# Patient Record
Sex: FEMALE | Race: WHITE | NOT HISPANIC OR LATINO | Employment: OTHER | URBAN - METROPOLITAN AREA
[De-identification: names, ages, dates, MRNs, and addresses within clinical notes are randomized per-mention and may not be internally consistent; named-entity substitution may affect disease eponyms.]

---

## 2017-01-27 DIAGNOSIS — Q23.1 BICUSPID AORTIC VALVE: ICD-10-CM

## 2017-01-27 DIAGNOSIS — R94.31 ABNORMAL EKG: Primary | ICD-10-CM

## 2017-05-02 ENCOUNTER — TRANSCRIBE ORDERS (OUTPATIENT)
Dept: ADMINISTRATIVE | Facility: HOSPITAL | Age: 67
End: 2017-05-02

## 2017-05-02 DIAGNOSIS — Z12.31 VISIT FOR SCREENING MAMMOGRAM: Primary | ICD-10-CM

## 2017-05-05 ENCOUNTER — APPOINTMENT (OUTPATIENT)
Dept: OTHER | Facility: HOSPITAL | Age: 67
End: 2017-05-05
Attending: FAMILY MEDICINE

## 2017-05-05 ENCOUNTER — HOSPITAL ENCOUNTER (OUTPATIENT)
Dept: MAMMOGRAPHY | Facility: HOSPITAL | Age: 67
Discharge: HOME | End: 2017-05-05
Attending: FAMILY MEDICINE | Admitting: FAMILY MEDICINE

## 2017-05-05 DIAGNOSIS — Z92.89 H/O MAMMOGRAM: ICD-10-CM

## 2017-05-05 DIAGNOSIS — Z12.31 VISIT FOR SCREENING MAMMOGRAM: ICD-10-CM

## 2017-05-05 PROCEDURE — 77063 BREAST TOMOSYNTHESIS BI: CPT

## 2017-05-05 PROCEDURE — 77067 SCR MAMMO BI INCL CAD: CPT | Performed by: RADIOLOGY

## 2017-05-05 PROCEDURE — 77063 BREAST TOMOSYNTHESIS BI: CPT | Performed by: RADIOLOGY

## 2017-05-05 PROCEDURE — G0202 SCR MAMMO BI INCL CAD: HCPCS

## 2017-05-10 ENCOUNTER — TRANSCRIBE ORDERS (OUTPATIENT)
Dept: ADMINISTRATIVE | Facility: HOSPITAL | Age: 67
End: 2017-05-10

## 2017-05-10 DIAGNOSIS — M25.512 LEFT SHOULDER PAIN, UNSPECIFIED CHRONICITY: Primary | ICD-10-CM

## 2017-05-15 ENCOUNTER — APPOINTMENT (OUTPATIENT)
Dept: PREADMISSION TESTING | Facility: HOSPITAL | Age: 67
End: 2017-05-15

## 2017-05-15 ENCOUNTER — HOSPITAL ENCOUNTER (OUTPATIENT)
Dept: CT IMAGING | Facility: HOSPITAL | Age: 67
Discharge: HOME | End: 2017-05-15
Attending: ORTHOPAEDIC SURGERY | Admitting: ORTHOPAEDIC SURGERY

## 2017-05-15 ENCOUNTER — APPOINTMENT (OUTPATIENT)
Dept: CT IMAGING | Facility: HOSPITAL | Age: 67
End: 2017-05-15
Attending: ORTHOPAEDIC SURGERY

## 2017-05-15 ENCOUNTER — HOSPITAL ENCOUNTER (OUTPATIENT)
Dept: GENERAL RADIOLOGY | Facility: HOSPITAL | Age: 67
Discharge: HOME | End: 2017-05-15

## 2017-05-15 VITALS — HEIGHT: 60 IN | BODY MASS INDEX: 27.05 KG/M2 | WEIGHT: 137.79 LBS

## 2017-05-15 DIAGNOSIS — M25.512 LEFT SHOULDER PAIN, UNSPECIFIED CHRONICITY: ICD-10-CM

## 2017-05-15 LAB
ALBUMIN SERPL-MCNC: 4.1 G/DL (ref 3.2–4.8)
ALBUMIN/GLOB SERPL: 1.6 G/DL (ref 1.5–2.5)
ALP SERPL-CCNC: 82 U/L (ref 25–100)
ALT SERPL W P-5'-P-CCNC: 18 U/L (ref 7–40)
ANION GAP SERPL CALCULATED.3IONS-SCNC: 2 MMOL/L (ref 3–11)
APTT PPP: 25 SECONDS (ref 24–31)
AST SERPL-CCNC: 30 U/L (ref 0–33)
BILIRUB SERPL-MCNC: 0.1 MG/DL (ref 0.3–1.2)
BUN BLD-MCNC: 14 MG/DL (ref 9–23)
BUN/CREAT SERPL: 20 (ref 7–25)
CALCIUM SPEC-SCNC: 9.6 MG/DL (ref 8.7–10.4)
CHLORIDE SERPL-SCNC: 107 MMOL/L (ref 99–109)
CO2 SERPL-SCNC: 37 MMOL/L (ref 20–31)
CREAT BLD-MCNC: 0.7 MG/DL (ref 0.6–1.3)
DEPRECATED RDW RBC AUTO: 57.4 FL (ref 37–54)
ERYTHROCYTE [DISTWIDTH] IN BLOOD BY AUTOMATED COUNT: 16.2 % (ref 11.3–14.5)
GFR SERPL CREATININE-BSD FRML MDRD: 84 ML/MIN/1.73
GLOBULIN UR ELPH-MCNC: 2.5 GM/DL
GLUCOSE BLD-MCNC: 124 MG/DL (ref 70–100)
HBA1C MFR BLD: 4.9 % (ref 4.8–5.6)
HCT VFR BLD AUTO: 43.9 % (ref 34.5–44)
HGB BLD-MCNC: 13.7 G/DL (ref 11.5–15.5)
INR PPP: 1.04
MCH RBC QN AUTO: 30.6 PG (ref 27–31)
MCHC RBC AUTO-ENTMCNC: 31.2 G/DL (ref 32–36)
MCV RBC AUTO: 98 FL (ref 80–99)
PLATELET # BLD AUTO: 172 10*3/MM3 (ref 150–450)
PMV BLD AUTO: 9.2 FL (ref 6–12)
POTASSIUM BLD-SCNC: 4.2 MMOL/L (ref 3.5–5.5)
PROT SERPL-MCNC: 6.6 G/DL (ref 5.7–8.2)
PROTHROMBIN TIME: 11.4 SECONDS (ref 9.6–11.5)
RBC # BLD AUTO: 4.48 10*6/MM3 (ref 3.89–5.14)
SODIUM BLD-SCNC: 146 MMOL/L (ref 132–146)
WBC NRBC COR # BLD: 5.74 10*3/MM3 (ref 3.5–10.8)

## 2017-05-15 PROCEDURE — 85610 PROTHROMBIN TIME: CPT | Performed by: ORTHOPAEDIC SURGERY

## 2017-05-15 PROCEDURE — 80053 COMPREHEN METABOLIC PANEL: CPT | Performed by: ORTHOPAEDIC SURGERY

## 2017-05-15 PROCEDURE — 85027 COMPLETE CBC AUTOMATED: CPT | Performed by: ORTHOPAEDIC SURGERY

## 2017-05-15 PROCEDURE — 71020 HC CHEST PA AND LATERAL: CPT

## 2017-05-15 PROCEDURE — 73200 CT UPPER EXTREMITY W/O DYE: CPT

## 2017-05-15 PROCEDURE — 36415 COLL VENOUS BLD VENIPUNCTURE: CPT

## 2017-05-15 PROCEDURE — 83036 HEMOGLOBIN GLYCOSYLATED A1C: CPT | Performed by: ORTHOPAEDIC SURGERY

## 2017-05-15 PROCEDURE — 85730 THROMBOPLASTIN TIME PARTIAL: CPT | Performed by: ORTHOPAEDIC SURGERY

## 2017-05-15 RX ORDER — VITAMIN E 268 MG
800 CAPSULE ORAL DAILY
COMMUNITY

## 2017-05-15 RX ORDER — LEVOTHYROXINE SODIUM 88 UG/1
88 TABLET ORAL DAILY
COMMUNITY
End: 2021-04-05

## 2017-05-15 RX ORDER — TRIAMCINOLONE ACETONIDE 55 UG/1
2 SPRAY, METERED NASAL DAILY
COMMUNITY
End: 2017-08-30

## 2017-05-15 RX ORDER — CETIRIZINE HYDROCHLORIDE 10 MG/1
10 TABLET ORAL DAILY PRN
COMMUNITY
End: 2018-03-20

## 2017-05-17 ENCOUNTER — HOSPITAL ENCOUNTER (OUTPATIENT)
Dept: MAMMOGRAPHY | Facility: HOSPITAL | Age: 67
Discharge: HOME | End: 2017-05-17
Admitting: FAMILY MEDICINE

## 2017-05-17 DIAGNOSIS — R92.8 ABNORMAL MAMMOGRAM: ICD-10-CM

## 2017-05-17 PROCEDURE — G0279 TOMOSYNTHESIS, MAMMO: HCPCS

## 2017-05-17 PROCEDURE — G0204 DX MAMMO INCL CAD BI: HCPCS

## 2017-05-17 PROCEDURE — 77062 BREAST TOMOSYNTHESIS BI: CPT | Performed by: RADIOLOGY

## 2017-05-17 PROCEDURE — 77066 DX MAMMO INCL CAD BI: CPT | Performed by: RADIOLOGY

## 2017-05-18 DIAGNOSIS — I35.9 AORTIC VALVE DEFECT: Primary | ICD-10-CM

## 2017-05-19 ENCOUNTER — ANESTHESIA EVENT (OUTPATIENT)
Dept: PERIOP | Facility: HOSPITAL | Age: 67
End: 2017-05-19

## 2017-05-19 RX ORDER — SODIUM CHLORIDE 0.9 % (FLUSH) 0.9 %
1-10 SYRINGE (ML) INJECTION AS NEEDED
Status: CANCELLED | OUTPATIENT
Start: 2017-05-19

## 2017-05-19 RX ORDER — FAMOTIDINE 10 MG/ML
20 INJECTION, SOLUTION INTRAVENOUS ONCE
Status: CANCELLED | OUTPATIENT
Start: 2017-05-19 | End: 2017-05-19

## 2017-05-22 ENCOUNTER — HOSPITAL ENCOUNTER (INPATIENT)
Facility: HOSPITAL | Age: 67
LOS: 1 days | Discharge: HOME | End: 2017-05-23
Attending: ORTHOPAEDIC SURGERY | Admitting: ORTHOPAEDIC SURGERY

## 2017-05-22 ENCOUNTER — ANESTHESIA (OUTPATIENT)
Dept: PERIOP | Facility: HOSPITAL | Age: 67
End: 2017-05-22

## 2017-05-22 DIAGNOSIS — Z74.09 IMPAIRED MOBILITY AND ADLS: ICD-10-CM

## 2017-05-22 DIAGNOSIS — Z78.9 IMPAIRED MOBILITY AND ADLS: ICD-10-CM

## 2017-05-22 DIAGNOSIS — Z74.09 IMPAIRED FUNCTIONAL MOBILITY, BALANCE, GAIT, AND ENDURANCE: Primary | ICD-10-CM

## 2017-05-22 PROBLEM — E03.9 HYPOTHYROID: Status: ACTIVE | Noted: 2017-05-22

## 2017-05-22 PROBLEM — M19.019 ARTHROPATHY OF SHOULDER REGION: Status: ACTIVE | Noted: 2017-05-22

## 2017-05-22 PROBLEM — Z96.612 STATUS POST REVERSE TOTAL REPLACEMENT OF LEFT SHOULDER: Status: ACTIVE | Noted: 2017-05-22

## 2017-05-22 LAB
ABO GROUP BLD: NORMAL
BLD GP AB SCN SERPL QL: NEGATIVE
GLUCOSE BLDC GLUCOMTR-MCNC: 134 MG/DL (ref 70–130)
RH BLD: POSITIVE

## 2017-05-22 PROCEDURE — 0PRD0JZ REPLACEMENT OF LEFT HUMERAL HEAD WITH SYNTHETIC SUBSTITUTE, OPEN APPROACH: ICD-10-PCS | Performed by: ORTHOPAEDIC SURGERY

## 2017-05-22 PROCEDURE — 25010000002 PROPOFOL 10 MG/ML EMULSION: Performed by: NURSE ANESTHETIST, CERTIFIED REGISTERED

## 2017-05-22 PROCEDURE — 82962 GLUCOSE BLOOD TEST: CPT

## 2017-05-22 PROCEDURE — 25010000003 CEFAZOLIN IN DEXTROSE 2-4 GM/100ML-% SOLUTION: Performed by: ORTHOPAEDIC SURGERY

## 2017-05-22 PROCEDURE — 25010000002 HYDROCORTISONE SODIUM SUCCINATE 100 MG RECONSTITUTED SOLUTION: Performed by: NURSE PRACTITIONER

## 2017-05-22 PROCEDURE — 25010000002 MIDAZOLAM PER 1 MG: Performed by: NURSE ANESTHETIST, CERTIFIED REGISTERED

## 2017-05-22 PROCEDURE — 86920 COMPATIBILITY TEST SPIN: CPT

## 2017-05-22 PROCEDURE — 25010000002 ROPIVACAINE PER 1 MG: Performed by: NURSE ANESTHETIST, CERTIFIED REGISTERED

## 2017-05-22 PROCEDURE — 25010000002 NEOSTIGMINE PER 0.5 MG: Performed by: NURSE ANESTHETIST, CERTIFIED REGISTERED

## 2017-05-22 PROCEDURE — 25010000002 HYDROCORTISONE SODIUM SUCCINATE 100 MG RECONSTITUTED SOLUTION: Performed by: NURSE ANESTHETIST, CERTIFIED REGISTERED

## 2017-05-22 PROCEDURE — C1713 ANCHOR/SCREW BN/BN,TIS/BN: HCPCS | Performed by: ORTHOPAEDIC SURGERY

## 2017-05-22 PROCEDURE — C1776 JOINT DEVICE (IMPLANTABLE): HCPCS | Performed by: ORTHOPAEDIC SURGERY

## 2017-05-22 PROCEDURE — 25010000002 ONDANSETRON PER 1 MG: Performed by: NURSE ANESTHETIST, CERTIFIED REGISTERED

## 2017-05-22 PROCEDURE — 0RRK00Z REPLACEMENT OF LEFT SHOULDER JOINT WITH REVERSE BALL AND SOCKET SYNTHETIC SUBSTITUTE, OPEN APPROACH: ICD-10-PCS | Performed by: ORTHOPAEDIC SURGERY

## 2017-05-22 PROCEDURE — 86901 BLOOD TYPING SEROLOGIC RH(D): CPT | Performed by: ORTHOPAEDIC SURGERY

## 2017-05-22 PROCEDURE — 86900 BLOOD TYPING SEROLOGIC ABO: CPT | Performed by: ORTHOPAEDIC SURGERY

## 2017-05-22 PROCEDURE — 25010000002 FENTANYL CITRATE (PF) 100 MCG/2ML SOLUTION: Performed by: NURSE ANESTHETIST, CERTIFIED REGISTERED

## 2017-05-22 PROCEDURE — 86850 RBC ANTIBODY SCREEN: CPT | Performed by: ORTHOPAEDIC SURGERY

## 2017-05-22 PROCEDURE — 0PHD04Z INSERTION OF INTERNAL FIXATION DEVICE INTO LEFT HUMERAL HEAD, OPEN APPROACH: ICD-10-PCS | Performed by: ORTHOPAEDIC SURGERY

## 2017-05-22 DEVICE — IMPLANTABLE DEVICE: Type: IMPLANTABLE DEVICE | Site: SHOULDER | Status: FUNCTIONAL

## 2017-05-22 DEVICE — SCRW GLEN UNIVERS REVERS CENTRL NL 6.5X20MM: Type: IMPLANTABLE DEVICE | Site: SHOULDER | Status: FUNCTIONAL

## 2017-05-22 DEVICE — CUP SUT UNIVERS REVERS 36 NTRL: Type: IMPLANTABLE DEVICE | Site: SHOULDER | Status: FUNCTIONAL

## 2017-05-22 DEVICE — SCRW GLEN UNIVERS REVERS PERIPH 4.5X36MM: Type: IMPLANTABLE DEVICE | Site: SHOULDER | Status: FUNCTIONAL

## 2017-05-22 DEVICE — SCRW GLEN UNIVERS REVERS PERIPH 4.5X30MM: Type: IMPLANTABLE DEVICE | Site: SHOULDER | Status: FUNCTIONAL

## 2017-05-22 DEVICE — GLENOSPHERE UNIVERS REVERS S/36 PLS4 LAT: Type: IMPLANTABLE DEVICE | Site: SHOULDER | Status: FUNCTIONAL

## 2017-05-22 DEVICE — STEM HUM/SHLDR UNIVERS REVERS CAP/COAT SZ7: Type: IMPLANTABLE DEVICE | Site: SHOULDER | Status: FUNCTIONAL

## 2017-05-22 RX ORDER — LEFLUNOMIDE 10 MG/1
20 TABLET ORAL DAILY
Status: DISCONTINUED | OUTPATIENT
Start: 2017-05-22 | End: 2017-05-23 | Stop reason: HOSPADM

## 2017-05-22 RX ORDER — LEVOTHYROXINE SODIUM 88 UG/1
88 TABLET ORAL DAILY
Status: DISCONTINUED | OUTPATIENT
Start: 2017-05-22 | End: 2017-05-23 | Stop reason: HOSPADM

## 2017-05-22 RX ORDER — HYDRALAZINE HYDROCHLORIDE 20 MG/ML
10 INJECTION INTRAMUSCULAR; INTRAVENOUS EVERY 6 HOURS PRN
Status: DISCONTINUED | OUTPATIENT
Start: 2017-05-22 | End: 2017-05-23 | Stop reason: HOSPADM

## 2017-05-22 RX ORDER — HYDROCODONE BITARTRATE AND ACETAMINOPHEN 5; 325 MG/1; MG/1
1 TABLET ORAL ONCE AS NEEDED
Status: DISCONTINUED | OUTPATIENT
Start: 2017-05-22 | End: 2017-05-22 | Stop reason: HOSPADM

## 2017-05-22 RX ORDER — ACETAMINOPHEN 325 MG/1
650 TABLET ORAL ONCE AS NEEDED
Status: DISCONTINUED | OUTPATIENT
Start: 2017-05-22 | End: 2017-05-22 | Stop reason: HOSPADM

## 2017-05-22 RX ORDER — GLYCOPYRROLATE 0.2 MG/ML
INJECTION INTRAMUSCULAR; INTRAVENOUS AS NEEDED
Status: DISCONTINUED | OUTPATIENT
Start: 2017-05-22 | End: 2017-05-22 | Stop reason: SURG

## 2017-05-22 RX ORDER — FENTANYL CITRATE 50 UG/ML
50 INJECTION, SOLUTION INTRAMUSCULAR; INTRAVENOUS
Status: DISCONTINUED | OUTPATIENT
Start: 2017-05-22 | End: 2017-05-22 | Stop reason: HOSPADM

## 2017-05-22 RX ORDER — DOCUSATE SODIUM 100 MG/1
100 CAPSULE, LIQUID FILLED ORAL 2 TIMES DAILY PRN
Status: DISCONTINUED | OUTPATIENT
Start: 2017-05-22 | End: 2017-05-23 | Stop reason: HOSPADM

## 2017-05-22 RX ORDER — CETIRIZINE HYDROCHLORIDE 10 MG/1
10 TABLET ORAL DAILY PRN
Status: DISCONTINUED | OUTPATIENT
Start: 2017-05-22 | End: 2017-05-23 | Stop reason: HOSPADM

## 2017-05-22 RX ORDER — SODIUM CHLORIDE, SODIUM LACTATE, POTASSIUM CHLORIDE, CALCIUM CHLORIDE 600; 310; 30; 20 MG/100ML; MG/100ML; MG/100ML; MG/100ML
9 INJECTION, SOLUTION INTRAVENOUS CONTINUOUS
Status: DISCONTINUED | OUTPATIENT
Start: 2017-05-22 | End: 2017-05-23 | Stop reason: HOSPADM

## 2017-05-22 RX ORDER — LIDOCAINE HYDROCHLORIDE 10 MG/ML
INJECTION, SOLUTION INFILTRATION; PERINEURAL AS NEEDED
Status: DISCONTINUED | OUTPATIENT
Start: 2017-05-22 | End: 2017-05-22 | Stop reason: SURG

## 2017-05-22 RX ORDER — OXYCODONE AND ACETAMINOPHEN 7.5; 325 MG/1; MG/1
2 TABLET ORAL EVERY 4 HOURS PRN
Status: DISCONTINUED | OUTPATIENT
Start: 2017-05-22 | End: 2017-05-23 | Stop reason: HOSPADM

## 2017-05-22 RX ORDER — LIDOCAINE HYDROCHLORIDE 10 MG/ML
0.5 INJECTION, SOLUTION EPIDURAL; INFILTRATION; INTRACAUDAL; PERINEURAL ONCE AS NEEDED
Status: COMPLETED | OUTPATIENT
Start: 2017-05-22 | End: 2017-05-22

## 2017-05-22 RX ORDER — BUPIVACAINE HYDROCHLORIDE 2.5 MG/ML
INJECTION, SOLUTION EPIDURAL; INFILTRATION; INTRACAUDAL AS NEEDED
Status: DISCONTINUED | OUTPATIENT
Start: 2017-05-22 | End: 2017-05-22 | Stop reason: SURG

## 2017-05-22 RX ORDER — CEFAZOLIN SODIUM 2 G/100ML
2 INJECTION, SOLUTION INTRAVENOUS ONCE
Status: COMPLETED | OUTPATIENT
Start: 2017-05-22 | End: 2017-05-22

## 2017-05-22 RX ORDER — NALOXONE HCL 0.4 MG/ML
0.1 VIAL (ML) INJECTION
Status: DISCONTINUED | OUTPATIENT
Start: 2017-05-22 | End: 2017-05-23 | Stop reason: HOSPADM

## 2017-05-22 RX ORDER — ONDANSETRON 2 MG/ML
4 INJECTION INTRAMUSCULAR; INTRAVENOUS EVERY 6 HOURS PRN
Status: DISCONTINUED | OUTPATIENT
Start: 2017-05-22 | End: 2017-05-23 | Stop reason: HOSPADM

## 2017-05-22 RX ORDER — ONDANSETRON 2 MG/ML
4 INJECTION INTRAMUSCULAR; INTRAVENOUS EVERY 6 HOURS PRN
Status: DISCONTINUED | OUTPATIENT
Start: 2017-05-22 | End: 2017-05-22 | Stop reason: SDUPTHER

## 2017-05-22 RX ORDER — FAMOTIDINE 20 MG/1
20 TABLET, FILM COATED ORAL ONCE
Status: COMPLETED | OUTPATIENT
Start: 2017-05-22 | End: 2017-05-22

## 2017-05-22 RX ORDER — ZOLPIDEM TARTRATE 5 MG/1
10 TABLET ORAL NIGHTLY
Status: DISCONTINUED | OUTPATIENT
Start: 2017-05-22 | End: 2017-05-23 | Stop reason: HOSPADM

## 2017-05-22 RX ORDER — PROPOFOL 10 MG/ML
VIAL (ML) INTRAVENOUS AS NEEDED
Status: DISCONTINUED | OUTPATIENT
Start: 2017-05-22 | End: 2017-05-22 | Stop reason: SURG

## 2017-05-22 RX ORDER — HYDROXYCHLOROQUINE SULFATE 200 MG/1
200 TABLET, FILM COATED ORAL DAILY
Status: DISCONTINUED | OUTPATIENT
Start: 2017-05-22 | End: 2017-05-23 | Stop reason: HOSPADM

## 2017-05-22 RX ORDER — SODIUM CHLORIDE, SODIUM LACTATE, POTASSIUM CHLORIDE, CALCIUM CHLORIDE 600; 310; 30; 20 MG/100ML; MG/100ML; MG/100ML; MG/100ML
INJECTION, SOLUTION INTRAVENOUS CONTINUOUS PRN
Status: DISCONTINUED | OUTPATIENT
Start: 2017-05-22 | End: 2017-05-22 | Stop reason: SURG

## 2017-05-22 RX ORDER — ONDANSETRON 2 MG/ML
4 INJECTION INTRAMUSCULAR; INTRAVENOUS ONCE AS NEEDED
Status: DISCONTINUED | OUTPATIENT
Start: 2017-05-22 | End: 2017-05-22 | Stop reason: HOSPADM

## 2017-05-22 RX ORDER — ATRACURIUM BESYLATE 10 MG/ML
INJECTION, SOLUTION INTRAVENOUS AS NEEDED
Status: DISCONTINUED | OUTPATIENT
Start: 2017-05-22 | End: 2017-05-22 | Stop reason: SURG

## 2017-05-22 RX ORDER — MAGNESIUM HYDROXIDE 1200 MG/15ML
LIQUID ORAL AS NEEDED
Status: DISCONTINUED | OUTPATIENT
Start: 2017-05-22 | End: 2017-05-22 | Stop reason: HOSPADM

## 2017-05-22 RX ORDER — PANTOPRAZOLE SODIUM 40 MG/1
40 TABLET, DELAYED RELEASE ORAL
Status: DISCONTINUED | OUTPATIENT
Start: 2017-05-22 | End: 2017-05-23 | Stop reason: HOSPADM

## 2017-05-22 RX ORDER — MIDAZOLAM HYDROCHLORIDE 1 MG/ML
INJECTION INTRAMUSCULAR; INTRAVENOUS AS NEEDED
Status: DISCONTINUED | OUTPATIENT
Start: 2017-05-22 | End: 2017-05-22 | Stop reason: SURG

## 2017-05-22 RX ORDER — PROPOFOL 10 MG/ML
VIAL (ML) INTRAVENOUS CONTINUOUS PRN
Status: DISCONTINUED | OUTPATIENT
Start: 2017-05-22 | End: 2017-05-22 | Stop reason: SURG

## 2017-05-22 RX ORDER — ROPIVACAINE HYDROCHLORIDE 2 MG/ML
6 INJECTION, SOLUTION EPIDURAL; INFILTRATION CONTINUOUS
Status: DISCONTINUED | OUTPATIENT
Start: 2017-05-22 | End: 2017-05-23 | Stop reason: HOSPADM

## 2017-05-22 RX ORDER — OXYCODONE AND ACETAMINOPHEN 7.5; 325 MG/1; MG/1
1 TABLET ORAL EVERY 4 HOURS PRN
Status: DISCONTINUED | OUTPATIENT
Start: 2017-05-22 | End: 2017-05-23 | Stop reason: HOSPADM

## 2017-05-22 RX ORDER — CEFAZOLIN SODIUM 2 G/100ML
2 INJECTION, SOLUTION INTRAVENOUS EVERY 8 HOURS
Status: COMPLETED | OUTPATIENT
Start: 2017-05-22 | End: 2017-05-22

## 2017-05-22 RX ORDER — DULOXETIN HYDROCHLORIDE 60 MG/1
60 CAPSULE, DELAYED RELEASE ORAL DAILY
Status: DISCONTINUED | OUTPATIENT
Start: 2017-05-22 | End: 2017-05-23 | Stop reason: HOSPADM

## 2017-05-22 RX ORDER — ONDANSETRON 2 MG/ML
INJECTION INTRAMUSCULAR; INTRAVENOUS AS NEEDED
Status: DISCONTINUED | OUTPATIENT
Start: 2017-05-22 | End: 2017-05-22 | Stop reason: SURG

## 2017-05-22 RX ORDER — ACETAMINOPHEN 650 MG/1
650 SUPPOSITORY RECTAL ONCE AS NEEDED
Status: DISCONTINUED | OUTPATIENT
Start: 2017-05-22 | End: 2017-05-22 | Stop reason: HOSPADM

## 2017-05-22 RX ORDER — PREDNISONE 1 MG/1
5 TABLET ORAL DAILY
Status: DISCONTINUED | OUTPATIENT
Start: 2017-05-23 | End: 2017-05-23 | Stop reason: HOSPADM

## 2017-05-22 RX ADMIN — EPHEDRINE SULFATE 10 MG: 50 INJECTION INTRAMUSCULAR; INTRAVENOUS; SUBCUTANEOUS at 08:50

## 2017-05-22 RX ADMIN — ROBINUL 0.4 MG: 0.2 INJECTION INTRAMUSCULAR; INTRAVENOUS at 08:53

## 2017-05-22 RX ADMIN — ZOLPIDEM TARTRATE 10 MG: 5 TABLET, FILM COATED ORAL at 22:36

## 2017-05-22 RX ADMIN — SODIUM CHLORIDE, POTASSIUM CHLORIDE, SODIUM LACTATE AND CALCIUM CHLORIDE 9 ML/HR: 600; 310; 30; 20 INJECTION, SOLUTION INTRAVENOUS at 06:25

## 2017-05-22 RX ADMIN — HYDROCORTISONE SODIUM SUCCINATE 100 MG: 100 INJECTION, POWDER, FOR SOLUTION INTRAMUSCULAR; INTRAVENOUS at 07:53

## 2017-05-22 RX ADMIN — SODIUM CHLORIDE, POTASSIUM CHLORIDE, SODIUM LACTATE AND CALCIUM CHLORIDE: 600; 310; 30; 20 INJECTION, SOLUTION INTRAVENOUS at 07:39

## 2017-05-22 RX ADMIN — LIDOCAINE HYDROCHLORIDE 50 MG: 10 INJECTION, SOLUTION INFILTRATION; PERINEURAL at 07:45

## 2017-05-22 RX ADMIN — LEFLUNOMIDE 20 MG: 10 TABLET, FILM COATED ORAL at 12:34

## 2017-05-22 RX ADMIN — EPHEDRINE SULFATE 20 MG: 50 INJECTION INTRAMUSCULAR; INTRAVENOUS; SUBCUTANEOUS at 08:48

## 2017-05-22 RX ADMIN — FENTANYL CITRATE 50 MCG: 50 INJECTION INTRAMUSCULAR; INTRAVENOUS at 09:40

## 2017-05-22 RX ADMIN — LEVOTHYROXINE SODIUM 88 MCG: 88 TABLET ORAL at 12:34

## 2017-05-22 RX ADMIN — DULOXETINE 60 MG: 60 CAPSULE, DELAYED RELEASE ORAL at 12:34

## 2017-05-22 RX ADMIN — CEFAZOLIN SODIUM 2 G: 2 INJECTION, SOLUTION INTRAVENOUS at 07:42

## 2017-05-22 RX ADMIN — SODIUM CHLORIDE, POTASSIUM CHLORIDE, SODIUM LACTATE AND CALCIUM CHLORIDE: 600; 310; 30; 20 INJECTION, SOLUTION INTRAVENOUS at 08:48

## 2017-05-22 RX ADMIN — PROPOFOL 111 MCG/KG/MIN: 10 INJECTION, EMULSION INTRAVENOUS at 07:45

## 2017-05-22 RX ADMIN — PANTOPRAZOLE SODIUM 40 MG: 40 TABLET, DELAYED RELEASE ORAL at 12:34

## 2017-05-22 RX ADMIN — PROPOFOL 150 MG: 10 INJECTION, EMULSION INTRAVENOUS at 07:45

## 2017-05-22 RX ADMIN — Medication 3 MG: at 08:53

## 2017-05-22 RX ADMIN — OXYCODONE HYDROCHLORIDE AND ACETAMINOPHEN 1 TABLET: 7.5; 325 TABLET ORAL at 22:36

## 2017-05-22 RX ADMIN — ONDANSETRON 4 MG: 2 INJECTION INTRAMUSCULAR; INTRAVENOUS at 08:53

## 2017-05-22 RX ADMIN — LIDOCAINE HYDROCHLORIDE 0.2 ML: 10 INJECTION, SOLUTION EPIDURAL; INFILTRATION; INTRACAUDAL; PERINEURAL at 06:25

## 2017-05-22 RX ADMIN — HYDROCORTISONE SODIUM SUCCINATE 50 MG: 100 INJECTION, POWDER, FOR SOLUTION INTRAMUSCULAR; INTRAVENOUS at 17:54

## 2017-05-22 RX ADMIN — MIDAZOLAM HYDROCHLORIDE 2 MG: 1 INJECTION, SOLUTION INTRAMUSCULAR; INTRAVENOUS at 07:08

## 2017-05-22 RX ADMIN — BUPIVACAINE HYDROCHLORIDE 15 ML: 2.5 INJECTION, SOLUTION EPIDURAL; INFILTRATION; INTRACAUDAL; PERINEURAL at 07:18

## 2017-05-22 RX ADMIN — MUPIROCIN: 20 OINTMENT TOPICAL at 06:33

## 2017-05-22 RX ADMIN — FENTANYL CITRATE 50 MCG: 50 INJECTION INTRAMUSCULAR; INTRAVENOUS at 10:05

## 2017-05-22 RX ADMIN — HYDROXYCHLOROQUINE SULFATE 200 MG: 200 TABLET, FILM COATED ORAL at 12:34

## 2017-05-22 RX ADMIN — FAMOTIDINE 20 MG: 20 TABLET ORAL at 06:33

## 2017-05-22 RX ADMIN — OXYCODONE HYDROCHLORIDE AND ACETAMINOPHEN 1 TABLET: 7.5; 325 TABLET ORAL at 17:54

## 2017-05-22 RX ADMIN — Medication 6 ML/HR: at 09:15

## 2017-05-22 RX ADMIN — ATRACURIUM BESYLATE 50 MG: 10 INJECTION, SOLUTION INTRAVENOUS at 07:45

## 2017-05-22 RX ADMIN — CEFAZOLIN SODIUM 2 G: 2 INJECTION, SOLUTION INTRAVENOUS at 22:36

## 2017-05-22 RX ADMIN — CEFAZOLIN SODIUM 2 G: 2 INJECTION, SOLUTION INTRAVENOUS at 15:56

## 2017-05-23 VITALS
OXYGEN SATURATION: 96 % | HEIGHT: 60 IN | RESPIRATION RATE: 16 BRPM | SYSTOLIC BLOOD PRESSURE: 127 MMHG | DIASTOLIC BLOOD PRESSURE: 70 MMHG | HEART RATE: 62 BPM | TEMPERATURE: 98 F | BODY MASS INDEX: 26.9 KG/M2 | WEIGHT: 137 LBS

## 2017-05-23 PROBLEM — N99.89 POSTOPERATIVE URINARY RETENTION: Status: ACTIVE | Noted: 2017-05-23

## 2017-05-23 PROBLEM — D62 ACUTE BLOOD LOSS ANEMIA: Status: ACTIVE | Noted: 2017-05-23

## 2017-05-23 PROBLEM — R33.8 POSTOPERATIVE URINARY RETENTION: Status: ACTIVE | Noted: 2017-05-23

## 2017-05-23 LAB
ANION GAP SERPL CALCULATED.3IONS-SCNC: 8 MMOL/L (ref 3–11)
BASOPHILS # BLD AUTO: 0.02 10*3/MM3 (ref 0–0.2)
BASOPHILS NFR BLD AUTO: 0.2 % (ref 0–1)
BUN BLD-MCNC: 13 MG/DL (ref 9–23)
BUN/CREAT SERPL: 21.7 (ref 7–25)
CALCIUM SPEC-SCNC: 8.8 MG/DL (ref 8.7–10.4)
CHLORIDE SERPL-SCNC: 101 MMOL/L (ref 99–109)
CO2 SERPL-SCNC: 30 MMOL/L (ref 20–31)
CREAT BLD-MCNC: 0.6 MG/DL (ref 0.6–1.3)
DEPRECATED RDW RBC AUTO: 57.9 FL (ref 37–54)
EOSINOPHIL # BLD AUTO: 0.02 10*3/MM3 (ref 0.1–0.3)
EOSINOPHIL NFR BLD AUTO: 0.2 % (ref 0–3)
ERYTHROCYTE [DISTWIDTH] IN BLOOD BY AUTOMATED COUNT: 16.2 % (ref 11.3–14.5)
GFR SERPL CREATININE-BSD FRML MDRD: 101 ML/MIN/1.73
GLUCOSE BLD-MCNC: 101 MG/DL (ref 70–100)
HCT VFR BLD AUTO: 36.2 % (ref 34.5–44)
HGB BLD-MCNC: 11.2 G/DL (ref 11.5–15.5)
IMM GRANULOCYTES # BLD: 0.02 10*3/MM3 (ref 0–0.03)
IMM GRANULOCYTES NFR BLD: 0.2 % (ref 0–0.6)
LYMPHOCYTES # BLD AUTO: 1.13 10*3/MM3 (ref 0.6–4.8)
LYMPHOCYTES NFR BLD AUTO: 13.8 % (ref 24–44)
MCH RBC QN AUTO: 30.6 PG (ref 27–31)
MCHC RBC AUTO-ENTMCNC: 30.9 G/DL (ref 32–36)
MCV RBC AUTO: 98.9 FL (ref 80–99)
MONOCYTES # BLD AUTO: 1.03 10*3/MM3 (ref 0–1)
MONOCYTES NFR BLD AUTO: 12.5 % (ref 0–12)
NEUTROPHILS # BLD AUTO: 5.99 10*3/MM3 (ref 1.5–8.3)
NEUTROPHILS NFR BLD AUTO: 73.1 % (ref 41–71)
PLATELET # BLD AUTO: 158 10*3/MM3 (ref 150–450)
PMV BLD AUTO: 9.4 FL (ref 6–12)
POTASSIUM BLD-SCNC: 3.5 MMOL/L (ref 3.5–5.5)
RBC # BLD AUTO: 3.66 10*6/MM3 (ref 3.89–5.14)
SODIUM BLD-SCNC: 139 MMOL/L (ref 132–146)
WBC NRBC COR # BLD: 8.21 10*3/MM3 (ref 3.5–10.8)

## 2017-05-23 PROCEDURE — 80048 BASIC METABOLIC PNL TOTAL CA: CPT | Performed by: ORTHOPAEDIC SURGERY

## 2017-05-23 PROCEDURE — 97530 THERAPEUTIC ACTIVITIES: CPT | Performed by: OCCUPATIONAL THERAPIST

## 2017-05-23 PROCEDURE — 25010000002 HYDROMORPHONE PER 4 MG: Performed by: ORTHOPAEDIC SURGERY

## 2017-05-23 PROCEDURE — 25010000002 HYDROCORTISONE SODIUM SUCCINATE 100 MG RECONSTITUTED SOLUTION: Performed by: NURSE PRACTITIONER

## 2017-05-23 PROCEDURE — 25010000002 ONDANSETRON PER 1 MG: Performed by: NURSE PRACTITIONER

## 2017-05-23 PROCEDURE — 63710000001 PREDNISONE PER 5 MG: Performed by: ORTHOPAEDIC SURGERY

## 2017-05-23 PROCEDURE — 97165 OT EVAL LOW COMPLEX 30 MIN: CPT | Performed by: OCCUPATIONAL THERAPIST

## 2017-05-23 PROCEDURE — 85025 COMPLETE CBC W/AUTO DIFF WBC: CPT | Performed by: ORTHOPAEDIC SURGERY

## 2017-05-23 PROCEDURE — 97161 PT EVAL LOW COMPLEX 20 MIN: CPT

## 2017-05-23 RX ORDER — TAMSULOSIN HYDROCHLORIDE 0.4 MG/1
0.4 CAPSULE ORAL DAILY
Status: DISCONTINUED | OUTPATIENT
Start: 2017-05-23 | End: 2017-05-23 | Stop reason: HOSPADM

## 2017-05-23 RX ORDER — LEFLUNOMIDE 20 MG/1
20 TABLET ORAL DAILY
Status: ON HOLD
Start: 2017-05-23 | End: 2018-04-05

## 2017-05-23 RX ORDER — TAMSULOSIN HYDROCHLORIDE 0.4 MG/1
0.4 CAPSULE ORAL DAILY
Qty: 14 CAPSULE | Refills: 0 | Status: SHIPPED | OUTPATIENT
Start: 2017-05-23 | End: 2017-08-30

## 2017-05-23 RX ORDER — PSEUDOEPHEDRINE HCL 30 MG
100 TABLET ORAL 2 TIMES DAILY PRN
Qty: 60 CAPSULE | Refills: 0 | Status: SHIPPED | OUTPATIENT
Start: 2017-05-23 | End: 2017-08-30

## 2017-05-23 RX ORDER — ROPIVACAINE HYDROCHLORIDE 2 MG/ML
6 INJECTION, SOLUTION EPIDURAL; INFILTRATION CONTINUOUS
Start: 2017-05-23 | End: 2017-08-30

## 2017-05-23 RX ORDER — OXYCODONE AND ACETAMINOPHEN 7.5; 325 MG/1; MG/1
1 TABLET ORAL EVERY 4 HOURS PRN
Refills: 0
Start: 2017-05-23 | End: 2017-06-01

## 2017-05-23 RX ADMIN — PANTOPRAZOLE SODIUM 40 MG: 40 TABLET, DELAYED RELEASE ORAL at 04:42

## 2017-05-23 RX ADMIN — OXYCODONE HYDROCHLORIDE AND ACETAMINOPHEN 1 TABLET: 7.5; 325 TABLET ORAL at 00:34

## 2017-05-23 RX ADMIN — PREDNISONE 5 MG: 5 TABLET ORAL at 08:54

## 2017-05-23 RX ADMIN — OXYCODONE HYDROCHLORIDE AND ACETAMINOPHEN 1 TABLET: 7.5; 325 TABLET ORAL at 13:58

## 2017-05-23 RX ADMIN — ONDANSETRON 4 MG: 2 INJECTION INTRAMUSCULAR; INTRAVENOUS at 02:25

## 2017-05-23 RX ADMIN — HYDROMORPHONE HYDROCHLORIDE 0.5 MG: 1 INJECTION, SOLUTION INTRAMUSCULAR; INTRAVENOUS; SUBCUTANEOUS at 02:25

## 2017-05-23 RX ADMIN — TAMSULOSIN HYDROCHLORIDE 0.4 MG: 0.4 CAPSULE ORAL at 11:05

## 2017-05-23 RX ADMIN — OXYCODONE HYDROCHLORIDE AND ACETAMINOPHEN 1 TABLET: 7.5; 325 TABLET ORAL at 04:42

## 2017-05-23 RX ADMIN — LEVOTHYROXINE SODIUM 88 MCG: 88 TABLET ORAL at 08:54

## 2017-05-23 RX ADMIN — HYDROCORTISONE SODIUM SUCCINATE 50 MG: 100 INJECTION, POWDER, FOR SOLUTION INTRAMUSCULAR; INTRAVENOUS at 08:53

## 2017-05-23 RX ADMIN — HYDROXYCHLOROQUINE SULFATE 200 MG: 200 TABLET, FILM COATED ORAL at 08:54

## 2017-05-23 RX ADMIN — DULOXETINE 60 MG: 60 CAPSULE, DELAYED RELEASE ORAL at 08:54

## 2017-05-26 LAB
ABO + RH BLD: NORMAL
ABO + RH BLD: NORMAL
BH BB BLOOD EXPIRATION DATE: NORMAL
BH BB BLOOD EXPIRATION DATE: NORMAL
BH BB BLOOD TYPE BARCODE: 5100
BH BB BLOOD TYPE BARCODE: 5100
BH BB DISPENSE STATUS: NORMAL
BH BB DISPENSE STATUS: NORMAL
BH BB PRODUCT CODE: NORMAL
BH BB PRODUCT CODE: NORMAL
BH BB UNIT NUMBER: NORMAL
BH BB UNIT NUMBER: NORMAL
CROSSMATCH INTERPRETATION: NORMAL
CROSSMATCH INTERPRETATION: NORMAL
UNIT  ABO: NORMAL
UNIT  ABO: NORMAL
UNIT  RH: NORMAL
UNIT  RH: NORMAL

## 2017-08-30 ENCOUNTER — CONSULT (OUTPATIENT)
Dept: CARDIOLOGY | Facility: CLINIC | Age: 67
End: 2017-08-30

## 2017-08-30 VITALS
WEIGHT: 136.4 LBS | DIASTOLIC BLOOD PRESSURE: 78 MMHG | SYSTOLIC BLOOD PRESSURE: 124 MMHG | HEART RATE: 82 BPM | BODY MASS INDEX: 26.78 KG/M2 | HEIGHT: 60 IN

## 2017-08-30 DIAGNOSIS — Q23.1 BICUSPID AORTIC VALVE: ICD-10-CM

## 2017-08-30 DIAGNOSIS — R42 DIZZINESS: Primary | ICD-10-CM

## 2017-08-30 PROBLEM — Q23.81 BICUSPID AORTIC VALVE: Status: ACTIVE | Noted: 2017-08-30

## 2017-08-30 PROCEDURE — 93000 ELECTROCARDIOGRAM COMPLETE: CPT | Performed by: INTERNAL MEDICINE

## 2017-08-30 PROCEDURE — 99204 OFFICE O/P NEW MOD 45 MIN: CPT | Performed by: INTERNAL MEDICINE

## 2017-08-30 RX ORDER — OXYCODONE AND ACETAMINOPHEN 7.5; 325 MG/1; MG/1
1 TABLET ORAL EVERY 6 HOURS PRN
Status: ON HOLD | COMMUNITY
End: 2018-04-05

## 2017-08-30 RX ORDER — ASCORBIC ACID 500 MG
500 TABLET ORAL DAILY
COMMUNITY

## 2017-08-30 RX ORDER — HYDROCODONE BITARTRATE AND ACETAMINOPHEN 5; 325 MG/1; MG/1
1 TABLET ORAL AS NEEDED
COMMUNITY
End: 2018-04-05 | Stop reason: HOSPADM

## 2018-01-04 ENCOUNTER — TRANSCRIBE ORDERS (OUTPATIENT)
Dept: ADMINISTRATIVE | Facility: HOSPITAL | Age: 68
End: 2018-01-04

## 2018-01-04 DIAGNOSIS — R92.8 ABNORMAL MAMMOGRAM: Primary | ICD-10-CM

## 2018-02-05 ENCOUNTER — OFFICE VISIT (OUTPATIENT)
Dept: ORTHOPEDIC SURGERY | Facility: CLINIC | Age: 68
End: 2018-02-05

## 2018-02-05 VITALS
SYSTOLIC BLOOD PRESSURE: 160 MMHG | BODY MASS INDEX: 26.09 KG/M2 | DIASTOLIC BLOOD PRESSURE: 76 MMHG | WEIGHT: 129.41 LBS | HEART RATE: 93 BPM | HEIGHT: 59 IN

## 2018-02-05 DIAGNOSIS — M05.762 RHEUMATOID ARTHRITIS INVOLVING LEFT KNEE WITH POSITIVE RHEUMATOID FACTOR (HCC): Primary | ICD-10-CM

## 2018-02-05 PROCEDURE — 99203 OFFICE O/P NEW LOW 30 MIN: CPT | Performed by: ORTHOPAEDIC SURGERY

## 2018-02-05 RX ORDER — MELOXICAM 7.5 MG/1
15 TABLET ORAL ONCE
Status: CANCELLED | OUTPATIENT
Start: 2018-02-05 | End: 2018-02-05

## 2018-02-05 RX ORDER — PREGABALIN 150 MG/1
150 CAPSULE ORAL ONCE
Status: CANCELLED | OUTPATIENT
Start: 2018-02-05 | End: 2018-02-05

## 2018-02-05 RX ORDER — ACETAMINOPHEN 325 MG/1
1000 TABLET ORAL ONCE
Status: CANCELLED | OUTPATIENT
Start: 2018-02-05 | End: 2018-02-05

## 2018-02-05 NOTE — PROGRESS NOTES
[facility] [place] [facility] Note    Subjective     Chief Complaint   Patient presents with   • Left Knee - Pain        HPI    Shanel Wall is a 67 y.o. female. She presents today for evaluation of left knee pain.  She has a known history of rheumatoid arthritis, and his had conservative treatment for her knee, which has worsened to the point where she would like knee replacement surgery.  She had a right total knee arthroplasty about 4 years ago, which worked quite well for her.  Pain is sharp, moderate, but severe at times, and worse with climbing stairs.    The patient has been considering left knee total joint replacement surgery.  The pain has been severe and has been worsening in spite of medications.The pain interferes with walking, sleeping, work and leisure activities, and the patient uses a cane as an ambulatory aid.  No previous history of blood clots, nor family history of clotting disorders.      Patient Active Problem List   Diagnosis   • Abnormal EKG   • Dizziness   • Lightheadedness   • Gastroesophageal reflux disease without esophagitis   • Rheumatoid arthritis involving multiple sites   • Hypoglycemia   • Insomnia   • Osteoporosis   • Arthropathy of shoulder region   • Status post reverse total replacement of left shoulder   • Hypothyroid   • Acute blood loss anemia, mild, asymptomatic   • Postoperative urinary retention   • Bicuspid aortic valve     Past Medical History:   Diagnosis Date   • Anesthesia     PT HAS RHEUMATOID ARTHRITIS, PT HAS SPECIFIC INSTRUCTIONS PER RA MD, INSTRUCTIONS AND RECS ATTACHED TO CHART AND PT TO DISCUSS WITH ANESTHESIA ON DATE OF PROCEDURE    • Arthritis    • Cancer     CERVICAL CANCER   • Depression    • Disease of thyroid gland    • GERD (gastroesophageal reflux disease)    • IBS (irritable bowel syndrome)    • Joint pain    • Measles    • Menopause    • Rheumatoid arthritis    • Wears glasses       Past Surgical History:   Procedure Laterality Date   •  ADENOIDECTOMY     • BREAST EXCISIONAL BIOPSY Left     NO VISIBLE SCAR   • CERVICAL CONE BIOPSY     • COLONOSCOPY     • COSMETIC SURGERY      SHORT SCAR FACE LIFT PER DR RODRIGUEZ    • DILATATION AND CURETTAGE     • KNEE ARTHROSCOPY Bilateral    • REDUCTION MAMMAPLASTY     • TENDON REPAIR Right     HAND   • TONSILLECTOMY AND ADENOIDECTOMY     • TOTAL SHOULDER ARTHROPLASTY W/ DISTAL CLAVICLE EXCISION Left 5/22/2017    Procedure: LEFT REVERSE TOTAL SHOULDER ARTHROPLASTY FOR FRACTURE;  Surgeon: Damaso Harris MD;  Location: [facility];  Service:    • TUBAL ABDOMINAL LIGATION        Family History   Problem Relation Age of Onset   • Parkinsonism Mother    • Heart failure Father    • Kidney failure Father    • Hyperlipidemia Father    • No Known Problems Sister    • No Known Problems Brother    • Breast cancer Neg Hx    • Ovarian cancer Neg Hx      Social History     Social History   • Marital status:      Spouse name: N/A   • Number of children: N/A   • Years of education: N/A     Occupational History   • unemployed      Social History Main Topics   • Smoking status: Never Smoker   • Smokeless tobacco: Never Used   • Alcohol use 1.2 oz/week     1 Glasses of wine, 1 Shots of liquor per week      Comment: drinks on weekend    • Drug use: No   • Sexual activity: Defer     Other Topics Concern   • Not on file     Social History Narrative    Pt consumes 2 servings of caffeine per day.       Current Outpatient Prescriptions on File Prior to Visit   Medication Sig Dispense Refill   • alendronate (FOSAMAX) 70 MG tablet Take 70 mg by mouth Every 7 (Seven) Days. Sunday     • ALLERGY SERUM INJECTION Inject  under the skin Every 21 (Twenty-One) Days.     • aluminum hydroxide-mag carbonate (GAVISCON EXTRA RELIEF) 160-105 MG chewable tablet chewable tablet Chew 1 tablet Daily As Needed.     • Calcium Carb-Cholecalciferol (CALCIUM-VITAMIN D) 600-400 MG-UNIT tablet Take 1 tablet by mouth 2 (Two) Times a Day.     • cetirizine (zyrTEC)  10 MG tablet Take 10 mg by mouth Daily As Needed for Allergies.     • DULoxetine (CYMBALTA) 60 MG capsule Take 60 mg by mouth Daily.     • Etanercept 50 MG/ML solution auto-injector Inject 50 mg under the skin 1 (One) Time Per Week. MONDAYS  Resume when ok with Dr. Harris     • folic acid (FOLVITE) 1 MG tablet Take 1 mg by mouth Daily.     • folic acid-vit B6-vit B12 (FOLGARD) 2.2-25-1 MG tablet tablet Take 1 tablet by mouth 2 (Two) Times a Day.     • HYDROcodone-acetaminophen (NORCO) 5-325 MG per tablet Take 1 tablet by mouth As Needed.     • lansoprazole (PREVACID) 15 MG capsule Take 15 mg by mouth Daily.     • leflunomide (ARAVA) 20 MG tablet Take 1 tablet by mouth Daily. Resume when ok with Dr. Harris     • levothyroxine (SYNTHROID, LEVOTHROID) 88 MCG tablet Take 88 mcg by mouth Daily.     • methotrexate 2.5 MG tablet Take 9 tablets by mouth 1 (One) Time Per Week. MONDAYS  Resume when ok with Dr. Harris  0   • Multiple Vitamins-Minerals (SENIOR MULTIVITAMIN PLUS) tablet Take 1 tablet by mouth Daily.     • NON FORMULARY Take 2 tablets by mouth Daily. Hydro Eye gelcaps     • oxyCODONE-acetaminophen (PERCOCET) 7.5-325 MG per tablet Take 1 tablet by mouth As Needed.     • polyethyl glycol-propyl glycol (SYSTANE) 0.4-0.3 % solution ophthalmic solution Administer 2 drops to both eyes 2 (Two) Times a Day.     • predniSONE (DELTASONE) 5 MG tablet Take 5 mg by mouth Daily.     • prochlorperazine (COMPAZINE) 10 MG tablet Take 10 mg by mouth Every 6 (Six) Hours As Needed.     • pseudoephedrine-guaifenesin (MUCINEX D)  MG per 12 hr tablet Take 1 tablet by mouth Every 12 (Twelve) Hours.     • salsalate (DISALCID) 750 MG tablet Take 1,500 mg by mouth 2 (Two) Times a Day.     • sodium chloride (OCEAN) 0.65 % nasal spray 1 spray into each nostril As Needed.     • vitamin A 8000 UNIT capsule Take 8,000 Units by mouth Daily.     • vitamin C (ASCORBIC ACID) 500 MG tablet Take 500 mg by mouth Daily.     • vitamin E 400 UNIT  capsule Take 800 Units by mouth Daily.     • zolpidem (AMBIEN) 10 MG tablet Take 10 mg by mouth Every Night.       No current facility-administered medications on file prior to visit.       No Known Allergies     Review of Systems   Constitutional: Positive for activity change and fatigue. Negative for appetite change, chills, diaphoresis, fever and unexpected weight change.   HENT: Positive for congestion, postnasal drip and tinnitus. Negative for dental problem, drooling, ear discharge, ear pain, facial swelling, hearing loss, mouth sores, nosebleeds, rhinorrhea, sinus pressure, sneezing, sore throat, trouble swallowing and voice change.    Eyes: Positive for visual disturbance. Negative for photophobia, pain, discharge, redness and itching.   Respiratory: Negative for apnea, cough, choking, chest tightness, shortness of breath, wheezing and stridor.    Cardiovascular: Negative for chest pain, palpitations and leg swelling.   Gastrointestinal: Positive for diarrhea and nausea. Negative for abdominal distention, abdominal pain, anal bleeding, blood in stool, constipation, rectal pain and vomiting.   Endocrine: Negative for cold intolerance, heat intolerance, polydipsia, polyphagia and polyuria.   Genitourinary: Negative for decreased urine volume, difficulty urinating, dysuria, enuresis, flank pain, frequency, hematuria and urgency.   Musculoskeletal: Positive for back pain, gait problem and neck pain. Negative for arthralgias, joint swelling, myalgias and neck stiffness.        Joint Pain    Skin: Negative for color change, pallor, rash and wound.   Allergic/Immunologic: Positive for environmental allergies, food allergies and immunocompromised state.   Neurological: Positive for dizziness, light-headedness and headaches. Negative for tremors, seizures, syncope, facial asymmetry, speech difficulty, weakness and numbness.   Hematological: Negative for adenopathy. Does not bruise/bleed easily.  "  Psychiatric/Behavioral: Negative for agitation, behavioral problems, confusion, decreased concentration, dysphoric mood, hallucinations, self-injury, sleep disturbance and suicidal ideas. The patient is not nervous/anxious and is not hyperactive.         Objective      Physical Exam  /76  Pulse 93  Ht 150 cm (59.06\")  Wt 58.7 kg (129 lb 6.6 oz)  BMI 26.09 kg/m2    Body mass index is 26.09 kg/(m^2).    General:   Mental Status:  Alert   Appearance: Cooperative, in no acute distress   Build and Nutrition: Well-nourished and well developed female   Orientation: Alert and oriented to person, place and time   Posture: Normal   Gait: Normal    Integument:   Left knee: No skin lesions, no rash, no ecchymosis    Neurologic:   Sensation:    Left foot: Intact to light touch on the dorsal and plantar aspect   Motor:  Left lower extremity: 5/5 quadriceps, hamstrings, ankle dorsiflexors, and ankle plantar flexors  Vascular:   Left lower extremity: 2+ dorsalis pedis pulse, prompt capillary refill    Lower Extremities:   Left Knee:    Tenderness:  Medial joint line tenderness    Effusion:  None    Swelling:  None    Crepitus:  Positive    Atrophy:  None    Range of motion:  Extension: 0°       Flexion: 120°  Instability:  No varus laxity, no valgus laxity, negative anterior drawer  Deformities:  None      Imaging/Studies  Imaging Results (last 24 hours)     Procedure Component Value Units Date/Time    XR Knee 4+ View Left [[id number]] Resulted:  02/05/18 1417     Updated:  02/05/18 1417    Narrative:       Left Knee Radiographs  Indication: left knee pain  Views: Standing AP's and skiers of both knees, with lateral and sunrise   views of the left knee    Comparison: no prior studies available    Findings:   Medial joint space narrowing, and bone-on-bone contact in the   patellofemoral joint, with medial osteophytes in the medial compartment.            Assessment and Plan     Shanel was seen today for pain.    Diagnoses " and all orders for this visit:    Rheumatoid arthritis involving left knee with positive rheumatoid factor  -     XR Knee 4+ View Left  -     Case Request; Standing  -     CBC and Differential; Future  -     Comprehensive metabolic panel; Future  -     Protime-INR; Future  -     APTT; Future  -     Hemoglobin A1c; Future  -     Sedimentation rate; Future  -     C-reactive protein; Future  -     Urinalysis With / Culture If Indicated - Urine, Clean Catch; Future  -     ECG 12 Lead; Future  -     ceFAZolin (ANCEF) 2 g in sodium chloride 0.9 % 100 mL IVPB; Infuse 2 g into a venous catheter 1 (One) Time.  -     acetaminophen (TYLENOL) tablet 975 mg; Take 3 tablets by mouth 1 (One) Time.  -     meloxicam (MOBIC) tablet 15 mg; Take 2 tablets by mouth 1 (One) Time.  -     pregabalin (LYRICA) capsule 150 mg; Take 1 capsule by mouth 1 (One) Time.  -     mupirocin (BACTROBAN) 2 % nasal ointment 1 application; 1 application by Each Nare route 1 (One) Time.  -     Tranexamic Acid 1,000 mg in sodium chloride 0.9 % 100 mL; Infuse 1,000 mg into a venous catheter 1 (One) Time.  -     Tranexamic Acid 1,000 mg in sodium chloride 0.9 % 100 mL; Infuse 1,000 mg into a venous catheter 1 (One) Time.  -     Case Request    Other orders  -     mupirocin (BACTROBAN NASAL) 2 % nasal ointment; into each nostril 2 (Two) Times a Day. Apply pea-sized amount tot each nostril twice daily for 5 days prior to surgery  -     chlorhexidine (HIBICLENS) 4 % external liquid; Apply  topically Daily. Shower with hibiclens solution as directed for 5 days prior to surgery  -     Inpatient Admission; Standing  -     Follow Anesthesia Guidelines / Standing Orders; Future  -     Orthopedic Discharge Planning for PT & Case Management - Inpatient With Post-Op Day 2 or 3 Discharge  -     Obtain informed consent  -     Provide instructions to patient regarding NPO status  -     Clorhexidine skin prep  -     Follow Anesthesia Guidelines / Standing Orders; Standing  -      Nerve Block; Standing  -     Verify NPO Status; Standing  -     RAJAT hose- To be placed on patient in pre-op; Standing  -     SCD (sequential compression device)- to be placed on patient in Pre-op; Standing  -     Clip operative site; Standing  -     Obtain informed consent (if not collected inpatient or PAT); Standing  -     Notify Physician - Standard; Standing        I reviewed my findings with patient today.  Her left knee pain is worsened to the point where she would like to proceed with knee replacement surgery.  Please see my counseling note for details.  Increased risks associated with knee replacement surgery in the setting of rheumatoid arthritis was discussed.    Surgical Counseling     I have informed the patient of the diagnosis and the prognosis.  Exhaustive conservative treatment modalities have not resulted in long term pain relief.  The symptoms have progressed to the point of daily pain and inability to perform activities of daily living without significant pain. The patient has reached the point of desiring to proceed with total knee arthroplasty after discussing the risks, benefits and alternatives to the procedure.  The surgical procedure itself was discussed in detail. Risks of the procedure were discussed, which included but are not limited to, bleeding, infection, damage to blood vessels and nerves, incomplete pain relief, loosening of the prosthesis, deep infection, need for further surgery, loss of limb, deep venous thrombosis, pulmonary embolus, death, heart attack, stroke, kidney failure, liver failure, and anesthetic complications.  In addition, the potential for deep infection developing in the future was discussed, which could require further surgery.  The knee would have to be re-opened, debrided, and potentially remove the prosthesis, which may or may not be replaced in the future.  Also, the possibility for loosening of the prosthesis has been mentioned.  If the prosthesis  loosened, a revision arthroplasty could be performed, with results that are not as predictable compared to the original procedure.  The typical rehabilitative course has also been discussed, and full recovery may take up to a year to see the maximum benefit.  The importance of patient cooperation in the rehabilitative efforts has also been discussed.  No guarantees whatsoever were given.  The patient understands the potential risks versus the benefits and desires to proceed with total knee arthroplasty at a mutually convenient time.    Return for For surgery as planned.      Medical Decision Making  Management Options : major surgery with risk factors  Data/Risk: radiology tests and independent visualization of imaging, lab tests, or EMG/NCV      Mahesh Wesley MD  02/05/18  2:49 PM

## 2018-02-08 ENCOUNTER — HOSPITAL ENCOUNTER (OUTPATIENT)
Dept: GENERAL RADIOLOGY | Facility: HOSPITAL | Age: 68
Discharge: HOME | End: 2018-02-08
Attending: PSYCHIATRY & NEUROLOGY | Admitting: PSYCHIATRY & NEUROLOGY

## 2018-02-08 ENCOUNTER — OFFICE VISIT (OUTPATIENT)
Dept: NEUROLOGY | Facility: CLINIC | Age: 68
End: 2018-02-08

## 2018-02-08 VITALS
DIASTOLIC BLOOD PRESSURE: 76 MMHG | SYSTOLIC BLOOD PRESSURE: 125 MMHG | WEIGHT: 135 LBS | BODY MASS INDEX: 27.21 KG/M2 | HEIGHT: 59 IN

## 2018-02-08 DIAGNOSIS — R42 DIZZINESS: Primary | ICD-10-CM

## 2018-02-08 DIAGNOSIS — H81.10 BENIGN PAROXYSMAL POSITIONAL VERTIGO, UNSPECIFIED LATERALITY: ICD-10-CM

## 2018-02-08 DIAGNOSIS — G47.10 HYPERSOMNOLENCE: ICD-10-CM

## 2018-02-08 DIAGNOSIS — M54.2 NECK PAIN: ICD-10-CM

## 2018-02-08 DIAGNOSIS — R27.0 ATAXIA: ICD-10-CM

## 2018-02-08 DIAGNOSIS — R51.9 NOCTURNAL HEADACHES: ICD-10-CM

## 2018-02-08 PROCEDURE — 99204 OFFICE O/P NEW MOD 45 MIN: CPT | Performed by: PSYCHIATRY & NEUROLOGY

## 2018-02-08 PROCEDURE — 72052 X-RAY EXAM NECK SPINE 6/>VWS: CPT

## 2018-02-08 NOTE — PROGRESS NOTES
Subjective:     Patient ID: Shanel Wall is a 67 y.o. female.    CC:   Chief Complaint   Patient presents with   • Dizziness   • Headache       HPI:   History of Present Illness  The following portions of the patient's history were reviewed and updated as appropriate: allergies, current medications, past family history, past medical history, past social history, past surgical history and problem list.     66 y/o female complains of intermittent positional vertigo for over one year, daily and disabling, keeping her from being able to lie down. She had a single PT session for BPPV that made her dizzy leading to a fall and a broken collar bone. She did not go back. She has poor balance. She denies history of head trauma or a stroke. She has tried meclizine and diazepam that did not appear to help much, currently not taking. Looking up appears to bring on vertigo. She has not had a head scan. She also complains of frequent headaches often on awakening, mostly pressure, frontal and occipital, some wake her up at night. Reoports unrefreshing sleep, daytime drowsiness, snoring. She has not had a sleep study. She reports some neck pain, denies radicular symptoms.    Past Medical History:   Diagnosis Date   • Anesthesia     PT HAS RHEUMATOID ARTHRITIS, PT HAS SPECIFIC INSTRUCTIONS PER RA MD, INSTRUCTIONS AND RECS ATTACHED TO CHART AND PT TO DISCUSS WITH ANESTHESIA ON DATE OF PROCEDURE    • Arthritis    • Cancer     CERVICAL CANCER   • Depression    • Disease of thyroid gland    • GERD (gastroesophageal reflux disease)    • IBS (irritable bowel syndrome)    • Joint pain    • Measles    • Menopause    • Rheumatoid arthritis    • Shingles    • Wears glasses        Past Surgical History:   Procedure Laterality Date   • ADENOIDECTOMY     • BREAST EXCISIONAL BIOPSY Left     NO VISIBLE SCAR   • CERVICAL CONE BIOPSY     • COLONOSCOPY     • COSMETIC SURGERY      SHORT SCAR FACE LIFT PER DR RODRIGUEZ    • DILATATION AND CURETTAGE      • KNEE ARTHROSCOPY Bilateral    • REDUCTION MAMMAPLASTY     • TENDON REPAIR Right     HAND   • TONSILLECTOMY AND ADENOIDECTOMY     • TOTAL SHOULDER ARTHROPLASTY W/ DISTAL CLAVICLE EXCISION Left 5/22/2017    Procedure: LEFT REVERSE TOTAL SHOULDER ARTHROPLASTY FOR FRACTURE;  Surgeon: Damaso Harris MD;  Location: [facility];  Service:    • TUBAL ABDOMINAL LIGATION         Social History     Social History   • Marital status:      Spouse name: N/A   • Number of children: N/A   • Years of education: N/A     Occupational History   • unemployed      Social History Main Topics   • Smoking status: Never Smoker   • Smokeless tobacco: Never Used   • Alcohol use 1.2 oz/week     1 Glasses of wine, 1 Shots of liquor per week      Comment: DAILY   • Drug use: No   • Sexual activity: Defer     Other Topics Concern   • Not on file     Social History Narrative    Pt consumes 2 servings of caffeine per day.        Family History   Problem Relation Age of Onset   • Parkinsonism Mother    • Heart failure Father    • Kidney failure Father    • Hyperlipidemia Father    • No Known Problems Sister    • No Known Problems Brother    • Breast cancer Neg Hx    • Ovarian cancer Neg Hx         Review of Systems   Constitutional: Positive for fatigue. Negative for chills, fever and unexpected weight change.   HENT: Negative for ear pain, hearing loss, nosebleeds, rhinorrhea and sore throat.    Eyes: Positive for photophobia and visual disturbance. Negative for pain, discharge and itching.   Respiratory: Negative for cough, chest tightness, shortness of breath and wheezing.    Cardiovascular: Negative for chest pain, palpitations and leg swelling.   Gastrointestinal: Positive for diarrhea and nausea. Negative for abdominal pain, blood in stool, constipation and vomiting.   Endocrine: Positive for heat intolerance.   Genitourinary: Negative for dysuria, frequency, hematuria and urgency.   Musculoskeletal: Positive for arthralgias, back  pain, gait problem, joint swelling, neck pain and neck stiffness. Negative for myalgias.   Skin: Negative for rash and wound.   Allergic/Immunologic: Negative for environmental allergies and food allergies.   Neurological: Positive for dizziness, light-headedness and headaches. Negative for tremors, seizures, syncope, speech difficulty, weakness and numbness.   Hematological: Negative for adenopathy. Does not bruise/bleed easily.   Psychiatric/Behavioral: Positive for decreased concentration and dysphoric mood. Negative for agitation, confusion, hallucinations, sleep disturbance and suicidal ideas. The patient is not nervous/anxious.         Objective:    Neurologic Exam     Mental Status   Oriented to person, place, and time.     Cranial Nerves     CN III, IV, VI   Pupils are equal, round, and reactive to light.  Extraocular motions are normal.     Motor Exam     Strength   Strength 5/5 throughout.       Physical Exam   Constitutional: She is oriented to person, place, and time. She appears well-developed and well-nourished.   HENT:   Head: Normocephalic and atraumatic.   Eyes: EOM are normal. Pupils are equal, round, and reactive to light.   Neck: Neck supple. Carotid bruit is not present.   Cardiovascular: Normal rate, regular rhythm, normal heart sounds and intact distal pulses.    Pulmonary/Chest: Effort normal and breath sounds normal.   Abdominal: Soft. Bowel sounds are normal.   Neurological: She is alert and oriented to person, place, and time. She has normal strength and normal reflexes. No cranial nerve deficit or sensory deficit. Coordination and gait normal. She displays no Babinski's sign on the right side. She displays no Babinski's sign on the left side.   Gait wide based, Romberg positive.   Skin: Skin is warm.   Psychiatric: She has a normal mood and affect. Her behavior is normal. Thought content normal. Cognition and memory are normal.       Assessment/Plan:       Shanel was seen today for  dizziness and headache.    Diagnoses and all orders for this visit:    Dizziness  -     MRI Brain Without Contrast  -     MRI Angiogram Neck Without Contrast  -     MRI Angiogram Head Without Contrast    Benign paroxysmal positional vertigo, unspecified laterality  -     MRI Brain Without Contrast  -     MRI Angiogram Neck Without Contrast  -     MRI Angiogram Head Without Contrast  -     Ambulatory Referral to Physical Therapy    Neck pain  -     XR Spine Cervical Complete With Flex Ext    Ataxia  -     MRI Brain Without Contrast  -     MRI Angiogram Neck Without Contrast  -     MRI Angiogram Head Without Contrast    Nocturnal headaches  -     MRI Brain Without Contrast  -     Ambulatory Referral to Sleep Medicine    Hypersomnolence  -     Ambulatory Referral to Sleep Medicine         She is willing to try vestibular rehab again, may resume meclizine until feeling better, follow fall precautions. MRI brain and MRAs are requested unop consideration of brain base mass and vascular disease.     Ramana Patricia MD  2/20/2018

## 2018-02-12 ENCOUNTER — TELEPHONE (OUTPATIENT)
Dept: NEUROLOGY | Facility: CLINIC | Age: 68
End: 2018-02-12

## 2018-02-14 ENCOUNTER — APPOINTMENT (OUTPATIENT)
Dept: MAMMOGRAPHY | Facility: HOSPITAL | Age: 68
End: 2018-02-14
Attending: FAMILY MEDICINE

## 2018-02-28 ENCOUNTER — HOSPITAL ENCOUNTER (OUTPATIENT)
Dept: MRI IMAGING | Facility: HOSPITAL | Age: 68
Discharge: HOME | End: 2018-02-28
Attending: PSYCHIATRY & NEUROLOGY | Admitting: PSYCHIATRY & NEUROLOGY

## 2018-02-28 ENCOUNTER — HOSPITAL ENCOUNTER (OUTPATIENT)
Dept: MRI IMAGING | Facility: HOSPITAL | Age: 68
Discharge: HOME | End: 2018-02-28
Attending: PSYCHIATRY & NEUROLOGY

## 2018-02-28 PROCEDURE — 70544 MR ANGIOGRAPHY HEAD W/O DYE: CPT

## 2018-02-28 PROCEDURE — 70547 MR ANGIOGRAPHY NECK W/O DYE: CPT

## 2018-02-28 PROCEDURE — 70551 MRI BRAIN STEM W/O DYE: CPT

## 2018-03-01 ENCOUNTER — TELEPHONE (OUTPATIENT)
Dept: NEUROLOGY | Facility: CLINIC | Age: 68
End: 2018-03-01

## 2018-03-01 NOTE — TELEPHONE ENCOUNTER
----- Message from Ramana Patricia MD sent at 2/28/2018 10:49 PM EST -----  Regarding: MRI/MRAs  Normal, thanks.  ----- Message -----     From: Interface, Rad Results [place] In     Sent: 2/28/2018   3:05 PM       To: Ramana Patricia MD

## 2018-03-19 ENCOUNTER — DOCUMENTATION (OUTPATIENT)
Dept: SOCIAL WORK | Facility: HOSPITAL | Age: 68
End: 2018-03-19

## 2018-03-19 NOTE — PROGRESS NOTES
3/19/18- I spoke with Mrs. Wall by phone to discuss d/c plan for upcoming surgery. Her plan is to return home.She states  will be available to assist with care. We discussed the new Medicare guidelines that classify TKR's as overnight outpt. She verbalized understanding (states she had a right TKR done 4yrs ago and stayed 3 days). She already has equipment from previous surgeries. She plans to use [facility], she has used them before and requests [facility] as the PT. She also plans to hire private duty help in the home,states she plans to use [employer] [facility] duty ( she has used them before).She does have problems with vertigo,but states she is ambulatory.  No other questions verbalized   Sonja Kellerman RN, case mgt.

## 2018-03-20 ENCOUNTER — APPOINTMENT (OUTPATIENT)
Dept: PREADMISSION TESTING | Facility: HOSPITAL | Age: 68
End: 2018-03-20

## 2018-03-20 VITALS — HEIGHT: 60 IN | BODY MASS INDEX: 26.92 KG/M2 | WEIGHT: 137.13 LBS

## 2018-03-20 DIAGNOSIS — M05.762 RHEUMATOID ARTHRITIS INVOLVING LEFT KNEE WITH POSITIVE RHEUMATOID FACTOR (HCC): ICD-10-CM

## 2018-03-20 LAB
ALBUMIN SERPL-MCNC: 4.4 G/DL (ref 3.2–4.8)
ALBUMIN/GLOB SERPL: 1.6 G/DL (ref 1.5–2.5)
ALP SERPL-CCNC: 82 U/L (ref 25–100)
ALT SERPL W P-5'-P-CCNC: 25 U/L (ref 7–40)
ANION GAP SERPL CALCULATED.3IONS-SCNC: 8 MMOL/L (ref 3–11)
APTT PPP: <24 SECONDS (ref 24–31)
AST SERPL-CCNC: 37 U/L (ref 0–33)
BACTERIA UR QL AUTO: NORMAL /HPF
BASOPHILS # BLD AUTO: 0.05 10*3/MM3 (ref 0–0.2)
BASOPHILS NFR BLD AUTO: 0.9 % (ref 0–1)
BILIRUB SERPL-MCNC: 0.2 MG/DL (ref 0.3–1.2)
BILIRUB UR QL STRIP: NEGATIVE
BILIRUB UR QL STRIP: NEGATIVE
BUN BLD-MCNC: 16 MG/DL (ref 9–23)
BUN/CREAT SERPL: 20 (ref 7–25)
CALCIUM SPEC-SCNC: 9.5 MG/DL (ref 8.7–10.4)
CHLORIDE SERPL-SCNC: 103 MMOL/L (ref 99–109)
CLARITY UR: CLEAR
CLARITY UR: CLEAR
CO2 SERPL-SCNC: 32 MMOL/L (ref 20–31)
COLOR UR: YELLOW
COLOR UR: YELLOW
CREAT BLD-MCNC: 0.8 MG/DL (ref 0.6–1.3)
CRP SERPL-MCNC: 0.04 MG/DL (ref 0–1)
DEPRECATED RDW RBC AUTO: 57 FL (ref 37–54)
EOSINOPHIL # BLD AUTO: 0.09 10*3/MM3 (ref 0–0.3)
EOSINOPHIL NFR BLD AUTO: 1.6 % (ref 0–3)
ERYTHROCYTE [DISTWIDTH] IN BLOOD BY AUTOMATED COUNT: 16.1 % (ref 11.3–14.5)
ERYTHROCYTE [SEDIMENTATION RATE] IN BLOOD: 11 MM/HR (ref 0–30)
GFR SERPL CREATININE-BSD FRML MDRD: 72 ML/MIN/1.73
GLOBULIN UR ELPH-MCNC: 2.8 GM/DL
GLUCOSE BLD-MCNC: 98 MG/DL (ref 70–100)
GLUCOSE UR STRIP-MCNC: NEGATIVE MG/DL
GLUCOSE UR STRIP-MCNC: NEGATIVE MG/DL
HBA1C MFR BLD: 5.4 % (ref 4.8–5.6)
HCT VFR BLD AUTO: 46.7 % (ref 34.5–44)
HGB BLD-MCNC: 14.6 G/DL (ref 11.5–15.5)
HGB UR QL STRIP.AUTO: NEGATIVE
HGB UR QL STRIP.AUTO: NEGATIVE
HYALINE CASTS UR QL AUTO: NORMAL /LPF
IMM GRANULOCYTES # BLD: 0.02 10*3/MM3 (ref 0–0.03)
IMM GRANULOCYTES NFR BLD: 0.4 % (ref 0–0.6)
INR PPP: 1.06 (ref 0.91–1.09)
KETONES UR QL STRIP: ABNORMAL
KETONES UR QL STRIP: NEGATIVE
LEUKOCYTE ESTERASE UR QL STRIP.AUTO: ABNORMAL
LEUKOCYTE ESTERASE UR QL STRIP.AUTO: NEGATIVE
LYMPHOCYTES # BLD AUTO: 0.93 10*3/MM3 (ref 0.6–4.8)
LYMPHOCYTES NFR BLD AUTO: 16.3 % (ref 24–44)
MCH RBC QN AUTO: 31.3 PG (ref 27–31)
MCHC RBC AUTO-ENTMCNC: 31.3 G/DL (ref 32–36)
MCV RBC AUTO: 100.2 FL (ref 80–99)
MONOCYTES # BLD AUTO: 0.54 10*3/MM3 (ref 0–1)
MONOCYTES NFR BLD AUTO: 9.5 % (ref 0–12)
NEUTROPHILS # BLD AUTO: 4.08 10*3/MM3 (ref 1.5–8.3)
NEUTROPHILS NFR BLD AUTO: 71.3 % (ref 41–71)
NITRITE UR QL STRIP: NEGATIVE
NITRITE UR QL STRIP: NEGATIVE
PH UR STRIP.AUTO: 5.5 [PH] (ref 5–8)
PH UR STRIP.AUTO: 5.5 [PH] (ref 5–8)
PLATELET # BLD AUTO: 191 10*3/MM3 (ref 150–450)
PMV BLD AUTO: 9.8 FL (ref 6–12)
POTASSIUM BLD-SCNC: 3.9 MMOL/L (ref 3.5–5.5)
PROT SERPL-MCNC: 7.2 G/DL (ref 5.7–8.2)
PROT UR QL STRIP: NEGATIVE
PROT UR QL STRIP: NEGATIVE
PROTHROMBIN TIME: 11.1 SECONDS (ref 9.6–11.5)
RBC # BLD AUTO: 4.66 10*6/MM3 (ref 3.89–5.14)
RBC # UR: NORMAL /HPF
REF LAB TEST METHOD: NORMAL
SODIUM BLD-SCNC: 143 MMOL/L (ref 132–146)
SP GR UR STRIP: 1.02 (ref 1–1.03)
SP GR UR STRIP: 1.02 (ref 1–1.03)
SQUAMOUS #/AREA URNS HPF: NORMAL /HPF
UROBILINOGEN UR QL STRIP: ABNORMAL
UROBILINOGEN UR QL STRIP: NORMAL
WBC NRBC COR # BLD: 5.71 10*3/MM3 (ref 3.5–10.8)
WBC UR QL AUTO: NORMAL /HPF

## 2018-03-20 PROCEDURE — 81003 URINALYSIS AUTO W/O SCOPE: CPT | Performed by: ORTHOPAEDIC SURGERY

## 2018-03-20 PROCEDURE — 36415 COLL VENOUS BLD VENIPUNCTURE: CPT

## 2018-03-20 PROCEDURE — 83036 HEMOGLOBIN GLYCOSYLATED A1C: CPT | Performed by: ORTHOPAEDIC SURGERY

## 2018-03-20 PROCEDURE — 85652 RBC SED RATE AUTOMATED: CPT | Performed by: ORTHOPAEDIC SURGERY

## 2018-03-20 PROCEDURE — 80053 COMPREHEN METABOLIC PANEL: CPT | Performed by: ORTHOPAEDIC SURGERY

## 2018-03-20 PROCEDURE — 85610 PROTHROMBIN TIME: CPT | Performed by: ORTHOPAEDIC SURGERY

## 2018-03-20 PROCEDURE — 93005 ELECTROCARDIOGRAM TRACING: CPT

## 2018-03-20 PROCEDURE — 81001 URINALYSIS AUTO W/SCOPE: CPT | Performed by: ORTHOPAEDIC SURGERY

## 2018-03-20 PROCEDURE — 93010 ELECTROCARDIOGRAM REPORT: CPT | Performed by: INTERNAL MEDICINE

## 2018-03-20 PROCEDURE — 85730 THROMBOPLASTIN TIME PARTIAL: CPT | Performed by: ORTHOPAEDIC SURGERY

## 2018-03-20 PROCEDURE — 85025 COMPLETE CBC W/AUTO DIFF WBC: CPT | Performed by: ORTHOPAEDIC SURGERY

## 2018-03-20 PROCEDURE — 86140 C-REACTIVE PROTEIN: CPT | Performed by: ORTHOPAEDIC SURGERY

## 2018-03-20 ASSESSMENT — KOOS JR
KOOS JR SCORE: 50.012
KOOS JR SCORE: 15

## 2018-03-20 NOTE — DISCHARGE INSTRUCTIONS
The following information and instructions were given:    NPO after MN except sips of water with routine prescribed medication (except blood thinner, diabetes, or weight reducing medication) unless otherwise instructed by your physician.  Do not eat, drink, smoke or chew gum after MN the night before surgery. This also includes no mints.    DO NOT shave for two days before your procedure.  Do not wear makeup.      DO NOT wear fingernail polish (gel/regular) and/or acrylic/artificial nails on the day of surgery.   If a patient had recent manicure and would rather not remove polish or artificial nails, then the minimum requirement is that the polish/artificial nails must be removed from the middle finger on each hand.      If patient was having surgery on an upper extremity, then the patient was instructed that fingernail polish/artificial fingernails must be removed for surgery.  NO EXCEPTIONS.      If patient was having surgery on a lower extremity, then the patient was instructed that toenail polish on both extremities must be removed for surgery.  NO EXCEPTIONS.    Remove all jewelry (advised to go to jeweler if unable to remove).  Jewelry especially rings can no longer be taped for surgery.    Leave anything you consider valuable at home.    Leave your suitcase in the car until after your surgery.    Bring the following with you (if applicable)   -picture ID and insurance cards   -Co-pay/deductible required by insurance   -Medications in the original bottles (not a list) including all over-the-counter  medications if not brought to PAT   -Copy of advance directive, living will or power of  documents if not  brought to PAT   -CPAP or BIPAP mask and tubing (do not bring machine)   -Skin prep instructions sheet   -PAT Pass    Education booklet, brochure, handout or binder given to patient.    Pain Control After Surgery handout given to patient.    Respirex use (handout given to patient) and pneumonia  prevention.    Signs and Symptoms of infection.    DVT Prevention stressing the importance of ambulation.    Patient to apply Chlorhexadine wipes to surgical area (as instructed) the night before procedure and the AM of procedure.    When applicable for ERAS patients (colon, orthropedic), patients were instructed to drink 20 ounces of Gatorade or G2 for diabetics (or until full) the morning of surgery.  The Gatorade or G2 must be consumed at least 3 hours before surgery start time.  No RED Gatorade or G2.  Appropriated ERAS handout given to patient during PAT visit.

## 2018-03-20 NOTE — PAT
Bactroban and Chlorhexidine written and verbal instructions given to patient during PAT visit. tp verbalized understanding and instructed pt to go to [facility] pharmacy    Patient to apply Chlorhexadine wipes  to surgical area (as instructed) the night before procedure and the AM of procedure. Wipes provided.    Attended joint class today    Passed memory screen 4/5      PT HAS RHEUMATOID ARTHRITIS, PT HAS SPECIFIC INSTRUCTIONS PER RA MD, pt to bring INSTRUCTIONS AND RECS ATTACHED TO CHART AND PT TO DISCUSS WITH ANESTHESIA ON DATE OF PROCEDURE

## 2018-04-02 ENCOUNTER — ANESTHESIA EVENT (OUTPATIENT)
Dept: PERIOP | Facility: HOSPITAL | Age: 68
End: 2018-04-02

## 2018-04-03 ENCOUNTER — APPOINTMENT (OUTPATIENT)
Dept: GENERAL RADIOLOGY | Facility: HOSPITAL | Age: 68
End: 2018-04-03

## 2018-04-03 ENCOUNTER — ANESTHESIA (OUTPATIENT)
Dept: PERIOP | Facility: HOSPITAL | Age: 68
End: 2018-04-03

## 2018-04-03 ENCOUNTER — HOSPITAL ENCOUNTER (OUTPATIENT)
Facility: HOSPITAL | Age: 68
LOS: 1 days | Discharge: NURSING, REHAB OR LONG-TERM CARE | End: 2018-04-05
Attending: ORTHOPAEDIC SURGERY | Admitting: ORTHOPAEDIC SURGERY

## 2018-04-03 DIAGNOSIS — Z74.09 IMPAIRED FUNCTIONAL MOBILITY, BALANCE, GAIT, AND ENDURANCE: Primary | ICD-10-CM

## 2018-04-03 DIAGNOSIS — Z78.9 IMPAIRED MOBILITY AND ADLS: ICD-10-CM

## 2018-04-03 DIAGNOSIS — Z74.09 IMPAIRED MOBILITY AND ADLS: ICD-10-CM

## 2018-04-03 DIAGNOSIS — M05.762 RHEUMATOID ARTHRITIS INVOLVING LEFT KNEE WITH POSITIVE RHEUMATOID FACTOR (HCC): ICD-10-CM

## 2018-04-03 PROBLEM — Z96.652 STATUS POST TOTAL LEFT KNEE REPLACEMENT: Status: ACTIVE | Noted: 2018-04-03

## 2018-04-03 LAB
ABO GROUP BLD: NORMAL
BLD GP AB SCN SERPL QL: NEGATIVE
RH BLD: POSITIVE
T&S EXPIRATION DATE: NORMAL

## 2018-04-03 PROCEDURE — 25010000002 HYDROCORTISONE SODIUM SUCCINATE 100 MG RECONSTITUTED SOLUTION: Performed by: NURSE PRACTITIONER

## 2018-04-03 PROCEDURE — 97161 PT EVAL LOW COMPLEX 20 MIN: CPT

## 2018-04-03 PROCEDURE — C1713 ANCHOR/SCREW BN/BN,TIS/BN: HCPCS | Performed by: ORTHOPAEDIC SURGERY

## 2018-04-03 PROCEDURE — G8979 MOBILITY GOAL STATUS: HCPCS

## 2018-04-03 PROCEDURE — 97116 GAIT TRAINING THERAPY: CPT

## 2018-04-03 PROCEDURE — 25010000002 PROPOFOL 1000 MG/ML EMULSION: Performed by: NURSE ANESTHETIST, CERTIFIED REGISTERED

## 2018-04-03 PROCEDURE — 25010000002 ROPIVACAINE PER 1 MG: Performed by: ORTHOPAEDIC SURGERY

## 2018-04-03 PROCEDURE — 25010000003 CEFAZOLIN IN DEXTROSE 2-4 GM/100ML-% SOLUTION: Performed by: ORTHOPAEDIC SURGERY

## 2018-04-03 PROCEDURE — 86850 RBC ANTIBODY SCREEN: CPT | Performed by: ORTHOPAEDIC SURGERY

## 2018-04-03 PROCEDURE — C1776 JOINT DEVICE (IMPLANTABLE): HCPCS | Performed by: ORTHOPAEDIC SURGERY

## 2018-04-03 PROCEDURE — 25010000002 DEXAMETHASONE PER 1 MG: Performed by: NURSE ANESTHETIST, CERTIFIED REGISTERED

## 2018-04-03 PROCEDURE — 73560 X-RAY EXAM OF KNEE 1 OR 2: CPT

## 2018-04-03 PROCEDURE — 86900 BLOOD TYPING SEROLOGIC ABO: CPT | Performed by: ORTHOPAEDIC SURGERY

## 2018-04-03 PROCEDURE — 27447 TOTAL KNEE ARTHROPLASTY: CPT | Performed by: ORTHOPAEDIC SURGERY

## 2018-04-03 PROCEDURE — 86901 BLOOD TYPING SEROLOGIC RH(D): CPT | Performed by: ORTHOPAEDIC SURGERY

## 2018-04-03 PROCEDURE — 25010000002 ONDANSETRON PER 1 MG: Performed by: NURSE ANESTHETIST, CERTIFIED REGISTERED

## 2018-04-03 PROCEDURE — G8978 MOBILITY CURRENT STATUS: HCPCS

## 2018-04-03 PROCEDURE — 25010000002 ROPIVACAINE PER 1 MG: Performed by: NURSE ANESTHETIST, CERTIFIED REGISTERED

## 2018-04-03 DEVICE — TOTL KN ATTUNE DEPUY 9527038: Type: IMPLANTABLE DEVICE | Status: FUNCTIONAL

## 2018-04-03 DEVICE — ATTUNE KNEE SYSTEM FEMORAL POSTERIOR STABILIZED NARROW SIZE 4N LEFT CEMENTED
Type: IMPLANTABLE DEVICE | Site: KNEE | Status: FUNCTIONAL
Brand: ATTUNE

## 2018-04-03 DEVICE — ATTUNE PATELLA MEDIALIZED ANATOMIC 32MM CEMENTED AOX
Type: IMPLANTABLE DEVICE | Site: KNEE | Status: FUNCTIONAL
Brand: ATTUNE

## 2018-04-03 DEVICE — CMT BONE SIMPLEX/P FULL DOSE 10/PK: Type: IMPLANTABLE DEVICE | Site: KNEE | Status: FUNCTIONAL

## 2018-04-03 DEVICE — ATTUNE KNEE SYSTEM TIBIAL BASE ROTATING PLATFORM SIZE 3 CEMENTED
Type: IMPLANTABLE DEVICE | Site: KNEE | Status: FUNCTIONAL
Brand: ATTUNE

## 2018-04-03 DEVICE — ATTUNE KNEE SYSTEM TIBIAL INSERT ROTATING PLATFORM POSTERIOR STABILIZED 4 7MM AOX
Type: IMPLANTABLE DEVICE | Site: KNEE | Status: FUNCTIONAL
Brand: ATTUNE

## 2018-04-03 RX ORDER — PREGABALIN 75 MG/1
150 CAPSULE ORAL ONCE
Status: COMPLETED | OUTPATIENT
Start: 2018-04-03 | End: 2018-04-03

## 2018-04-03 RX ORDER — HYDRALAZINE HYDROCHLORIDE 20 MG/ML
5 INJECTION INTRAMUSCULAR; INTRAVENOUS
Status: DISCONTINUED | OUTPATIENT
Start: 2018-04-03 | End: 2018-04-03 | Stop reason: HOSPADM

## 2018-04-03 RX ORDER — MAGNESIUM HYDROXIDE 1200 MG/15ML
LIQUID ORAL AS NEEDED
Status: DISCONTINUED | OUTPATIENT
Start: 2018-04-03 | End: 2018-04-03 | Stop reason: HOSPADM

## 2018-04-03 RX ORDER — BUPIVACAINE HYDROCHLORIDE 5 MG/ML
INJECTION, SOLUTION EPIDURAL; INTRACAUDAL AS NEEDED
Status: DISCONTINUED | OUTPATIENT
Start: 2018-04-03 | End: 2018-04-03 | Stop reason: SURG

## 2018-04-03 RX ORDER — DOCUSATE SODIUM 100 MG/1
100 CAPSULE, LIQUID FILLED ORAL 2 TIMES DAILY PRN
Status: DISCONTINUED | OUTPATIENT
Start: 2018-04-03 | End: 2018-04-05 | Stop reason: HOSPADM

## 2018-04-03 RX ORDER — MELOXICAM 7.5 MG/1
15 TABLET ORAL DAILY
Status: DISCONTINUED | OUTPATIENT
Start: 2018-04-03 | End: 2018-04-05 | Stop reason: HOSPADM

## 2018-04-03 RX ORDER — MEPERIDINE HYDROCHLORIDE 25 MG/ML
12.5 INJECTION INTRAMUSCULAR; INTRAVENOUS; SUBCUTANEOUS
Status: DISCONTINUED | OUTPATIENT
Start: 2018-04-03 | End: 2018-04-03 | Stop reason: HOSPADM

## 2018-04-03 RX ORDER — OXYCODONE HYDROCHLORIDE AND ACETAMINOPHEN 5; 325 MG/1; MG/1
1 TABLET ORAL ONCE AS NEEDED
Status: DISCONTINUED | OUTPATIENT
Start: 2018-04-03 | End: 2018-04-03 | Stop reason: HOSPADM

## 2018-04-03 RX ORDER — BISACODYL 5 MG/1
10 TABLET, DELAYED RELEASE ORAL DAILY PRN
Status: DISCONTINUED | OUTPATIENT
Start: 2018-04-03 | End: 2018-04-05 | Stop reason: HOSPADM

## 2018-04-03 RX ORDER — ONDANSETRON 2 MG/ML
4 INJECTION INTRAMUSCULAR; INTRAVENOUS EVERY 6 HOURS PRN
Status: DISCONTINUED | OUTPATIENT
Start: 2018-04-03 | End: 2018-04-05 | Stop reason: HOSPADM

## 2018-04-03 RX ORDER — LIDOCAINE HYDROCHLORIDE 10 MG/ML
0.5 INJECTION, SOLUTION EPIDURAL; INFILTRATION; INTRACAUDAL; PERINEURAL ONCE AS NEEDED
Status: COMPLETED | OUTPATIENT
Start: 2018-04-03 | End: 2018-04-03

## 2018-04-03 RX ORDER — PROCHLORPERAZINE MALEATE 10 MG
10 TABLET ORAL EVERY 6 HOURS PRN
Status: DISCONTINUED | OUTPATIENT
Start: 2018-04-03 | End: 2018-04-05 | Stop reason: HOSPADM

## 2018-04-03 RX ORDER — SODIUM CHLORIDE, SODIUM LACTATE, POTASSIUM CHLORIDE, CALCIUM CHLORIDE 600; 310; 30; 20 MG/100ML; MG/100ML; MG/100ML; MG/100ML
9 INJECTION, SOLUTION INTRAVENOUS CONTINUOUS
Status: DISCONTINUED | OUTPATIENT
Start: 2018-04-03 | End: 2018-04-03

## 2018-04-03 RX ORDER — NALOXONE HCL 0.4 MG/ML
0.1 VIAL (ML) INJECTION
Status: DISCONTINUED | OUTPATIENT
Start: 2018-04-03 | End: 2018-04-05 | Stop reason: HOSPADM

## 2018-04-03 RX ORDER — LEVOTHYROXINE SODIUM 88 UG/1
88 TABLET ORAL
Status: DISCONTINUED | OUTPATIENT
Start: 2018-04-03 | End: 2018-04-05 | Stop reason: HOSPADM

## 2018-04-03 RX ORDER — DULOXETIN HYDROCHLORIDE 60 MG/1
60 CAPSULE, DELAYED RELEASE ORAL DAILY
Status: DISCONTINUED | OUTPATIENT
Start: 2018-04-03 | End: 2018-04-05 | Stop reason: HOSPADM

## 2018-04-03 RX ORDER — SODIUM CHLORIDE 0.9 % (FLUSH) 0.9 %
1-10 SYRINGE (ML) INJECTION AS NEEDED
Status: DISCONTINUED | OUTPATIENT
Start: 2018-04-03 | End: 2018-04-05 | Stop reason: HOSPADM

## 2018-04-03 RX ORDER — BISACODYL 10 MG
10 SUPPOSITORY, RECTAL RECTAL DAILY PRN
Status: DISCONTINUED | OUTPATIENT
Start: 2018-04-03 | End: 2018-04-05 | Stop reason: HOSPADM

## 2018-04-03 RX ORDER — OXYCODONE AND ACETAMINOPHEN 7.5; 325 MG/1; MG/1
1 TABLET ORAL ONCE AS NEEDED
Status: DISCONTINUED | OUTPATIENT
Start: 2018-04-03 | End: 2018-04-03 | Stop reason: HOSPADM

## 2018-04-03 RX ORDER — PROMETHAZINE HYDROCHLORIDE 25 MG/ML
6.25 INJECTION, SOLUTION INTRAMUSCULAR; INTRAVENOUS ONCE AS NEEDED
Status: DISCONTINUED | OUTPATIENT
Start: 2018-04-03 | End: 2018-04-03 | Stop reason: HOSPADM

## 2018-04-03 RX ORDER — LABETALOL HYDROCHLORIDE 5 MG/ML
5 INJECTION, SOLUTION INTRAVENOUS
Status: DISCONTINUED | OUTPATIENT
Start: 2018-04-03 | End: 2018-04-03 | Stop reason: HOSPADM

## 2018-04-03 RX ORDER — ZOLPIDEM TARTRATE 5 MG/1
10 TABLET ORAL NIGHTLY PRN
Status: DISCONTINUED | OUTPATIENT
Start: 2018-04-03 | End: 2018-04-05 | Stop reason: HOSPADM

## 2018-04-03 RX ORDER — SODIUM CHLORIDE 0.9 % (FLUSH) 0.9 %
1-10 SYRINGE (ML) INJECTION AS NEEDED
Status: DISCONTINUED | OUTPATIENT
Start: 2018-04-03 | End: 2018-04-03 | Stop reason: HOSPADM

## 2018-04-03 RX ORDER — ACETAMINOPHEN 500 MG
1000 TABLET ORAL ONCE
Status: COMPLETED | OUTPATIENT
Start: 2018-04-03 | End: 2018-04-03

## 2018-04-03 RX ORDER — ROPIVACAINE HYDROCHLORIDE 2 MG/ML
12 INJECTION, SOLUTION EPIDURAL; INFILTRATION CONTINUOUS
Status: DISCONTINUED | OUTPATIENT
Start: 2018-04-03 | End: 2018-04-04

## 2018-04-03 RX ORDER — ROPIVACAINE HYDROCHLORIDE 5 MG/ML
INJECTION, SOLUTION EPIDURAL; INFILTRATION; PERINEURAL AS NEEDED
Status: DISCONTINUED | OUTPATIENT
Start: 2018-04-03 | End: 2018-04-03 | Stop reason: HOSPADM

## 2018-04-03 RX ORDER — ONDANSETRON 2 MG/ML
INJECTION INTRAMUSCULAR; INTRAVENOUS AS NEEDED
Status: DISCONTINUED | OUTPATIENT
Start: 2018-04-03 | End: 2018-04-03 | Stop reason: SURG

## 2018-04-03 RX ORDER — PANTOPRAZOLE SODIUM 40 MG/1
40 TABLET, DELAYED RELEASE ORAL EVERY MORNING
Status: DISCONTINUED | OUTPATIENT
Start: 2018-04-04 | End: 2018-04-05 | Stop reason: HOSPADM

## 2018-04-03 RX ORDER — ONDANSETRON 4 MG/1
4 TABLET, FILM COATED ORAL EVERY 6 HOURS PRN
Status: DISCONTINUED | OUTPATIENT
Start: 2018-04-03 | End: 2018-04-05 | Stop reason: HOSPADM

## 2018-04-03 RX ORDER — BUPIVACAINE HYDROCHLORIDE 2.5 MG/ML
INJECTION, SOLUTION EPIDURAL; INFILTRATION; INTRACAUDAL AS NEEDED
Status: DISCONTINUED | OUTPATIENT
Start: 2018-04-03 | End: 2018-04-03 | Stop reason: SURG

## 2018-04-03 RX ORDER — HYDROMORPHONE HYDROCHLORIDE 1 MG/ML
0.5 INJECTION, SOLUTION INTRAMUSCULAR; INTRAVENOUS; SUBCUTANEOUS
Status: DISCONTINUED | OUTPATIENT
Start: 2018-04-03 | End: 2018-04-03 | Stop reason: HOSPADM

## 2018-04-03 RX ORDER — OXYCODONE HYDROCHLORIDE AND ACETAMINOPHEN 5; 325 MG/1; MG/1
1 TABLET ORAL EVERY 4 HOURS PRN
Status: DISCONTINUED | OUTPATIENT
Start: 2018-04-03 | End: 2018-04-05 | Stop reason: HOSPADM

## 2018-04-03 RX ORDER — CEFAZOLIN SODIUM 2 G/100ML
2 INJECTION, SOLUTION INTRAVENOUS EVERY 8 HOURS
Status: COMPLETED | OUTPATIENT
Start: 2018-04-03 | End: 2018-04-04

## 2018-04-03 RX ORDER — HYDROCODONE BITARTRATE AND ACETAMINOPHEN 7.5; 325 MG/1; MG/1
1 TABLET ORAL EVERY 4 HOURS PRN
Status: DISCONTINUED | OUTPATIENT
Start: 2018-04-03 | End: 2018-04-03

## 2018-04-03 RX ORDER — TAMSULOSIN HYDROCHLORIDE 0.4 MG/1
0.4 CAPSULE ORAL DAILY
Status: DISCONTINUED | OUTPATIENT
Start: 2018-04-03 | End: 2018-04-05 | Stop reason: HOSPADM

## 2018-04-03 RX ORDER — HYDRALAZINE HYDROCHLORIDE 20 MG/ML
10 INJECTION INTRAMUSCULAR; INTRAVENOUS EVERY 6 HOURS PRN
Status: DISCONTINUED | OUTPATIENT
Start: 2018-04-03 | End: 2018-04-05 | Stop reason: HOSPADM

## 2018-04-03 RX ORDER — IPRATROPIUM BROMIDE AND ALBUTEROL SULFATE 2.5; .5 MG/3ML; MG/3ML
3 SOLUTION RESPIRATORY (INHALATION) ONCE AS NEEDED
Status: DISCONTINUED | OUTPATIENT
Start: 2018-04-03 | End: 2018-04-03 | Stop reason: HOSPADM

## 2018-04-03 RX ORDER — NALOXONE HCL 0.4 MG/ML
0.4 VIAL (ML) INJECTION
Status: DISCONTINUED | OUTPATIENT
Start: 2018-04-03 | End: 2018-04-05 | Stop reason: HOSPADM

## 2018-04-03 RX ORDER — SODIUM CHLORIDE 9 MG/ML
120 INJECTION, SOLUTION INTRAVENOUS CONTINUOUS
Status: DISCONTINUED | OUTPATIENT
Start: 2018-04-03 | End: 2018-04-05 | Stop reason: HOSPADM

## 2018-04-03 RX ORDER — FENTANYL CITRATE 50 UG/ML
50 INJECTION, SOLUTION INTRAMUSCULAR; INTRAVENOUS
Status: DISCONTINUED | OUTPATIENT
Start: 2018-04-03 | End: 2018-04-03 | Stop reason: HOSPADM

## 2018-04-03 RX ORDER — MORPHINE SULFATE 2 MG/ML
4 INJECTION, SOLUTION INTRAMUSCULAR; INTRAVENOUS
Status: DISCONTINUED | OUTPATIENT
Start: 2018-04-03 | End: 2018-04-05 | Stop reason: HOSPADM

## 2018-04-03 RX ORDER — ONDANSETRON 2 MG/ML
4 INJECTION INTRAMUSCULAR; INTRAVENOUS ONCE AS NEEDED
Status: DISCONTINUED | OUTPATIENT
Start: 2018-04-03 | End: 2018-04-03 | Stop reason: HOSPADM

## 2018-04-03 RX ORDER — MELOXICAM 7.5 MG/1
15 TABLET ORAL ONCE
Status: COMPLETED | OUTPATIENT
Start: 2018-04-03 | End: 2018-04-03

## 2018-04-03 RX ORDER — PROMETHAZINE HYDROCHLORIDE 25 MG/1
25 TABLET ORAL ONCE AS NEEDED
Status: DISCONTINUED | OUTPATIENT
Start: 2018-04-03 | End: 2018-04-03 | Stop reason: HOSPADM

## 2018-04-03 RX ORDER — PROMETHAZINE HYDROCHLORIDE 25 MG/1
25 SUPPOSITORY RECTAL ONCE AS NEEDED
Status: DISCONTINUED | OUTPATIENT
Start: 2018-04-03 | End: 2018-04-03 | Stop reason: HOSPADM

## 2018-04-03 RX ORDER — CEFAZOLIN SODIUM 2 G/100ML
2 INJECTION, SOLUTION INTRAVENOUS ONCE
Status: COMPLETED | OUTPATIENT
Start: 2018-04-03 | End: 2018-04-03

## 2018-04-03 RX ORDER — DEXAMETHASONE SODIUM PHOSPHATE 4 MG/ML
INJECTION, SOLUTION INTRA-ARTICULAR; INTRALESIONAL; INTRAMUSCULAR; INTRAVENOUS; SOFT TISSUE AS NEEDED
Status: DISCONTINUED | OUTPATIENT
Start: 2018-04-03 | End: 2018-04-03 | Stop reason: SURG

## 2018-04-03 RX ORDER — NALOXONE HCL 0.4 MG/ML
0.4 VIAL (ML) INJECTION AS NEEDED
Status: DISCONTINUED | OUTPATIENT
Start: 2018-04-03 | End: 2018-04-03 | Stop reason: HOSPADM

## 2018-04-03 RX ORDER — FAMOTIDINE 20 MG/1
20 TABLET, FILM COATED ORAL ONCE
Status: COMPLETED | OUTPATIENT
Start: 2018-04-03 | End: 2018-04-03

## 2018-04-03 RX ORDER — ASPIRIN 325 MG
325 TABLET, DELAYED RELEASE (ENTERIC COATED) ORAL DAILY
Status: DISCONTINUED | OUTPATIENT
Start: 2018-04-04 | End: 2018-04-04

## 2018-04-03 RX ADMIN — CEFAZOLIN SODIUM 2 G: 2 INJECTION, SOLUTION INTRAVENOUS at 17:04

## 2018-04-03 RX ADMIN — BUPIVACAINE HYDROCHLORIDE 20 ML: 2.5 INJECTION, SOLUTION EPIDURAL; INFILTRATION; INTRACAUDAL; PERINEURAL at 12:35

## 2018-04-03 RX ADMIN — LEVOTHYROXINE SODIUM 88 MCG: 88 TABLET ORAL at 17:10

## 2018-04-03 RX ADMIN — CEFAZOLIN SODIUM 2 G: 2 INJECTION, SOLUTION INTRAVENOUS at 10:33

## 2018-04-03 RX ADMIN — SODIUM CHLORIDE, POTASSIUM CHLORIDE, SODIUM LACTATE AND CALCIUM CHLORIDE: 600; 310; 30; 20 INJECTION, SOLUTION INTRAVENOUS at 10:33

## 2018-04-03 RX ADMIN — HYDROCORTISONE SODIUM SUCCINATE 50 MG: 100 INJECTION, POWDER, FOR SOLUTION INTRAMUSCULAR; INTRAVENOUS at 17:04

## 2018-04-03 RX ADMIN — DULOXETINE HYDROCHLORIDE 60 MG: 60 CAPSULE, DELAYED RELEASE ORAL at 17:04

## 2018-04-03 RX ADMIN — ROPIVACAINE HYDROCHLORIDE 12 ML/HR: 2 INJECTION, SOLUTION EPIDURAL; INFILTRATION at 13:06

## 2018-04-03 RX ADMIN — TRANEXAMIC ACID 1000 MG: 100 INJECTION, SOLUTION INTRAVENOUS at 10:45

## 2018-04-03 RX ADMIN — SODIUM CHLORIDE 120 ML/HR: 9 INJECTION, SOLUTION INTRAVENOUS at 23:34

## 2018-04-03 RX ADMIN — MELOXICAM 15 MG: 7.5 TABLET ORAL at 07:55

## 2018-04-03 RX ADMIN — BUPIVACAINE HYDROCHLORIDE 2 ML: 5 INJECTION, SOLUTION EPIDURAL; INTRACAUDAL at 10:39

## 2018-04-03 RX ADMIN — PREGABALIN 150 MG: 75 CAPSULE ORAL at 07:55

## 2018-04-03 RX ADMIN — SODIUM CHLORIDE 120 ML/HR: 9 INJECTION, SOLUTION INTRAVENOUS at 13:22

## 2018-04-03 RX ADMIN — OXYCODONE HYDROCHLORIDE AND ACETAMINOPHEN 1 TABLET: 5; 325 TABLET ORAL at 23:12

## 2018-04-03 RX ADMIN — ACETAMINOPHEN 1000 MG: 500 TABLET, FILM COATED ORAL at 07:55

## 2018-04-03 RX ADMIN — MELOXICAM 15 MG: 7.5 TABLET ORAL at 17:03

## 2018-04-03 RX ADMIN — DEXAMETHASONE SODIUM PHOSPHATE 8 MG: 4 INJECTION, SOLUTION INTRAMUSCULAR; INTRAVENOUS at 10:52

## 2018-04-03 RX ADMIN — TRANEXAMIC ACID 1000 MG: 100 INJECTION, SOLUTION INTRAVENOUS at 11:30

## 2018-04-03 RX ADMIN — MUPIROCIN 1 APPLICATION: 20 OINTMENT TOPICAL at 07:55

## 2018-04-03 RX ADMIN — LIDOCAINE HYDROCHLORIDE 0.5 ML: 10 INJECTION, SOLUTION EPIDURAL; INFILTRATION; INTRACAUDAL; PERINEURAL at 07:55

## 2018-04-03 RX ADMIN — TAMSULOSIN HYDROCHLORIDE 0.4 MG: 0.4 CAPSULE ORAL at 17:04

## 2018-04-03 RX ADMIN — OXYCODONE HYDROCHLORIDE AND ACETAMINOPHEN 1 TABLET: 5; 325 TABLET ORAL at 18:32

## 2018-04-03 RX ADMIN — PROPOFOL 75 MCG/KG/MIN: 10 INJECTION, EMULSION INTRAVENOUS at 10:39

## 2018-04-03 RX ADMIN — FAMOTIDINE 20 MG: 20 TABLET, FILM COATED ORAL at 07:55

## 2018-04-03 RX ADMIN — ONDANSETRON 8 MG: 2 INJECTION INTRAMUSCULAR; INTRAVENOUS at 10:43

## 2018-04-03 RX ADMIN — ZOLPIDEM TARTRATE 10 MG: 5 TABLET, FILM COATED ORAL at 23:12

## 2018-04-03 NOTE — H&P
Patient Name: Shanel Wall  MRN: [id number]  : 1950  DOS: 4/3/2018    Attending: No att. providers found    Primary Care Provider: Gena Olvera MD      Chief complaint:  Left knee pain    Subjective   Patient is a 67 y.o. female presented for left total knee arthroplasty by Dr. Wesley under spinal anesthesia. She tolerated surgery well and is admitted for further medical management. Her knee has been painful for many years. Conservative treatments failed to provide long term pain relief. She denies use of assistive device for ambulation or recent falls.      She is known to us from previous shoulder replacement surgery in May 2017; which she recovered well.  She does have rheumatoid arthritis and is followed by Dr. Delgado.    When seen postop she is doing well. She denies pain, but is still under effects of spinal anesthesia. She denies nausea, shortness of breath or chest pain. No hx of DVT or PE.    Allergies:  No Known Allergies    Meds:  Prescriptions Prior to Admission   Medication Sig Dispense Refill Last Dose   • Calcium Carb-Cholecalciferol (CALCIUM-VITAMIN D) 600-400 MG-UNIT tablet Take 1 tablet by mouth 2 (Two) Times a Day.   2018 at Unknown time   • DULoxetine (CYMBALTA) 60 MG capsule Take 60 mg by mouth Daily.   2018 at Unknown time   • folic acid (FOLVITE) 1 MG tablet Take 1 mg by mouth Daily.   2018 at Unknown time   • folic acid-vit B6-vit B12 (FOLGARD) 2.2-25-1 MG tablet tablet Take 1 tablet by mouth 2 (Two) Times a Day.   2018 at Unknown time   • HYDROcodone-acetaminophen (NORCO) 5-325 MG per tablet Take 1 tablet by mouth As Needed.   2018 at Unknown time   • lansoprazole (PREVACID) 15 MG capsule Take 15 mg by mouth Daily.   2018 at Unknown time   • leflunomide (ARAVA) 20 MG tablet Take 1 tablet by mouth Daily. Resume when ok with Dr. Harris   Past Month at Unknown time   • levothyroxine (SYNTHROID, LEVOTHROID) 88 MCG tablet Take 88 mcg by mouth Daily.    4/2/2018 at Unknown time   • methotrexate 2.5 MG tablet Take 9 tablets by mouth 1 (One) Time Per Week. MONDAYS  Resume when ok with Dr. Harris (Patient taking differently: Take 22.5 mg by mouth 1 (One) Time Per Week. wednesedays)  0 Past Month at Unknown time   • Multiple Vitamins-Minerals (SENIOR MULTIVITAMIN PLUS) tablet Take 1 tablet by mouth Daily.   4/2/2018 at Unknown time   • NON FORMULARY Take 2 tablets by mouth Daily. Hydro Eye gelcaps   4/2/2018 at Unknown time   • oxyCODONE-acetaminophen (PERCOCET) 7.5-325 MG per tablet Take 1 tablet by mouth Every 6 (Six) Hours As Needed for Moderate Pain .   4/2/2018 at Unknown time   • polyethyl glycol-propyl glycol (SYSTANE) 0.4-0.3 % solution ophthalmic solution Administer 2 drops to both eyes 2 (Two) Times a Day.   4/2/2018 at Unknown time   • predniSONE (DELTASONE) 5 MG tablet Take 5 mg by mouth Daily.   4/3/2018 at Unknown time   • prochlorperazine (COMPAZINE) 10 MG tablet Take 10 mg by mouth Every 6 (Six) Hours As Needed for Nausea or Vomiting.   4/2/2018 at Unknown time   • pseudoephedrine-guaifenesin (MUCINEX D)  MG per 12 hr tablet Take 1 tablet by mouth Every 12 (Twelve) Hours.   4/2/2018 at Unknown time   • salsalate (DISALCID) 750 MG tablet Take 1,500 mg by mouth 2 (Two) Times a Day.   4/2/2018 at Unknown time   • sodium chloride (OCEAN) 0.65 % nasal spray 1 spray into each nostril As Needed.   4/2/2018 at Unknown time   • vitamin A 8000 UNIT capsule Take 8,000 Units by mouth Daily.   4/2/2018 at Unknown time   • vitamin C (ASCORBIC ACID) 500 MG tablet Take 500 mg by mouth Daily.   4/2/2018 at Unknown time   • vitamin E 400 UNIT capsule Take 800 Units by mouth Daily.   4/2/2018 at Unknown time   • zolpidem (AMBIEN) 10 MG tablet Take 10 mg by mouth Every Night.   4/2/2018 at Unknown time   • alendronate (FOSAMAX) 70 MG tablet Take 70 mg by mouth Every 7 (Seven) Days. Eleazar   3/28/2018   • ALLERGY SERUM INJECTION Inject  under the skin Every 21  "(Twenty-One) Days.   3/13/2018   • aluminum hydroxide-mag carbonate (GAVISCON EXTRA RELIEF) 160-105 MG chewable tablet chewable tablet Chew 1 tablet Daily As Needed.   Taking   • Etanercept 50 MG/ML solution auto-injector Inject 50 mg under the skin 1 (One) Time Per Week. MONDAYS  Resume when ok with Dr. Harris (Patient taking differently: Inject 50 mg under the skin 1 (One) Time Per Week. MONDAYS)   Taking       History:   Past Medical History:   Diagnosis Date   • Anesthesia     PT HAS RHEUMATOID ARTHRITIS, PT HAS SPECIFIC INSTRUCTIONS PER RA MD, pt to bring INSTRUCTIONS AND RECS ATTACHED TO CHART AND PT TO DISCUSS WITH ANESTHESIA ON DATE OF PROCEDURE    • Arthritis    • Cervical cancer     cone biopsy   • Depression    • Disease of thyroid gland    • GERD (gastroesophageal reflux disease)    • Hard to intubate     \"anterior trachea\"    • IBS (irritable bowel syndrome)    • Joint pain    • Measles    • Menopause    • Rheumatoid arthritis    • Shingles    • Vertigo    • Wears glasses      Past Surgical History:   Procedure Laterality Date   • ADENOIDECTOMY     • BREAST EXCISIONAL BIOPSY Left     NO VISIBLE SCAR   • CERVICAL CONE BIOPSY     • COLONOSCOPY     • COSMETIC SURGERY      SHORT SCAR FACE LIFT PER DR RODRIGUEZ    • DILATATION AND CURETTAGE     • FOOT SURGERY Left     calcaneous    • KNEE ARTHROPLASTY Right     blue    • KNEE ARTHROSCOPY Bilateral    • REDUCTION MAMMAPLASTY Bilateral    • TENDON REPAIR Right     HAND   • TONSILLECTOMY AND ADENOIDECTOMY     • TOTAL SHOULDER ARTHROPLASTY W/ DISTAL CLAVICLE EXCISION Left 5/22/2017    Procedure: LEFT REVERSE TOTAL SHOULDER ARTHROPLASTY FOR FRACTURE;  Surgeon: Damaso Harris MD;  Location: [facility];  Service:    • TUBAL ABDOMINAL LIGATION       Family History   Problem Relation Age of Onset   • Parkinsonism Mother    • Heart failure Father    • Kidney failure Father    • Hyperlipidemia Father    • No Known Problems Sister    • No Known Problems Brother    • " "Breast cancer Neg Hx    • Ovarian cancer Neg Hx      Social History   Substance Use Topics   • Smoking status: Never Smoker   • Smokeless tobacco: Never Used   • Alcohol use 1.2 oz/week     1 Glasses of wine, 1 Shots of liquor per week      Comment: DAILY   . No children. Retired from working at [employer].    Review of Systems  All systems were reviewed and negative except for:  Gastrointestinal: postitive for  diarrhea    Vital Signs  /76   Pulse 57   Temp 97.6 °F (36.4 °C) (Oral)   Resp 16   Ht 149.9 cm (59\")   Wt 62.1 kg (137 lb)   SpO2 95%   BMI 27.67 kg/m²     Physical Exam:    General Appearance:    Alert, cooperative, in no acute distress   Head:    Normocephalic, without obvious abnormality, atraumatic   Eyes:            Lids and lashes normal, conjunctivae and sclerae normal, no   icterus, no pallor, corneas clear, PERRLA   Ears:    Ears appear intact with no abnormalities noted   Neck:   No adenopathy, supple, trachea midline, no thyromegaly, no     carotid bruit, no JVD   Lungs:     Clear to auscultation,respirations regular, even and                   unlabored    Heart:    Regular rhythm and normal rate, normal S1 and S2, no            murmur, no gallop, no rub, no click   Abdomen:     Normal bowel sounds, no masses, no organomegaly, soft        non-tender, non-distended, no guarding, no rebound                 tenderness   Genitalia:    Deferred   Extremities:   Left knee ACE wrap CDI. Nerve block present. Joint deformities secondary to RA    Pulses:   Pulses palpable and equal bilaterally   Skin:   No bleeding, bruising or rash   Neurologic:   Cranial nerves 2 - 12 grossly intact, lack of sensation or movement BLE due to effects of spinal block      I reviewed the patient's new clinical results.     Results for DELGADO FERNANDO A (MRN [id number]) as of 4/3/2018 14:35   Ref. Range 3/20/2018 15:08   Glucose Latest Ref Range: 70 - 100 mg/dL 98   Sodium Latest Ref Range: 132 - 146 mmol/L " 143   Potassium Latest Ref Range: 3.5 - 5.5 mmol/L 3.9   CO2 Latest Ref Range: 20.0 - 31.0 mmol/L 32.0 (H)   Chloride Latest Ref Range: 99 - 109 mmol/L 103   Anion Gap Latest Ref Range: 3.0 - 11.0 mmol/L 8.0   Creatinine Latest Ref Range: 0.60 - 1.30 mg/dL 0.80   BUN Latest Ref Range: 9 - 23 mg/dL 16   BUN/Creatinine Ratio Latest Ref Range: 7.0 - 25.0  20.0   Calcium Latest Ref Range: 8.7 - 10.4 mg/dL 9.5   eGFR Non African Amer Latest Ref Range: >60 mL/min/1.73 72   Alkaline Phosphatase Latest Ref Range: 25 - 100 U/L 82   Total Protein Latest Ref Range: 5.7 - 8.2 g/dL 7.2   ALT (SGPT) Latest Ref Range: 7 - 40 U/L 25   AST (SGOT) Latest Ref Range: 0 - 33 U/L 37 (H)   Total Bilirubin Latest Ref Range: 0.3 - 1.2 mg/dL 0.2 (L)   Albumin Latest Ref Range: 3.20 - 4.80 g/dL 4.40   Globulin Latest Units: gm/dL 2.8   A/G Ratio Latest Ref Range: 1.5 - 2.5 g/dL 1.6   Hemoglobin A1C Latest Ref Range: 4.80 - 5.60 % 5.40   C-Reactive Protein Latest Ref Range: 0.00 - 1.00 mg/dL 0.04   Protime Latest Ref Range: 9.6 - 11.5 Seconds 11.1   INR Latest Ref Range: 0.91 - 1.09  1.06   aPTT Latest Ref Range: 24.0 - 31.0 seconds <24.0 (L)   WBC Latest Ref Range: 3.50 - 10.80 10*3/mm3 5.71   RBC Latest Ref Range: 3.89 - 5.14 10*6/mm3 4.66   Hemoglobin Latest Ref Range: 11.5 - 15.5 g/dL 14.6   Hematocrit Latest Ref Range: 34.5 - 44.0 % 46.7 (H)   RDW Latest Ref Range: 11.3 - 14.5 % 16.1 (H)   MCV Latest Ref Range: 80.0 - 99.0 fL 100.2 (H)   MCH Latest Ref Range: 27.0 - 31.0 pg 31.3 (H)   MCHC Latest Ref Range: 32.0 - 36.0 g/dL 31.3 (L)   MPV Latest Ref Range: 6.0 - 12.0 fL 9.8   Platelets Latest Ref Range: 150 - 450 10*3/mm3 191     Assessment and Plan:   Principal Problem:    Status post total left knee replacement  Active Problems:    Rheumatoid arthritis involving left knee with positive rheumatoid factor    Gastroesophageal reflux disease without esophagitis    Hypothyroid      Plan  1. PT/OT- early ambulation post op  2. Pain  control-prns, AC nerve block  3. IS-encourage  4. DVT proph- mechs/ASA  5. Bowel regimen  6. Resume home medications as appropriate  7. Monitor post-op labs  8. DC planning for home    Stress dose steroids ordered per Rheumatologist recs.    Hypothyroid  - Continue home synthroid     Seen and examined by me.   Discussed with patient.wy.  I have personally performed a diagnostic evaluation on this patient. My history is consistant  with HPI obtained. My exam finding are listed above. I have personally reviewed and discussed the above formulated treatment plan with ARNSHERIF.julianne Stallings MD  04/12/18  4:29 PM

## 2018-04-03 NOTE — OP NOTE
DATE OF PROCEDURE: 04/03/18    PREOPERATIVE DIAGNOSIS: left knee rheumatoid arthritis      POSTOPERATIVE DIAGNOSIS:  left knee rheumatoid arthritis    PROCEDURES PERFORMED:   left total knee arthroplasty with DePuy Attune components (# 4 narrow posterior stabilized femur, # 3 rotating platform tibia, 7 mm rotating platform polyethylene, with 32 three peg anatomic patella).     SURGEON: Mahesh Wesley MD    ASSISTANT: Jayshree Jean Baptiste PA-C     SPECIMENS: None    IMPLANTS:   Implants     Implant    Baseplt Jed Univers Revers Sm - [id number] - Implanted   (Left) Shoulder    Inventory item: Baseplt Jed Univers Revers Sm Model/Cat number: [id number]    : ARTHREX Lot number: [id number]    As of 5/22/2017     Status: Implanted                  Scrw Jed Univers Revers Centrl Nl 6.5x20mm - [id number] - Implanted   (Left) Shoulder    Inventory item: Scrw Jed Univers Revers Centrl Nl 6.5x20mm Model/Cat number: [id number]    : ARTHREX Lot number: [id number]    As of 5/22/2017     Status: Implanted                  Scrw Jed Univers Revers Periph 4.5x30mm - [id number] - Implanted   (Left) Shoulder    Inventory item: Scrw Jed Univers Revers Periph 4.5x30mm Model/Cat number: [id number]    : ARTHREX Lot number: [id number]    As of 5/22/2017     Status: Implanted                  Scrw Jed Univers Revers Periph 4.5x36mm - [id number] - Implanted   (Left) Shoulder    Inventory item: Scrw Jed Univers Revers Periph 4.5x36mm Model/Cat number: [id number]    : ARTHREX Lot number: [id number]    As of 5/22/2017     Status: Implanted                  Glenosphere Univers Revers S/36 Pls4 Lat - [id number] - Implanted   (Left) Shoulder    Inventory item: Glenosphere Univers Revers S/36 Pls4 Lat Model/Cat number: [id number]    : ARTHREX Lot number: [id number]    As of 5/22/2017     Status: Implanted                  Stem Hum Univers Revers Cap/Coat Sz7 - [id number] - Implanted   (Left) Shoulder     Inventory item: Stem Hum Univers Revers Cap/Coat Sz7 Model/Cat number: [id number]    : ARTHREX Lot number: [id number]    As of 5/22/2017     Status: Implanted                  Cup Sut Univers Revers 36 Ntrl - [id number] - Implanted   (Left) Shoulder    Inventory item: Cup Sut Univers Revers 36 Ntrl Model/Cat number: [id number]    : ARTHREX Lot number: [id number]    As of 5/22/2017     Status: Implanted                  Liner Hum Univers Revers Sm 36 Pls3mm - [id number] - Implanted   (Left) Shoulder    Inventory item: Liner Hum Univers Revers Sm 36 Pls3mm Model/Cat number: [id number]    : ARTHREX Lot number: [id number]    As of 5/22/2017     Status: Implanted                  Totl Shldr Rev S4 - [id number] - Implanted   (Left) Shoulder    Inventory item: Totl Shldr Rev S4 Model/Cat number: [id number]    : ARTHREX      As of 5/22/2017     Status: Implanted                  Cmt Bone Simplex/P Full Dose 10/Pk - [id number] - Implanted   (Left) Knee    Inventory item: Cmt Bone Simplex/P Full Dose 10/Pk Model/Cat number: [id number]    : BISON Lot number: [id number]    As of 4/3/2018     Status: Implanted                  Base Tib Attune Cmt Rp Sz3 - [id number] - Implanted   (Left) Knee    Inventory item: Base Tib Attune Cmt Rp Sz3 Model/Cat number: [id number]    : DEPUY Lot number: [id number]    As of 4/3/2018     Status: Implanted                  Comp Fem Attune Cmt Ps Nrw Sz4 Lt - [id number] - Implanted   (Left) Knee    Inventory item: Comp Fem Attune Cmt Ps Nrw Sz4 Lt Model/Cat number: [id number]    : DEPUY Lot number: [id number]    As of 4/3/2018     Status: Implanted                  Comp Pat Attune Cmt Medl Luciana 32mm - [id number] - Implanted   (Left) Knee    Inventory item: Comp Pat Attune Cmt Medl Luciana 32mm Model/Cat number: [id number]    : DEPUY Lot number: [id number]    As of 4/3/2018     Status: Implanted                   Ins Tib Chandrika Ps Rp Sz4 7mm - [id number] - Implanted   (Left) Knee    Inventory item: Insrt Tib Attricki Ps Rp Sz4 7mm Model/Cat number: [id number]    : DEPUY Lot number: [id number]    As of 4/3/2018     Status: Implanted                         ANESTHESIA:  Spinal    STAFF:  Circulator: Tracy Carroll RN; Isabela Chavez RN  Scrub Person: Kathleen Valerio; Charlotte Martin  Vendor Representative: Justus Woody  Nursing Assistant: Luis Staples    TOURNIQUET TIME: 20 minutes     ESTIMATED BLOOD LOSS: 100ml     COMPLICATIONS: None    PREOPERATIVE ANTIBIOTICS: Ancef 2 g    INDICATIONS: The patient is a 67 y.o. female with debilitating left knee pain secondary to advanced rheumatoid arthritis that failed to improve in spite of conservative treatment .  Options have been discussed at length with the patient and the patient has had an extended course of conservative treatment without long-term benefit. The patient has reached the point where the patient desires total knee arthroplasty surgery and understands the risks, benefits, and alternatives. Consent was obtained. Please see my office notes for details with regard to preoperative counseling and operative rationale.     DESCRIPTION OF PROCEDURE: The patient was positively identified in the preoperative holding area and brought to the operating suite and placed in a supine position. After adequate spinal anesthetic had been achieved, the left lower extremity was prepped and draped in the usual sterile fashion.  After application of a tourniquet to the left upper thigh, which was used during the procedure for a total 20 minutes, used during the cementation process only. Landmarks of the knee were identified and timeout procedure was performed to confirm the operative site, as well as other parameters. Following the sterile prep and drape, a skin incision was made just off the medial aspect of midline for a medial parapatellar approach. Following a sharp skin  incision, dissection was carried down to the level of the extensor mechanism and a medial parapatellar arthrotomy was made and the patella was tucked into the lateral gutter. Description of arthritis:  Advanced arthritic changes in the patellofemoral joint, with bone-on-bone contact, and medial and lateral osteophytes with significant medial and lateral wear.  The knee was adequately exposed and a distal femoral cut was made with an intramedullary guide. This was subsequently sized for a # 4 implant and anterior, posterior chamfer cuts made. The menisci removed both medially and laterally.  The ACL was transected and the tibia was subluxed anteriorly. Proximal tibia cut was made with the external alignment guide. The cut was noted to be perpendicular to the tibial axis, with symmetric flexion and extension gaps. Therefore, final femoral preparations were made with the box cutting guide followed by final tibial preparations to accommodate a # 3 tibia. With a trial 6  insert in place, full flexion and extension was noted with no instability. The patella was then prepared for a 32 three peg anatomic patella which had excellent tracking. All trial components were removed and final components were cemented in place with namely a #3 rotating platform tibia, # 4 narrow posterior stabilized femur and a 32 three peg anatomic patella with a trial 7 insert for cement compression. All the excess cement was removed from the bone implant interface and allowed to harden. Tourniquet was deflated. Hemostasis was obtained with electrocautery. There was no brisk bleeding noted in the popliteal fossa in particular. Therefore, the knee was copiously irrigated as it was between major steps, and the final 7 insert was placed as this was deemed appropriate for the patient's anatomy with full flexion and extension and no instability and attention was then directed towards closure. The medial parapatellar arthrotomy was closed with #1 Vicryl  in an interrupted figure-of-eight fashion in 4 strategic locations followed by oversewing this from proximal to distal with a #1 StrataFix symmetric, which nicely sealed the joint, followed by closure of the deep fascial layer with #1 Vicryl in a buried interrupted fashion, followed by closure of the subcutaneous layer with 2-0 Vicryl and the skin with 3-0 Stratafix in a running subcuticular fashion. Prineo wound closure dressing was applied followed by a sterile dressing with 4 x 4's, abdominal pad, soft roll and Ace wrap. The patient tolerated the procedure well and was brought to the recovery room in good condition.     PLAN:  1.  The patient will begin early range of motion and weight-bearing per the post total knee arthroplasty protocol.   2.  I anticipate brief hospitalization for initial rehabilitation and pain control followed by continued rehabilitation in either home health or supervised setting.   3.  Postoperative medical management with Dr. Stallings.  4.  Aspirin will be utilized for DVT prophylaxis unless there is a contraindication.       Mahesh Wesley MD  04/03/18  12:07 PM

## 2018-04-03 NOTE — H&P
Pre-Op H&P  Shanel Wall  [id number]  1950    Chief complaint: Left knee pain    HPI:    2/5/18 office visit:  Shanel Wall is a 67 y.o. female. She presents today for evaluation of left knee pain.  She has a known history of rheumatoid arthritis, and his had conservative treatment for her knee, which has worsened to the point where she would like knee replacement surgery.  She had a right total knee arthroplasty about 4 years ago, which worked quite well for her.  Pain is sharp, moderate, but severe at times, and worse with climbing stairs.     The patient has been considering left knee total joint replacement surgery.  The pain has been severe and has been worsening in spite of medications.The pain interferes with walking, sleeping, work and leisure activities, and the patient uses a cane as an ambulatory aid.  No previous history of blood clots, nor family history of clotting disorders.    4/3/18:  Here today for left total knee arthroplasty    Review of Systems:  General ROS: negative for chills, fever or skin lesions;  No changes since last office visit  Cardiovascular ROS: no chest pain or dyspnea on exertion  Respiratory ROS: no cough, shortness of breath, or wheezing    Allergies: No Known Allergies    Home Meds:    No current facility-administered medications on file prior to encounter.      Current Outpatient Prescriptions on File Prior to Encounter   Medication Sig Dispense Refill   • Calcium Carb-Cholecalciferol (CALCIUM-VITAMIN D) 600-400 MG-UNIT tablet Take 1 tablet by mouth 2 (Two) Times a Day.     • DULoxetine (CYMBALTA) 60 MG capsule Take 60 mg by mouth Daily.     • folic acid (FOLVITE) 1 MG tablet Take 1 mg by mouth Daily.     • folic acid-vit B6-vit B12 (FOLGARD) 2.2-25-1 MG tablet tablet Take 1 tablet by mouth 2 (Two) Times a Day.     • HYDROcodone-acetaminophen (NORCO) 5-325 MG per tablet Take 1 tablet by mouth As Needed.     • lansoprazole (PREVACID) 15 MG capsule Take 15 mg  by mouth Daily.     • leflunomide (ARAVA) 20 MG tablet Take 1 tablet by mouth Daily. Resume when ok with Dr. Harris     • levothyroxine (SYNTHROID, LEVOTHROID) 88 MCG tablet Take 88 mcg by mouth Daily.     • methotrexate 2.5 MG tablet Take 9 tablets by mouth 1 (One) Time Per Week. MONDAYS  Resume when ok with Dr. Harris (Patient taking differently: Take 22.5 mg by mouth 1 (One) Time Per Week. wednesedays)  0   • Multiple Vitamins-Minerals (SENIOR MULTIVITAMIN PLUS) tablet Take 1 tablet by mouth Daily.     • NON FORMULARY Take 2 tablets by mouth Daily. Hydro Eye gelcaps     • oxyCODONE-acetaminophen (PERCOCET) 7.5-325 MG per tablet Take 1 tablet by mouth Every 6 (Six) Hours As Needed for Moderate Pain .     • polyethyl glycol-propyl glycol (SYSTANE) 0.4-0.3 % solution ophthalmic solution Administer 2 drops to both eyes 2 (Two) Times a Day.     • predniSONE (DELTASONE) 5 MG tablet Take 5 mg by mouth Daily.     • prochlorperazine (COMPAZINE) 10 MG tablet Take 10 mg by mouth Every 6 (Six) Hours As Needed for Nausea or Vomiting.     • pseudoephedrine-guaifenesin (MUCINEX D)  MG per 12 hr tablet Take 1 tablet by mouth Every 12 (Twelve) Hours.     • salsalate (DISALCID) 750 MG tablet Take 1,500 mg by mouth 2 (Two) Times a Day.     • sodium chloride (OCEAN) 0.65 % nasal spray 1 spray into each nostril As Needed.     • vitamin A 8000 UNIT capsule Take 8,000 Units by mouth Daily.     • vitamin C (ASCORBIC ACID) 500 MG tablet Take 500 mg by mouth Daily.     • vitamin E 400 UNIT capsule Take 800 Units by mouth Daily.     • zolpidem (AMBIEN) 10 MG tablet Take 10 mg by mouth Every Night.     • alendronate (FOSAMAX) 70 MG tablet Take 70 mg by mouth Every 7 (Seven) Days. Sunday     • ALLERGY SERUM INJECTION Inject  under the skin Every 21 (Twenty-One) Days.     • aluminum hydroxide-mag carbonate (GAVISCON EXTRA RELIEF) 160-105 MG chewable tablet chewable tablet Chew 1 tablet Daily As Needed.     • Chlorhexidine Gluconate 4 %  "solution  Shower with hibiclens solution as directed for 5 days prior to surgery 237 mL 0   • Etanercept 50 MG/ML solution auto-injector Inject 50 mg under the skin 1 (One) Time Per Week. MONDAYS  Resume when ok with Dr. Harris (Patient taking differently: Inject 50 mg under the skin 1 (One) Time Per Week. MONDAYS)     • mupirocin (BACTROBAN) 2 % ointment  Apply pea-sized amount tot each nostril twice daily for 5 days prior to surgery 22 g 0       PMH:   Past Medical History:   Diagnosis Date   • Anesthesia     PT HAS RHEUMATOID ARTHRITIS, PT HAS SPECIFIC INSTRUCTIONS PER RA MD, pt to bring INSTRUCTIONS AND RECS ATTACHED TO CHART AND PT TO DISCUSS WITH ANESTHESIA ON DATE OF PROCEDURE    • Arthritis    • Cervical cancer     cone biopsy   • Depression    • Disease of thyroid gland    • GERD (gastroesophageal reflux disease)    • Hard to intubate     \"anterior trachea\"    • IBS (irritable bowel syndrome)    • Joint pain    • Measles    • Menopause    • Rheumatoid arthritis    • Shingles    • Vertigo    • Wears glasses      PSH:    Past Surgical History:   Procedure Laterality Date   • ADENOIDECTOMY     • BREAST EXCISIONAL BIOPSY Left     NO VISIBLE SCAR   • CERVICAL CONE BIOPSY     • COLONOSCOPY     • COSMETIC SURGERY      SHORT SCAR FACE LIFT PER DR RODRIGUEZ    • DILATATION AND CURETTAGE     • FOOT SURGERY Left     calcaneous    • KNEE ARTHROPLASTY Right     blue    • KNEE ARTHROSCOPY Bilateral    • REDUCTION MAMMAPLASTY Bilateral    • TENDON REPAIR Right     HAND   • TONSILLECTOMY AND ADENOIDECTOMY     • TOTAL SHOULDER ARTHROPLASTY W/ DISTAL CLAVICLE EXCISION Left 5/22/2017    Procedure: LEFT REVERSE TOTAL SHOULDER ARTHROPLASTY FOR FRACTURE;  Surgeon: Damaso Harris MD;  Location: [facility];  Service:    • TUBAL ABDOMINAL LIGATION         Immunization History:  Influenza: yes 2017  Pneumococcal: yes 2014  Tetanus: no    Social History:   Tobacco:   History   Smoking Status   • Never Smoker   Smokeless Tobacco   • " "Never Used      Alcohol:     History   Alcohol Use   • 1.2 oz/week   • 1 Glasses of wine, 1 Shots of liquor per week     Comment: DAILY       Vitals:           /92 (BP Location: Right arm, Patient Position: Lying)   Pulse 73   Temp 98.2 °F (36.8 °C) (Tympanic)   Resp 18   Ht 149.9 cm (59\")   Wt 62.1 kg (137 lb)   SpO2 95%   BMI 27.67 kg/m²     Physical Exam:  General Appearance:    Alert, cooperative, no distress, appears stated age   Head:    Normocephalic, without obvious abnormality, atraumatic   Lungs:     Clear to auscultation bilaterally, respirations unlabored    Heart:   Regular rate and rhythm, S1 and S2 normal, no murmur, rub    or gallop    Abdomen:    Soft, non-tender.  +bowel sounds   Breast Exam:    deferred   Genitalia:    deferred   Extremities:   Extremities normal, atraumatic, no cyanosis or edema   Skin:   Skin color, texture, turgor normal, no rashes or lesions   Neurologic:   Grossly intact   Results Review  I reviewed the patient's new clinical results.    Cancer Staging (if applicable)  Cancer Patient: __ yes _x_no __unknown; If yes, clinical stage T:__ N:__M:__, stage group or __N/A    Impression: RA involving left knee    Discussion: I reviewed my findings with patient today.  Her left knee pain is worsened to the point where she would like to proceed with knee replacement surgery.  Please see my counseling note for details.  Increased risks associated with knee replacement surgery in the setting of rheumatoid arthritis was discussed.     Surgical Counseling      I have informed the patient of the diagnosis and the prognosis.  Exhaustive conservative treatment modalities have not resulted in long term pain relief.  The symptoms have progressed to the point of daily pain and inability to perform activities of daily living without significant pain. The patient has reached the point of desiring to proceed with total knee arthroplasty after discussing the risks, benefits and " alternatives to the procedure.  The surgical procedure itself was discussed in detail. Risks of the procedure were discussed, which included but are not limited to, bleeding, infection, damage to blood vessels and nerves, incomplete pain relief, loosening of the prosthesis, deep infection, need for further surgery, loss of limb, deep venous thrombosis, pulmonary embolus, death, heart attack, stroke, kidney failure, liver failure, and anesthetic complications.  In addition, the potential for deep infection developing in the future was discussed, which could require further surgery.  The knee would have to be re-opened, debrided, and potentially remove the prosthesis, which may or may not be replaced in the future.  Also, the possibility for loosening of the prosthesis has been mentioned.  If the prosthesis loosened, a revision arthroplasty could be performed, with results that are not as predictable compared to the original procedure.  The typical rehabilitative course has also been discussed, and full recovery may take up to a year to see the maximum benefit.  The importance of patient cooperation in the rehabilitative efforts has also been discussed.  No guarantees whatsoever were given.  The patient understands the potential risks versus the benefits and desires to proceed with total knee arthroplasty at a mutually convenient time.      Abbi Rick, ENDY   4/3/2018   8:03 AM       Agree with above.  Plan for left total knee arthroplasty.

## 2018-04-03 NOTE — PLAN OF CARE
Problem: Pain, Acute (Adult)  Goal: Identify Related Risk Factors and Signs and Symptoms  Outcome: Outcome(s) achieved Date Met: 04/03/18 04/03/18 1715   Pain, Acute (Adult)   Related Risk Factors (Acute Pain) knowledge deficit;surgery   Signs and Symptoms (Acute Pain) BADLs/IADLs reluctance/inability to perform;verbalization of pain descriptors     Goal: Acceptable Pain Control/Comfort Level  Outcome: Ongoing (interventions implemented as appropriate)   04/03/18 5092   Pain, Acute (Adult)   Acceptable Pain Control/Comfort Level making progress toward outcome

## 2018-04-03 NOTE — PROGRESS NOTES
Discharge Planning Assessment  [facility]     Patient Name: Shanel Pacheco  MRN: [id number]  Today's Date: 4/3/2018    Admit Date: 4/3/2018          Discharge Needs Assessment     Row Name 04/03/18 7078       Living Environment    Lives With spouse    Name(s) of Who Lives With Patient jose pacheco (spouse) [phone]    Current Living Arrangements home/apartment/condo    Primary Care Provided by spouse/significant other    Provides Primary Care For no one    Family Caregiver if Needed spouse    Quality of Family Relationships involved;supportive    Able to Return to Prior Arrangements yes       Resource/Environmental Concerns    Resource/Environmental Concerns none    Transportation Concerns car, none       Transition Planning    Patient/Family Anticipates Transition to home    Patient/Family Anticipated Services at Transition home health care;rehabilitation services       Discharge Needs Assessment    Readmission Within the Last 30 Days no previous admission in last 30 days    Concerns to be Addressed basic needs;discharge planning    Equipment Currently Used at Home walker, rolling;commode    Anticipated Changes Related to Illness inability to care for self    Equipment Needed After Discharge none    Outpatient/Agency/Support Group Needs homecare agency    Discharge Facility/Level of Care Needs home with home health    Current Discharge Risk dependent with mobility/activities of daily living            Discharge Plan     Row Name 04/03/18 1520       Plan    Plan Home with      Patient/Family in Agreement with Plan yes    Plan Comments I met with Mrs pacheco to discuss d/c plan. Her plan is to return home. She lives with  who is home full time to assist. She has requested Home PT from [facility]. I have spoken with Jozef who accepted referral. She already has equipment from previous surgeries. 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 We also  discussed option of respite program at [facility], she will think about it an let  know.. Case mgt will f/u in am    Final Discharge Disposition Code 06 - home with home health care        Destination     No service coordination in this encounter.      Durable Medical Equipment     No service coordination in this encounter.      Dialysis/Infusion     No service coordination in this encounter.      Home Medical Care     No service coordination in this encounter.      Social Care     No service coordination in this encounter.                Demographic Summary     Row Name 04/03/18 1631       General Information    Admission Type inpatient    Arrived From home    Referral Source physician    Reason for Consult discharge planning    Preferred Language English    General Information Comments PCP- Gena Olvera       Contact Information    Permission Granted to Share Info With             Functional Status     Row Name 04/03/18 1632       Functional Status    Usual Activity Tolerance good       Functional Status, IADL    Medications independent    Meal Preparation assistive person    Housekeeping assistive person    Laundry independent    Shopping independent       Mental Status    General Appearance WDL WDL       Mental Status Summary    Recent Changes in Mental Status/Cognitive Functioning no changes       Employment/    Employment Status retired    Employment/ Comments Medicare & Misc            Psychosocial    No documentation.           Abuse/Neglect    No documentation.           Legal    No documentation.           Substance Abuse    No documentation.           Patient Forms    No documentation.         Sonja C Kellerman, RN

## 2018-04-03 NOTE — ANESTHESIA PROCEDURE NOTES
Spinal Block    Patient location during procedure: OR  Indication:at surgeon's request  Performed By  CRNA: VIRGINIE DARBY  Preanesthetic Checklist  Completed: patient identified, site marked, surgical consent, pre-op evaluation, timeout performed, IV checked, risks and benefits discussed and monitors and equipment checked  Spinal Block Prep:  Patient Position:sitting  Sterile Tech:cap, gloves, sterile barriers and mask  Prep:Chloraprep  Patient Monitoring:blood pressure monitoring, continuous pulse oximetry and EKG  Spinal Block Procedure  Approach:midline  Guidance:landmark technique and palpation technique  Location:L4-L5  Needle Type:Pencan (Introducer:   no)  Needle Gauge:25 G  Placement of Spinal needle event:cerebrospinal fluid aspirated    Fluid Appearance:clear  Post Assessment  Patient Tolerance:patient tolerated the procedure well with no apparent complications  Complications no  Additional Notes  Procedure:  Pt assisted to sitting position, with legs in position of comfort over side of bed.  Pt. instructed in optimal spine presentation, the spine was prepped/ Draped and the skin at insertion site was anesthetized with 1% Lidocaine 2 ml.  The spinal needle was then advanced until CSF flow was obtained and LA was injected:      Marcaine 0.5% PSF injected 10 Mg.

## 2018-04-03 NOTE — PLAN OF CARE
Problem: Patient Care Overview  Goal: Plan of Care Review  Outcome: Ongoing (interventions implemented as appropriate)   04/03/18 1287   Coping/Psychosocial   Plan of Care Reviewed With patient   OTHER   Outcome Summary Pt ambulated 340 feet with CGAx2 and RW, limited by fatigue. Will obtain ROM measure POD #1. PT will follow to increase ROM, improve strength and progress functional mobility. Plan is home with HHPT at discharge, pt does have multiple steps at home so may need longer inpatient stay pending progress. PADD score=9

## 2018-04-03 NOTE — ANESTHESIA PREPROCEDURE EVALUATION
Anesthesia Evaluation     Patient summary reviewed and Nursing notes reviewed   NPO Solid Status: > 8 hours  NPO Liquid Status: > 8 hours           Airway   Mallampati: IV  TM distance: >3 FB  Neck ROM: limited  Difficult intubation highly probable  Dental      Pulmonary     breath sounds clear to auscultation  Cardiovascular     Rhythm: regular  Rate: normal        Neuro/Psych  (+) dizziness/light headedness,     GI/Hepatic/Renal/Endo    (+)  GERD,  hypothyroidism,     Musculoskeletal     (+) neck pain,   Abdominal    Substance History      OB/GYN          Other   (+) arthritis     ROS/Med Hx Other: Longstanding steroid dependent RA; bicuspid AV without stenosis or regurg, normal EF                  Anesthesia Plan    ASA 3     spinal   (+ add canal cath  Cover with stress dose steroids)  intravenous induction   Anesthetic plan and risks discussed with patient.    Plan discussed with CRNA.

## 2018-04-03 NOTE — ANESTHESIA PROCEDURE NOTES
Peripheral Block    Patient location during procedure: post-op  Start time: 4/3/2018 12:35 PM  Reason for block: at surgeon's request and post-op pain management  Performed by  CRNA: VIRGIINE DARBY  Assisted by: SHAHZAD BARRIOS  Preanesthetic Checklist  Completed: patient identified, site marked, surgical consent, pre-op evaluation, timeout performed, IV checked, risks and benefits discussed and monitors and equipment checked  Prep:  Sterile barriers:cap, gloves, mask and sterile barriers  Prep: ChloraPrep  Patient monitoring: blood pressure monitoring, continuous pulse oximetry and EKG  Procedure  Sedation:no  Performed under: spinal  Guidance:ultrasound guided  Images:still images obtained    Laterality:left  Block Type:adductor canal block  Injection Technique:catheter  Needle Type:Tuohy and echogenic  Needle Gauge:18 G    Catheter Size:20 G (20g)  Cath Depth at skin: 11 cm  Medications  Local Injected:bupivacaine 0.25% Local Amount Injected:20 (ml)mL  Post Assessment  Injection Assessment: negative aspiration for heme, incremental injection and no paresthesia on injection  Patient Tolerance:comfortable throughout block  Complications:no  Additional Notes  Procedure:             The pt was placed in the Supine position.  The Insertion site was  prepped and Draped in sterile fashion.  The pt was anesthetized with  IV Sedation( see meds).  Skin and cutaneous tissue was infiltrated and anesthetized with 1% Lidocaine 3 mls via a 25g needle.  A BBraun 4 inch 18g echogenic needle was then  inserted approximately midline, mid-thigh and advanced In-plane with Ultrasound guidance.  Normal Saline PSF was utilized for hydrodissection of tissue.  The Vastus medialis and Sartorius muscle where visualized and the needle tip was placed in the adductor canal,  lateral to the femoral artery.  LA injection spread was visualized, injection was incremental 1-5ml, injection pressure was normal or little, no intraneural injection,  no vascular injection.  LA dose was injected thru the needle(see dose above).  A BBraun 20g wire stylet catheter was placed via the needle with ultrasound visualization and confirmation with NS fluid bolus. The labeled Catheter was then secured to skin at insertion site with skin afix and steristrips to curled catheter and CHG transparent dressing.  Thank you.

## 2018-04-03 NOTE — ANESTHESIA POSTPROCEDURE EVALUATION
Patient: Shanel Wall    Procedure Summary     Date:  04/03/18 Room / Location:   [facility]    Anesthesia Start:  1033 Anesthesia Stop:  1246    Procedure:  TOTAL KNEE ARTHROPLASTY LEFT (Left Knee) Diagnosis:       Rheumatoid arthritis involving left knee with positive rheumatoid factor      (Rheumatoid arthritis involving left knee with positive rheumatoid factor [M05.762])    Surgeon:  Mahesh Wesley MD Provider:  Hawk Key MD    Anesthesia Type:  spinal ASA Status:  3          Anesthesia Type: spinal  Last vitals  BP   104/56 (04/03/18 1226)   Temp   98 °F (36.7 °C) (04/03/18 1226)   Pulse   61 (04/03/18 1226)   Resp   16 (04/03/18 1226)     SpO2   94 % (04/03/18 1226)     Post Anesthesia Care and Evaluation    Patient location during evaluation: PACU  Patient participation: complete - patient participated  Level of consciousness: awake and alert  Pain score: 0  Pain management: adequate  Airway patency: patent  Anesthetic complications: No anesthetic complications  PONV Status: none  Cardiovascular status: hemodynamically stable and acceptable  Respiratory status: nonlabored ventilation, acceptable and nasal cannula  Hydration status: acceptable  Post Neuraxial Block status: No signs or symptoms of PDPH

## 2018-04-03 NOTE — THERAPY EVALUATION
"Acute Care - Physical Therapy Initial Evaluation  [facility]     Patient Name: Shanel Wall  : 1950  MRN: [id number]  Today's Date: 4/3/2018   Onset of Illness/Injury or Date of Surgery: 18  Date of Referral to PT: 18  Referring Physician: MD Lupillo      Admit Date: 4/3/2018    Visit Dx:     ICD-10-CM ICD-9-CM   1. Impaired functional mobility, balance, gait, and endurance Z74.09 V49.89   2. Rheumatoid arthritis involving left knee with positive rheumatoid factor M05.762 714.0     Patient Active Problem List   Diagnosis   • Abnormal EKG   • Dizziness   • Lightheadedness   • Gastroesophageal reflux disease without esophagitis   • Rheumatoid arthritis involving multiple sites   • Hypoglycemia   • Insomnia   • Osteoporosis   • Arthropathy of shoulder region   • Status post reverse total replacement of left shoulder   • Hypothyroid   • Acute blood loss anemia, mild, asymptomatic   • Postoperative urinary retention   • Bicuspid aortic valve   • Rheumatoid arthritis involving left knee with positive rheumatoid factor   • Benign paroxysmal positional vertigo   • Neck pain   • Ataxia   • Nocturnal headaches   • Status post total left knee replacement     Past Medical History:   Diagnosis Date   • Anesthesia     PT HAS RHEUMATOID ARTHRITIS, PT HAS SPECIFIC INSTRUCTIONS PER RA MD, pt to bring INSTRUCTIONS AND RECS ATTACHED TO CHART AND PT TO DISCUSS WITH ANESTHESIA ON DATE OF PROCEDURE    • Arthritis    • Cervical cancer     cone biopsy   • Depression    • Disease of thyroid gland    • GERD (gastroesophageal reflux disease)    • Hard to intubate     \"anterior trachea\"    • IBS (irritable bowel syndrome)    • Joint pain    • Measles    • Menopause    • Rheumatoid arthritis    • Shingles    • Vertigo    • Wears glasses      Past Surgical History:   Procedure Laterality Date   • ADENOIDECTOMY     • BREAST EXCISIONAL BIOPSY Left     NO VISIBLE SCAR   • CERVICAL CONE BIOPSY     • COLONOSCOPY     • " COSMETIC SURGERY      SHORT SCAR FACE LIFT PER DR RODRIGUEZ    • DILATATION AND CURETTAGE     • FOOT SURGERY Left     calcaneous    • KNEE ARTHROPLASTY Right     blue    • KNEE ARTHROSCOPY Bilateral    • REDUCTION MAMMAPLASTY Bilateral    • TENDON REPAIR Right     HAND   • TONSILLECTOMY AND ADENOIDECTOMY     • TOTAL SHOULDER ARTHROPLASTY W/ DISTAL CLAVICLE EXCISION Left 5/22/2017    Procedure: LEFT REVERSE TOTAL SHOULDER ARTHROPLASTY FOR FRACTURE;  Surgeon: Damaso Harris MD;  Location: [facility] OR;  Service:    • TUBAL ABDOMINAL LIGATION          PT ASSESSMENT (last 72 hours)      Physical Therapy Evaluation     Row Name 04/03/18 1542          PT Evaluation Time/Intention    Subjective Information no complaints  -     Document Type evaluation  -     Mode of Treatment physical therapy  -     Patient Effort excellent  -     Symptoms Noted During/After Treatment increased pain  -     Comment RN notified  -     Row Name 04/03/18 1548          General Information    Patient Profile Reviewed? yes  -MJ     Onset of Illness/Injury or Date of Surgery 04/03/18  -     Referring Physician MD Lupillo  -     Patient Observations alert;cooperative;agree to therapy  -     General Observations of Patient Pt supine in bed, L adductor nerve catheter, IV, SCDs, ace bandage and ice to L knee.   -MJ     Prior Level of Function min assist:;all household mobility;community mobility;gait;transfer;bed mobility   Pain increased with mobility  -MJ     Equipment Currently Used at Home walker, rolling;walker, standard;cane, straight;bath bench;raised toilet  -MJ     Pertinent History of Current Functional Problem Pt presents for surgical management of L knee pain and dysfunction that failed to improve with conservative management  -MJ     Existing Precautions/Restrictions fall;other (see comments)   L adductor nerve catheter  -MJ     Risks Reviewed patient:;LOB;increased discomfort  -     Benefits Reviewed patient:;improve  function;increase independence;increase strength;increase balance;decrease pain  -MJ     Barriers to Rehab none identified  -MJ     Row Name 04/03/18 1542          Relationship/Environment    Primary Source of Support/Comfort spouse  -MJ     Lives With spouse  -MJ     Family Caregiver if Needed spouse  -MJ     Row Name 04/03/18 1542          Resource/Environmental Concerns    Current Living Arrangements home/apartment/condo  -MJ     Row Name 04/03/18 1542          Home Main Entrance    Number of Stairs, Main Entrance six  -MJ     Stair Railings, Main Entrance railing on right side (ascending)  -MJ     Row Name 04/03/18 1542          Stairs Within Home, Primary    Stairs, Within Home, Primary 16  -MJ     Stair Railings, Within Home, Primary railing on right side (ascending)  -MJ     Row Name 04/03/18 1542          Cognitive Assessment/Intervention- PT/OT    Orientation Status (Cognition) oriented x 4  -MJ     Follows Commands (Cognition) WFL  -MJ     Row Name 04/03/18 1542          Safety Issues, Functional Mobility    Impairments Affecting Function (Mobility) pain;range of motion (ROM);strength  -MJ     Row Name 04/03/18 1542          Mobility Assessment/Treatment    Extremity Weight-bearing Status left lower extremity  -MJ     Left Lower Extremity (Weight-bearing Status) weight-bearing as tolerated (WBAT)  -MJ     Row Name 04/03/18 1542          Bed Mobility Assessment/Treatment    Bed Mobility Assessment/Treatment supine-sit;sit-supine  -MJ     Supine-Sit [place] (Bed Mobility) conditional independence;verbal cues  -MJ     Sit-Supine [place] (Bed Mobility) not tested   pt UIC  -MJ     Bed Mobility, Safety Issues decreased use of legs for bridging/pushing  -MJ     Assistive Device (Bed Mobility) bed rails;head of bed elevated  -MJ     Comment (Bed Mobility) Verbal cues to move LEs off EOB and to use UEs to push trunk to sitting.   -MJ     Row Name 04/03/18 1542          Transfer Assessment/Treatment     Transfer Assessment/Treatment sit-stand transfer;stand-sit transfer;toilet transfer  -MJ     Comment (Transfers) Verbal cues to push up from bed with UEs to stand and to reach back for chair to lower into sitting. Cues to step L LE out prior to t/f. Assisted pt to bathroom, cues to reach back for grab bar prior to t/f.   -MJ     Sit-Stand [place] (Transfers) contact guard;2 person assist;verbal cues  -MJ     Stand-Sit [place] (Transfers) contact guard;2 person assist;verbal cues  -MJ     [place] Level (Toilet Transfer) contact guard;verbal cues  -MJ     Assistive Device (Toilet Transfer) grab bars/safety frame;raised toilet seat;walker, front-wheeled  -MJ     Row Name 04/03/18 1542          Sit-Stand Transfer    Assistive Device (Sit-Stand Transfers) walker, front-wheeled  -MJ     Row Name 04/03/18 1542          Stand-Sit Transfer    Assistive Device (Stand-Sit Transfers) walker, front-wheeled  -MJ     Row Name 04/03/18 1542          Toilet Transfer    Type (Toilet Transfer) stand pivot/stand step  -MJ     Row Name 04/03/18 1542          Gait/Stairs Assessment/Training    [place] Level (Gait) contact guard;2 person assist;verbal cues  -MJ     Assistive Device (Gait) walker, front-wheeled  -MJ     Distance in Feet (Gait) 340  -MJ     Pattern (Gait) step-through  -MJ     Deviations/Abnormal Patterns (Gait) left sided deviations;antalgic;stride length decreased  -MJ     Left Sided Gait Deviations weight shift ability decreased  -MJ     Comment (Gait/Stairs) Pt demo step through gait pattern. Cues to stay in middle of RW and to increase LE weight bearing. Gait limited by fatigue  -MJ     Row Name 04/03/18 1542          General ROM    LT Lower Ext Lt Knee Extension/Flexion  -MJ     Row Name 04/03/18 1542          Left Lower Ext    Lt Knee Extension/Flexion AROM L knee AROM impaired 25%  -MJ     Row Name 04/03/18 1542          General Assessment (Manual Muscle Testing)    General Manual Muscle Testing  (MMT) Assessment lower extremity strength deficits identified  -[facility] Name 04/03/18 1542          Lower Extremity (Manual Muscle Testing)    Comment, MMT: Lower Extremity L LE 4-/5; R LE 4+/5  -[facility] Name 04/03/18 1542          Motor Assessment/Intervention    Additional Documentation Therapeutic Exercise (Group)  -[facility] Name 04/03/18 1542          Therapeutic Exercise    Therapeutic Exercise supine, lower extremities  -     Additional Documentation Therapeutic Exercise (Row)  -MJ     Row Name 04/03/18 1542          Lower Extremity Supine Therapeutic Exercise    Performed, Supine Lower Extremity (Therapeutic Exercise) ankle pumps;gluteal sets;quadriceps sets  -     Sets/Reps Detail, Supine Lower Extremity (Therapeutic Exercise) 10  -MJ     Comment, Supine Lower Extremity (Therapeutic Exercise) Cues for technique  -[facility] Name 04/03/18 1542          Vision Assessment/Intervention    Visual Impairment/Limitations WFL;corrective lenses full time  -[facility] Name 04/03/18 1542          Pain Assessment    Additional Documentation Pain Scale: Numbers Pre/Post-Treatment (Group)  -MJ     Row Name 04/03/18 1542          Pain Scale: Numbers Pre/Post-Treatment    Pain Scale: Numbers, Pretreatment 0/10 - no pain  -     Pain Scale: Numbers, Post-Treatment 3/10  -MJ     Pain Location - Side Left  -MJ     Pain Location - Orientation generalized  -MJ     Pain Location knee  -MJ     Pain Intervention(s) Repositioned;Ambulation/increased activity;Cold pack  -[facility] Name             Wound 04/03/18 1112 Left knee incision    Wound - Properties Group Date first assessed: 04/03/18  -JS Time first assessed: 1112  -JS Side: Left  -JS Location: knee  -JS Type: incision  -JS    [facility] Name 04/03/18 1542          Plan of Care Review    Plan of Care Reviewed With patient  -MJ     Row Name 04/03/18 1542          Physical Therapy Clinical Impression    Date of Referral to PT 04/03/18  -     PT Diagnosis (PT Clinical  Impression) s/p elective L TKA  -MJ     Patient/Family Goals Statement (PT Clinical Impression) return home  -MJ     Criteria for Skilled Interventions Met (PT Clinical Impression) yes;treatment indicated  -MJ     Care Plan Review (PT) evaluation/treatment results reviewed;care plan/treatment goals reviewed;risks/benefits reviewed;patient/other agree to care plan  -MJ     Row Name 04/03/18 1542          Physical Therapy Goals    Bed Mobility Goal Selection (PT) bed mobility, PT goal 1  -MJ     Transfer Goal Selection (PT) transfer, PT goal 1  -MJ     Gait Training Goal Selection (PT) gait training, PT goal 1  -MJ     ROM Goal Selection (PT) ROM, PT goal 1  -MJ     Stairs Goal Selection (PT) stairs, PT goal 1;stairs, PT goal 2  -MJ     Additional Documentation Stairs Goal Selection (PT) (Row);ROM Goal Selection (PT) (Row)  -     Row Name 04/03/18 1542          Bed Mobility Goal 1 (PT)    Activity/Assistive Device (Bed Mobility Goal 1, PT) sit to supine/supine to sit  -MJ     [place] Level/Cues Needed (Bed Mobility Goal 1, PT) conditional independence  -MJ     Time Frame (Bed Mobility Goal 1, PT) 3 days;long term goal (LTG)  -MJ     Progress/Outcomes (Bed Mobility Goal 1, PT) goal partially met   achieved supine to sit  -MJ     Row Name 04/03/18 1542          Transfer Goal 1 (PT)    Activity/Assistive Device (Transfer Goal 1, PT) sit-to-stand/stand-to-sit;walker, rolling  -MJ     [place] Level/Cues Needed (Transfer Goal 1, PT) conditional independence  -MJ     Time Frame (Transfer Goal 1, PT) 3 days;long term goal (LTG)  -MJ     Progress/Outcome (Transfer Goal 1, PT) goal ongoing  -MJ     Row Name 04/03/18 1542          Gait Training Goal 1 (PT)    Activity/Assistive Device (Gait Training Goal 1, PT) gait (walking locomotion);walker, rolling  -MJ     [place] Level (Gait Training Goal 1, PT) conditional independence  -MJ     Distance (Gait Goal 1, PT) 500 feet  -MJ     Time Frame (Gait Training Goal 1,  PT) 3 days;long term goal (LTG)  -MJ     Progress/Outcome (Gait Training Goal 1, PT) goal ongoing  -     Row Name 04/03/18 1542          ROM Goal 1 (PT)    ROM Goal 1 (PT) L knee 0-90 degrees  -MJ     Time Frame (ROM Goal 1, PT) 3 days;long term goal (LTG)  -MJ     Progress/Outcome (ROM Goal 1, PT) goal ongoing  -     Row Name 04/03/18 1542          Stairs Goal 1 (PT)    Activity/Assistive Device (Stairs Goal 1, PT) ascending stairs;descending stairs;using handrail;step-to-step;cane, straight  -MJ     [place] Level/Cues Needed (Stairs Goal 1, PT) contact guard assist  -MJ     Number of Stairs (Stairs Goal 1, PT) 6  -MJ     Time Frame (Stairs Goal 1, PT) 3 days;long term goal (LTG)  -MJ     Progress/Outcome (Stairs Goal 1, PT) goal ongoing  -     Row Name 04/03/18 1542          Stairs Goal 2 (PT)    Activity/Assistive Device (Stairs Goal 2, PT) ascending stairs;descending stairs;using handrail;step-to-step;cane, straight  -MJ     [place] Level/Cues Needed (Stairs Goal 2, PT) contact guard assist  -MJ     Number of Stairs (Stairs Goal 2, PT) 16  -MJ     Time Frame (Stairs Goal 2, PT) 3 days;long term goal (LTG)  -MJ     Progress/Outcome (Stairs Goal 2, PT) goal ongoing  -     Row Name 04/03/18 1542          Positioning and Restraints    Pre-Treatment Position in bed  -     Post Treatment Position chair  -MJ     In Chair notified nsg;reclined;call light within reach;encouraged to call for assist;compression device  -     Row Name 04/03/18 1542          Living Environment    Home Accessibility stairs to enter home;stairs within home;tub/shower is not walk in  -       User Key  (r) = Recorded By, (t) = Taken By, (c) = Cosigned By    Initials Name Provider Type    COOPER Greenberg PT Physical Therapist    GELA Carroll RN Registered Nurse          Physical Therapy Education     Title: PT OT SLP Therapies (Active)     Topic: Physical Therapy (Active)     Point: Mobility training (Done)     Learning Progress Summary     Learner Status Readiness Method Response Comment Documented by    Patient Done Acceptance VALE BYERS Reviewed benefits of mobility  04/03/18 1627          Point: Body mechanics (Done)    Learning Progress Summary     Learner Status Readiness Method Response Comment Documented by    Patient Done Acceptance VALE BYERS Reviewed benefits of mobility  04/03/18 1627          Point: Precautions (Done)    Learning Progress Summary     Learner Status Readiness Method Response Comment Documented by    Patient Done Acceptance VALE BYERS Reviewed benefits of mobility  04/03/18 1627                      User Key     Initials Effective Dates Name Provider Type Discipline     04/03/18 -  Luke Greenberg, PT Physical Therapist PT                PT Recommendation and Plan  Anticipated Discharge Disposition (PT): home or self care, home with home health care  Planned Therapy Interventions (PT Eval): balance training, bed mobility training, gait training, home exercise program, patient/family education, ROM (range of motion), stair training, strengthening, transfer training  Therapy Frequency (PT Clinical Impression): 2 times/day  Outcome Summary/Treatment Plan (PT)  Anticipated Discharge Disposition (PT): home or self care, home with home health care  Plan of Care Reviewed With: patient  Outcome Summary: Pt ambulated 340 feet with CGAx2 and RW, limited by fatigue. Will obtain ROM measure POD #1. PT will follow to increase ROM, improve strength and progress functional mobility. Plan is home with HHPT at discharge, pt does have multiple steps at home so may need longer inpatient stay pending progress. PADD score=9          Outcome Measures     Row Name 04/03/18 3337             How much help from another person do you currently need...    Turning from your back to your side while in flat bed without using bedrails? 4  -MJ      Moving from lying on back to sitting on the side of a flat bed without bedrails? 4   -MJ      Moving to and from a bed to a chair (including a wheelchair)? 3  -MJ      Standing up from a chair using your arms (e.g., wheelchair, bedside chair)? 3  -MJ      Climbing 3-5 steps with a railing? 3  -MJ      To walk in hospital room? 3  -MJ      AM-PAC 6 Clicks Score 20  -MJ         Functional Assessment    Outcome Measure Options AM-PAC 6 Clicks Basic Mobility (PT)  -MJ        User Key  (r) = Recorded By, (t) = Taken By, (c) = Cosigned By    Initials Name Provider Type    COOPER Greenberg PT Physical Therapist           Time Calculation:         PT Charges     Row Name 04/03/18 1629             Time Calculation    Start Time 1542  -MJ      PT Received On 04/03/18  -MJ      PT Goal Re-Cert Due Date 04/13/18  -         Time Calculation- PT    Total Timed Code Minutes- PT 10 minute(s)  -        User Key  (r) = Recorded By, (t) = Taken By, (c) = Cosigned By    Initials Name Provider Type    COOPER Greenberg PT Physical Therapist          Therapy Charges for Today     Code Description Service Date Service Provider Modifiers Qty    71213860045 HC PT MOBILITY CURRENT 4/3/2018 Luke Greenberg, PT GP, CJ 1    21634494583 HC PT MOBILITY PROJECTED 4/3/2018 Luke Greenberg, PT GP, CI 1    47536687341 HC PT EVAL LOW COMPLEXITY 3 4/3/2018 Luke Greenberg PT GP 1    89317000293 HC GAIT TRAINING EA 15 MIN 4/3/2018 Luke Greenberg PT GP 1    77710016857 HC PT THER SUPP EA 15 MIN 4/3/2018 Luke Greenberg PT GP 2          PT G-Codes  PT Professional Judgement Used?: Yes  Outcome Measure Options: AM-PAC 6 Clicks Basic Mobility (PT)  Score: 20  Functional Limitation: Mobility: Walking and moving around  Mobility: Walking and Moving Around Current Status (): At least 20 percent but less than 40 percent impaired, limited or restricted  Mobility: Walking and Moving Around Goal Status (): At least 1 percent but less than 20 percent impaired, limited or restricted      Luke Greenberg PT  4/3/2018

## 2018-04-04 PROBLEM — E87.6 HYPOKALEMIA: Status: ACTIVE | Noted: 2018-04-04

## 2018-04-04 PROBLEM — G89.18 ACUTE POSTOPERATIVE PAIN: Status: ACTIVE | Noted: 2018-04-04

## 2018-04-04 LAB
ANION GAP SERPL CALCULATED.3IONS-SCNC: 10 MMOL/L (ref 3–11)
BUN BLD-MCNC: 18 MG/DL (ref 9–23)
BUN/CREAT SERPL: 30 (ref 7–25)
CALCIUM SPEC-SCNC: 7.9 MG/DL (ref 8.7–10.4)
CHLORIDE SERPL-SCNC: 103 MMOL/L (ref 99–109)
CO2 SERPL-SCNC: 27 MMOL/L (ref 20–31)
CREAT BLD-MCNC: 0.6 MG/DL (ref 0.6–1.3)
DEPRECATED RDW RBC AUTO: 57.3 FL (ref 37–54)
ERYTHROCYTE [DISTWIDTH] IN BLOOD BY AUTOMATED COUNT: 16.1 % (ref 11.3–14.5)
GFR SERPL CREATININE-BSD FRML MDRD: 100 ML/MIN/1.73
GLUCOSE BLD-MCNC: 111 MG/DL (ref 70–100)
HCT VFR BLD AUTO: 35.9 % (ref 34.5–44)
HGB BLD-MCNC: 11.3 G/DL (ref 11.5–15.5)
MCH RBC QN AUTO: 30.8 PG (ref 27–31)
MCHC RBC AUTO-ENTMCNC: 31.5 G/DL (ref 32–36)
MCV RBC AUTO: 97.8 FL (ref 80–99)
PLATELET # BLD AUTO: 159 10*3/MM3 (ref 150–450)
PMV BLD AUTO: 10.2 FL (ref 6–12)
POTASSIUM BLD-SCNC: 3.4 MMOL/L (ref 3.5–5.5)
RBC # BLD AUTO: 3.67 10*6/MM3 (ref 3.89–5.14)
SODIUM BLD-SCNC: 140 MMOL/L (ref 132–146)
WBC NRBC COR # BLD: 9.54 10*3/MM3 (ref 3.5–10.8)

## 2018-04-04 PROCEDURE — 25010000002 HYDROMORPHONE PER 4 MG: Performed by: ORTHOPAEDIC SURGERY

## 2018-04-04 PROCEDURE — 80048 BASIC METABOLIC PNL TOTAL CA: CPT | Performed by: NURSE PRACTITIONER

## 2018-04-04 PROCEDURE — 25010000002 HYDROCORTISONE SODIUM SUCCINATE 100 MG RECONSTITUTED SOLUTION: Performed by: NURSE PRACTITIONER

## 2018-04-04 PROCEDURE — 97165 OT EVAL LOW COMPLEX 30 MIN: CPT

## 2018-04-04 PROCEDURE — 25010000002 ROPIVACAINE PER 1 MG: Performed by: NURSE ANESTHETIST, CERTIFIED REGISTERED

## 2018-04-04 PROCEDURE — 99024 POSTOP FOLLOW-UP VISIT: CPT | Performed by: ORTHOPAEDIC SURGERY

## 2018-04-04 PROCEDURE — 85027 COMPLETE CBC AUTOMATED: CPT | Performed by: ORTHOPAEDIC SURGERY

## 2018-04-04 PROCEDURE — G8988 SELF CARE GOAL STATUS: HCPCS

## 2018-04-04 PROCEDURE — 97530 THERAPEUTIC ACTIVITIES: CPT

## 2018-04-04 PROCEDURE — G8987 SELF CARE CURRENT STATUS: HCPCS

## 2018-04-04 PROCEDURE — 97110 THERAPEUTIC EXERCISES: CPT

## 2018-04-04 PROCEDURE — 97116 GAIT TRAINING THERAPY: CPT

## 2018-04-04 PROCEDURE — 25010000003 CEFAZOLIN IN DEXTROSE 2-4 GM/100ML-% SOLUTION: Performed by: ORTHOPAEDIC SURGERY

## 2018-04-04 RX ORDER — ROPIVACAINE HYDROCHLORIDE 2 MG/ML
12 INJECTION, SOLUTION EPIDURAL; INFILTRATION CONTINUOUS
Status: DISCONTINUED | OUTPATIENT
Start: 2018-04-04 | End: 2018-04-05

## 2018-04-04 RX ORDER — POTASSIUM CHLORIDE 750 MG/1
40 CAPSULE, EXTENDED RELEASE ORAL ONCE
Status: COMPLETED | OUTPATIENT
Start: 2018-04-04 | End: 2018-04-04

## 2018-04-04 RX ADMIN — HYDROMORPHONE HYDROCHLORIDE 0.5 MG: 10 INJECTION INTRAMUSCULAR; INTRAVENOUS; SUBCUTANEOUS at 08:56

## 2018-04-04 RX ADMIN — BISACODYL 10 MG: 5 TABLET, COATED ORAL at 17:14

## 2018-04-04 RX ADMIN — OXYCODONE HYDROCHLORIDE AND ACETAMINOPHEN 1 TABLET: 5; 325 TABLET ORAL at 06:12

## 2018-04-04 RX ADMIN — OXYCODONE HYDROCHLORIDE AND ACETAMINOPHEN 1 TABLET: 5; 325 TABLET ORAL at 11:05

## 2018-04-04 RX ADMIN — CEFAZOLIN SODIUM 2 G: 2 INJECTION, SOLUTION INTRAVENOUS at 01:18

## 2018-04-04 RX ADMIN — ASPIRIN 325 MG: 325 TABLET, DELAYED RELEASE ORAL at 08:56

## 2018-04-04 RX ADMIN — HYDROCORTISONE SODIUM SUCCINATE 50 MG: 100 INJECTION, POWDER, FOR SOLUTION INTRAMUSCULAR; INTRAVENOUS at 21:28

## 2018-04-04 RX ADMIN — PANTOPRAZOLE SODIUM 40 MG: 40 TABLET, DELAYED RELEASE ORAL at 08:56

## 2018-04-04 RX ADMIN — OXYCODONE HYDROCHLORIDE AND ACETAMINOPHEN 1 TABLET: 5; 325 TABLET ORAL at 21:28

## 2018-04-04 RX ADMIN — ROPIVACAINE HYDROCHLORIDE 12 ML/HR: 2 INJECTION, SOLUTION EPIDURAL; INFILTRATION at 22:27

## 2018-04-04 RX ADMIN — SODIUM CHLORIDE 120 ML/HR: 9 INJECTION, SOLUTION INTRAVENOUS at 08:57

## 2018-04-04 RX ADMIN — ROPIVACAINE HYDROCHLORIDE 12 ML/HR: 2 INJECTION, SOLUTION EPIDURAL; INFILTRATION at 04:25

## 2018-04-04 RX ADMIN — MELOXICAM 15 MG: 7.5 TABLET ORAL at 08:56

## 2018-04-04 RX ADMIN — MAGNESIUM HYDROXIDE 10 ML: 2400 SUSPENSION ORAL at 17:14

## 2018-04-04 RX ADMIN — TAMSULOSIN HYDROCHLORIDE 0.4 MG: 0.4 CAPSULE ORAL at 08:56

## 2018-04-04 RX ADMIN — HYDROMORPHONE HYDROCHLORIDE 0.5 MG: 10 INJECTION INTRAMUSCULAR; INTRAVENOUS; SUBCUTANEOUS at 15:33

## 2018-04-04 RX ADMIN — DULOXETINE HYDROCHLORIDE 60 MG: 60 CAPSULE, DELAYED RELEASE ORAL at 08:57

## 2018-04-04 RX ADMIN — ZOLPIDEM TARTRATE 10 MG: 5 TABLET, FILM COATED ORAL at 21:43

## 2018-04-04 RX ADMIN — OXYCODONE HYDROCHLORIDE AND ACETAMINOPHEN 1 TABLET: 5; 325 TABLET ORAL at 17:14

## 2018-04-04 RX ADMIN — POTASSIUM CHLORIDE 40 MEQ: 750 CAPSULE, EXTENDED RELEASE ORAL at 11:05

## 2018-04-04 RX ADMIN — HYDROCORTISONE SODIUM SUCCINATE 50 MG: 100 INJECTION, POWDER, FOR SOLUTION INTRAMUSCULAR; INTRAVENOUS at 08:56

## 2018-04-04 NOTE — PROGRESS NOTES
"      [facility] Progress Note    Subjective      LOS: 1 day   Patient Care Team:  Gena Olvera MD as PCP - General (Family Medicine)    Interval History:   Patient resting comfortably in bed.  Her pain is controlled, but she does have more pain than she did yesterday.    Objective      Vital Signs  Temp (24hrs), Av.9 °F (36.6 °C), Min:97.5 °F (36.4 °C), Max:98.2 °F (36.8 °C)      /71 (BP Location: Right arm, Patient Position: Lying)   Pulse 70   Temp 98.2 °F (36.8 °C) (Oral)   Resp 16   Ht 149.9 cm (59\")   Wt 62.1 kg (137 lb)   SpO2 95%   BMI 27.67 kg/m²     Examination:   Examination of the left knee: The wound is clean, dry, and intact.  Ankle dorsiflexion, ankle plantar flexion, and EHL are intact.  Sensation intact in the foot to light touch.  2+ dorsalis pedis pulse.  Straight leg raise is intact.      Labs:    Results from last 7 days  Lab Units 18  0421   WBC 10*3/mm3 9.54   RBC 10*6/mm3 3.67*   HEMOGLOBIN g/dL 11.3*   HEMATOCRIT % 35.9   PLATELETS 10*3/mm3 159       Radiology:  Imaging Results (last 24 hours)     Procedure Component Value Units Date/Time    XR Knee 1 or 2 View Left [[id number]] Collected:  18 1601     Updated:  18 1711    Narrative:       EXAMINATION: XR KNEE 1 OR 2 VW, RIGHT-2018:      INDICATION: Post-op.      COMPARISON: NONE.     FINDINGS: Two views of the left knee reveal no fracture or dislocation.  The cortex is intact. Joint spaces are preserved. No soft tissue  abnormality identified. Hardware is intact and unremarkable. The patient  is status post total left knee arthroplasty.           Impression:       Status post total left knee arthroplasty with no hardware  complication or malalignment identified of the prosthesis. Postsurgical  changes seen within the soft tissues.     D:  2018  E:  2018     This report was finalized on 4/3/2018 5:09 PM by Dr. Tiffany Mercado MD.             PT:  Pt ambulated 340 feet " with CGAx2 and RW, limited by fatigue. Will obtain ROM measure POD #1. PT will follow to increase ROM, improve strength and progress functional mobility. Plan is home with HHPT at discharge, pt does have multiple steps at home so may need longer inpatient stay pending progress. PADD score=9     Results Review:     I reviewed the patient's new clinical results.    Assessment and Plan     Assessment:   Status post left total knee arthroplasty-doing well    Principal Problem:    Status post total left knee replacement  Active Problems:    Gastroesophageal reflux disease without esophagitis    Hypothyroid    Rheumatoid arthritis involving left knee with positive rheumatoid factor      Plan for disposition: Probable discharge to home tomorrow.  Follow-up with me on 4/23/2018 as planned.      Mahesh Wesley MD  04/04/18  8:01 AM

## 2018-04-04 NOTE — PROGRESS NOTES
Continued Stay Note   [place]     Patient Name: Shanel Wall  MRN: [id number]  Today's Date: 4/4/2018    Admit Date: 4/3/2018          Discharge Plan     Row Name 04/04/18 1152       Plan    Plan [facility]    Patient/Family in Agreement with Plan yes    Plan Comments Followed up [facility] Ms. Wall at the bedside for discharge planning.  Ms. Wall has been accepted into [facility], self pay of $100/day.  [facility] Wall and [facility]  Patient will be transferring to [facility] tomorrow.  Patient's  will be transporting her to facility by personal vehicle.  CM will continue to follow.    Final Discharge Disposition Code --   Respite              Discharge Codes    No documentation.       Expected Discharge Date and Time     Expected Discharge Date Expected Discharge Time    Apr 4, 2018             Kimber Bocanegra

## 2018-04-04 NOTE — THERAPY TREATMENT NOTE
Acute Care - Physical Therapy Treatment Note  [facility]     Patient Name: Shanel Wall  : 1950  MRN: [id number]  Today's Date: 2018  Onset of Illness/Injury or Date of Surgery: 18  Date of Referral to PT: 18  Referring Physician: MD Lupillo    Admit Date: 4/3/2018    Visit Dx:    ICD-10-CM ICD-9-CM   1. Impaired functional mobility, balance, gait, and endurance Z74.09 V49.89   2. Rheumatoid arthritis involving left knee with positive rheumatoid factor M05.762 714.0   3. Impaired mobility and ADLs Z74.09 799.89     Patient Active Problem List   Diagnosis   • Abnormal EKG   • Dizziness   • Lightheadedness   • Gastroesophageal reflux disease without esophagitis   • Rheumatoid arthritis involving multiple sites   • Hypoglycemia   • Insomnia   • Osteoporosis   • Arthropathy of shoulder region   • Status post reverse total replacement of left shoulder   • Hypothyroid   • Acute blood loss anemia, mild, asymptomatic   • Postoperative urinary retention   • Bicuspid aortic valve   • Rheumatoid arthritis involving left knee with positive rheumatoid factor   • Benign paroxysmal positional vertigo   • Neck pain   • Ataxia   • Nocturnal headaches   • Status post total left knee replacement       Therapy Treatment    Therapy Treatment / Health Promotion    Treatment Time/Intention  Discipline: physical therapist (18 0935 : Luke Greenberg, CHA)  Document Type: therapy note (daily note) (18 0935 : Luke Greenberg, PT)  Subjective Information: complains of, pain (18 0935 : Luke Greenberg, PT)  Mode of Treatment: physical therapy (18 0935 : Luke Greenberg, PT)  Patient Effort: good (18 0935 : Luke Greenberg, CHA)  Existing Precautions/Restrictions: fall, other (see comments) (L adductor nerve catehter) (18 0935 : Luke Greenberg, CHA)  Plan of Care Review  Plan of Care Reviewed With: patient (18 0955 : Luke Greenberg PT)    Vitals/Pain/Safety  Pain Scale: Numbers  Pre/Post-Treatment  Pain Scale: Numbers, Pretreatment: 2/10 (04/04/18 0935 : Luke Greenberg PT)  Pain Scale: Numbers, Post-Treatment: 6/10 (04/04/18 0935 : Luke Greenberg PT)  Pain Location - Side: Left (04/04/18 0935 : Luke Greenberg PT)  Pain Location - Orientation: anterior (04/04/18 0935 : Luke Greenberg PT)  Pain Location: knee (04/04/18 0935 : Luke Greenberg PT)  Pain Intervention(s): Repositioned, Ambulation/increased activity, Cold pack (04/04/18 0935 : Luke Greenberg PT)  Pain Scale: Word Pre/Post-Treatment  Pain Location - Side: Left (04/04/18 0935 : Luke Greenberg PT)  Pain Location - Orientation: anterior (04/04/18 0935 : Luke Greenberg PT)  Pain Location: knee (04/04/18 0935 : Luke Greenberg PT)  Pain Intervention(s): Repositioned, Ambulation/increased activity, Cold pack (04/04/18 0935 : Luke Greenberg PT)  Pain Scale: FACES Pre/Post-Treatment  Pain Location - Side: Left (04/04/18 0935 : Luke Greenberg PT)  Pain Location - Orientation: anterior (04/04/18 0935 : Luke Greenberg PT)  Pain Location: knee (04/04/18 0935 : Luke Greenberg PT)  Pain Intervention(s): Repositioned, Ambulation/increased activity, Cold pack (04/04/18 0935 : Luke Greenberg PT)  Positioning and Restraints  Pre-Treatment Position: sitting in chair/recliner (04/04/18 0935 : Luke Greenberg PT)  Post Treatment Position: chair (04/04/18 0935 : Luke Greenberg PT)  In Chair: notified nsg, reclined, call light within reach, encouraged to call for assist, exit alarm on (04/04/18 0935 : Luke Greenberg PT)    Mobility,ADL,Motor, Modality  Mobility Assessment/Intervention  Extremity Weight-bearing Status: left lower extremity (04/04/18 0935 : Luke Greenberg PT)  Left Lower Extremity (Weight-bearing Status): weight-bearing as tolerated (WBAT) (04/04/18 0935 : Luke Greenberg PT)  Bed Mobility Assessment/Treatment  Comment (Bed Mobility): NT- Pt UIC (04/04/18 0935 : Luke Greenberg PT)  Transfer Assessment/Treatment  Transfer Assessment/Treatment: sit-stand transfer, stand-sit transfer  (04/04/18 0935 : Luke Greenberg, PT)  Comment (Transfers): Verbal cues for hand placement and to step L LE out prior to t/f (04/04/18 0935 : Luke Greenberg, PT)  Sit-Stand Transfer  Sit-Stand Leming (Transfers): supervision, verbal cues (04/04/18 0935 : Luke Greenberg PT)  Assistive Device (Sit-Stand Transfers): walker, front-wheeled (04/04/18 0935 : Luke Greenberg PT)  Stand-Sit Transfer  Stand-Sit Leming (Transfers): supervision, verbal cues (04/04/18 0935 : Luke Greenberg PT)  Assistive Device (Stand-Sit Transfers): walker, front-wheeled (04/04/18 0935 : Luke Greenberg PT)  Gait/Stairs Assessment/Training  Leming Level (Gait): contact guard, verbal cues (04/04/18 0935 : Luke Greenberg, PT)  Assistive Device (Gait): walker, front-wheeled (04/04/18 0935 : Luke Greenberg, PT)  Distance in Feet (Gait): 220 (04/04/18 0935 : Luke Greenberg, PT)  Pattern (Gait): step-through (04/04/18 0935 : Luke Greenberg, PT)  Deviations/Abnormal Patterns (Gait): left sided deviations, antalgic, gait speed decreased, stride length decreased (04/04/18 0935 : Luke Greenberg, PT)  Left Sided Gait Deviations: weight shift ability decreased (04/04/18 0935 : Luke Greenberg PT)  Comment (Gait/Stairs): Pt demo step through gait pattern. Cues to allow heel to strike at initial contact and to increase LE weight bearing, mild improvement. Cues to  feet during turn. Gait limited by pain and fatigue (04/04/18 0935 : Luke Greenberg, PT)     Lower Extremity Supine Therapeutic Exercise  Performed, Supine Lower Extremity (Therapeutic Exercise): ankle pumps, gluteal sets, quadriceps sets, SLR (straight leg raise), SAQ (short arc quad) over bolster, heel slides (LAQ) (04/04/18 0935 : Luke Greenberg, PT)  Sets/Reps Detail, Supine Lower Extremity (Therapeutic Exercise): 15 (04/04/18 0935 : Luke Greenberg, PT)  Comment, Supine Lower Extremity (Therapeutic Exercise): TKA protocol: cues for technique. No physical assist required (04/04/18 0935 : Luke Greenberg, PT)            ROM/MMT             Sensory, Edema, Orthotics          Cognition, Communication, Swallow  Cognitive Assessment/Intervention- PT/OT  Orientation Status (Cognition): oriented x 4 (04/04/18 0935 : Luke Greenberg, PT)  Follows Commands (Cognition): WFL (04/04/18 0935 : Luke Greenberg, PT)    Outcome Summary  Outcome Summary/Treatment Plan (PT)  Daily Summary of Progress (PT): progress toward functional goals as expected (04/04/18 0935 : Luke Greenberg, PT)            PT Rehab Goals     Row Name 04/03/18 1542             Bed Mobility Goal 1 (PT)    Activity/Assistive Device (Bed Mobility Goal 1, PT) sit to supine/supine to sit  -MJ      Boykins Level/Cues Needed (Bed Mobility Goal 1, PT) conditional independence  -MJ      Time Frame (Bed Mobility Goal 1, PT) 3 days;long term goal (LTG)  -MJ      Progress/Outcomes (Bed Mobility Goal 1, PT) goal partially met   achieved supine to sit  -MJ         Transfer Goal 1 (PT)    Activity/Assistive Device (Transfer Goal 1, PT) sit-to-stand/stand-to-sit;walker, rolling  -MJ      Boykins Level/Cues Needed (Transfer Goal 1, PT) conditional independence  -MJ      Time Frame (Transfer Goal 1, PT) 3 days;long term goal (LTG)  -MJ      Progress/Outcome (Transfer Goal 1, PT) goal ongoing  -MJ         Gait Training Goal 1 (PT)    Activity/Assistive Device (Gait Training Goal 1, PT) gait (walking locomotion);walker, rolling  -MJ      Boykins Level (Gait Training Goal 1, PT) conditional independence  -MJ      Distance (Gait Goal 1, PT) 500 feet  -MJ      Time Frame (Gait Training Goal 1, PT) 3 days;long term goal (LTG)  -MJ      Progress/Outcome (Gait Training Goal 1, PT) goal ongoing  -MJ         ROM Goal 1 (PT)    ROM Goal 1 (PT) L knee 0-90 degrees  -MJ      Time Frame (ROM Goal 1, PT) 3 days;long term goal (LTG)  -MJ      Progress/Outcome (ROM Goal 1, PT) goal ongoing  -MJ         Stairs Goal 1 (PT)    Activity/Assistive Device (Stairs Goal 1, PT) ascending stairs;descending  stairs;using handrail;step-to-step;cane, straight  -MJ      Ritchie Level/Cues Needed (Stairs Goal 1, PT) contact guard assist  -MJ      Number of Stairs (Stairs Goal 1, PT) 6  -MJ      Time Frame (Stairs Goal 1, PT) 3 days;long term goal (LTG)  -MJ      Progress/Outcome (Stairs Goal 1, PT) goal ongoing  -MJ         Stairs Goal 2 (PT)    Activity/Assistive Device (Stairs Goal 2, PT) ascending stairs;descending stairs;using handrail;step-to-step;cane, straight  -MJ      Ritchie Level/Cues Needed (Stairs Goal 2, PT) contact guard assist  -MJ      Number of Stairs (Stairs Goal 2, PT) 16  -MJ      Time Frame (Stairs Goal 2, PT) 3 days;long term goal (LTG)  -MJ      Progress/Outcome (Stairs Goal 2, PT) goal ongoing  -MJ        User Key  (r) = Recorded By, (t) = Taken By, (c) = Cosigned By    Initials Name Provider Type    COOPER Greenberg, PT Physical Therapist          Physical Therapy Education     Title: PT OT SLP Therapies (Active)     Topic: Physical Therapy (Done)     Point: Mobility training (Done)    Learning Progress Summary     Learner Status Readiness Method Response Comment Documented by    Patient Done Acceptance VALE BYERS NR Reviewed HEP, gait mechanics, benefits of mobility  04/04/18 0955     Done Acceptance VALE BYERS Reviewed benefits of mobility  04/03/18 1627          Point: Home exercise program (Done)    Learning Progress Summary     Learner Status Readiness Method Response Comment Documented by    Patient Done Acceptance VALE BYERS NR Reviewed HEP, gait mechanics, benefits of mobility  04/04/18 0955          Point: Body mechanics (Done)    Learning Progress Summary     Learner Status Readiness Method Response Comment Documented by    Patient Done Acceptance VALE BYERS NR Reviewed HEP, gait mechanics, benefits of mobility  04/04/18 0955     Done Acceptance VALE BYERS Reviewed benefits of mobility  04/03/18 1627          Point: Precautions (Done)    Learning Progress Summary     Learner Status  Readiness Method Response Comment Documented by    Patient Done Acceptance VALE BYERS,NR Reviewed HEP, gait mechanics, benefits of mobility  04/04/18 0955     Done Acceptance VALE BYERS Reviewed benefits of mobility  04/03/18 1627                      User Key     Initials Effective Dates Name Provider Type Discipline     04/03/18 -  Luke Greenberg, PT Physical Therapist PT                    PT Recommendation and Plan  Anticipated Discharge Disposition (PT): home or self care, home with home health care  Planned Therapy Interventions (PT Eval): balance training, bed mobility training, gait training, home exercise program, patient/family education, ROM (range of motion), stair training, strengthening, transfer training  Therapy Frequency (PT Clinical Impression): 2 times/day  Outcome Summary/Treatment Plan (PT)  Daily Summary of Progress (PT): progress toward functional goals as expected  Anticipated Discharge Disposition (PT): home or self care, home with home health care  Plan of Care Reviewed With: patient  Progress: improving  Outcome Summary: Pt ambulated 220 feet with CGA and RW, limited by pain. Will obtain ROM measure in PM and progress to stair training as able.           Outcome Measures     Row Name 04/04/18 0935 04/04/18 0801 04/03/18 1542       How much help from another person do you currently need...    Turning from your back to your side while in flat bed without using bedrails? 4  -MJ  -- 4  -MJ    Moving from lying on back to sitting on the side of a flat bed without bedrails? 4  -MJ  -- 4  -MJ    Moving to and from a bed to a chair (including a wheelchair)? 3  -MJ  -- 3  -MJ    Standing up from a chair using your arms (e.g., wheelchair, bedside chair)? 3  -MJ  -- 3  -MJ    Climbing 3-5 steps with a railing? 3  -MJ  -- 3  -MJ    To walk in hospital room? 3  -MJ  -- 3  -MJ    AM-PAC 6 Clicks Score 20  -MJ  -- 20  -MJ       How much help from another is currently needed...    Putting on and taking off  regular lower body clothing?  -- 3  -AC  --    Bathing (including washing, rinsing, and drying)  -- 3  -AC  --    Toileting (which includes using toilet bed pan or urinal)  -- 3  -AC  --    Putting on and taking off regular upper body clothing  -- 4  -AC  --    Taking care of personal grooming (such as brushing teeth)  -- 4  -AC  --    Eating meals  -- 4  -AC  --    Score  -- 21  -AC  --       Functional Assessment    Outcome Measure Options AM-PAC 6 Clicks Basic Mobility (PT)  - AM-PAC 6 Clicks Daily Activity (OT)  - AM-PAC 6 Clicks Basic Mobility (PT)  -      User Key  (r) = Recorded By, (t) = Taken By, (c) = Cosigned By    Initials Name Provider Type    AC Cassidy Pierre, OT Occupational Therapist    COOPER Greenberg PT Physical Therapist           Time Calculation:         PT Charges     Row Name 04/04/18 0956             Time Calculation    Start Time 0935  -      PT Received On 04/04/18  -      PT Goal Re-Cert Due Date 04/13/18  -         Time Calculation- PT    Total Timed Code Minutes- PT 16 minute(s)  -        User Key  (r) = Recorded By, (t) = Taken By, (c) = Cosigned By    Initials Name Provider Type    COOPER Greenberg PT Physical Therapist          Therapy Charges for Today     Code Description Service Date Service Provider Modifiers Qty    62661768432 HC PT MOBILITY CURRENT 4/3/2018 Luke Greenberg, PT GP, CJ 1    77143283821 HC PT MOBILITY PROJECTED 4/3/2018 Luke Greenberg, PT GP, CI 1    53958223537 HC PT EVAL LOW COMPLEXITY 3 4/3/2018 Luke Greenberg PT GP 1    20808255115 HC GAIT TRAINING EA 15 MIN 4/3/2018 Luke Greenberg PT GP 1    99614540230 HC PT THER SUPP EA 15 MIN 4/3/2018 Luke Greenberg PT GP 2    77478918270 HC GAIT TRAINING EA 15 MIN 4/4/2018 Luke Greenberg, PT GP 1          PT Carlos  PT Professional Judgement Used?: Yes  Outcome Measure Options: AM-PAC 6 Clicks Basic Mobility (PT)  Score: 20  Functional Limitation: Mobility: Walking and moving around  Mobility: Walking and Moving Around  Current Status (): At least 20 percent but less than 40 percent impaired, limited or restricted  Mobility: Walking and Moving Around Goal Status (): At least 1 percent but less than 20 percent impaired, limited or restricted    Luke Greenberg, PT  4/4/2018

## 2018-04-04 NOTE — PROGRESS NOTES
"IM progress note      Shanel Wall  [id number]  1950     LOS: 1 day     Attending: No att. providers found    Primary Care Provider: Gena Olvera MD      Chief Complaint/Reason for visit:  Left knee pain    Subjective   Doing well. Adequate pain control. Denies f/c/n/v/sob/cp.    Objective     Vital Signs  Blood pressure 131/62, pulse 72, temperature 98.5 °F (36.9 °C), temperature source Oral, resp. rate 17, height 149.9 cm (59\"), weight 62.1 kg (137 lb), SpO2 96 %, not currently breastfeeding.  No data recorded.      Nutrition: PO    Respiratory: RA    Physical Therapy: Pt ambulated 220 feet with CGA and RW, limited by pain. Will obtain ROM measure in PM and progress to stair training as able.     Physical Exam:     General Appearance:    Alert, cooperative, in no acute distress   Head:    Normocephalic, without obvious abnormality, atraumatic    Lungs:     Normal effort, symmetric chest rise, no crepitus, clear to      auscultation bilaterally             Heart:    Regular rhythm and normal rate, normal S1 and S2   Abdomen:     Normal bowel sounds, no masses, no organomegaly, soft        non-tender, non-distended, no guarding, no rebound                tenderness   Extremities:   No clubbing, cyanosis or edema.  No deformities. Left knee stockinet CDI. Nerve block present   Pulses:   Pulses palpable and equal bilaterally   Skin:   No bleeding, bruising or rash   Neurologic:   Moves all extremities with no obvious focal motor deficit.  Cranial nerves 2 - 12 grossly intact. Flexion and dorsiflexion intact bilateral feet.       Results Review:     I reviewed the patient's new clinical results.     Results from last 7 days  Lab Units 04/04/18  0421   WBC 10*3/mm3 9.54   HEMOGLOBIN g/dL 11.3*   HEMATOCRIT % 35.9   PLATELETS 10*3/mm3 159       Results from last 7 days  Lab Units 04/04/18  0421   SODIUM mmol/L 140   POTASSIUM mmol/L 3.4*   CHLORIDE mmol/L 103   CO2 mmol/L 27.0   BUN mg/dL 18 "   CREATININE mg/dL 0.60   CALCIUM mg/dL 7.9*   GLUCOSE mg/dL 111*     I reviewed the patient's new imaging including images and reports.    All medications reviewed.     aspirin 325 mg Oral Daily   DULoxetine 60 mg Oral Daily   hydrocortisone sodium succinate 50 mg Intravenous Q12H   [START ON 4/5/2018] hydrocortisone sodium succinate 50 mg Intravenous Once   levothyroxine 88 mcg Oral Q AM   meloxicam 15 mg Oral Daily   pantoprazole 40 mg Oral QAM   tamsulosin 0.4 mg Oral Daily       Assessment/Plan   Principal Problem:    Status post total left knee replacement  Active Problems:    Rheumatoid arthritis involving left knee with positive rheumatoid factor    Gastroesophageal reflux disease without esophagitis    Hypothyroid    Acute blood loss anemia, mild, asymptomatic    Hypokalemia, replaced    Acute postoperative pain      Plan  1. PT/OT- WBAT LLE  2. Pain control-prns, AC nerve block   3. IS-encouraged  4. DVT proph- mechs/ASA  5. Bowel regimen  6. Monitor post-op labs  7. DC planning for [place] tomorrow    Stress dose steroids ordered per Rheumatologist recs.     Hypothyroid  - Continue home synthroid     Seen and examined by me. Agree with above. Discussed with patient.     Haleigh Stallings MD  04/12/18  4:31 PM

## 2018-04-04 NOTE — THERAPY EVALUATION
"Acute Care - Occupational Therapy Initial Evaluation  [facility]     Patient Name: Shanel Wall  : 1950  MRN: [id number]  Today's Date: 2018  Onset of Illness/Injury or Date of Surgery: 18  Date of Referral to OT: 18  Referring Physician: MD Lupillo    Admit Date: 4/3/2018       ICD-10-CM ICD-9-CM   1. Impaired functional mobility, balance, gait, and endurance Z74.09 V49.89   2. Rheumatoid arthritis involving left knee with positive rheumatoid factor M05.762 714.0   3. Impaired mobility and ADLs Z74.09 799.89     Patient Active Problem List   Diagnosis   • Abnormal EKG   • Dizziness   • Lightheadedness   • Gastroesophageal reflux disease without esophagitis   • Rheumatoid arthritis involving multiple sites   • Hypoglycemia   • Insomnia   • Osteoporosis   • Arthropathy of shoulder region   • Status post reverse total replacement of left shoulder   • Hypothyroid   • Acute blood loss anemia, mild, asymptomatic   • Postoperative urinary retention   • Bicuspid aortic valve   • Rheumatoid arthritis involving left knee with positive rheumatoid factor   • Benign paroxysmal positional vertigo   • Neck pain   • Ataxia   • Nocturnal headaches   • Status post total left knee replacement     Past Medical History:   Diagnosis Date   • Anesthesia     PT HAS RHEUMATOID ARTHRITIS, PT HAS SPECIFIC INSTRUCTIONS PER RA MD, pt to bring INSTRUCTIONS AND RECS ATTACHED TO CHART AND PT TO DISCUSS WITH ANESTHESIA ON DATE OF PROCEDURE    • Arthritis    • Cervical cancer     cone biopsy   • Depression    • Disease of thyroid gland    • GERD (gastroesophageal reflux disease)    • Hard to intubate     \"anterior trachea\"    • IBS (irritable bowel syndrome)    • Joint pain    • Measles    • Menopause    • Rheumatoid arthritis    • Shingles    • Vertigo    • Wears glasses      Past Surgical History:   Procedure Laterality Date   • ADENOIDECTOMY     • BREAST EXCISIONAL BIOPSY Left     NO VISIBLE SCAR   • CERVICAL CONE " BIOPSY     • COLONOSCOPY     • COSMETIC SURGERY      SHORT SCAR FACE LIFT PER DR RODRIGUEZ    • DILATATION AND CURETTAGE     • FOOT SURGERY Left     calcaneous    • KNEE ARTHROPLASTY Right     blue    • KNEE ARTHROSCOPY Bilateral    • REDUCTION MAMMAPLASTY Bilateral    • TENDON REPAIR Right     HAND   • TONSILLECTOMY AND ADENOIDECTOMY     • TOTAL KNEE ARTHROPLASTY Left 4/3/2018    Procedure: TOTAL KNEE ARTHROPLASTY LEFT;  Surgeon: Mahesh Wesley MD;  Location: [facility] OR;  Service: Orthopedics   • TOTAL SHOULDER ARTHROPLASTY W/ DISTAL CLAVICLE EXCISION Left 5/22/2017    Procedure: LEFT REVERSE TOTAL SHOULDER ARTHROPLASTY FOR FRACTURE;  Surgeon: Damaso Harris MD;  Location:  [facility] OR;  Service:    • TUBAL ABDOMINAL LIGATION            OT ASSESSMENT FLOWSHEET (last 72 hours)      Occupational Therapy Evaluation     Row Name 04/04/18 0801                   OT Evaluation Time/Intention    Subjective Information complains of;pain  -AC        Document Type evaluation  -AC        Mode of Treatment occupational therapy  -AC        Patient Effort good  -AC           General Information    Patient Profile Reviewed? yes  -AC        Onset of Illness/Injury or Date of Surgery 04/03/18  -AC        Referring Physician MD Lupillo  -AC        Patient Observations alert;cooperative  -AC        General Observations of Patient Pt received supine in bed.    -AC        Prior Level of Function --   increased pain and effort with self care  -AC        Equipment Currently Used at Home walker, rolling;walker, standard;cane, straight;raised toilet;bath bench   self care kit  -AC        Pertinent History of Current Functional Problem S/P L TKR  -AC        Existing Precautions/Restrictions fall   nerve cath  -AC        Risks Reviewed patient:;LOB;increased discomfort  -AC        Benefits Reviewed patient:;improve function;increase independence;increase balance;increase knowledge  -AC        Barriers to Rehab none identified  -AC            Relationship/Environment    Primary Source of Support/Comfort spouse  -        Lives With spouse  -           Resource/Environmental Concerns    Current Living Arrangements home/apartment/condo  -           Home Main Entrance    Number of Stairs, Main Entrance six  -AC        Stair Railings, Main Entrance railing on right side (ascending)  -AC           Stairs Within Home, Primary    Stairs, Within Home, Primary 16  -AC        Stair Railings, Within Home, Primary railing on right side (ascending)  -           Cognitive Assessment/Intervention- PT/OT    Orientation Status (Cognition) oriented x 4  -AC        Follows Commands (Cognition) WFL  -           Mobility Assessment/Treatment    Extremity Weight-bearing Status left lower extremity  -        Left Lower Extremity (Weight-bearing Status) weight-bearing as tolerated (WBAT)  -           Bed Mobility Assessment/Treatment    Bed Mobility Assessment/Treatment supine-sit  -        Supine-Sit [place] (Bed Mobility) conditional independence  -        Bed Mobility, Safety Issues decreased use of legs for bridging/pushing  -        Assistive Device (Bed Mobility) leg   -           Functional Mobility    Functional Mobility- Ind. Level verbal cues required;supervision required  -        Functional Mobility- Device rolling walker  -        Functional Mobility-Distance (Feet) 10  -AC           Transfer Assessment/Treatment    Transfer Assessment/Treatment sit-stand transfer;stand-sit transfer;bathtub transfer  -        Comment (Transfers) VCs to step LLE out prior to transfer  -        Sit-Stand [place] (Transfers) verbal cues;supervision  -        Stand-Sit [place] (Transfers) verbal cues;supervision  -        [place] Level (Bathtub Transfer) contact guard   simulated  -        Assistive Device (Bathtub Transfer) tub bench;leg   -           Sit-Stand Transfer    Assistive Device (Sit-Stand Transfers)  walker, front-wheeled  -AC           Stand-Sit Transfer    Assistive Device (Stand-Sit Transfers) walker, front-wheeled  -AC           ADL Assessment/Intervention    BADL Assessment/Intervention lower body dressing  -AC           Lower Body Dressing Assessment/Training    Lower Body Dressing [place] Level doff;don;socks;minimum assist (75% patient effort)  -AC        Assistive Devices (Lower Body Dressing) reacher;sock-aid  -AC        Lower Body Dressing Position unsupported sitting  -AC           General ROM    GENERAL ROM COMMENTS BUE WFL  -AC           Vision Assessment/Intervention    Visual Impairment/Limitations WFL;corrective lenses full time  -AC           Positioning and Restraints    Pre-Treatment Position in bed  -AC        Post Treatment Position chair  -AC        In Chair reclined;call light within reach;encouraged to call for assist;exit alarm on  -AC           Pain Scale: Numbers Pre/Post-Treatment    Pain Scale: Numbers, Pretreatment 0/10 - no pain  -AC        Pain Scale: Numbers, Post-Treatment 4/10  -AC        Pain Location - Side Left  -AC        Pain Location - Orientation generalized  -AC        Pain Location mouth (non-dental)  -AC        Pain Intervention(s) Repositioned  -AC           Wound 04/03/18 1112 Left knee incision    Wound - Properties Group Date first assessed: 04/03/18  -JS Time first assessed: 1112  -JS Side: Left  -JS Location: knee  -JS Type: incision  -JS       Clinical Impression (OT)    Date of Referral to OT 04/03/18  -AC        OT Diagnosis ADL decline  -AC        Criteria for Skilled Therapeutic Interventions Met (OT Eval) yes;treatment indicated  -AC        Therapy Frequency (OT Eval) daily  -AC        Care Plan Review (OT) care plan/treatment goals reviewed;risks/benefits reviewed  -AC        Anticipated Discharge Disposition (OT) --   home with assist  -AC           Planned OT Interventions    Planned Therapy Interventions (OT Eval) adaptive equipment training;BADL  retraining;functional balance retraining;transfer/mobility retraining  -AC           OT Goals    Transfer Goal Selection (OT) transfer, OT goal 1  -AC        Dressing Goal Selection (OT) dressing, OT goal 1  -AC        Toileting Goal Selection (OT) toileting, OT goal 1  -AC           Transfer Goal 1 (OT)    Activity/Assistive Device (Transfer Goal 1, OT) toilet;other (see comments)   RTS  -AC        [place] Level/Cues Needed (Transfer Goal 1, OT) supervision required  -AC        Time Frame (Transfer Goal 1, OT) by discharge  -AC           Dressing Goal 1 (OT)    Activity/Assistive Device (Dressing Goal 1, OT) lower body dressing  -AC        [place]/Cues Needed (Dressing Goal 1, OT) supervision required;set-up required  -AC        Time Frame (Dressing Goal 1, OT) by discharge  -AC           Toileting Goal 1 (OT)    Activity/Device (Toileting Goal 1, OT) toileting skills, all;raised toilet seat  -AC        [place] Level/Cues Needed (Toileting Goal 1, OT) supervision required  -AC        Time Frame (Toileting Goal 1, OT) by discharge  -AC           Living Environment    Home Accessibility stairs to enter home;stairs within home;tub/shower is not walk in  -AC          User Key  (r) = Recorded By, (t) = Taken By, (c) = Cosigned By    Initials Name Effective Dates    AC Cassidy Pierre OT 06/23/15 -     GELA Carroll, RN 07/14/16 -            Occupational Therapy Education     Title: PT OT SLP Therapies (Active)     Topic: Occupational Therapy (Active)     Point: ADL training (Done)     Description: Instruct learner(s) on proper safety adaptation and remediation techniques during self care or transfers.   Instruct in proper use of assistive devices.   Learning Progress Summary     Learner Status Readiness Method Response Comment Documented by    Patient Done Acceptance E VU tub shower transfer training, use of AE for LBD AC 04/04/18 0916                      User Key     Initials Effective Dates Name  Provider Type Discipline     06/23/15 -  Cassidy Pierre, OT Occupational Therapist OT                  OT Recommendation and Plan  Outcome Summary/Treatment Plan (OT)  Anticipated Discharge Disposition (OT):  (home with assist)  Planned Therapy Interventions (OT Eval): adaptive equipment training, BADL retraining, functional balance retraining, transfer/mobility retraining  Therapy Frequency (OT Eval): daily  Plan of Care Review  Plan of Care Reviewed With: patient  Plan of Care Reviewed With: patient  Outcome Summary: OT eval complete. Pt CGA with simulated tub shower transfer using tub bench,  min A to don/doff socks using AE.  Pt will benenfit from OT to advance pt toward increased indpendence with self care.  Recommend home with assist.           Outcome Measures     Row Name 04/04/18 0801 04/03/18 1542          How much help from another person do you currently need...    Turning from your back to your side while in flat bed without using bedrails?  -- 4  -MJ     Moving from lying on back to sitting on the side of a flat bed without bedrails?  -- 4  -MJ     Moving to and from a bed to a chair (including a wheelchair)?  -- 3  -MJ     Standing up from a chair using your arms (e.g., wheelchair, bedside chair)?  -- 3  -MJ     Climbing 3-5 steps with a railing?  -- 3  -MJ     To walk in hospital room?  -- 3  -MJ     AM-PAC 6 Clicks Score  -- 20  -MJ        How much help from another is currently needed...    Putting on and taking off regular lower body clothing? 3  -AC  --     Bathing (including washing, rinsing, and drying) 3  -AC  --     Toileting (which includes using toilet bed pan or urinal) 3  -AC  --     Putting on and taking off regular upper body clothing 4  -AC  --     Taking care of personal grooming (such as brushing teeth) 4  -AC  --     Eating meals 4  -AC  --     Score 21  -AC  --        Functional Assessment    Outcome Measure Options AM-PAC 6 Clicks Daily Activity (OT)  - AM-PAC 6 Clicks Basic  Mobility (PT)  -MJ       User Key  (r) = Recorded By, (t) = Taken By, (c) = Cosigned By    Initials Name Provider Type    AC Cassidy Pierre, OT Occupational Therapist    COOPER Greenberg, PT Physical Therapist          Time Calculation:   OT Start Time: 0801    Therapy Charges for Today     Code Description Service Date Service Provider Modifiers Qty    59603035766  OT EVAL LOW COMPLEXITY 4 4/4/2018 Cassidy iPerre OT GO 1    [id number]  OT THERAPEUTIC ACT EA 15 MIN 4/4/2018 Cassidy Pierre OT GO 1    [id number]  OT SELFCARE CURRENT 4/4/2018 Cassidy Pierre OT GO, CJ 1    [id number]  OT SELFCARE PROJECTED 4/4/2018 Cassidy Pierre OT GO, CI 1          OT G-codes  Functional Assessment Tool Used: impact 6 clicks  Score: 21  Self Care Current Status (): At least 20 percent but less than 40 percent impaired, limited or restricted  Self Care Goal Status (): At least 1 percent but less than 20 percent impaired, limited or restricted    Cassidy Pierre OT  4/4/2018

## 2018-04-04 NOTE — NURSING NOTE
Acute Pain Service:  On-Q teaching completed with patient.  She has had an ONQ before.  Handout and bracelet provided with [facility] on call central phone number.  Instructed to call with any questions or concerns.  Patient verbalized understanding.  Service will continue to follow until catheter DC'd.  Please contact patient at C:[phone] or H:[phone] if needed.

## 2018-04-04 NOTE — PLAN OF CARE
Problem: Patient Care Overview  Goal: Plan of Care Review  Outcome: Ongoing (interventions implemented as appropriate)   04/04/18 9621   Coping/Psychosocial   Plan of Care Reviewed With patient   OTHER   Outcome Summary Pt ambulated 220 feet with CGA and RW, limited by pain. Will obtain ROM measure in PM and progress to stair training as able.    Plan of Care Review   Progress improving

## 2018-04-04 NOTE — PLAN OF CARE
Problem: Patient Care Overview  Goal: Plan of Care Review   04/04/18 0933   Coping/Psychosocial   Plan of Care Reviewed With patient   OTHER   Outcome Summary OT eval complete. Pt CGA with simulated tub shower transfer using tub bench, min A to don/doff socks using AE. Pt will benenfit from OT to advance pt toward increased indpendence with self care. Recommend home with assist.    Plan of Care Review   Progress (Evaluation)

## 2018-04-04 NOTE — PROGRESS NOTES
[facility]    Acute pain service Inpatient Progress Note    Patient Name: Shanel Wall  :  1950  MRN:  [id number]        Acute Pain  Service Inpatient Progress Note:    Analgesia:Fair  Pain Score:7/10  LOC: alert and awake  Resp Status: room air  Cardiac: VS stable  Side Effects:None  Catheter Site:clean, dressing intact and dry  Cath type: peripheral nerve cath(MOOG pump)  Infusion rate: 12ml/hr  Catheter Plan:Catheter to remain Insitu and Continue catheter infusion rate unchanged  Comments: Anterior = 7, Posterior 0.  Increased pump to 16 ml/hr x2 hours.

## 2018-04-04 NOTE — PLAN OF CARE
Problem: Patient Care Overview  Goal: Plan of Care Review  Outcome: Ongoing (interventions implemented as appropriate)   04/04/18 5704   Coping/Psychosocial   Plan of Care Reviewed With patient   OTHER   Outcome Summary Pt increased gait distance to 370 feet with supervision and RW. Pt progressed to stair training and ROM measured 8-91 degrees. Pt anticipates discharge to [facility] tomorrow, will continue to progress mobility as able.    Plan of Care Review   Progress improving

## 2018-04-04 NOTE — THERAPY TREATMENT NOTE
Acute Care - Physical Therapy Treatment Note  [facility]     Patient Name: Shanel Wall  : 1950  MRN: [id number]  Today's Date: 2018  Onset of Illness/Injury or Date of Surgery: 18  Date of Referral to PT: 18  Referring Physician: MD Lupillo    Admit Date: 4/3/2018    Visit Dx:    ICD-10-CM ICD-9-CM   1. Impaired functional mobility, balance, gait, and endurance Z74.09 V49.89   2. Rheumatoid arthritis involving left knee with positive rheumatoid factor M05.762 714.0   3. Impaired mobility and ADLs Z74.09 799.89     Patient Active Problem List   Diagnosis   • Abnormal EKG   • Dizziness   • Lightheadedness   • Gastroesophageal reflux disease without esophagitis   • Rheumatoid arthritis involving multiple sites   • Hypoglycemia   • Insomnia   • Osteoporosis   • Arthropathy of shoulder region   • Status post reverse total replacement of left shoulder   • Hypothyroid   • Acute blood loss anemia, mild, asymptomatic   • Postoperative urinary retention   • Bicuspid aortic valve   • Rheumatoid arthritis involving left knee with positive rheumatoid factor   • Benign paroxysmal positional vertigo   • Neck pain   • Ataxia   • Nocturnal headaches   • Status post total left knee replacement   • Hypokalemia, replaced   • Acute postoperative pain       Therapy Treatment    Therapy Treatment / Health Promotion    Treatment Time/Intention  Discipline: physical therapist (18 1430 : Luke Greenberg PT)  Document Type: therapy note (daily note) (18 1430 : Luke Greenberg PT)  Subjective Information: complains of, pain (18 1430 : Luke Greenberg PT)  Mode of Treatment: physical therapy (18 1430 : Luke Greenberg PT)  Patient/Family Observations: Pt supine in bed, L adductor nerve catheter, IV, SCDs. (18 1430 : Luke Greenberg PT)  Care Plan Review: patient/other agree to care plan (18 1430 : Luke Greenberg PT)  Patient Effort: good (18 1430 : Luke Greenberg PT)  Existing  Precautions/Restrictions: fall, other (see comments) (L adductor nerve catheter) (04/04/18 1430 : Luke Greenberg PT)  Plan of Care Review  Plan of Care Reviewed With: patient (04/04/18 1504 : Luke Greenberg PT)    Vitals/Pain/Safety  Pain Scale: Numbers Pre/Post-Treatment  Pain Scale: Numbers, Pretreatment: 4/10 (04/04/18 1430 : Luke Greenberg PT)  Pain Scale: Numbers, Post-Treatment: 6/10 (04/04/18 1430 : Luke Greenberg PT)  Pain Location - Side: Left (04/04/18 1430 : Luke Greenberg PT)  Pain Location - Orientation: anterior (04/04/18 1430 : Luke Greenberg PT)  Pain Location: knee (04/04/18 1430 : Luke Greenberg PT)  Pain Intervention(s): Repositioned, Ambulation/increased activity, Cold pack (04/04/18 1430 : Luke Greenberg PT)  Pain Scale: Word Pre/Post-Treatment  Pain Location - Side: Left (04/04/18 1430 : Luke Greenberg PT)  Pain Location - Orientation: anterior (04/04/18 1430 : Luke Greenberg PT)  Pain Location: knee (04/04/18 1430 : Luke Greenberg PT)  Pain Intervention(s): Repositioned, Ambulation/increased activity, Cold pack (04/04/18 1430 : Luke Greenberg PT)  Pain Scale: FACES Pre/Post-Treatment  Pain Location - Side: Left (04/04/18 1430 : Luke Greenberg PT)  Pain Location - Orientation: anterior (04/04/18 1430 : Luke Greenberg PT)  Pain Location: knee (04/04/18 1430 : Luke Greenberg PT)  Pain Intervention(s): Repositioned, Ambulation/increased activity, Cold pack (04/04/18 1430 : Luke Greenberg PT)  Positioning and Restraints  Pre-Treatment Position: in bed (04/04/18 1430 : Luke Greenberg PT)  Post Treatment Position: bed (04/04/18 1430 : Luke Greenberg PT)  In Bed: notified nsg, supine, call light within reach, encouraged to call for assist, SCD pump applied, L heel elevated (04/04/18 1430 : Luke Jury, PT)  In Chair: notified nsg, reclined, call light within reach, encouraged to call for assist, exit alarm on (04/04/18 0935 : Luke Greenberg, PT)    Mobility,ADL,Motor, Modality  Mobility Assessment/Intervention  Extremity Weight-bearing  Status: left lower extremity (04/04/18 1430 : Luke Greenberg PT)  Left Lower Extremity (Weight-bearing Status): weight-bearing as tolerated (WBAT) (04/04/18 1430 : Luke Greenberg PT)  Bed Mobility Assessment/Treatment  Supine-Sit [place] (Bed Mobility): conditional independence (04/04/18 1430 : Luke Greenberg PT)  Sit-Supine [place] (Bed Mobility): conditional independence (04/04/18 1430 : Luke Greenberg PT)  Bed Mobility, Safety Issues: decreased use of legs for bridging/pushing (04/04/18 1430 : Luke Greenberg PT)  Assistive Device (Bed Mobility): head of bed elevated (04/04/18 1430 : Luke Greenberg PT)  Comment (Bed Mobility): NT- Pt [facility] (04/04/18 0935 : Luke Greenberg PT)  Transfer Assessment/Treatment  Transfer Assessment/Treatment: sit-stand transfer, stand-sit transfer, toilet transfer (04/04/18 1430 : Luke Greenberg PT)  Comment (Transfers): Verbal cues for correct hand placement and to step L LE out prior to t/f. Assisted pt to bahtroom, pt independent with hygiene after toileting (04/04/18 1430 : Luke Greenberg PT)  Sit-Stand Transfer  Sit-Stand [place] (Transfers): supervision, verbal cues (04/04/18 1430 : Luke Greenberg PT)  Assistive Device (Sit-Stand Transfers): walker, front-wheeled (04/04/18 1430 : Luke Greenberg PT)  Stand-Sit Transfer  Stand-Sit [place] (Transfers): supervision, verbal cues (04/04/18 1430 : Luke Greenberg PT)  Assistive Device (Stand-Sit Transfers): walker, front-wheeled (04/04/18 1430 : Luke Greenberg PT)  Toilet Transfer  Type (Toilet Transfer): stand pivot/stand step (04/04/18 1430 : Luke Greenberg PT)  [place] Level (Toilet Transfer): supervision, verbal cues (04/04/18 1430 : Luke Greenberg PT)  Assistive Device (Toilet Transfer): raised toilet seat, walker, front-wheeled (04/04/18 1430 : Luke Greenberg PT)  Gait/Stairs Assessment/Training  [place] Level (Gait): supervision, verbal cues (04/04/18 1430 : Luke Greenberg PT)  Assistive Device (Gait): walker, front-wheeled (04/04/18  1430 : Luke Greenberg PT)  Distance in Feet (Gait): 370 (04/04/18 1430 : Luke Greenberg PT)  Pattern (Gait): step-through (04/04/18 1430 : Luke Greenberg PT)  Deviations/Abnormal Patterns (Gait): left sided deviations, antalgic, gait speed decreased, stride length decreased (04/04/18 1430 : Luke Greenberg PT)  Left Sided Gait Deviations: weight shift ability decreased (04/04/18 1430 : Luke Greenberg PT)  [place] Level (Stairs): contact guard, verbal cues (04/04/18 1430 : Luke Greneberg PT)  Handrail Location (Stairs): both sides (04/04/18 1430 : Luke Greenberg PT)  Number of Steps (Stairs): 5 (04/04/18 1430 : Luke Greenberg PT)  Ascending Technique (Stairs): step-to-step (04/04/18 1430 : Luke Greenberg PT)  Descending Technique (Stairs): step-to-step (04/04/18 1430 : Luke Greenberg PT)  Stairs, Safety Issues: sequencing ability decreased, weight-shifting ability decreased (04/04/18 1430 : Luke Greenberg PT)  Stairs, Impairments: ROM decreased, strength decreased, pain (04/04/18 1430 : Luke Greenberg PT)  Comment (Gait/Stairs): Pt demo step through gait pattern. Cues to increased LE weight bearing, improvement noted. Pt with smooth sequencing and good heel strike. Gait limited by pain and fatigue. Pt performed steps non-reciprocally. Verbal cues to ascend with R LE and to descend with L LE (04/04/18 1430 : Luke Greenberg PT)     Lower Extremity Supine Therapeutic Exercise  Performed, Supine Lower Extremity (Therapeutic Exercise): ankle pumps, gluteal sets, quadriceps sets, heel slides, SLR (straight leg raise), SAQ (short arc quad) over bolster (LAQ) (04/04/18 1430 : Luke Greenberg PT)  Sets/Reps Detail, Supine Lower Extremity (Therapeutic Exercise): 15 (04/04/18 1430 : Luke Greenberg PT)  Comment, Supine Lower Extremity (Therapeutic Exercise): TKA protocol: cues for technique. No physical assist required (04/04/18 1430 : Luke Greenberg, PT)           ROM/MMT  Left Lower Ext  Lt Knee Extension/Flexion AROM: L knee 8-91 degrees; pt lacking 8  degrees from full extension; pt seated to measure AAROM flexion (04/04/18 1430 : Luke Greenberg, PT)          Sensory, Edema, Orthotics          Cognition, Communication, Swallow  Cognitive Assessment/Intervention- PT/OT  Orientation Status (Cognition): oriented x 4 (04/04/18 1430 : Luke Greenberg, PT)  Follows Commands (Cognition): WFL (04/04/18 1430 : Luke Greenberg, PT)    Outcome Summary  Outcome Summary/Treatment Plan (PT)  Daily Summary of Progress (PT): progress toward functional goals is good (04/04/18 1430 : Luke Greenberg, PT)            PT Rehab Goals     Row Name 04/03/18 1542             Bed Mobility Goal 1 (PT)    Activity/Assistive Device (Bed Mobility Goal 1, PT) sit to supine/supine to sit  -MJ      [place] Level/Cues Needed (Bed Mobility Goal 1, PT) conditional independence  -MJ      Time Frame (Bed Mobility Goal 1, PT) 3 days;long term goal (LTG)  -MJ      Progress/Outcomes (Bed Mobility Goal 1, PT) goal partially met   achieved supine to sit  -MJ         Transfer Goal 1 (PT)    Activity/Assistive Device (Transfer Goal 1, PT) sit-to-stand/stand-to-sit;walker, rolling  -MJ      [place] Level/Cues Needed (Transfer Goal 1, PT) conditional independence  -MJ      Time Frame (Transfer Goal 1, PT) 3 days;long term goal (LTG)  -MJ      Progress/Outcome (Transfer Goal 1, PT) goal ongoing  -MJ         Gait Training Goal 1 (PT)    Activity/Assistive Device (Gait Training Goal 1, PT) gait (walking locomotion);walker, rolling  -MJ      [place] Level (Gait Training Goal 1, PT) conditional independence  -MJ      Distance (Gait Goal 1, PT) 500 feet  -MJ      Time Frame (Gait Training Goal 1, PT) 3 days;long term goal (LTG)  -MJ      Progress/Outcome (Gait Training Goal 1, PT) goal ongoing  -MJ         ROM Goal 1 (PT)    ROM Goal 1 (PT) L knee 0-90 degrees  -MJ      Time Frame (ROM Goal 1, PT) 3 days;long term goal (LTG)  -MJ      Progress/Outcome (ROM Goal 1, PT) goal ongoing  -MJ         Stairs Goal 1 (PT)     Activity/Assistive Device (Stairs Goal 1, PT) ascending stairs;descending stairs;using handrail;step-to-step;cane, straight  -MJ      [place] Level/Cues Needed (Stairs Goal 1, PT) contact guard assist  -MJ      Number of Stairs (Stairs Goal 1, PT) 6  -MJ      Time Frame (Stairs Goal 1, PT) 3 days;long term goal (LTG)  -MJ      Progress/Outcome (Stairs Goal 1, PT) goal ongoing  -MJ         Stairs Goal 2 (PT)    Activity/Assistive Device (Stairs Goal 2, PT) ascending stairs;descending stairs;using handrail;step-to-step;cane, straight  -MJ      [place] Level/Cues Needed (Stairs Goal 2, PT) contact guard assist  -MJ      Number of Stairs (Stairs Goal 2, PT) 16  -MJ      Time Frame (Stairs Goal 2, PT) 3 days;long term goal (LTG)  -MJ      Progress/Outcome (Stairs Goal 2, PT) goal ongoing  -MJ        User Key  (r) = Recorded By, (t) = Taken By, (c) = Cosigned By    Initials Name Provider Type    COOPER Greenberg, CHA Physical Therapist          Physical Therapy Education     Title: PT OT SLP Therapies (Active)     Topic: Physical Therapy (Done)     Point: Mobility training (Done)    Learning Progress Summary     Learner Status Readiness Method Response Comment Documented by    Patient Done Acceptance VALE BYERS NR Reviewed HEP, gait mechanics, benefits of mobility, stairs sequencing  04/04/18 1503     Done Acceptance VALE BYERS NR Reviewed HEP, gait mechanics, benefits of mobility  04/04/18 0955     Done Acceptance VALE BYERS Reviewed benefits of mobility  04/03/18 1627          Point: Home exercise program (Done)    Learning Progress Summary     Learner Status Readiness Method Response Comment Documented by    Patient Done Acceptance VALE BYERS NR Reviewed HEP, gait mechanics, benefits of mobility, stairs sequencing  04/04/18 1503     Done Acceptance VALE BYERS NR Reviewed HEP, gait mechanics, benefits of mobility  04/04/18 0955          Point: Body mechanics (Done)    Learning Progress Summary     Learner Status Readiness  Method Response Comment Documented by    Patient Done Acceptance VALE BYERS NR Reviewed HEP, gait mechanics, benefits of mobility, stairs sequencing  04/04/18 1503     Done Acceptance VALE BYERSNR Reviewed HEP, gait mechanics, benefits of mobility  04/04/18 0955     Done Acceptance VALE BYERS Reviewed benefits of mobility  04/03/18 1627          Point: Precautions (Done)    Learning Progress Summary     Learner Status Readiness Method Response Comment Documented by    Patient Done Acceptance VALE BYERS NR Reviewed HEP, gait mechanics, benefits of mobility, stairs sequencing  04/04/18 1503     Done Acceptance VALE BYERSNR Reviewed HEP, gait mechanics, benefits of mobility  04/04/18 0955     Done Acceptance VALE BYERS Reviewed benefits of mobility  04/03/18 1627                      User Key     Initials Effective Dates Name Provider Type Discipline     04/03/18 -  Luke Greenberg, PT Physical Therapist PT                    PT Recommendation and Plan  Anticipated Discharge Disposition (PT): home or self care, home with home health care  Planned Therapy Interventions (PT Eval): balance training, bed mobility training, gait training, home exercise program, patient/family education, ROM (range of motion), stair training, strengthening, transfer training  Therapy Frequency (PT Clinical Impression): 2 times/day  Outcome Summary/Treatment Plan (PT)  Daily Summary of Progress (PT): progress toward functional goals is good  Anticipated Discharge Disposition (PT): home or self care, home with home health care  Plan of Care Reviewed With: patient  Progress: improving  Outcome Summary: Pt increased gait distance to 370 feet with supervision and RW. Pt progressed to stair training and ROM measured 8-91 degrees. Pt anticipates discharge to [facility] tomorrow, will continue to progress mobility as able.           Outcome Measures     Row Name 04/04/18 1430 04/04/18 0935 04/04/18 0801       How much help from another person do you currently  need...    Turning from your back to your side while in flat bed without using bedrails? 4  -MJ 4  -MJ  --    Moving from lying on back to sitting on the side of a flat bed without bedrails? 4  -MJ 4  -MJ  --    Moving to and from a bed to a chair (including a wheelchair)? 3  -MJ 3  -MJ  --    Standing up from a chair using your arms (e.g., wheelchair, bedside chair)? 3  -MJ 3  -MJ  --    Climbing 3-5 steps with a railing? 3  -MJ 3  -MJ  --    To walk in hospital room? 3  -MJ 3  -MJ  --    AM-PAC 6 Clicks Score 20  -MJ 20  -MJ  --       How much help from another is currently needed...    Putting on and taking off regular lower body clothing?  --  -- 3  -AC    Bathing (including washing, rinsing, and drying)  --  -- 3  -AC    Toileting (which includes using toilet bed pan or urinal)  --  -- 3  -AC    Putting on and taking off regular upper body clothing  --  -- 4  -AC    Taking care of personal grooming (such as brushing teeth)  --  -- 4  -AC    Eating meals  --  -- 4  -AC    Score  --  -- 21  -AC       Functional Assessment    Outcome Measure Options AM-PAC 6 Clicks Basic Mobility (PT)  -MJ AM-PAC 6 Clicks Basic Mobility (PT)  -MJ AM-[facility] 6 Clicks Daily Activity (OT)  -AC    Row Name 04/03/18 1542             How much help from another person do you currently need...    Turning from your back to your side while in flat bed without using bedrails? 4  -MJ      Moving from lying on back to sitting on the side of a flat bed without bedrails? 4  -MJ      Moving to and from a bed to a chair (including a wheelchair)? 3  -MJ      Standing up from a chair using your arms (e.g., wheelchair, bedside chair)? 3  -MJ      Climbing 3-5 steps with a railing? 3  -MJ      To walk in hospital room? 3  -MJ      AM-PAC 6 Clicks Score 20  -MJ         Functional Assessment    Outcome Measure Options AM-PAC 6 Clicks Basic Mobility (PT)  -MJ        User Key  (r) = Recorded By, (t) = Taken By, (c) = Cosigned By    Initials Name Provider Type     AC Cassidy Pierre, OT Occupational Therapist    MJ Luke Greenberg, PT Physical Therapist           Time Calculation:         PT Charges     Row Name 04/04/18 1505 04/04/18 0956          Time Calculation    Start Time 1430  -MJ 0935  -MJ     PT Received On 04/04/18  -MJ 04/04/18  -MJ     PT Goal Re-Cert Due Date 04/13/18  -MJ 04/13/18  -MJ        Time Calculation- PT    Total Timed Code Minutes- PT 25 minute(s)  -MJ 16 minute(s)  -MJ       User Key  (r) = Recorded By, (t) = Taken By, (c) = Cosigned By    Initials Name Provider Type    MJ Luke Greenberg, PT Physical Therapist          Therapy Charges for Today     Code Description Service Date Service Provider Modifiers Qty    48069384325 HC PT MOBILITY CURRENT 4/3/2018 Luke Greenberg, PT GP, CJ 1    35977127237 HC PT MOBILITY PROJECTED 4/3/2018 Luke Greenberg, PT GP, CI 1    52754124575 HC PT EVAL LOW COMPLEXITY 3 4/3/2018 Luke Greenberg, PT GP 1    25070451347 HC GAIT TRAINING EA 15 MIN 4/3/2018 Luke Greenberg, PT GP 1    95118436471 HC PT THER SUPP EA 15 MIN 4/3/2018 Luke Greenberg, PT GP 2    83947879075 HC GAIT TRAINING EA 15 MIN 4/4/2018 Luke Greenberg, PT GP 1    63203140049 HC GAIT TRAINING EA 15 MIN 4/4/2018 Luke Greenberg, PT GP 1    66690147651 HC PT THER PROC EA 15 MIN 4/4/2018 Luke Greenberg, PT GP 1          PT G-Codes  PT Professional Judgement Used?: Yes  Outcome Measure Options: AM-PAC 6 Clicks Basic Mobility (PT)  Score: 20  Functional Limitation: Mobility: Walking and moving around  Mobility: Walking and Moving Around Current Status (): At least 20 percent but less than 40 percent impaired, limited or restricted  Mobility: Walking and Moving Around Goal Status (): At least 1 percent but less than 20 percent impaired, limited or restricted    Luke Greenberg, CHA  4/4/2018

## 2018-04-05 VITALS
SYSTOLIC BLOOD PRESSURE: 142 MMHG | RESPIRATION RATE: 18 BRPM | HEIGHT: 59 IN | HEART RATE: 85 BPM | OXYGEN SATURATION: 94 % | DIASTOLIC BLOOD PRESSURE: 65 MMHG | BODY MASS INDEX: 27.62 KG/M2 | TEMPERATURE: 98 F | WEIGHT: 137 LBS

## 2018-04-05 LAB
DEPRECATED RDW RBC AUTO: 58.4 FL (ref 37–54)
ERYTHROCYTE [DISTWIDTH] IN BLOOD BY AUTOMATED COUNT: 16.5 % (ref 11.3–14.5)
HCT VFR BLD AUTO: 38.1 % (ref 34.5–44)
HGB BLD-MCNC: 11.9 G/DL (ref 11.5–15.5)
MCH RBC QN AUTO: 30.7 PG (ref 27–31)
MCHC RBC AUTO-ENTMCNC: 31.2 G/DL (ref 32–36)
MCV RBC AUTO: 98.4 FL (ref 80–99)
PLATELET # BLD AUTO: 166 10*3/MM3 (ref 150–450)
PMV BLD AUTO: 10.4 FL (ref 6–12)
RBC # BLD AUTO: 3.87 10*6/MM3 (ref 3.89–5.14)
WBC NRBC COR # BLD: 8.03 10*3/MM3 (ref 3.5–10.8)

## 2018-04-05 PROCEDURE — 25010000002 ROPIVACAINE HCL-NACL 0.2-0.9 % SOLUTION: Performed by: NURSE ANESTHETIST, CERTIFIED REGISTERED

## 2018-04-05 PROCEDURE — 99024 POSTOP FOLLOW-UP VISIT: CPT | Performed by: ORTHOPAEDIC SURGERY

## 2018-04-05 PROCEDURE — 97116 GAIT TRAINING THERAPY: CPT

## 2018-04-05 PROCEDURE — G8979 MOBILITY GOAL STATUS: HCPCS

## 2018-04-05 PROCEDURE — 25010000002 HYDROCORTISONE SODIUM SUCCINATE 100 MG RECONSTITUTED SOLUTION: Performed by: NURSE PRACTITIONER

## 2018-04-05 PROCEDURE — 85027 COMPLETE CBC AUTOMATED: CPT | Performed by: ORTHOPAEDIC SURGERY

## 2018-04-05 PROCEDURE — G8980 MOBILITY D/C STATUS: HCPCS

## 2018-04-05 PROCEDURE — 97110 THERAPEUTIC EXERCISES: CPT

## 2018-04-05 PROCEDURE — G8978 MOBILITY CURRENT STATUS: HCPCS

## 2018-04-05 RX ORDER — ZOLPIDEM TARTRATE 10 MG/1
10 TABLET ORAL NIGHTLY
Qty: 5 TABLET | Refills: 0 | Status: SHIPPED | OUTPATIENT
Start: 2018-04-05 | End: 2018-04-05

## 2018-04-05 RX ORDER — OXYCODONE AND ACETAMINOPHEN 7.5; 325 MG/1; MG/1
1 TABLET ORAL EVERY 6 HOURS PRN
Qty: 40 TABLET | Refills: 0 | Status: SHIPPED | OUTPATIENT
Start: 2018-04-05 | End: 2018-04-05

## 2018-04-05 RX ORDER — LEFLUNOMIDE 20 MG/1
20 TABLET ORAL DAILY
Start: 2018-04-05 | End: 2020-08-19

## 2018-04-05 RX ORDER — ROPIVACAINE IN 0.9% SOD CHL/PF 0.2% 545ML
12 ELASTOMERIC PUMP, HI VARIABLE RATE INJECTION CONTINUOUS
Status: DISCONTINUED | OUTPATIENT
Start: 2018-04-05 | End: 2018-04-05 | Stop reason: HOSPADM

## 2018-04-05 RX ORDER — OXYCODONE AND ACETAMINOPHEN 7.5; 325 MG/1; MG/1
1 TABLET ORAL EVERY 6 HOURS PRN
Qty: 40 TABLET | Refills: 0 | Status: SHIPPED | OUTPATIENT
Start: 2018-04-05 | End: 2018-04-13 | Stop reason: SDUPTHER

## 2018-04-05 RX ORDER — ZOLPIDEM TARTRATE 10 MG/1
10 TABLET ORAL NIGHTLY
Qty: 5 TABLET | Refills: 0 | Status: SHIPPED | OUTPATIENT
Start: 2018-04-05 | End: 2022-10-10 | Stop reason: SDUPTHER

## 2018-04-05 RX ORDER — PSEUDOEPHEDRINE HCL 30 MG
100 TABLET ORAL 2 TIMES DAILY PRN
Start: 2018-04-05 | End: 2020-08-19

## 2018-04-05 RX ADMIN — HYDROCORTISONE SODIUM SUCCINATE 50 MG: 100 INJECTION, POWDER, FOR SOLUTION INTRAMUSCULAR; INTRAVENOUS at 08:23

## 2018-04-05 RX ADMIN — APIXABAN 2.5 MG: 2.5 TABLET, FILM COATED ORAL at 08:22

## 2018-04-05 RX ADMIN — BISACODYL 10 MG: 5 TABLET, COATED ORAL at 08:22

## 2018-04-05 RX ADMIN — DOCUSATE SODIUM 100 MG: 100 CAPSULE, LIQUID FILLED ORAL at 08:22

## 2018-04-05 RX ADMIN — OXYCODONE HYDROCHLORIDE AND ACETAMINOPHEN 1 TABLET: 5; 325 TABLET ORAL at 08:23

## 2018-04-05 RX ADMIN — LEVOTHYROXINE SODIUM 88 MCG: 88 TABLET ORAL at 05:27

## 2018-04-05 RX ADMIN — OXYCODONE HYDROCHLORIDE AND ACETAMINOPHEN 1 TABLET: 5; 325 TABLET ORAL at 12:01

## 2018-04-05 RX ADMIN — DULOXETINE HYDROCHLORIDE 60 MG: 60 CAPSULE, DELAYED RELEASE ORAL at 08:22

## 2018-04-05 RX ADMIN — Medication 12 ML/HR: at 11:59

## 2018-04-05 RX ADMIN — PANTOPRAZOLE SODIUM 40 MG: 40 TABLET, DELAYED RELEASE ORAL at 05:27

## 2018-04-05 RX ADMIN — TAMSULOSIN HYDROCHLORIDE 0.4 MG: 0.4 CAPSULE ORAL at 08:23

## 2018-04-05 RX ADMIN — MELOXICAM 15 MG: 7.5 TABLET ORAL at 08:22

## 2018-04-05 RX ADMIN — OXYCODONE HYDROCHLORIDE AND ACETAMINOPHEN 1 TABLET: 5; 325 TABLET ORAL at 03:28

## 2018-04-05 NOTE — THERAPY DISCHARGE NOTE
Acute Care - Physical Therapy Treatment Note/Discharge  [facility]     Patient Name: Shanel Wall  : 1950  MRN: [id number]  Today's Date: 2018  Onset of Illness/Injury or Date of Surgery: 18  Date of Referral to PT: 18  Referring Physician: MD Lupillo    Admit Date: 4/3/2018    Visit Dx:    ICD-10-CM ICD-9-CM   1. Impaired functional mobility, balance, gait, and endurance Z74.09 V49.89   2. Rheumatoid arthritis involving left knee with positive rheumatoid factor M05.762 714.0   3. Impaired mobility and ADLs Z74.09 799.89     Patient Active Problem List   Diagnosis   • Abnormal EKG   • Dizziness   • Lightheadedness   • Gastroesophageal reflux disease without esophagitis   • Rheumatoid arthritis involving multiple sites   • Hypoglycemia   • Insomnia   • Osteoporosis   • Arthropathy of shoulder region   • Status post reverse total replacement of left shoulder   • Hypothyroid   • Acute blood loss anemia, mild, asymptomatic   • Postoperative urinary retention   • Bicuspid aortic valve   • Rheumatoid arthritis involving left knee with positive rheumatoid factor   • Benign paroxysmal positional vertigo   • Neck pain   • Ataxia   • Nocturnal headaches   • Status post total left knee replacement   • Hypokalemia, replaced   • Acute postoperative pain       Physical Therapy Education     Title: PT OT SLP Therapies (Active)     Topic: Physical Therapy (Done)     Point: Mobility training (Done)    Learning Progress Summary     Learner Status Readiness Method Response Comment Documented by    Patient Done Acceptance VALE BYERS NR Reviewed benefits of activity, HEP, gait mechanics, stair sequencing.  18 1052     Done Acceptance VALE BYERS NR Reviewed HEP, gait mechanics, benefits of mobility, stairs sequencing  18 1503     Done Acceptance VALE BYERS NR Reviewed HEP, gait mechanics, benefits of mobility  18 0955     Done Acceptance VALE BYERS Reviewed benefits of mobility  18 1627           Point: Home exercise program (Done)    Learning Progress Summary     Learner Status Readiness Method Response Comment Documented by    Patient Done Acceptance VALE BYERS,NR Reviewed benefits of activity, HEP, gait mechanics, stair sequencing.  04/05/18 1052     Done Acceptance VALE YBERSNR Reviewed HEP, gait mechanics, benefits of mobility, stairs sequencing  04/04/18 1503     Done Acceptance VALE BYERS,NR Reviewed HEP, gait mechanics, benefits of mobility  04/04/18 0955          Point: Body mechanics (Done)    Learning Progress Summary     Learner Status Readiness Method Response Comment Documented by    Patient Done Acceptance VALE BYERS,NR Reviewed benefits of activity, HEP, gait mechanics, stair sequencing.  04/05/18 1052     Done Acceptance VAEL BYERSNR Reviewed HEP, gait mechanics, benefits of mobility, stairs sequencing  04/04/18 1503     Done Acceptance VALE BYERSNR Reviewed HEP, gait mechanics, benefits of mobility  04/04/18 0955     Done Acceptance VALE BYERS Reviewed benefits of mobility  04/03/18 1627          Point: Precautions (Done)    Learning Progress Summary     Learner Status Readiness Method Response Comment Documented by    Patient Done Acceptance VALE BYERS,NR Reviewed benefits of activity, HEP, gait mechanics, stair sequencing.  04/05/18 1052     Done Acceptance VALE BYERSNR Reviewed HEP, gait mechanics, benefits of mobility, stairs sequencing  04/04/18 1503     Done Acceptance VALE BYERSNR Reviewed HEP, gait mechanics, benefits of mobility  04/04/18 0955     Done Acceptance VALE BYERS Reviewed benefits of mobility  04/03/18 1627                      User Key     Initials Effective Dates Name Provider Type Discipline     04/03/18 -  Luke Greenberg, PT Physical Therapist PT     04/03/18 -  Virgen Rios PT Physical Therapist PT                    PT Rehab Goals     Row Name 04/05/18 1010 04/03/18 1542          Bed Mobility Goal 1 (PT)    Activity/Assistive Device (Bed Mobility Goal 1, PT) sit to  supine/supine to sit  -LM sit to supine/supine to sit  -MJ     [place] Level/Cues Needed (Bed Mobility Goal 1, PT) conditional independence  -LM conditional independence  -MJ     Time Frame (Bed Mobility Goal 1, PT) 3 days;long term goal (LTG)  -LM 3 days;long term goal (LTG)  -MJ     Progress/Outcomes (Bed Mobility Goal 1, PT) goal met   achieved supine to sit  -LM goal partially met   achieved supine to sit  -MJ        Transfer Goal 1 (PT)    Activity/Assistive Device (Transfer Goal 1, PT) sit-to-stand/stand-to-sit;walker, rolling  -LM sit-to-stand/stand-to-sit;walker, rolling  -MJ     [place] Level/Cues Needed (Transfer Goal 1, PT) conditional independence  -LM conditional independence  -MJ     Time Frame (Transfer Goal 1, PT) 3 days;long term goal (LTG)  -LM 3 days;long term goal (LTG)  -MJ     Progress/Outcome (Transfer Goal 1, PT) goal not met  -LM goal ongoing  -MJ        Gait Training Goal 1 (PT)    Activity/Assistive Device (Gait Training Goal 1, PT) gait (walking locomotion);walker, rolling  -LM gait (walking locomotion);walker, rolling  -MJ     [place] Level (Gait Training Goal 1, PT) conditional independence  -LM conditional independence  -MJ     Distance (Gait Goal 1, PT) 500 feet  - feet  -MJ     Time Frame (Gait Training Goal 1, PT) 3 days;long term goal (LTG)  -LM 3 days;long term goal (LTG)  -MJ     Progress/Outcome (Gait Training Goal 1, PT) goal partially met  -LM goal ongoing  -MJ        ROM Goal 1 (PT)    ROM Goal 1 (PT) L knee 0-90 degrees  -LM L knee 0-90 degrees  -MJ     Time Frame (ROM Goal 1, PT) 3 days;long term goal (LTG)  -LM 3 days;long term goal (LTG)  -MJ     Progress/Outcome (ROM Goal 1, PT) goal partially met   met flexion  -LM goal ongoing  -MJ        Stairs Goal 1 (PT)    Activity/Assistive Device (Stairs Goal 1, PT) ascending stairs;descending stairs;using handrail;step-to-step;cane, straight  -LM ascending stairs;descending stairs;using  handrail;step-to-step;cane, straight  -MJ     [place] Level/Cues Needed (Stairs Goal 1, PT) contact guard assist  -LM contact guard assist  -MJ     Number of Stairs (Stairs Goal 1, PT) 6  -LM 6  -MJ     Time Frame (Stairs Goal 1, PT) 3 days;long term goal (LTG)  -LM 3 days;long term goal (LTG)  -MJ     Progress/Outcome (Stairs Goal 1, PT) goal partially met  -LM goal ongoing  -MJ        Stairs Goal 2 (PT)    Activity/Assistive Device (Stairs Goal 2, PT) ascending stairs;descending stairs;using handrail;step-to-step;cane, straight  -LM ascending stairs;descending stairs;using handrail;step-to-step;cane, straight  -MJ     [place] Level/Cues Needed (Stairs Goal 2, PT) contact guard assist  -LM contact guard assist  -MJ     Number of Stairs (Stairs Goal 2, PT) 16  -LM 16  -MJ     Time Frame (Stairs Goal 2, PT) 3 days;long term goal (LTG)  -LM 3 days;long term goal (LTG)  -MJ     Progress/Outcome (Stairs Goal 2, PT) goal not met  -LM goal ongoing  -MJ       User Key  (r) = Recorded By, (t) = Taken By, (c) = Cosigned By    Initials Name Provider Type    COOPER Greenberg, PT Physical Therapist    SERENA Rios PT Physical Therapist        Therapy Treatment    Therapy Treatment / Health Promotion    Treatment Time/Intention  Discipline: physical therapist (04/05/18 1010 : Virgen Rios PT)  Document Type: therapy note (daily note), discharge treatment (04/05/18 1010 : Virgen Rios PT)  Subjective Information: complains of (stiffness) (04/05/18 1010 : Virgen Rios PT)  Mode of Treatment: physical therapy, individual therapy (04/05/18 1010 : Virgen Rios PT)  Patient/Family Observations: Pt received sitting UIC, left adductor canal nerve cath. (04/05/18 1010 : Virgen Rios PT)  Care Plan Review: patient/other agree to care plan (04/05/18 1010 : Virgen Rios PT)  Patient Effort: good (04/05/18 1010 : Virgen K [place], PT)  Existing Precautions/Restrictions: fall, other (see comments) (L  adductor canal nerve cath) (04/05/18 1010 : Virgen Rios PT)  Plan of Care Review  Plan of Care Reviewed With: patient (04/05/18 1053 : Virgen Rios PT)    Vitals/Pain/Safety  Pain Scale: Numbers Pre/Post-Treatment  Pain Scale: Numbers, Pretreatment: 0/10 - no pain (04/05/18 1010 : Virgen Rios PT)  Pain Scale: Numbers, Post-Treatment: 0/10 - no pain (04/05/18 1010 : Virgen Rios PT)  Positioning and Restraints  Pre-Treatment Position: sitting in chair/recliner (04/05/18 1010 : Virgen Rios PT)  Post Treatment Position: bed (04/05/18 1010 : Virgen Rios PT)  In Bed: notified nsg, supine, call light within reach, encouraged to call for assist, exit alarm on, L heel elevated (04/05/18 1010 : Virgen Rios PT)    Mobility,ADL,Motor, Modality  Mobility Assessment/Intervention  Extremity Weight-bearing Status: left lower extremity (04/05/18 1010 : Virgen Rios PT)  Left Lower Extremity (Weight-bearing Status): weight-bearing as tolerated (WBAT) (04/05/18 1010 : Virgen Rios PT)  Bed Mobility Assessment/Treatment  Supine-Sit [place] (Bed Mobility): not tested (Pt received UI) (04/05/18 1010 : Virgen Rios PT)  Sit-Supine [place] (Bed Mobility): conditional independence (04/05/18 1010 : Virgen Rios PT)  Transfer Assessment/Treatment  Transfer Assessment/Treatment: sit-stand transfer, stand-sit transfer (04/05/18 1010 : Virgen Rios PT)  Comment (Transfers): Verbal cues for correct hand placement and to step out left LE prior to t/f. (04/05/18 1010 : Virgen Rios PT)  Sit-Stand Transfer  Sit-Stand [place] (Transfers): supervision, verbal cues (04/05/18 1010 : Virgen Rios PT)  Assistive Device (Sit-Stand Transfers): walker, front-wheeled (04/05/18 1010 : Virgen Rios PT)  Stand-Sit Transfer  Stand-Sit [place] (Transfers): supervision, verbal cues (04/05/18 1010 : Virgen Rios, PT)  Assistive Device (Stand-Sit Transfers): walker,  front-wheeled (04/05/18 1010 : Virgen Rios PT)  Gait/Stairs Assessment/Training  [place] Level (Gait): supervision, verbal cues (04/05/18 1010 : Virgen Rios PT)  Assistive Device (Gait): walker, front-wheeled (04/05/18 1010 : Virgen Rios PT)  Distance in Feet (Gait): 380 (04/05/18 1010 : Virgen Rios PT)  Pattern (Gait): step-through (04/05/18 1010 : Virgen Rios PT)  Deviations/Abnormal Patterns (Gait): left sided deviations, antalgic, gait speed decreased, stride length decreased (04/05/18 1010 : Virgen Rios PT)  Left Sided Gait Deviations: weight shift ability decreased (04/05/18 1010 : Virgen Rios PT)  [place] Level (Stairs): contact guard, verbal cues (04/05/18 1010 : Virgen Rios PT)  Handrail Location (Stairs): both sides (04/05/18 1010 : Virgen Rios PT)  Number of Steps (Stairs): 5 (04/05/18 1010 : Virgen Rios PT)  Ascending Technique (Stairs): step-to-step (04/05/18 1010 : Virgen Rios PT)  Descending Technique (Stairs): step-to-step (04/05/18 1010 : Virgen Rios PT)  Stairs, Safety Issues: sequencing ability decreased, weight-shifting ability decreased (04/05/18 1010 : Virgen Rios PT)  Stairs, Impairments: ROM decreased, strength decreased (04/05/18 1010 : Virgen Rios PT)  Comment (Gait/Stairs): Pt ambulated with step-through pattern at slow pace. Verbal cues to increase left LE weight-bearing. Pt demo good upright posture and heel strike. Verbal cues given for stair sequencing. (04/05/18 1010 : Virgen Rios PT)     Therapeutic Exercise  Therapeutic Exercise: seated, lower extremities (04/05/18 1010 : Virgen Rios PT)  Additional Documentation: Therapeutic Exercise (Row) (04/05/18 1010 : Virgen Rios PT)  Lower Extremity Seated Therapeutic Exercise  Performed, Seated Lower Extremity (Therapeutic Exercise): SAQ (short arc quad), knee extension, LAQ (long arc quad), knee extension, ankle dorsiflexion/plantarflexion (quad  set, glut set, heel slide stretch, SLR) (04/05/18 1010 : Virgen Rios PT)  Sets/Reps Detail, Seated Lower Extremity (Therapeutic Exercise): x15 reps each (04/05/18 1010 : Virgen Rios PT)  Comment, Seated Lower Extremity (Therapeutic Exercise): TKA protocol, no physical assist required (04/05/18 1010 : Virgen Rios PT)           ROM/MMT  Left Lower Ext  Lt Knee Extension/Flexion AROM: L knee ROM 8-95 degrees; pt lacking 8 degrees from full extension; AAROM flexion measured in sitting (04/05/18 1010 : Virgen Rios PT)          Sensory, Edema, Orthotics          Cognition, Communication, Swallow  Cognitive Assessment/Intervention  Additional Documentation: Cognitive Assessment/Intervention (Group) (04/05/18 1010 : Virgen Rios PT)  Cognitive Assessment/Intervention- PT/OT  Orientation Status (Cognition): oriented x 4 (04/05/18 1010 : Virgen Rios PT)  Follows Commands (Cognition): WFL (04/05/18 1010 : Virgen Rios PT)    Outcome Summary  Outcome Summary/Treatment Plan (PT)  Daily Summary of Progress (PT): progress toward functional goals as expected (04/05/18 1010 : Virgen Rios PT)  Anticipated Discharge Disposition (PT): other health care institution not elsewhere defined (Rehab - respite care) (04/05/18 1055 : Virgen Rios, PT)    PT Recommendation and Plan  Anticipated Discharge Disposition (PT): other health care institution not elsewhere defined (Rehab - respite care)  Outcome Summary/Treatment Plan (PT)  Daily Summary of Progress (PT): progress toward functional goals as expected  Anticipated Discharge Disposition (PT): other health care institution not elsewhere defined (Rehab - respite care)  Plan of Care Reviewed With: patient  Outcome Summary: Pt ambulated 380 feet with supervision and RW. L knee ROM measured 8-91 degrees. Continued stair training x5 steps. Pt anticipating d/c to [facility] today.           Outcome Measures     Row Name 04/05/18 1010 04/04/18 1430 04/04/18  0935       How much help from another person do you currently need...    Turning from your back to your side while in flat bed without using bedrails? 4  -LM 4  -MJ 4  -MJ    Moving from lying on back to sitting on the side of a flat bed without bedrails? 4  -LM 4  -MJ 4  -MJ    Moving to and from a bed to a chair (including a wheelchair)? 3  -LM 3  -MJ 3  -MJ    Standing up from a chair using your arms (e.g., wheelchair, bedside chair)? 3  -LM 3  -MJ 3  -MJ    Climbing 3-5 steps with a railing? 3  -LM 3  -MJ 3  -MJ    To walk in hospital room? 3  -LM 3  -MJ 3  -MJ    AM-PAC 6 Clicks Score 20  -LM 20  -MJ 20  -MJ       Functional Assessment    Outcome Measure Options AM-PAC 6 Clicks Basic Mobility (PT)  -LM AM-PAC 6 Clicks Basic Mobility (PT)  -MJ AM-PAC 6 Clicks Basic Mobility (PT)  -MJ    Row Name 04/04/18 0801 04/03/18 1542          How much help from another person do you currently need...    Turning from your back to your side while in flat bed without using bedrails?  -- 4  -MJ     Moving from lying on back to sitting on the side of a flat bed without bedrails?  -- 4  -MJ     Moving to and from a bed to a chair (including a wheelchair)?  -- 3  -MJ     Standing up from a chair using your arms (e.g., wheelchair, bedside chair)?  -- 3  -MJ     Climbing 3-5 steps with a railing?  -- 3  -MJ     To walk in hospital room?  -- 3  -MJ     AM-PAC 6 Clicks Score  -- 20  -MJ        How much help from another is currently needed...    Putting on and taking off regular lower body clothing? 3  -AC  --     Bathing (including washing, rinsing, and drying) 3  -AC  --     Toileting (which includes using toilet bed pan or urinal) 3  -AC  --     Putting on and taking off regular upper body clothing 4  -AC  --     Taking care of personal grooming (such as brushing teeth) 4  -AC  --     Eating meals 4  -AC  --     Score 21  -AC  --        Functional Assessment    Outcome Measure Options AM-PAC 6 Clicks Daily Activity (OT)  -AC  -[facility] 6 Clicks Basic Mobility (PT)  -       User Key  (r) = Recorded By, (t) = Taken By, (c) = Cosigned By    Initials Name Provider Type    VICKI Pierre, OT Occupational Therapist    MJ Luke Greenberg, PT Physical Therapist    LM Virgen Rios, PT Physical Therapist           Time Calculation:         PT Charges     Row Name 04/05/18 1058             Time Calculation    Start Time 1010  -LM      PT Received On 04/05/18  -LM      PT Goal Re-Cert Due Date 04/13/18  -LM         Time Calculation- PT    Total Timed Code Minutes- PT 30 minute(s)  -LM        User Key  (r) = Recorded By, (t) = Taken By, (c) = Cosigned By    Initials Name Provider Type    LM Virgen Rios, PT Physical Therapist          Therapy Charges for Today     Code Description Service Date Service Provider Modifiers Qty    51872809245 HC PT MOBILITY CURRENT 4/5/2018 Virgen Rios, PT GP, CJ 1    38246644900 HC PT MOBILITY PROJECTED 4/5/2018 Virgen Rios, PT GP, CI 1    17949188607 HC PT MOBILITY DISCHARGE 4/5/2018 Virgen Rios, PT GP, CJ 1    53853519622 HC GAIT TRAINING EA 15 MIN 4/5/2018 Virgen Rios, PT GP 1    72592669035 HC PT THER PROC EA 15 MIN 4/5/2018 Virgen Rios, PT GP 1          PT G-Codes  PT Professional Judgement Used?: Yes  Outcome Measure Options: AM-PAC 6 Clicks Basic Mobility (PT)  Score: 20  Functional Limitation: Mobility: Walking and moving around  Mobility: Walking and Moving Around Current Status (): At least 20 percent but less than 40 percent impaired, limited or restricted  Mobility: Walking and Moving Around Goal Status (): At least 1 percent but less than 20 percent impaired, limited or restricted  Mobility: Walking and Moving Around Discharge Status (): At least 20 percent but less than 40 percent impaired, limited or restricted    PT Discharge Summary  Anticipated Discharge Disposition (PT): other health care institution not elsewhere defined (Rehab - respite care)  Reason for  Discharge: Discharge from facility  Outcomes Achieved: Patient able to partially acheive established goals  Discharge Destination: other (comment) (Rehab - respite care)    Virgen Rios, PT  4/5/2018

## 2018-04-05 NOTE — PROGRESS NOTES
Continued Stay Note  [facility]     Patient Name: Shanel Wall  MRN: [id number]  Today's Date: 4/5/2018    Admit Date: 4/3/2018          Discharge Plan     Row Name 04/05/18 1219       Plan    Plan d/c update    Patient/Family in Agreement with Plan other (see comments)    Plan Comments RN can call report to  [phone] and d/c summary will be evaluated per EPIC ([facility] has EPIC access). Pt has transportation w .              Discharge Codes    No documentation.       Expected Discharge Date and Time     Expected Discharge Date Expected Discharge Time    Apr 4, 2018             Shila Benitez, RN

## 2018-04-05 NOTE — PROGRESS NOTES
"      [facility] Orthopaedic Surgery Progress Note    Subjective      LOS: 1 day   Patient Care Team:  Gena Olvera MD as PCP - General (Family Medicine)    Interval History:   Eating breakfast this morning.  Pain is controlled.    Objective      Vital Signs  Temp (24hrs), Av.3 °F (36.8 °C), Min:97.8 °F (36.6 °C), Max:98.5 °F (36.9 °C)      /76 (BP Location: Left arm, Patient Position: Lying)   Pulse 76   Temp 97.8 °F (36.6 °C) (Oral)   Resp 18   Ht 149.9 cm (59\")   Wt 62.1 kg (137 lb)   SpO2 94%   BMI 27.67 kg/m²     Examination:   Examination of the left knee: The wound is clean, dry, and intact.  Ankle dorsiflexion, ankle plantar flexion, and EHL are intact.  Sensation intact in the foot to light touch.  2+ dorsalis pedis pulse.  Straight leg raise is intact.      Labs:    Results from last 7 days  Lab Units 18  0421   WBC 10*3/mm3 9.54   RBC 10*6/mm3 3.67*   HEMOGLOBIN g/dL 11.3*   HEMATOCRIT % 35.9   PLATELETS 10*3/mm3 159       PT:  Pt increased gait distance to 370 feet with supervision and RW. Pt progressed to stair training and ROM measured 8-91 degrees. Pt anticipates discharge to [facility] tomorrow, will continue to progress mobility as able.      Results Review:     I reviewed the patient's new clinical results.    Assessment and Plan     Assessment:   Status post left total knee arthroplasty-doing well    Principal Problem:    Status post total left knee replacement  Active Problems:    Gastroesophageal reflux disease without esophagitis    Hypothyroid    Acute blood loss anemia, mild, asymptomatic    Rheumatoid arthritis involving left knee with positive rheumatoid factor    Hypokalemia, replaced    Acute postoperative pain      Plan for disposition: Probable discharge to [facility] today.  Follow-up with me on 2018 as planned.      Mahesh Wesley MD  18  7:47 AM    "

## 2018-04-05 NOTE — PROGRESS NOTES
[facility]    Acute pain service Inpatient Progress Note    Patient Name: Shanel Wall  :  1950  MRN:  [id number]        Acute Pain  Service Inpatient Progress Note:    Analgesia:Good  Pain Score:2/10  LOC: alert and awake  Resp Status: room air  Cardiac: VS stable  Side Effects:None  Catheter Site:clean, dressing intact and dry  Cath type: peripheral nerve cath(MOOG pump)  Infusion rate: 12ml/hr  Catheter Plan:Catheter to remain Insitu and Continue catheter infusion rate unchanged  Comments: Anterior = 2, posterior = 0

## 2018-04-05 NOTE — DISCHARGE SUMMARY
Patient Name: Shanel Wall  MRN: [id number]  : 1950  DOS: 2018    Attending: Mahesh Wesley MD    Primary Care Provider: Gena Olvera MD    Date of Admission:.4/3/2018  7:16 AM    Date of Discharge:  2018    Discharge Diagnosis: Principal Problem:    Status post total left knee replacement  Active Problems:    Rheumatoid arthritis involving left knee with positive rheumatoid factor    Gastroesophageal reflux disease without esophagitis    Hypothyroid    Acute blood loss anemia, mild, asymptomatic    Hypokalemia, replaced    Acute postoperative pain      Hospital Course  Patient is a 67 y.o. female presented for left total knee arthroplasty by Dr. Wesley under spinal anesthesia. She tolerated surgery well and was admitted for further medical management. Her knee has been painful for many years. Conservative treatments failed to provide long term pain relief. She denies use of assistive device for ambulation or recent falls.      She is known to us from previous shoulder replacement surgery in May 2017; which she recovered well.  She does have rheumatoid arthritis and is followed by Dr. Delgado.      Patient was provided pain medications as needed for pain control, along with adductor canal nerve block infusion of Ropivacaine.    Adjustments were made to pain medications to optimize postop pain management. Risks and benefits of opiate medications discussed with patient.    She was seen by PT and OT and has progressed well over her stay.  She used an IS for atelectasis prophylaxis and Eliquis along with mechanicals for DVT prophylaxis.  Home medications were resumed as appropriate, and labs were monitored and remained fairly stable.     With the progress she has made, she is ready for DC to [facility] today.    A prineo dressing is in place and is to remain for 2 weeks.  She will have an On Q pump ( instructed on it during this admit)  Discussed with patient regarding plan and she shows  "understanding and agreement.        Procedures Performed  DATE OF PROCEDURE: 18     PREOPERATIVE DIAGNOSIS: left knee rheumatoid arthritis       POSTOPERATIVE DIAGNOSIS:  left knee rheumatoid arthritis     PROCEDURES PERFORMED:   left total knee arthroplasty with DePuy Attune components (# 4 narrow posterior stabilized femur, # 3 rotating platform tibia, 7 mm rotating platform polyethylene, with 32 three peg anatomic patella).      SURGEON: Mahesh Wesley MD       Pertinent Test Results:    I reviewed the patient's new clinical results.     Results from last 7 days  Lab Units 18  0759 18  0421   WBC 10*3/mm3 8.03 9.54   HEMOGLOBIN g/dL 11.9 11.3*   HEMATOCRIT % 38.1 35.9   PLATELETS 10*3/mm3 166 159       Results from last 7 days  Lab Units 18  0421   SODIUM mmol/L 140   POTASSIUM mmol/L 3.4*   CHLORIDE mmol/L 103   CO2 mmol/L 27.0   BUN mg/dL 18   CREATININE mg/dL 0.60   CALCIUM mg/dL 7.9*   GLUCOSE mg/dL 111*     I reviewed the patient's new imaging including images and reports.      Physical therapy: Pt increased gait distance to 370 feet with supervision and RW. Pt progressed to stair training and ROM measured 8-91 degrees. Pt anticipates discharge to [facility] tomorrow, will continue to progress mobility as able.    Discharge Assessment:    Vital Signs  /65 (BP Location: Left arm, Patient Position: Lying)   Pulse 85   Temp 98 °F (36.7 °C) (Oral)   Resp 18   Ht 149.9 cm (59\")   Wt 62.1 kg (137 lb)   SpO2 94%   BMI 27.67 kg/m²   Temp (24hrs), Av.2 °F (36.8 °C), Min:97.8 °F (36.6 °C), Max:98.5 °F (36.9 °C)      General Appearance:    Alert, cooperative, in no acute distress   Lungs:     Clear to auscultation,respirations regular, even and                   unlabored    Heart:    Regular rhythm and normal rate, normal S1 and S2   Abdomen:     Normal bowel sounds, no masses, no organomegaly, soft        non-tender, non-distended, no guarding, no rebound                 " tenderness   Extremities:   Moves all extremities well, no edema, no cyanosis, no              Redness. Left knee stockinet CDI. Nerve block present. Joint deformities secondary to RA    Pulses:   Pulses palpable and equal bilaterally   Skin:   No bleeding, bruising or rash   Neurologic:   Cranial nerves 2 - 12 grossly intact, sensation intact. Flexion and dorsiflexion intact bilateral feet.       Discharge Disposition: [facility]    Discharge Medications   Shanel Wall   Home Medication Instructions CHRISTOPHER:[id number]    Printed on:04/05/18 1023   Medication Information                      alendronate (FOSAMAX) 70 MG tablet  Take 70 mg by mouth Every 7 (Seven) Days. Sunday             ALLERGY SERUM INJECTION  Inject  under the skin Every 21 (Twenty-One) Days.             aluminum hydroxide-mag carbonate (GAVISCON EXTRA RELIEF) 160-105 MG chewable tablet chewable tablet  Chew 1 tablet Daily As Needed.             apixaban (ELIQUIS) 2.5 MG tablet tablet  Take 1 tablet by mouth Every 12 (Twelve) Hours. For 2 weeks             Calcium Carb-Cholecalciferol (CALCIUM-VITAMIN D) 600-400 MG-UNIT tablet  Take 1 tablet by mouth 2 (Two) Times a Day.             docusate sodium 100 MG capsule  Take 100 mg by mouth 2 (Two) Times a Day As Needed for Constipation.             DULoxetine (CYMBALTA) 60 MG capsule  Take 60 mg by mouth Daily.             Etanercept 50 MG/ML solution auto-injector  Inject 50 mg under the skin 1 (One) Time Per Week. MONDAYS  Resume when ok with Dr. Wesley             folic acid (FOLVITE) 1 MG tablet  Take 1 mg by mouth Daily.             folic acid-vit B6-vit B12 (FOLGARD) 2.2-25-1 MG tablet tablet  Take 1 tablet by mouth 2 (Two) Times a Day.             lansoprazole (PREVACID) 15 MG capsule  Take 15 mg by mouth Daily.             leflunomide (ARAVA) 20 MG tablet  Take 1 tablet by mouth Daily. Resume when ok with Dr. Wesley             levothyroxine (SYNTHROID, LEVOTHROID) 88 MCG tablet  Take 88 mcg by  mouth Daily.             methotrexate 2.5 MG tablet  Take 9 tablets by mouth 1 (One) Time Per Week. MONDAYS  Resume when ok with Dr. Wesley             Multiple Vitamins-Minerals (SENIOR MULTIVITAMIN PLUS) tablet  Take 1 tablet by mouth Daily.             NON FORMULARY  Take 2 tablets by mouth Daily. Hydro Eye gelcaps             oxyCODONE-acetaminophen (PERCOCET) 7.5-325 MG per tablet  Take 1 tablet by mouth Every 6 (Six) Hours As Needed for Moderate Pain .             polyethyl glycol-propyl glycol (SYSTANE) 0.4-0.3 % solution ophthalmic solution  Administer 2 drops to both eyes 2 (Two) Times a Day.             predniSONE (DELTASONE) 5 MG tablet  Take 5 mg by mouth Daily.             prochlorperazine (COMPAZINE) 10 MG tablet  Take 10 mg by mouth Every 6 (Six) Hours As Needed for Nausea or Vomiting.             pseudoephedrine-guaifenesin (MUCINEX D)  MG per 12 hr tablet  Take 1 tablet by mouth Every 12 (Twelve) Hours.             salsalate (DISALCID) 750 MG tablet  Take 1,500 mg by mouth 2 (Two) Times a Day.             sodium chloride (OCEAN) 0.65 % nasal spray  1 spray into each nostril As Needed.             vitamin A 8000 UNIT capsule  Take 8,000 Units by mouth Daily.             vitamin C (ASCORBIC ACID) 500 MG tablet  Take 500 mg by mouth Daily.             vitamin E 400 UNIT capsule  Take 800 Units by mouth Daily.             zolpidem (AMBIEN) 10 MG tablet  Take 1 tablet by mouth Every Night.                 Discharge Diet: Regular diet    Activity at Discharge: [facility]    Follow-up Appointments  Dr. Wesley per his orders      ENDY Castellanos  04/05/18  10:23 AM

## 2018-04-05 NOTE — PLAN OF CARE
Problem: Patient Care Overview  Goal: Plan of Care Review  Outcome: Ongoing (interventions implemented as appropriate)   04/05/18 1053   Coping/Psychosocial   Plan of Care Reviewed With patient   OTHER   Outcome Summary Pt ambulated 380 feet with supervision and RW. L knee ROM measured 8-91 degrees. Continued stair training x5 steps. Pt anticipating d/c to [facility] today.

## 2018-04-05 NOTE — PLAN OF CARE
Problem: Patient Care Overview  Goal: Plan of Care Review  Outcome: Ongoing (interventions implemented as appropriate)   04/04/18 2128 04/05/18 0448   Coping/Psychosocial   Plan of Care Reviewed With patient --    Plan of Care Review   Progress --  improving

## 2018-04-06 ENCOUNTER — TELEPHONE (OUTPATIENT)
Dept: ORTHOPEDIC SURGERY | Facility: CLINIC | Age: 68
End: 2018-04-06

## 2018-04-06 DIAGNOSIS — Z96.652 STATUS POST LEFT KNEE REPLACEMENT: Primary | ICD-10-CM

## 2018-04-06 NOTE — TELEPHONE ENCOUNTER
----- Message from Mhaesh Wesley MD sent at 4/6/2018  8:36 AM EDT -----  Done    ----- Message -----  From: Jennifer Hernandez  Sent: 4/5/2018   4:08 PM  To: Mahesh Wesley MD    PATIENT DISCHARGED TODAY TO [facility]. SHE DID NOT GET A SCRIPT FOR ELIQUIS. CAN YOU CALL IN SCRIPT TO [address] PHONE # [phone].     SHE ALSO SAID SHE NEEDS AN ORDER FOR HER HOME THERAPY.     TXS

## 2018-04-09 NOTE — PROGRESS NOTES
EDDIE Velazquez    Nerve Cath Post Op Call    Patient Name: Shanel Wall  :  1950  MRN:  [id number]  Date of Discharge: 2018    Nerve Cath Post Op Call:    Analgesia:Good  Pain Score:3/10  Side Effects:None  Patient Controlled ON Q pump infusion rate: 12ml/hr

## 2018-04-09 NOTE — THERAPY DISCHARGE NOTE
Acute Care - Occupational Therapy Discharge Summary  [facility]     Patient Name: Shanel Wall  : 1950  MRN: [id number]    Today's Date: 2018       Date of Referral to OT: 18         Admit Date: 4/3/2018        OT Recommendation and Plan    Visit Dx:    ICD-10-CM ICD-9-CM   1. Impaired functional mobility, balance, gait, and endurance Z74.09 V49.89   2. Rheumatoid arthritis involving left knee with positive rheumatoid factor M05.762 714.0   3. Impaired mobility and ADLs Z74.09 799.89                     OT Rehab Goals     Row Name 18 1252 18 0801          Transfer Goal 1 (OT)    Activity/Assistive Device (Transfer Goal 1, OT)  -- toilet;other (see comments)   RTS  -AC     [place] Level/Cues Needed (Transfer Goal 1, OT)  -- supervision required  -AC     Time Frame (Transfer Goal 1, OT)  -- by discharge  -AC     Progress/Outcome (Transfer Goal 1, OT) goal not met;discharged from [facility]  --        Dressing Goal 1 (OT)    Activity/Assistive Device (Dressing Goal 1, OT)  -- lower body dressing  -AC     [place]/Cues Needed (Dressing Goal 1, OT)  -- supervision required;set-up required  -AC     Time Frame (Dressing Goal 1, OT)  -- by discharge  -AC     Progress/Outcome (Dressing Goal 1, OT) goal not met;discharged from [facility]  --        Toileting Goal 1 (OT)    Activity/Device (Toileting Goal 1, OT)  -- toileting skills, all;raised toilet seat  -AC     [place] Level/Cues Needed (Toileting Goal 1, OT)  -- supervision required  -AC     Time Frame (Toileting Goal 1, OT)  -- by discharge  -AC     Progress/Outcome (Toileting Goal 1, OT) goal not met;discharged from [facility]  --       User Key  (r) = Recorded By, (t) = Taken By, (c) = Cosigned By    Initials Name Provider Type    MARLY Jackie Nicole, OT Occupational Therapist    VICKI Pierre, OT Occupational Therapist                  OT Discharge Summary  Reason for Discharge: Discharge from  facility  Outcomes Achieved: Other (Pt. only seen for evaluation prior to dishcarge to SNF.  No goals met.)  Discharge Destination: SNF      Jackie Nicole OT  4/9/2018

## 2018-04-13 ENCOUNTER — TELEPHONE (OUTPATIENT)
Dept: ORTHOPEDIC SURGERY | Facility: CLINIC | Age: 68
End: 2018-04-13

## 2018-04-13 RX ORDER — OXYCODONE AND ACETAMINOPHEN 7.5; 325 MG/1; MG/1
1 TABLET ORAL EVERY 6 HOURS PRN
Qty: 30 TABLET | Refills: 0 | Status: SHIPPED | OUTPATIENT
Start: 2018-04-13 | End: 2018-10-08 | Stop reason: SDUPTHER

## 2018-04-13 NOTE — TELEPHONE ENCOUNTER
----- Message from Jennifer Hernandez sent at 4/13/2018 11:53 AM EDT -----  PATIENT CALLED NEEDING A REFILL ON HER PERCOCET 7.5/325. SHE HAS ENOUGH TO GET HER THRU TILL Monday. SHE HAS BEEN TAKING BEFORE HER THERAPY.    TXS

## 2018-04-23 ENCOUNTER — OFFICE VISIT (OUTPATIENT)
Dept: ORTHOPEDIC SURGERY | Facility: CLINIC | Age: 68
End: 2018-04-23

## 2018-04-23 DIAGNOSIS — Z96.652 STATUS POST TOTAL LEFT KNEE REPLACEMENT: Primary | ICD-10-CM

## 2018-04-23 DIAGNOSIS — Z47.89 ORTHOPEDIC AFTERCARE: ICD-10-CM

## 2018-04-23 PROCEDURE — 99024 POSTOP FOLLOW-UP VISIT: CPT | Performed by: ORTHOPAEDIC SURGERY

## 2018-04-23 RX ORDER — MELOXICAM 15 MG/1
TABLET ORAL
COMMUNITY
Start: 2018-04-06 | End: 2020-08-19

## 2018-04-23 RX ORDER — OXYCODONE AND ACETAMINOPHEN 7.5; 325 MG/1; MG/1
1 TABLET ORAL EVERY 6 HOURS PRN
Qty: 30 TABLET | Refills: 0 | Status: SHIPPED | OUTPATIENT
Start: 2018-04-23 | End: 2018-10-08 | Stop reason: SDUPTHER

## 2018-04-23 NOTE — PROGRESS NOTES
"    [facility] Orthopaedic Surgery Clinic Note    Subjective     Chief Complaint   Patient presents with   • Post-op     2 weeks s/p (L) Total Knee Arthroplasty 4/3/18        HPI    Shanel Wall is a 67 y.o. female. She follows up today for her left total knee arthroplasty.  She is doing well today.  Making improvements with therapy.  She is having intermittent pains along the medial aspect of the leg from the groin down to the foot.      Patient Active Problem List   Diagnosis   • Abnormal EKG   • Dizziness   • Lightheadedness   • Gastroesophageal reflux disease without esophagitis   • Rheumatoid arthritis involving multiple sites   • Hypoglycemia   • Insomnia   • Osteoporosis   • Arthropathy of shoulder region   • Status post reverse total replacement of left shoulder   • Hypothyroid   • Acute blood loss anemia, mild, asymptomatic   • Postoperative urinary retention   • Bicuspid aortic valve   • Rheumatoid arthritis involving left knee with positive rheumatoid factor   • Benign paroxysmal positional vertigo   • Neck pain   • Ataxia   • Nocturnal headaches   • Status post total left knee replacement   • Hypokalemia, replaced   • Acute postoperative pain     Past Medical History:   Diagnosis Date   • Anesthesia     PT HAS RHEUMATOID ARTHRITIS, PT HAS SPECIFIC INSTRUCTIONS PER RA MD, pt to bring INSTRUCTIONS AND RECS ATTACHED TO CHART AND PT TO DISCUSS WITH ANESTHESIA ON DATE OF PROCEDURE    • Arthritis    • Cervical cancer     cone biopsy   • Depression    • Disease of thyroid gland    • GERD (gastroesophageal reflux disease)    • Hard to intubate     \"anterior trachea\"    • IBS (irritable bowel syndrome)    • Joint pain    • Measles    • Menopause    • Rheumatoid arthritis    • Shingles    • Vertigo    • Wears glasses       Past Surgical History:   Procedure Laterality Date   • ADENOIDECTOMY     • BREAST EXCISIONAL BIOPSY Left     NO VISIBLE SCAR   • CERVICAL CONE BIOPSY     • COLONOSCOPY     • COSMETIC SURGERY   "    SHORT SCAR FACE LIFT PER DR RODRIGUEZ    • DILATATION AND CURETTAGE     • FOOT SURGERY Left     calcaneous    • KNEE ARTHROPLASTY Right     blue    • KNEE ARTHROSCOPY Bilateral    • REDUCTION MAMMAPLASTY Bilateral    • TENDON REPAIR Right     HAND   • TONSILLECTOMY AND ADENOIDECTOMY     • TOTAL KNEE ARTHROPLASTY Left 4/3/2018    Procedure: TOTAL KNEE ARTHROPLASTY LEFT;  Surgeon: Mahesh Wesley MD;  Location: [facility] OR;  Service: Orthopedics   • TOTAL SHOULDER ARTHROPLASTY W/ DISTAL CLAVICLE EXCISION Left 5/22/2017    Procedure: LEFT REVERSE TOTAL SHOULDER ARTHROPLASTY FOR FRACTURE;  Surgeon: Damaso Harris MD;  Location:  [facility] OR;  Service:    • TUBAL ABDOMINAL LIGATION        Family History   Problem Relation Age of Onset   • Parkinsonism Mother    • Heart failure Father    • Kidney failure Father    • Hyperlipidemia Father    • No Known Problems Sister    • No Known Problems Brother    • Breast cancer Neg Hx    • Ovarian cancer Neg Hx      Social History     Social History   • Marital status:      Spouse name: N/A   • Number of children: N/A   • Years of education: N/A     Occupational History   • unemployed      Social History Main Topics   • Smoking status: Never Smoker   • Smokeless tobacco: Never Used   • Alcohol use 1.2 oz/week     1 Glasses of wine, 1 Shots of liquor per week      Comment: DAILY   • Drug use: No   • Sexual activity: Defer     Other Topics Concern   • Not on file     Social History Narrative    Pt consumes 2 servings of caffeine per day.       Current Outpatient Prescriptions on File Prior to Visit   Medication Sig Dispense Refill   • alendronate (FOSAMAX) 70 MG tablet Take 70 mg by mouth Every 7 (Seven) Days. Sunday     • ALLERGY SERUM INJECTION Inject  under the skin Every 21 (Twenty-One) Days.     • aluminum hydroxide-mag carbonate (GAVISCON EXTRA RELIEF) 160-105 MG chewable tablet chewable tablet Chew 1 tablet Daily As Needed.     • apixaban (ELIQUIS) 2.5 MG tablet tablet Take  1 tablet by mouth Every 12 (Twelve) Hours. For 2 weeks 28 tablet 0   • Calcium Carb-Cholecalciferol (CALCIUM-VITAMIN D) 600-400 MG-UNIT tablet Take 1 tablet by mouth 2 (Two) Times a Day.     • docusate sodium 100 MG capsule Take 100 mg by mouth 2 (Two) Times a Day As Needed for Constipation.     • DULoxetine (CYMBALTA) 60 MG capsule Take 60 mg by mouth Daily.     • Etanercept 50 MG/ML solution auto-injector Inject 50 mg under the skin 1 (One) Time Per Week. MONDAYS  Resume when ok with Dr. Wesley     • folic acid (FOLVITE) 1 MG tablet Take 1 mg by mouth Daily.     • folic acid-vit B6-vit B12 (FOLGARD) 2.2-25-1 MG tablet tablet Take 1 tablet by mouth 2 (Two) Times a Day.     • lansoprazole (PREVACID) 15 MG capsule Take 15 mg by mouth Daily.     • leflunomide (ARAVA) 20 MG tablet Take 1 tablet by mouth Daily. Resume when ok with Dr. Wesley     • levothyroxine (SYNTHROID, LEVOTHROID) 88 MCG tablet Take 88 mcg by mouth Daily.     • methotrexate 2.5 MG tablet Take 9 tablets by mouth 1 (One) Time Per Week. MONDAYS  Resume when ok with Dr. Wesley  0   • Multiple Vitamins-Minerals (SENIOR MULTIVITAMIN PLUS) tablet Take 1 tablet by mouth Daily.     • NON FORMULARY Take 2 tablets by mouth Daily. Hydro Eye gelcaps     • oxyCODONE-acetaminophen (PERCOCET) 7.5-325 MG per tablet Take 1 tablet by mouth Every 6 (Six) Hours As Needed for Moderate Pain . 30 tablet 0   • polyethyl glycol-propyl glycol (SYSTANE) 0.4-0.3 % solution ophthalmic solution Administer 2 drops to both eyes 2 (Two) Times a Day.     • predniSONE (DELTASONE) 5 MG tablet Take 5 mg by mouth Daily.     • prochlorperazine (COMPAZINE) 10 MG tablet Take 10 mg by mouth Every 6 (Six) Hours As Needed for Nausea or Vomiting.     • pseudoephedrine-guaifenesin (MUCINEX D)  MG per 12 hr tablet Take 1 tablet by mouth Every 12 (Twelve) Hours.     • salsalate (DISALCID) 750 MG tablet Take 1,500 mg by mouth 2 (Two) Times a Day.     • sodium chloride (OCEAN) 0.65 % nasal spray 1  spray into each nostril As Needed.     • vitamin A 8000 UNIT capsule Take 8,000 Units by mouth Daily.     • vitamin C (ASCORBIC ACID) 500 MG tablet Take 500 mg by mouth Daily.     • vitamin E 400 UNIT capsule Take 800 Units by mouth Daily.     • zolpidem (AMBIEN) 10 MG tablet Take 1 tablet by mouth Every Night. 5 tablet 0     No current facility-administered medications on file prior to visit.       No Known Allergies     Review of Systems   Constitutional: Positive for fatigue. Negative for activity change, appetite change, chills, diaphoresis, fever and unexpected weight change.        Night Sweats    HENT: Positive for mouth sores, postnasal drip and tinnitus. Negative for congestion, dental problem, drooling, ear discharge, ear pain, facial swelling, nosebleeds, rhinorrhea, sinus pressure, sneezing, sore throat, trouble swallowing and voice change.    Eyes: Positive for itching and visual disturbance. Negative for photophobia, pain, discharge and redness.   Respiratory: Negative for apnea, cough, choking, chest tightness, shortness of breath, wheezing and stridor.    Cardiovascular: Positive for leg swelling. Negative for chest pain and palpitations.   Gastrointestinal: Positive for diarrhea and nausea. Negative for abdominal distention, abdominal pain, anal bleeding, blood in stool, constipation, rectal pain and vomiting.   Endocrine: Negative for cold intolerance, heat intolerance, polydipsia, polyphagia and polyuria.   Genitourinary: Negative for decreased urine volume, difficulty urinating, dysuria, enuresis, flank pain, frequency, genital sores, hematuria and urgency.   Musculoskeletal: Positive for gait problem and joint swelling. Negative for arthralgias, back pain, myalgias, neck pain and neck stiffness.   Skin: Negative for color change, pallor, rash and wound.   Allergic/Immunologic: Positive for environmental allergies, food allergies and immunocompromised state.   Neurological: Positive for dizziness  and light-headedness. Negative for tremors, seizures, syncope, facial asymmetry, speech difficulty, weakness, numbness and headaches.   Hematological: Negative for adenopathy. Does not bruise/bleed easily.   Psychiatric/Behavioral: Positive for sleep disturbance. Negative for agitation, behavioral problems, confusion, decreased concentration, dysphoric mood, hallucinations, self-injury and suicidal ideas. The patient is not nervous/anxious and is not hyperactive.         Objective      Physical Exam  There were no vitals taken for this visit.    There is no height or weight on file to calculate BMI.    General:   Mental Status:  Alert   Appearance: Cooperative, in no acute distress   Build and Nutrition: Well-nourished and well developed female   Orientation: Alert and oriented to person, place and time   Posture: Normal   Gait: With a cane    Integument:   Left knee: Wound is well-healed with no signs of infection    Lower Extremities:   Left Knee:    Tenderness:  None    Effusion:  None    Swelling: None    Crepitus:  None    Range of motion:  Extension: 5°       Flexion: 120°  Instability:  No varus laxity, no valgus laxity, negative anterior drawer  Deformities:  None      Imaging/Studies  Imaging Results (last 24 hours)     Procedure Component Value Units Date/Time    XR Knee 3+ View With [place] Left [[id number]] Resulted:  04/23/18 1426     Updated:  04/23/18 1427    Narrative:       Left Knee Radiographs  Indication: status-post left total knee arthroplasty  Views: AP, lateral, and sunrise views of the left knee    Comparison: no change compared to prior study, 4/3/2018    Findings:   The components are well aligned, with no signs of loosening or failure.              Assessment and Plan     Shanel was seen today for post-op.    Diagnoses and all orders for this visit:    Status post total left knee replacement  -     XR Knee 3+ View With [place] Left  -     oxyCODONE-acetaminophen (PERCOCET) 7.5-325 MG per  tablet; Take 1 tablet by mouth Every 6 (Six) Hours As Needed for Moderate Pain .  -     Duplex Venous Lower Extremity - Left CAR; Future    Orthopedic aftercare        I reviewed my findings with patient today.  Her left total knee arthroplasty is functioning well.  I will see her back in 6-8 weeks for recheck.  She is having pain along the medial aspect of the leg from the groin down to the ankle intermittently, and we will check for a blood clot with a duplex scan.  I think it is low likelihood.    Return in about 6 weeks (around 6/4/2018).        Mahesh Wesley MD  04/23/18  2:35 PM

## 2018-04-24 ENCOUNTER — HOSPITAL ENCOUNTER (OUTPATIENT)
Dept: MAMMOGRAPHY | Facility: HOSPITAL | Age: 68
Discharge: HOME | End: 2018-04-24
Attending: FAMILY MEDICINE | Admitting: FAMILY MEDICINE

## 2018-04-24 DIAGNOSIS — R92.8 ABNORMAL MAMMOGRAM: ICD-10-CM

## 2018-04-24 PROCEDURE — 77066 DX MAMMO INCL CAD BI: CPT | Performed by: RADIOLOGY

## 2018-04-24 PROCEDURE — G0279 TOMOSYNTHESIS, MAMMO: HCPCS | Performed by: RADIOLOGY

## 2018-04-24 PROCEDURE — G0279 TOMOSYNTHESIS, MAMMO: HCPCS

## 2018-04-24 PROCEDURE — 77066 DX MAMMO INCL CAD BI: CPT

## 2018-04-25 DIAGNOSIS — Z96.652 STATUS POST LEFT KNEE REPLACEMENT: Primary | ICD-10-CM

## 2018-05-31 ENCOUNTER — OFFICE VISIT (OUTPATIENT)
Dept: NEUROLOGY | Facility: CLINIC | Age: 68
End: 2018-05-31

## 2018-05-31 VITALS
WEIGHT: 135 LBS | SYSTOLIC BLOOD PRESSURE: 140 MMHG | HEIGHT: 59 IN | BODY MASS INDEX: 27.21 KG/M2 | HEART RATE: 75 BPM | DIASTOLIC BLOOD PRESSURE: 70 MMHG

## 2018-05-31 DIAGNOSIS — H81.10 BENIGN PAROXYSMAL POSITIONAL VERTIGO, UNSPECIFIED LATERALITY: Primary | ICD-10-CM

## 2018-05-31 DIAGNOSIS — R53.82 CHRONIC FATIGUE: ICD-10-CM

## 2018-05-31 DIAGNOSIS — G25.81 RESTLESS LEGS SYNDROME (RLS): ICD-10-CM

## 2018-05-31 DIAGNOSIS — R51.9 NOCTURNAL HEADACHES: ICD-10-CM

## 2018-05-31 PROCEDURE — 99213 OFFICE O/P EST LOW 20 MIN: CPT | Performed by: PSYCHIATRY & NEUROLOGY

## 2018-05-31 RX ORDER — ALBUTEROL SULFATE 90 UG/1
AEROSOL, METERED RESPIRATORY (INHALATION)
COMMUNITY
End: 2020-12-02

## 2018-05-31 RX ORDER — HYDROCODONE BITARTRATE AND ACETAMINOPHEN 5; 325 MG/1; MG/1
TABLET ORAL
COMMUNITY
End: 2018-10-08 | Stop reason: SDUPTHER

## 2018-06-10 PROBLEM — G25.81 RESTLESS LEGS SYNDROME (RLS): Status: ACTIVE | Noted: 2018-06-10

## 2018-06-10 PROBLEM — R53.82 CHRONIC FATIGUE: Status: ACTIVE | Noted: 2018-06-10

## 2018-06-11 ENCOUNTER — OFFICE VISIT (OUTPATIENT)
Dept: ORTHOPEDIC SURGERY | Facility: CLINIC | Age: 68
End: 2018-06-11

## 2018-06-11 DIAGNOSIS — Z96.652 STATUS POST LEFT KNEE REPLACEMENT: Primary | ICD-10-CM

## 2018-06-11 DIAGNOSIS — Z47.89 ORTHOPEDIC AFTERCARE: ICD-10-CM

## 2018-06-11 PROCEDURE — 99024 POSTOP FOLLOW-UP VISIT: CPT | Performed by: ORTHOPAEDIC SURGERY

## 2018-06-11 NOTE — PROGRESS NOTES
"    [facility] [place] [facility] Note    Subjective     Chief Complaint   Patient presents with   • Post-op     6 week f/u; 9 weeks status post Left Total Knee Arthroplasty 4/3/18        HPI    Shanel Wall is a 67 y.o. female. She follows up today for her left total knee arthroplasty.  She is doing well today.  Mild pain, and the hamstring area.  50% improvement compared to her preoperative symptoms.  Ambulatory without external aids.      Patient Active Problem List   Diagnosis   • Abnormal EKG   • Dizziness   • Lightheadedness   • Gastroesophageal reflux disease without esophagitis   • Rheumatoid arthritis involving multiple sites   • Hypoglycemia   • Insomnia   • Osteoporosis   • Arthropathy of shoulder region   • Status post reverse total replacement of left shoulder   • Hypothyroid   • Acute blood loss anemia, mild, asymptomatic   • Postoperative urinary retention   • Bicuspid aortic valve   • Rheumatoid arthritis involving left knee with positive rheumatoid factor   • Benign paroxysmal positional vertigo   • Neck pain   • Ataxia   • Nocturnal headaches   • Status post total left knee replacement   • Hypokalemia, replaced   • Acute postoperative pain   • Chronic fatigue   • Restless legs syndrome (RLS)     Past Medical History:   Diagnosis Date   • Anesthesia     PT HAS RHEUMATOID ARTHRITIS, PT HAS SPECIFIC INSTRUCTIONS PER RA MD, pt to bring INSTRUCTIONS AND RECS ATTACHED TO CHART AND PT TO DISCUSS WITH ANESTHESIA ON DATE OF PROCEDURE    • Arthritis    • Cervical cancer     cone biopsy   • Depression    • Disease of thyroid gland    • GERD (gastroesophageal reflux disease)    • Hard to intubate     \"anterior trachea\"    • IBS (irritable bowel syndrome)    • Joint pain    • Measles    • Menopause    • Rheumatoid arthritis    • Shingles    • Vertigo    • Wears glasses       Past Surgical History:   Procedure Laterality Date   • ADENOIDECTOMY     • BREAST EXCISIONAL BIOPSY Left     NO VISIBLE SCAR   • " CERVICAL CONE BIOPSY     • COLONOSCOPY     • COSMETIC SURGERY      SHORT SCAR FACE LIFT PER DR RODRIGUEZ    • DILATATION AND CURETTAGE     • FOOT SURGERY Left     calcaneous    • KNEE ARTHROPLASTY Right     blue    • KNEE ARTHROSCOPY Bilateral    • REDUCTION MAMMAPLASTY Bilateral    • TENDON REPAIR Right     HAND   • TONSILLECTOMY AND ADENOIDECTOMY     • TOTAL KNEE ARTHROPLASTY Left 4/3/2018    Procedure: TOTAL KNEE ARTHROPLASTY LEFT;  Surgeon: Mahesh Wesley MD;  Location:  [facility];  Service: Orthopedics   • TOTAL SHOULDER ARTHROPLASTY W/ DISTAL CLAVICLE EXCISION Left 5/22/2017    Procedure: LEFT REVERSE TOTAL SHOULDER ARTHROPLASTY FOR FRACTURE;  Surgeon: Damaso Harris MD;  Location:  [facility];  Service:    • TUBAL ABDOMINAL LIGATION        Family History   Problem Relation Age of Onset   • Parkinsonism Mother    • Heart failure Father    • Kidney failure Father    • Hyperlipidemia Father    • No Known Problems Sister    • No Known Problems Brother    • Breast cancer Neg Hx    • Ovarian cancer Neg Hx      Social History     Social History   • Marital status:      Spouse name: N/A   • Number of children: N/A   • Years of education: N/A     Occupational History   • unemployed      Social History Main Topics   • Smoking status: Never Smoker   • Smokeless tobacco: Never Used   • Alcohol use 1.2 oz/week     1 Glasses of wine, 1 Shots of liquor per week      Comment: DAILY   • Drug use: No   • Sexual activity: Defer     Other Topics Concern   • Not on file     Social History Narrative    Pt consumes 2 servings of caffeine per day.       Current Outpatient Prescriptions on File Prior to Visit   Medication Sig Dispense Refill   • albuterol (PROAIR HFA) 108 (90 Base) MCG/ACT inhaler ProAir HFA 90 mcg/actuation aerosol inhaler     • alendronate (FOSAMAX) 70 MG tablet Take 70 mg by mouth Every 7 (Seven) Days. Sunday     • ALLERGY SERUM INJECTION Inject  under the skin Every 21 (Twenty-One) Days.     • aluminum  hydroxide-mag carbonate (GAVISCON EXTRA RELIEF) 160-105 MG chewable tablet chewable tablet Chew 1 tablet Daily As Needed.     • apixaban (ELIQUIS) 2.5 MG tablet tablet Take 1 tablet by mouth Every 12 (Twelve) Hours. For 2 weeks 28 tablet 0   • Calcium Carb-Cholecalciferol (CALCIUM-VITAMIN D) 600-400 MG-UNIT tablet Take 1 tablet by mouth 2 (Two) Times a Day.     • docusate sodium 100 MG capsule Take 100 mg by mouth 2 (Two) Times a Day As Needed for Constipation.     • DULoxetine (CYMBALTA) 60 MG capsule Take 60 mg by mouth Daily.     • Etanercept 50 MG/ML solution auto-injector Inject 50 mg under the skin 1 (One) Time Per Week. MONDAYS  Resume when ok with Dr. Wesley     • folic acid (FOLVITE) 1 MG tablet Take 1 mg by mouth Daily.     • folic acid-vit B6-vit B12 (FOLGARD) 2.2-25-1 MG tablet tablet Take 1 tablet by mouth 2 (Two) Times a Day.     • HYDROcodone-acetaminophen (NORCO) 5-325 MG per tablet hydrocodone 5 mg-acetaminophen 325 mg tablet     • lansoprazole (PREVACID) 15 MG capsule Take 15 mg by mouth Daily.     • leflunomide (ARAVA) 20 MG tablet Take 1 tablet by mouth Daily. Resume when ok with Dr. Wesley     • levothyroxine (SYNTHROID, LEVOTHROID) 88 MCG tablet Take 88 mcg by mouth Daily.     • meloxicam (MOBIC) 15 MG tablet      • methotrexate 2.5 MG tablet Take 9 tablets by mouth 1 (One) Time Per Week. MONDAYS  Resume when ok with Dr. Wesley  0   • Multiple Vitamins-Minerals (SENIOR MULTIVITAMIN PLUS) tablet Take 1 tablet by mouth Daily.     • NON FORMULARY Take 2 tablets by mouth Daily. Hydro Eye gelcaps     • oxyCODONE-acetaminophen (PERCOCET) 7.5-325 MG per tablet Take 1 tablet by mouth Every 6 (Six) Hours As Needed for Moderate Pain . 30 tablet 0   • oxyCODONE-acetaminophen (PERCOCET) 7.5-325 MG per tablet Take 1 tablet by mouth Every 6 (Six) Hours As Needed for Moderate Pain . 30 tablet 0   • polyethyl glycol-propyl glycol (SYSTANE) 0.4-0.3 % solution ophthalmic solution Administer 2 drops to both eyes 2  (Two) Times a Day.     • predniSONE (DELTASONE) 5 MG tablet Take 5 mg by mouth Daily.     • prochlorperazine (COMPAZINE) 10 MG tablet Take 10 mg by mouth Every 6 (Six) Hours As Needed for Nausea or Vomiting.     • pseudoephedrine-guaifenesin (MUCINEX D)  MG per 12 hr tablet Take 1 tablet by mouth Every 12 (Twelve) Hours.     • salsalate (DISALCID) 750 MG tablet Take 1,500 mg by mouth 2 (Two) Times a Day.     • sodium chloride (OCEAN) 0.65 % nasal spray 1 spray into each nostril As Needed.     • vitamin A 8000 UNIT capsule Take 8,000 Units by mouth Daily.     • vitamin C (ASCORBIC ACID) 500 MG tablet Take 500 mg by mouth Daily.     • vitamin E 400 UNIT capsule Take 800 Units by mouth Daily.     • zolpidem (AMBIEN) 10 MG tablet Take 1 tablet by mouth Every Night. 5 tablet 0     No current facility-administered medications on file prior to visit.       No Known Allergies     Review of Systems   Constitutional: Positive for fatigue. Negative for activity change, appetite change, chills, diaphoresis, fever and unexpected weight change.        Night Sweats   HENT: Positive for sore throat. Negative for congestion, dental problem, drooling, ear discharge, ear pain, facial swelling, hearing loss, mouth sores, nosebleeds, postnasal drip, rhinorrhea, sinus pressure, sneezing, tinnitus, trouble swallowing and voice change.    Eyes: Positive for photophobia and visual disturbance. Negative for pain, discharge, redness and itching.   Respiratory: Negative for apnea, cough, choking, chest tightness, shortness of breath, wheezing and stridor.    Cardiovascular: Negative for chest pain, palpitations and leg swelling.   Gastrointestinal: Negative for abdominal distention, abdominal pain, anal bleeding, blood in stool, constipation, diarrhea, nausea, rectal pain and vomiting.   Endocrine: Negative for cold intolerance, heat intolerance, polydipsia, polyphagia and polyuria.   Genitourinary: Negative for decreased urine volume,  difficulty urinating, dysuria, enuresis, flank pain, frequency, genital sores, hematuria and urgency.   Musculoskeletal: Positive for gait problem, joint swelling and myalgias. Negative for arthralgias, back pain, neck pain and neck stiffness.   Skin: Negative for color change, pallor, rash and wound.   Allergic/Immunologic: Positive for environmental allergies, food allergies and immunocompromised state.   Neurological: Positive for dizziness and light-headedness. Negative for tremors, seizures, syncope, facial asymmetry, speech difficulty, weakness, numbness and headaches.   Hematological: Negative for adenopathy. Does not bruise/bleed easily.   Psychiatric/Behavioral: Positive for sleep disturbance. Negative for agitation, behavioral problems, confusion, decreased concentration, dysphoric mood, hallucinations, self-injury and suicidal ideas. The patient is nervous/anxious. The patient is not hyperactive.         Objective      Physical Exam  There were no vitals taken for this visit.    There is no height or weight on file to calculate BMI.    General:   Mental Status:  Alert   Appearance: Cooperative, in no acute distress   Build and Nutrition: Well-nourished and well developed female   Orientation: Alert and oriented to person, place and time   Posture: Normal   Gait: Normal    Integument:   Left knee: Wound is well-healed with no signs of infection    Lower Extremities:   Left Knee:    Tenderness:  None    Effusion:  None    Swelling: None    Crepitus:  None    Range of motion:  Extension: 0°       Flexion: 130°  Instability:  No varus laxity, no valgus laxity, negative anterior drawer  Deformities:  None        Assessment and Plan     Shanel was seen today for post-op.    Diagnoses and all orders for this visit:    Status post left knee replacement    Orthopedic aftercare        I reviewed my findings with patient today.  Her left total knee arthroplasty is functioning well.  I will see her back in 4 months  with an x-ray, but sooner for any problems.  That will be a 6 month checkup.    Return in about 4 months (around 10/11/2018) for Recheck with X-Rays.        Mahesh Wesley MD  06/11/18  1:59 PM

## 2018-06-19 ENCOUNTER — TELEPHONE (OUTPATIENT)
Dept: ORTHOPEDIC SURGERY | Facility: CLINIC | Age: 68
End: 2018-06-19

## 2018-06-19 DIAGNOSIS — Z96.652 STATUS POST LEFT KNEE REPLACEMENT: Primary | ICD-10-CM

## 2018-06-19 NOTE — TELEPHONE ENCOUNTER
I called the patient back to advise her that Dr. Wesley sent the referral for her to continue with PT. Patient understood.

## 2018-06-19 NOTE — TELEPHONE ENCOUNTER
PATIENT CALLED INQUIRING ABOUT A NEW ORDER FOR PHYSICAL THERAPY.  PLEASE RETURN HER CALL AT [phone].

## 2018-06-19 NOTE — TELEPHONE ENCOUNTER
I called the patient to advise her that I would have to send a message to Dr. Wesley to ask about getting her a new order for PT. She stated that she is going to PT today at [facility] and if  he approves the order, we can fax the script to them at  [phone].

## 2018-09-27 ENCOUNTER — TRANSCRIBE ORDERS (OUTPATIENT)
Dept: ADMINISTRATIVE | Facility: HOSPITAL | Age: 68
End: 2018-09-27

## 2018-09-27 DIAGNOSIS — R92.8 ABNORMAL MAMMOGRAPHY: Primary | ICD-10-CM

## 2018-10-08 ENCOUNTER — OFFICE VISIT (OUTPATIENT)
Dept: ORTHOPEDIC SURGERY | Facility: CLINIC | Age: 68
End: 2018-10-08

## 2018-10-08 VITALS — HEIGHT: 59 IN | HEART RATE: 74 BPM | BODY MASS INDEX: 27.33 KG/M2 | WEIGHT: 135.58 LBS | OXYGEN SATURATION: 98 %

## 2018-10-08 DIAGNOSIS — Z47.89 ORTHOPEDIC AFTERCARE: ICD-10-CM

## 2018-10-08 DIAGNOSIS — Z96.652 STATUS POST LEFT KNEE REPLACEMENT: Primary | ICD-10-CM

## 2018-10-08 PROCEDURE — 99213 OFFICE O/P EST LOW 20 MIN: CPT | Performed by: ORTHOPAEDIC SURGERY

## 2018-10-08 RX ORDER — ETANERCEPT 50 MG/ML
50 SOLUTION SUBCUTANEOUS WEEKLY
COMMUNITY
Start: 2018-10-04

## 2018-10-08 RX ORDER — HYDROCODONE BITARTRATE AND ACETAMINOPHEN 10; 325 MG/1; MG/1
TABLET ORAL
COMMUNITY
Start: 2018-07-18 | End: 2020-08-19

## 2018-10-08 RX ORDER — PREDNISONE 1 MG/1
5 TABLET ORAL DAILY
COMMUNITY
Start: 2018-09-08 | End: 2020-12-02

## 2018-10-08 RX ORDER — OXYCODONE HYDROCHLORIDE AND ACETAMINOPHEN 5; 325 MG/1; MG/1
TABLET ORAL
COMMUNITY
Start: 2018-09-07 | End: 2020-08-19

## 2018-10-08 NOTE — PROGRESS NOTES
"    [facility] [place] Orthopaedic Surgery Clinic Note    Subjective     Chief Complaint   Patient presents with   • Follow-up     4 month f/u: 6 months s/p LEFT TOTAL KNEE ARTHROPLASTY - 04/03/18        HPI    Shanel Wall is a 67 y.o. female.  She follows up today for left total knee arthroplasty.  She's doing well today.  Good relief compared to her preoperative symptoms, and her knee feels much more stable.  Ambulatory without external aids.      Patient Active Problem List   Diagnosis   • Abnormal EKG   • Dizziness   • Lightheadedness   • Gastroesophageal reflux disease without esophagitis   • Rheumatoid arthritis involving multiple sites (CMS/HCC)   • Hypoglycemia   • Insomnia   • Osteoporosis   • Arthropathy of shoulder region   • Status post reverse total replacement of left shoulder   • Hypothyroid   • Acute blood loss anemia, mild, asymptomatic   • Postoperative urinary retention   • Bicuspid aortic valve   • Rheumatoid arthritis involving left knee with positive rheumatoid factor (CMS/HCC)   • Benign paroxysmal positional vertigo   • Neck pain   • Ataxia   • Nocturnal headaches   • Status post total left knee replacement   • Hypokalemia, replaced   • Acute postoperative pain   • Chronic fatigue   • Restless legs syndrome (RLS)     Past Medical History:   Diagnosis Date   • Anesthesia     PT HAS RHEUMATOID ARTHRITIS, PT HAS SPECIFIC INSTRUCTIONS PER RA MD, pt to bring INSTRUCTIONS AND RECS ATTACHED TO CHART AND PT TO DISCUSS WITH ANESTHESIA ON DATE OF PROCEDURE    • Arthritis    • Cervical cancer (CMS/HCC)     cone biopsy   • Depression    • Disease of thyroid gland    • GERD (gastroesophageal reflux disease)    • Hard to intubate     \"anterior trachea\"    • IBS (irritable bowel syndrome)    • Joint pain    • Measles    • Menopause    • Rheumatoid arthritis (CMS/HCC)    • Shingles    • Vertigo    • Wears glasses       Past Surgical History:   Procedure Laterality Date   • ADENOIDECTOMY     • BLEPHAROPLASTY, QUAD " Bilateral 09/18/2018   • BREAST EXCISIONAL BIOPSY Left     NO VISIBLE SCAR   • CERVICAL CONE BIOPSY     • COLONOSCOPY     • COSMETIC SURGERY      SHORT SCAR FACE LIFT PER DR RODRIGUEZ    • DILATATION AND CURETTAGE     • FOOT SURGERY Left     calcaneous    • KNEE ARTHROPLASTY Right     blue    • KNEE ARTHROSCOPY Bilateral    • REDUCTION MAMMAPLASTY Bilateral    • TENDON REPAIR Right     HAND   • TONSILLECTOMY AND ADENOIDECTOMY     • TOTAL KNEE ARTHROPLASTY Left 4/3/2018    Procedure: TOTAL KNEE ARTHROPLASTY LEFT;  Surgeon: Mahesh Wesley MD;  Location:  [facility];  Service: Orthopedics   • TOTAL SHOULDER ARTHROPLASTY W/ DISTAL CLAVICLE EXCISION Left 5/22/2017    Procedure: LEFT REVERSE TOTAL SHOULDER ARTHROPLASTY FOR FRACTURE;  Surgeon: Damaso Harris MD;  Location:  [facility];  Service:    • TUBAL ABDOMINAL LIGATION        Family History   Problem Relation Age of Onset   • Parkinsonism Mother    • Heart failure Father    • Kidney failure Father    • Hyperlipidemia Father    • No Known Problems Sister    • No Known Problems Brother    • Breast cancer Neg Hx    • Ovarian cancer Neg Hx      Social History     Social History   • Marital status:      Spouse name: N/A   • Number of children: N/A   • Years of education: N/A     Occupational History   • unemployed      Social History Main Topics   • Smoking status: Never Smoker   • Smokeless tobacco: Never Used   • Alcohol use 1.2 oz/week     1 Glasses of wine, 1 Shots of liquor per week      Comment: DAILY   • Drug use: No   • Sexual activity: Defer     Other Topics Concern   • Not on file     Social History Narrative    Pt consumes 2 servings of caffeine per day.       Current Outpatient Prescriptions on File Prior to Visit   Medication Sig Dispense Refill   • albuterol (PROAIR HFA) 108 (90 Base) MCG/ACT inhaler ProAir HFA 90 mcg/actuation aerosol inhaler     • alendronate (FOSAMAX) 70 MG tablet Take 70 mg by mouth Every 7 (Seven) Days. Sunday     • ALLERGY SERUM  INJECTION Inject  under the skin Every 21 (Twenty-One) Days.     • aluminum hydroxide-mag carbonate (GAVISCON EXTRA RELIEF) 160-105 MG chewable tablet chewable tablet Chew 1 tablet Daily As Needed.     • apixaban (ELIQUIS) 2.5 MG tablet tablet Take 1 tablet by mouth Every 12 (Twelve) Hours. For 2 weeks 28 tablet 0   • Calcium Carb-Cholecalciferol (CALCIUM-VITAMIN D) 600-400 MG-UNIT tablet Take 1 tablet by mouth 2 (Two) Times a Day.     • docusate sodium 100 MG capsule Take 100 mg by mouth 2 (Two) Times a Day As Needed for Constipation.     • DULoxetine (CYMBALTA) 60 MG capsule Take 60 mg by mouth Daily.     • folic acid (FOLVITE) 1 MG tablet Take 1 mg by mouth Daily.     • folic acid-vit B6-vit B12 (FOLGARD) 2.2-25-1 MG tablet tablet Take 1 tablet by mouth 2 (Two) Times a Day.     • lansoprazole (PREVACID) 15 MG capsule Take 15 mg by mouth Daily.     • leflunomide (ARAVA) 20 MG tablet Take 1 tablet by mouth Daily. Resume when ok with Dr. Wesley     • levothyroxine (SYNTHROID, LEVOTHROID) 88 MCG tablet Take 88 mcg by mouth Daily.     • meloxicam (MOBIC) 15 MG tablet      • methotrexate 2.5 MG tablet Take 9 tablets by mouth 1 (One) Time Per Week. MONDAYS  Resume when ok with Dr. Wesley  0   • Multiple Vitamins-Minerals (SENIOR MULTIVITAMIN PLUS) tablet Take 1 tablet by mouth Daily.     • NON FORMULARY Take 2 tablets by mouth Daily. Hydro Eye gelcaps     • polyethyl glycol-propyl glycol (SYSTANE) 0.4-0.3 % solution ophthalmic solution Administer 2 drops to both eyes 2 (Two) Times a Day.     • predniSONE (DELTASONE) 5 MG tablet Take 5 mg by mouth Daily.     • prochlorperazine (COMPAZINE) 10 MG tablet Take 10 mg by mouth Every 6 (Six) Hours As Needed for Nausea or Vomiting.     • pseudoephedrine-guaifenesin (MUCINEX D)  MG per 12 hr tablet Take 1 tablet by mouth Every 12 (Twelve) Hours.     • salsalate (DISALCID) 750 MG tablet Take 1,500 mg by mouth 2 (Two) Times a Day.     • sodium chloride (OCEAN) 0.65 % nasal spray  1 spray into each nostril As Needed.     • vitamin A 8000 UNIT capsule Take 8,000 Units by mouth Daily.     • vitamin C (ASCORBIC ACID) 500 MG tablet Take 500 mg by mouth Daily.     • vitamin E 400 UNIT capsule Take 800 Units by mouth Daily.     • zolpidem (AMBIEN) 10 MG tablet Take 1 tablet by mouth Every Night. 5 tablet 0   • [DISCONTINUED] Etanercept 50 MG/ML solution auto-injector Inject 50 mg under the skin 1 (One) Time Per Week. MONDAYS  Resume when ok with Dr. Wesley     • [DISCONTINUED] HYDROcodone-acetaminophen (NORCO) 5-325 MG per tablet hydrocodone 5 mg-acetaminophen 325 mg tablet     • [DISCONTINUED] oxyCODONE-acetaminophen (PERCOCET) 7.5-325 MG per tablet Take 1 tablet by mouth Every 6 (Six) Hours As Needed for Moderate Pain . 30 tablet 0   • [DISCONTINUED] oxyCODONE-acetaminophen (PERCOCET) 7.5-325 MG per tablet Take 1 tablet by mouth Every 6 (Six) Hours As Needed for Moderate Pain . 30 tablet 0     No current facility-administered medications on file prior to visit.       No Known Allergies     Review of Systems   Constitutional: Positive for fatigue. Negative for activity change, appetite change, chills, diaphoresis, fever and unexpected weight change.   HENT: Negative for congestion, dental problem, drooling, ear discharge, ear pain, facial swelling, hearing loss, mouth sores, nosebleeds, postnasal drip, rhinorrhea, sinus pain, sinus pressure, sneezing, sore throat, tinnitus, trouble swallowing and voice change.    Eyes: Positive for photophobia. Negative for pain, discharge, redness, itching and visual disturbance.   Respiratory: Negative for apnea, cough, choking, chest tightness, shortness of breath, wheezing and stridor.    Cardiovascular: Negative for chest pain, palpitations and leg swelling.   Gastrointestinal: Negative for abdominal distention, abdominal pain, anal bleeding, blood in stool, constipation, diarrhea, nausea, rectal pain and vomiting.   Endocrine: Positive for heat intolerance.  "Negative for cold intolerance, polydipsia, polyphagia and polyuria.   Genitourinary: Negative for decreased urine volume, difficulty urinating, dyspareunia, dysuria, enuresis, flank pain, frequency, genital sores, hematuria, menstrual problem, pelvic pain, urgency, vaginal bleeding, vaginal discharge and vaginal pain.   Musculoskeletal: Positive for gait problem and joint swelling (Left knee). Negative for arthralgias, back pain, myalgias, neck pain and neck stiffness.   Skin: Negative for color change, pallor, rash and wound.   Allergic/Immunologic: Positive for environmental allergies, food allergies and immunocompromised state.   Neurological: Positive for light-headedness. Negative for dizziness, tremors, seizures, syncope, facial asymmetry, speech difficulty, weakness, numbness and headaches.   Hematological: Negative for adenopathy. Bruises/bleeds easily.   Psychiatric/Behavioral: Negative for agitation, behavioral problems, confusion, decreased concentration, dysphoric mood, hallucinations, self-injury, sleep disturbance and suicidal ideas. The patient is not nervous/anxious and is not hyperactive.         Objective      Physical Exam  Pulse 74   Ht 149.9 cm (59.02\")   Wt 61.5 kg (135 lb 9.3 oz)   SpO2 98%   BMI 27.37 kg/m²     Body mass index is 27.37 kg/m².    General:   Mental Status:  Alert   Appearance: Cooperative, in no acute distress   Build and Nutrition: Well-nourished and well developed female   Orientation: Alert and oriented to person, place and time   Posture: Normal   Gait: Normal    Integument:   Left knee: Wound is well-healed with no signs of infection    Lower Extremities:   Left Knee:    Tenderness:  None    Effusion:  None    Swelling: None    Crepitus:  None    Range of motion:  Extension: 0°       Flexion: 140°  Instability:  No varus laxity, no valgus laxity, negative anterior drawer  Deformities:  None      Imaging/Studies  Imaging Results (last 24 hours)     Procedure Component " Value Units Date/Time    XR Knee 3+ View With [place] Left [[id number]] Resulted:  10/08/18 1441     Updated:  10/08/18 1441    Narrative:       Left Knee Radiographs  Indication: status-post left total knee arthroplasty  Views: AP, lateral, and sunrise views of the left knee    Comparison: no change compared to prior study, 4/23/2018    Findings:   The components are well aligned, with no signs of loosening or failure.            Assessment and Plan     Shnael was seen today for follow-up.    Diagnoses and all orders for this visit:    Status post left knee replacement  -     XR Knee 3+ View With [place] Left    Orthopedic aftercare        I reviewed my findings with patient today.  Her left total knee arthroplasties functioning well, and she is pleased with results.  I will see her back in 6 months, which will be 1 year checkup.  I will see her back sooner for any problems.    Return in about 6 months (around 4/8/2019) for Recheck with X-Rays.      Medical Decision Making  Data/Risk: radiology tests and independent visualization of imaging, lab tests, or EMG/NCV      Mahesh Wesley MD  10/08/18  2:47 PM

## 2018-11-09 NOTE — TELEPHONE ENCOUNTER
----- Message from Ramana Patricia MD sent at 2/9/2018  3:12 PM EST -----  Regarding: XR cspine  Moderate arthritis changes, no fracture or instability, thanks.  ----- Message -----     From: Carol, Rad Results [place] In     Sent: 2/9/2018   2:22 PM       To: Ramana Patircia MD         09-Nov-2018 20:19

## 2018-11-26 ENCOUNTER — TRANSCRIBE ORDERS (OUTPATIENT)
Dept: ADMINISTRATIVE | Facility: HOSPITAL | Age: 68
End: 2018-11-26

## 2018-11-26 DIAGNOSIS — Z79.899 HISTORY OF LONG-TERM TREATMENT WITH HIGH-RISK MEDICATION: Primary | ICD-10-CM

## 2018-11-30 ENCOUNTER — HOSPITAL ENCOUNTER (OUTPATIENT)
Dept: ULTRASOUND IMAGING | Facility: HOSPITAL | Age: 68
Discharge: HOME | End: 2018-11-30
Admitting: INTERNAL MEDICINE

## 2018-11-30 DIAGNOSIS — Z79.899 HISTORY OF LONG-TERM TREATMENT WITH HIGH-RISK MEDICATION: ICD-10-CM

## 2018-11-30 PROCEDURE — 76705 ECHO EXAM OF ABDOMEN: CPT

## 2019-01-10 ENCOUNTER — HOSPITAL ENCOUNTER (OUTPATIENT)
Dept: MAMMOGRAPHY | Facility: HOSPITAL | Age: 69
Discharge: HOME | End: 2019-01-10
Attending: FAMILY MEDICINE | Admitting: FAMILY MEDICINE

## 2019-01-10 DIAGNOSIS — R92.8 ABNORMAL MAMMOGRAPHY: ICD-10-CM

## 2019-01-10 PROCEDURE — 77065 DX MAMMO INCL CAD UNI: CPT | Performed by: RADIOLOGY

## 2019-01-10 PROCEDURE — 77065 DX MAMMO INCL CAD UNI: CPT

## 2019-04-08 ENCOUNTER — OFFICE VISIT (OUTPATIENT)
Dept: ORTHOPEDIC SURGERY | Facility: CLINIC | Age: 69
End: 2019-04-08

## 2019-04-08 VITALS — HEIGHT: 59 IN | BODY MASS INDEX: 29.33 KG/M2 | WEIGHT: 145.5 LBS | OXYGEN SATURATION: 95 % | HEART RATE: 82 BPM

## 2019-04-08 DIAGNOSIS — Z09 POSTOPERATIVE EXAMINATION: ICD-10-CM

## 2019-04-08 DIAGNOSIS — Z96.652 STATUS POST LEFT KNEE REPLACEMENT: Primary | ICD-10-CM

## 2019-04-08 PROCEDURE — 99213 OFFICE O/P EST LOW 20 MIN: CPT | Performed by: ORTHOPAEDIC SURGERY

## 2019-04-08 NOTE — PROGRESS NOTES
"    [facility] Note    Subjective     Chief Complaint   Patient presents with   • Left Knee - Follow-up     6 month follow up; 1 year status post total knee.        HPI    Shanel Wall is a 68 y.o. female.  She follows up for her left total knee replacement.  She is very pleased with results, and had excellent relief.  100% improvement compared to her preoperative symptoms.  Ambulatory without external aids.      Patient Active Problem List   Diagnosis   • Abnormal EKG   • Dizziness   • Lightheadedness   • Gastroesophageal reflux disease without esophagitis   • Rheumatoid arthritis involving multiple sites (CMS/HCC)   • Hypoglycemia   • Insomnia   • Osteoporosis   • Arthropathy of shoulder region   • Status post reverse total replacement of left shoulder   • Hypothyroid   • Acute blood loss anemia, mild, asymptomatic   • Postoperative urinary retention   • Bicuspid aortic valve   • Rheumatoid arthritis involving left knee with positive rheumatoid factor (CMS/HCC)   • Benign paroxysmal positional vertigo   • Neck pain   • Ataxia   • Nocturnal headaches   • Status post total left knee replacement   • Hypokalemia, replaced   • Acute postoperative pain   • Chronic fatigue   • Restless legs syndrome (RLS)     Past Medical History:   Diagnosis Date   • Anesthesia     PT HAS RHEUMATOID ARTHRITIS, PT HAS SPECIFIC INSTRUCTIONS PER RA MD, pt to bring INSTRUCTIONS AND RECS ATTACHED TO CHART AND PT TO DISCUSS WITH ANESTHESIA ON DATE OF PROCEDURE    • Arthritis    • Cervical cancer (CMS/HCC)     cone biopsy   • Depression    • Disease of thyroid gland    • GERD (gastroesophageal reflux disease)    • Hard to intubate     \"anterior trachea\"    • IBS (irritable bowel syndrome)    • Joint pain    • Measles    • Menopause    • Rheumatoid arthritis (CMS/HCC)    • Shingles    • Vertigo    • Wears glasses       Past Surgical History:   Procedure Laterality Date   • ADENOIDECTOMY     • BLEPHAROPLASTY, QUAD " Bilateral 09/18/2018    Dr. romano.   • BREAST EXCISIONAL BIOPSY Left     NO VISIBLE SCAR   • CERVICAL CONE BIOPSY     • COLONOSCOPY     • COSMETIC SURGERY      SHORT SCAR FACE LIFT PER DR ROMANO    • DILATATION AND CURETTAGE     • FOOT SURGERY Left     calcaneous    • KNEE ARTHROPLASTY Right     blue    • KNEE ARTHROSCOPY Bilateral    • REDUCTION MAMMAPLASTY Bilateral    • TENDON REPAIR Right     HAND   • TONSILLECTOMY AND ADENOIDECTOMY     • TOTAL KNEE ARTHROPLASTY Left 4/3/2018    Procedure: TOTAL KNEE ARTHROPLASTY LEFT;  Surgeon: Mahesh Wesley MD;  Location:  [facility];  Service: Orthopedics   • TOTAL SHOULDER ARTHROPLASTY W/ DISTAL CLAVICLE EXCISION Left 5/22/2017    Procedure: LEFT REVERSE TOTAL SHOULDER ARTHROPLASTY FOR FRACTURE;  Surgeon: Damaso Harris MD;  Location:  [facility];  Service:    • TUBAL ABDOMINAL LIGATION        Family History   Problem Relation Age of Onset   • Parkinsonism Mother    • Heart failure Father    • Kidney failure Father    • Hyperlipidemia Father    • No Known Problems Sister    • No Known Problems Brother    • Breast cancer Neg Hx    • Ovarian cancer Neg Hx      Social History     Socioeconomic History   • Marital status:      Spouse name: Not on file   • Number of children: Not on file   • Years of education: Not on file   • Highest education level: Not on file   Occupational History   • Occupation: unemployed   Tobacco Use   • Smoking status: Never Smoker   • Smokeless tobacco: Never Used   Substance and Sexual Activity   • Alcohol use: Yes     Alcohol/week: 1.2 oz     Types: 1 Glasses of wine, 1 Shots of liquor per week     Comment: DAILY   • Drug use: No   • Sexual activity: Defer   Social History Narrative    Pt consumes 2 servings of caffeine per day.       Current Outpatient Medications on File Prior to Visit   Medication Sig Dispense Refill   • albuterol (PROAIR HFA) 108 (90 Base) MCG/ACT inhaler ProAir HFA 90 mcg/actuation aerosol inhaler     • alendronate  (FOSAMAX) 70 MG tablet Take 70 mg by mouth Every 7 (Seven) Days. Sunday     • ALLERGY SERUM INJECTION Inject  under the skin Every 21 (Twenty-One) Days.     • aluminum hydroxide-mag carbonate (GAVISCON EXTRA RELIEF) 160-105 MG chewable tablet chewable tablet Chew 1 tablet Daily As Needed.     • apixaban (ELIQUIS) 2.5 MG tablet tablet Take 1 tablet by mouth Every 12 (Twelve) Hours. For 2 weeks 28 tablet 0   • Calcium Carb-Cholecalciferol (CALCIUM-VITAMIN D) 600-400 MG-UNIT tablet Take 1 tablet by mouth 2 (Two) Times a Day.     • docusate sodium 100 MG capsule Take 100 mg by mouth 2 (Two) Times a Day As Needed for Constipation.     • DULoxetine (CYMBALTA) 60 MG capsule Take 60 mg by mouth Daily.     • ENBREL 50 MG/ML injection      • folic acid (FOLVITE) 1 MG tablet Take 1 mg by mouth Daily.     • folic acid-vit B6-vit B12 (FOLGARD) 2.2-25-1 MG tablet tablet Take 1 tablet by mouth 2 (Two) Times a Day.     • HYDROcodone-acetaminophen (NORCO)  MG per tablet      • lansoprazole (PREVACID) 15 MG capsule Take 15 mg by mouth Daily.     • leflunomide (ARAVA) 20 MG tablet Take 1 tablet by mouth Daily. Resume when ok with Dr. Wesley     • levothyroxine (SYNTHROID, LEVOTHROID) 88 MCG tablet Take 88 mcg by mouth Daily.     • meloxicam (MOBIC) 15 MG tablet      • methotrexate 2.5 MG tablet Take 9 tablets by mouth 1 (One) Time Per Week. MONDAYS  Resume when ok with Dr. Wesley  0   • Multiple Vitamins-Minerals (SENIOR MULTIVITAMIN PLUS) tablet Take 1 tablet by mouth Daily.     • NON FORMULARY Take 2 tablets by mouth Daily. Hydro Eye gelcaps     • oxyCODONE-acetaminophen (PERCOCET) 5-325 MG per tablet      • polyethyl glycol-propyl glycol (SYSTANE) 0.4-0.3 % solution ophthalmic solution Administer 2 drops to both eyes 2 (Two) Times a Day.     • predniSONE (DELTASONE) 10 MG tablet      • predniSONE (DELTASONE) 5 MG tablet Take 5 mg by mouth Daily.     • prochlorperazine (COMPAZINE) 10 MG tablet Take 10 mg by mouth Every 6 (Six)  "Hours As Needed for Nausea or Vomiting.     • pseudoephedrine-guaifenesin (MUCINEX D)  MG per 12 hr tablet Take 1 tablet by mouth Every 12 (Twelve) Hours.     • salsalate (DISALCID) 750 MG tablet Take 1,500 mg by mouth 2 (Two) Times a Day.     • sodium chloride (OCEAN) 0.65 % nasal spray 1 spray into each nostril As Needed.     • vitamin A 8000 UNIT capsule Take 8,000 Units by mouth Daily.     • vitamin C (ASCORBIC ACID) 500 MG tablet Take 500 mg by mouth Daily.     • vitamin E 400 UNIT capsule Take 800 Units by mouth Daily.     • zolpidem (AMBIEN) 10 MG tablet Take 1 tablet by mouth Every Night. 5 tablet 0     No current facility-administered medications on file prior to visit.       No Known Allergies     Review of Systems   Constitutional: Positive for fatigue.   HENT: Positive for tinnitus.    Eyes: Positive for itching.   Respiratory: Negative.    Cardiovascular: Negative.    Gastrointestinal: Positive for diarrhea.   Endocrine: Positive for heat intolerance.   Genitourinary: Negative.    Musculoskeletal: Positive for back pain, myalgias, neck pain and neck stiffness.   Skin: Negative.    Allergic/Immunologic: Positive for immunocompromised state.   Neurological: Negative.    Hematological: Bruises/bleeds easily.   Psychiatric/Behavioral: Positive for decreased concentration and sleep disturbance. The patient is nervous/anxious.         Objective      Physical Exam  Pulse 82   Ht 149.9 cm (59\")   Wt 66 kg (145 lb 8.1 oz)   SpO2 95%   Breastfeeding? No   BMI 29.39 kg/m²     Body mass index is 29.39 kg/m².    General:   Mental Status:  Alert   Appearance: Cooperative, in no acute distress   Build and Nutrition: Well-nourished well-developed female   Orientation: Alert and oriented to person, place and time   Posture: Normal   Gait: Normal    Integument:   Left knee: Wound is well-healed with no signs of infection    Lower Extremities:   Left Knee:    Tenderness:  None    Effusion: "  None    Swelling: None    Crepitus:  None    Range of motion:  Extension: 0°       Flexion: 140°  Instability:  No varus laxity, no valgus laxity, negative anterior drawer  Deformities:  None      Imaging/Studies      Imaging Results (last 24 hours)     Procedure Component Value Units Date/Time    XR Knee 3+ View With [place] Left [[id number]] Resulted:  04/08/19 1450     Updated:  04/08/19 1450    Narrative:       Left Knee Radiographs  Indication: status-post left total knee arthroplasty  Views: AP, lateral, and sunrise views of the left knee    Comparison: no change compared to prior study, 10/8/2018    Findings:   The components are well aligned, with no signs of loosening or failure.          Assessment and Plan     Shanel was seen today for follow-up.    Diagnoses and all orders for this visit:    Status post left knee replacement  -     XR Knee 3+ View With [place] Left    Postoperative examination        1. Status post left knee replacement    2. Postoperative examination        I reviewed my findings with the patient today.  Her left total knee arthroplasty is functioning well, and she is pleased with results.  I will see her back in 4 years with a repeat x-ray, but sooner for any problems.    Return in about 4 years (around 4/8/2023) for Recheck with X-Rays.      Medical Decision Making  Data/Risk: radiology tests and independent visualization of imaging, lab tests, or EMG/NCV      Mahesh Wesley MD  04/08/19  3:02 PM

## 2019-06-03 ENCOUNTER — TRANSCRIBE ORDERS (OUTPATIENT)
Dept: ADMINISTRATIVE | Facility: HOSPITAL | Age: 69
End: 2019-06-03

## 2019-06-03 DIAGNOSIS — R92.8 ABNORMAL MAMMOGRAPHY: Primary | ICD-10-CM

## 2019-06-28 ENCOUNTER — TRANSCRIBE ORDERS (OUTPATIENT)
Dept: ADMINISTRATIVE | Facility: HOSPITAL | Age: 69
End: 2019-06-28

## 2019-06-28 DIAGNOSIS — Z82.49 FAMILY HISTORY OF ABDOMINAL AORTIC ANEURYSM (AAA): Primary | ICD-10-CM

## 2019-07-09 ENCOUNTER — OFFICE VISIT (OUTPATIENT)
Dept: GASTROENTEROLOGY | Facility: CLINIC | Age: 69
End: 2019-07-09

## 2019-07-09 VITALS
WEIGHT: 138.8 LBS | SYSTOLIC BLOOD PRESSURE: 134 MMHG | BODY MASS INDEX: 27.98 KG/M2 | HEART RATE: 62 BPM | HEIGHT: 59 IN | DIASTOLIC BLOOD PRESSURE: 79 MMHG

## 2019-07-09 DIAGNOSIS — K58.0 IRRITABLE BOWEL SYNDROME WITH DIARRHEA: Primary | ICD-10-CM

## 2019-07-09 DIAGNOSIS — K57.30 DIVERTICULOSIS OF LARGE INTESTINE WITHOUT HEMORRHAGE: ICD-10-CM

## 2019-07-09 PROCEDURE — 99203 OFFICE O/P NEW LOW 30 MIN: CPT | Performed by: INTERNAL MEDICINE

## 2019-07-09 NOTE — PROGRESS NOTES
GASTROENTEROLOGY OFFICE NOTE  Shanel Wall  [id number]  1950    CARE TEAM  Patient Care Team:  Gena Olvera MD as PCP - General (Family Medicine)  Prisca Boles MD as PCP - Claims Attributed    Gena Olvera MD     Chief Complaint   Patient presents with   • GI Problem     Consultation for screening colonoscopy    • Diarrhea   • Abdominal Pain   • Bloated        HISTORY OF PRESENT ILLNESS:  Patient is here today for second opinion regarding diverticulosis.    Patient is here today because she underwent colonoscopy in January of this year.  It was done because of chronic diarrhea which she has had for many years.  She does not find it that problematic.  Biopsies at time of her colonoscopy were negative for microscopic colitis nor was there any evidence of Crohn's disease/ulcerative colitis.  The colonoscopy was performed by Dr. Hassan who then recommended the patient return for an office visit to discuss her severe diverticuli and she was told that this was likely responsible for her occasional loose stools.  Review of records reveal that that at least as far back as 2003 she has had occasional problems with diarrhea.  She states that these issues are no worse than they have been in decades.    She became frustrated when she kept that follow-up appointment and was left waiting in the waiting room and she states she was never seen by him she left and never received a call from the office and therefore did not wish to return to that office.    She has a lot of concerns about what she needs to do about diverticulosis to prevent it from progressing.  About dietary recommendations etc. she came to us therefore for further recommendations in this regard.    Outside of her occasional problems with diarrhea she denies dysphagia, odynophagia, early satiety, unexplained weight loss, melanotic stools, constipation, melena, bright red blood per rectum or any other localizing GI complaints.   "She has not had any episodes of diverticulitis.    PAST MEDICAL HISTORY  Past Medical History:   Diagnosis Date   • Anesthesia     PT HAS RHEUMATOID ARTHRITIS, PT HAS SPECIFIC INSTRUCTIONS PER RA MD, pt to bring INSTRUCTIONS AND RECS ATTACHED TO CHART AND PT TO DISCUSS WITH ANESTHESIA ON DATE OF PROCEDURE    • Arthritis    • Back disorder     degenerative disc disorder   • Baker's cyst of knee     right   • Benign paroxysmal positional vertigo    • Cervical cancer (CMS/HCC)     cone biopsy   • Depression    • Deviated septum    • Disease of thyroid gland    • Diverticulosis    • Fibromyalgia    • GERD (gastroesophageal reflux disease)    • Hard to intubate     \"anterior trachea\"    • History of gallstones    • Hypoglycemia    • IBS (irritable bowel syndrome)    • Joint pain    • Measles    • Menopause    • Osteoarthritis (arthritis due to wear and tear of joints)    • Rheumatoid arthritis (CMS/HCC)    • Rheumatoid arthritis (CMS/HCC)    • Scoliosis    • Seasonal allergies    • Shingles    • Synovitis    • Tinnitus    • Vertigo    • Wears glasses         PAST SURGICAL HISTORY  Past Surgical History:   Procedure Laterality Date   • ADENOIDECTOMY     • BLEPHAROPLASTY, QUAD Bilateral 09/18/2018    Dr. romano.   • BREAST EXCISIONAL BIOPSY Left     NO VISIBLE SCAR   • CERVICAL CONE BIOPSY     • COLONOSCOPY     • COSMETIC SURGERY      SHORT SCAR FACE LIFT PER DR ROMANO    • DILATATION AND CURETTAGE     • FACIAL COSMETIC SURGERY     • FOOT SURGERY Left     calcaneous    • KNEE ARTHROPLASTY Right     mirza    • KNEE ARTHROSCOPY Bilateral    • REDUCTION MAMMAPLASTY Bilateral    • TENDON REPAIR Right     HAND   • TONSILLECTOMY AND ADENOIDECTOMY     • TOTAL KNEE ARTHROPLASTY Left 4/3/2018    Procedure: TOTAL KNEE ARTHROPLASTY LEFT;  Surgeon: Mahesh Wesley MD;  Location: [facility];  Service: Orthopedics   • TOTAL SHOULDER ARTHROPLASTY W/ DISTAL CLAVICLE EXCISION Left 5/22/2017    Procedure: LEFT REVERSE TOTAL SHOULDER " ARTHROPLASTY FOR FRACTURE;  Surgeon: Damaso Harris MD;  Location: [facility];  Service:    • TOTAL SHOULDER REPLACEMENT Left    • TUBAL ABDOMINAL LIGATION          MEDICATIONS:    Current Outpatient Medications:   •  Unable to find, 1 each 1 (One) Time. Med Name: Hydro eye two daily, Disp: , Rfl:   •  albuterol (PROAIR HFA) 108 (90 Base) MCG/ACT inhaler, ProAir HFA 90 mcg/actuation aerosol inhaler, Disp: , Rfl:   •  alendronate (FOSAMAX) 70 MG tablet, Take 70 mg by mouth Every 7 (Seven) Days. Sunday, Disp: , Rfl:   •  ALLERGY SERUM INJECTION, Inject  under the skin Every 21 (Twenty-One) Days., Disp: , Rfl:   •  aluminum hydroxide-mag carbonate (GAVISCON EXTRA RELIEF) 160-105 MG chewable tablet chewable tablet, Chew 1 tablet Daily As Needed., Disp: , Rfl:   •  apixaban (ELIQUIS) 2.5 MG tablet tablet, Take 1 tablet by mouth Every 12 (Twelve) Hours. For 2 weeks, Disp: 28 tablet, Rfl: 0  •  Calcium Carb-Cholecalciferol (CALCIUM-VITAMIN D) 600-400 MG-UNIT tablet, Take 1 tablet by mouth 2 (Two) Times a Day., Disp: , Rfl:   •  docusate sodium 100 MG capsule, Take 100 mg by mouth 2 (Two) Times a Day As Needed for Constipation., Disp: , Rfl:   •  DULoxetine (CYMBALTA) 60 MG capsule, Take 60 mg by mouth Daily., Disp: , Rfl:   •  ENBREL 50 MG/ML injection, , Disp: , Rfl:   •  folic acid (FOLVITE) 1 MG tablet, Take 1 mg by mouth Daily., Disp: , Rfl:   •  folic acid-vit B6-vit B12 (FOLGARD) 2.2-25-1 MG tablet tablet, Take 1 tablet by mouth 2 (Two) Times a Day., Disp: , Rfl:   •  HYDROcodone-acetaminophen (NORCO)  MG per tablet, , Disp: , Rfl:   •  lansoprazole (PREVACID) 15 MG capsule, Take 15 mg by mouth Daily., Disp: , Rfl:   •  leflunomide (ARAVA) 20 MG tablet, Take 1 tablet by mouth Daily. Resume when ok with Dr. Wesley, Disp: , Rfl:   •  levothyroxine (SYNTHROID, LEVOTHROID) 88 MCG tablet, Take 88 mcg by mouth Daily., Disp: , Rfl:   •  meloxicam (MOBIC) 15 MG tablet, , Disp: , Rfl:   •  methotrexate 2.5 MG tablet, Take  9 tablets by mouth 1 (One) Time Per Week. MONDAYS Resume when ok with Dr. Wesley, Disp: , Rfl: 0  •  Multiple Vitamins-Minerals (SENIOR MULTIVITAMIN PLUS) tablet, Take 1 tablet by mouth Daily., Disp: , Rfl:   •  NON FORMULARY, Take 2 tablets by mouth Daily. Hydro Eye gelcaps, Disp: , Rfl:   •  oxyCODONE-acetaminophen (PERCOCET) 5-325 MG per tablet, , Disp: , Rfl:   •  polyethyl glycol-propyl glycol (SYSTANE) 0.4-0.3 % solution ophthalmic solution, Administer 2 drops to both eyes 2 (Two) Times a Day., Disp: , Rfl:   •  predniSONE (DELTASONE) 10 MG tablet, , Disp: , Rfl:   •  predniSONE (DELTASONE) 5 MG tablet, Take 5 mg by mouth Daily., Disp: , Rfl:   •  prochlorperazine (COMPAZINE) 10 MG tablet, Take 10 mg by mouth Every 6 (Six) Hours As Needed for Nausea or Vomiting., Disp: , Rfl:   •  pseudoephedrine-guaifenesin (MUCINEX D)  MG per 12 hr tablet, Take 1 tablet by mouth Every 12 (Twelve) Hours., Disp: , Rfl:   •  salsalate (DISALCID) 750 MG tablet, Take 1,500 mg by mouth 2 (Two) Times a Day., Disp: , Rfl:   •  sodium chloride (OCEAN) 0.65 % nasal spray, 1 spray into each nostril As Needed., Disp: , Rfl:   •  vitamin A 8000 UNIT capsule, Take 8,000 Units by mouth Daily., Disp: , Rfl:   •  vitamin C (ASCORBIC ACID) 500 MG tablet, Take 500 mg by mouth Daily., Disp: , Rfl:   •  vitamin E 400 UNIT capsule, Take 800 Units by mouth Daily., Disp: , Rfl:   •  zolpidem (AMBIEN) 10 MG tablet, Take 1 tablet by mouth Every Night., Disp: 5 tablet, Rfl: 0    ALLERGIES  No Known Allergies    FAMILY HISTORY:  Family History   Problem Relation Age of Onset   • Parkinsonism Mother    • Heart failure Father    • Kidney failure Father    • Hyperlipidemia Father    • No Known Problems Sister    • No Known Problems Brother    • Breast cancer Neg Hx    • Ovarian cancer Neg Hx    • Colon cancer Neg Hx    • Colon polyps Neg Hx        SOCIAL HISTORY  Social History     Socioeconomic History   • Marital status:      Spouse name: Not  "on file   • Number of children: Not on file   • Years of education: Not on file   • Highest education level: Not on file   Occupational History   • Occupation: unemployed   Tobacco Use   • Smoking status: Never Smoker   • Smokeless tobacco: Never Used   Substance and Sexual Activity   • Alcohol use: Yes     Alcohol/week: 1.2 oz     Types: 1 Glasses of wine, 1 Shots of liquor per week     Comment: DAILY   • Drug use: No   • Sexual activity: Defer   Social History Narrative    Pt consumes 2 servings of caffeine per day.      Socioeconomic History:  She is .  She does not have children.  She is a non-smoker and drinks about a glass of wine per day.  She is a retired banker.  She has a terrier named \"Piper \"at home.       REVIEW OF SYSTEMS  Review of Systems   Constitutional: Negative for unexpected weight loss.   HENT: Negative for trouble swallowing.    Eyes: Negative.    Respiratory: Negative.    Gastrointestinal: Positive for abdominal distention, abdominal pain, constipation, diarrhea, nausea, GERD and indigestion. Negative for anal bleeding, blood in stool, rectal pain and vomiting.   Endocrine: Negative.    Genitourinary: Negative.    Musculoskeletal: Negative.    Skin: Negative.    Allergic/Immunologic: Negative.    Neurological: Negative.    Hematological: Negative.    Psychiatric/Behavioral: Negative.      I reviewed the above ROS.  Salient, active issues are those reviewed in the HPI    PHYSICAL EXAM   /79 (BP Location: Right arm, Patient Position: Sitting, Cuff Size: Adult)   Pulse 62   Ht 149.9 cm (59.02\")   Wt 63 kg (138 lb 12.8 oz)   BMI 28.02 kg/m²   General: Alert and oriented x 3. In no apparent or acute distress.  and No stigmata of chronic liver disease  HEENT: Anicteric slcera. Normal oropharynx  Neck: Supple. Without lymphadenopathy  CV: Regular rate and rhythm, S1, S2  Lungs: Clear to ausculation. Without rales, robchi and wheezing  Abdomen:  Soft,non-distended without palpable " masses or hepatosplenomeagaly, areas of rebound tenderness or guarding.   Extremeties: without clubbing, cyanosis or edema  Neurologic:  Alert and oriented x 3 without focal motor or sensory deficits  Rectal exam: deferred     Results for orders placed or performed during the hospital encounter of 04/03/18   CBC (No Diff)   Result Value Ref Range    WBC 9.54 3.50 - 10.80 10*3/mm3    RBC 3.67 (L) 3.89 - 5.14 10*6/mm3    Hemoglobin 11.3 (L) 11.5 - 15.5 g/dL    Hematocrit 35.9 34.5 - 44.0 %    MCV 97.8 80.0 - 99.0 fL    MCH 30.8 27.0 - 31.0 pg    MCHC 31.5 (L) 32.0 - 36.0 g/dL    RDW 16.1 (H) 11.3 - 14.5 %    RDW-SD 57.3 (H) 37.0 - 54.0 fl    MPV 10.2 6.0 - 12.0 fL    Platelets 159 150 - 450 10*3/mm3   Basic Metabolic Panel   Result Value Ref Range    Glucose 111 (H) 70 - 100 mg/dL    BUN 18 9 - 23 mg/dL    Creatinine 0.60 0.60 - 1.30 mg/dL    Sodium 140 132 - 146 mmol/L    Potassium 3.4 (L) 3.5 - 5.5 mmol/L    Chloride 103 99 - 109 mmol/L    CO2 27.0 20.0 - 31.0 mmol/L    Calcium 7.9 (L) 8.7 - 10.4 mg/dL    eGFR Non African Amer 100 >60 mL/min/1.73    BUN/Creatinine Ratio 30.0 (H) 7.0 - 25.0    Anion Gap 10.0 3.0 - 11.0 mmol/L   CBC (No Diff)   Result Value Ref Range    WBC 8.03 3.50 - 10.80 10*3/mm3    RBC 3.87 (L) 3.89 - 5.14 10*6/mm3    Hemoglobin 11.9 11.5 - 15.5 g/dL    Hematocrit 38.1 34.5 - 44.0 %    MCV 98.4 80.0 - 99.0 fL    MCH 30.7 27.0 - 31.0 pg    MCHC 31.2 (L) 32.0 - 36.0 g/dL    RDW 16.5 (H) 11.3 - 14.5 %    RDW-SD 58.4 (H) 37.0 - 54.0 fl    MPV 10.4 6.0 - 12.0 fL    Platelets 166 150 - 450 10*3/mm3   Type & Screen   Result Value Ref Range    ABO Type O     RH type Positive     Antibody Screen Negative     T&S Expiration Date 4/6/2018 11:59:59 PM         Results Review:  I reviewed the patient's new clinical results.  Old records specifically prior colonoscopy and office notes were reviewed.      ASSESSMENT  1.-  Diverticulosis  2.-  Stable diarrhea predominant irritable bowel syndrome.  There is no  good correlation between diverticulosis and diarrhea  3.-  Patient at average risk for colon cancer    PLAN  1.-  No specific new recommendations are offered at this time in the absence of any localizing GI complaints outside of her occasional diarrhea for which she believes no further work-up or intervention is necessary at this time.  2.-  Surveillance colonoscopy can to be deferred for as many as 10  years given current colon cancer screening guidelines for average risk individuals  3.-  Patient will follow-up with us on an as-needed basis.        I discussed the patients findings and my recommendations with patient    Favio Hermosillo MD  7/9/2019   2:32 PM    Much of this note is an electronic transcription of spoken language to printed text. Electronic transcription of spoken language may permit erroneous, nonsensical word phrases to be inadvertently transcribed.  Although I have reviewed the note for these errors, some may still be present.

## 2019-07-11 PROBLEM — K57.30 DIVERTICULOSIS OF LARGE INTESTINE WITHOUT HEMORRHAGE: Status: ACTIVE | Noted: 2019-07-11

## 2019-07-11 PROBLEM — K58.0 IRRITABLE BOWEL SYNDROME WITH DIARRHEA: Status: ACTIVE | Noted: 2019-07-11

## 2019-08-06 ENCOUNTER — HOSPITAL ENCOUNTER (OUTPATIENT)
Dept: ULTRASOUND IMAGING | Facility: HOSPITAL | Age: 69
Discharge: HOME | End: 2019-08-06
Admitting: FAMILY MEDICINE

## 2019-08-06 DIAGNOSIS — Z82.49 FAMILY HISTORY OF ABDOMINAL AORTIC ANEURYSM (AAA): ICD-10-CM

## 2019-08-06 PROCEDURE — 76706 US ABDL AORTA SCREEN AAA: CPT

## 2019-08-29 ENCOUNTER — APPOINTMENT (OUTPATIENT)
Dept: MAMMOGRAPHY | Facility: HOSPITAL | Age: 69
End: 2019-08-29

## 2019-11-01 ENCOUNTER — HOSPITAL ENCOUNTER (OUTPATIENT)
Dept: MAMMOGRAPHY | Facility: HOSPITAL | Age: 69
Discharge: HOME | End: 2019-11-01
Admitting: FAMILY MEDICINE

## 2019-11-01 DIAGNOSIS — R92.8 ABNORMAL MAMMOGRAPHY: ICD-10-CM

## 2019-11-01 PROCEDURE — G0279 TOMOSYNTHESIS, MAMMO: HCPCS | Performed by: RADIOLOGY

## 2019-11-01 PROCEDURE — G0279 TOMOSYNTHESIS, MAMMO: HCPCS

## 2019-11-01 PROCEDURE — 77066 DX MAMMO INCL CAD BI: CPT | Performed by: RADIOLOGY

## 2019-11-01 PROCEDURE — 77066 DX MAMMO INCL CAD BI: CPT

## 2020-08-19 ENCOUNTER — CONSULT (OUTPATIENT)
Dept: CARDIOLOGY | Facility: CLINIC | Age: 70
End: 2020-08-19

## 2020-08-19 VITALS
OXYGEN SATURATION: 97 % | BODY MASS INDEX: 28.22 KG/M2 | TEMPERATURE: 97.3 F | HEIGHT: 59 IN | SYSTOLIC BLOOD PRESSURE: 142 MMHG | DIASTOLIC BLOOD PRESSURE: 74 MMHG | HEART RATE: 72 BPM | WEIGHT: 140 LBS

## 2020-08-19 DIAGNOSIS — Q23.1 BICUSPID AORTIC VALVE: Primary | ICD-10-CM

## 2020-08-19 DIAGNOSIS — R00.2 PALPITATIONS: ICD-10-CM

## 2020-08-19 PROCEDURE — 99203 OFFICE O/P NEW LOW 30 MIN: CPT | Performed by: INTERNAL MEDICINE

## 2020-08-19 PROCEDURE — 93000 ELECTROCARDIOGRAM COMPLETE: CPT | Performed by: NURSE PRACTITIONER

## 2020-08-19 NOTE — PROGRESS NOTES
[facility]    Subjective:     Encounter Date:08/19/2020    Patient ID: Shanel Wall is a 69 y.o. female.  Referring provider: No ref. provider found     Reason for consultation: palpitations     PROBLEM LIST:  1. Aortic valve disease  probable bicuspid aortic valve  a. Echocardiogram 12/2016:  EF 65%, probable bicuspid aoritc valve, mild MR and TR  2. Dizziness  a. Cardiac event monitor 12/23/16-1/21/17: NSR.  3. GERD  4. Arthritis  a. RA and Osteo  5. Depression  6. IBS  7. Thyroid disorder   8. Surgeries:              A.  Tonsillectomy and adenoidectomy              B.  Dilatation and curettage              C.  Tubal abdominal ligation              D.  Knee arthroscopy              E.  Tendon repair              F.  Neck surgery              G.  Shoulder surgery              H.  Reduction mammaplasty  No Known Allergies      Current Outpatient Medications:   •  albuterol (PROAIR HFA) 108 (90 Base) MCG/ACT inhaler, ProAir HFA 90 mcg/actuation aerosol inhaler, Disp: , Rfl:   •  ALLERGY SERUM INJECTION, Inject  under the skin Every 21 (Twenty-One) Days., Disp: , Rfl:   •  aluminum hydroxide-mag carbonate (GAVISCON EXTRA RELIEF) 160-105 MG chewable tablet chewable tablet, Chew 1 tablet Daily As Needed., Disp: , Rfl:   •  Calcium Carb-Cholecalciferol (CALCIUM-VITAMIN D) 600-400 MG-UNIT tablet, Take 1 tablet by mouth 2 (Two) Times a Day., Disp: , Rfl:   •  DULoxetine (CYMBALTA) 60 MG capsule, Take 60 mg by mouth Daily., Disp: , Rfl:   •  ENBREL 50 MG/ML injection, Inject 50 mg under the skin into the appropriate area as directed 1 (One) Time Per Week., Disp: , Rfl:   •  folic acid (FOLVITE) 1 MG tablet, Take 1 mg by mouth Daily., Disp: , Rfl:   •  folic acid-vit B6-vit B12 (FOLGARD) 2.2-25-1 MG tablet tablet, Take 1 tablet by mouth 2 (Two) Times a Day., Disp: , Rfl:   •  lansoprazole (PREVACID) 15 MG capsule, Take 15 mg by mouth Daily., Disp: , Rfl:   •  levothyroxine  "(SYNTHROID, LEVOTHROID) 88 MCG tablet, Take 88 mcg by mouth Daily., Disp: , Rfl:   •  methotrexate 2.5 MG tablet, Take 9 tablets by mouth 1 (One) Time Per Week. MONDAYS Resume when ok with Dr. Wesley (Patient taking differently: Take 15 mg by mouth 1 (One) Time Per Week. MONDAYS Resume when ok with Dr. Wesley), Disp: , Rfl: 0  •  Multiple Vitamins-Minerals (SENIOR MULTIVITAMIN PLUS) tablet, Take 1 tablet by mouth Daily., Disp: , Rfl:   •  NON FORMULARY, Take 2 tablets by mouth Daily. Hydro Eye gelcaps, Disp: , Rfl:   •  polyethyl glycol-propyl glycol (SYSTANE) 0.4-0.3 % solution ophthalmic solution, Administer 2 drops to both eyes 5 (Five) Times a Day., Disp: , Rfl:   •  predniSONE (DELTASONE) 5 MG tablet, Take 5 mg by mouth Daily., Disp: , Rfl:   •  prochlorperazine (COMPAZINE) 10 MG tablet, Take 10 mg by mouth Every 6 (Six) Hours As Needed for Nausea or Vomiting., Disp: , Rfl:   •  pseudoephedrine-guaifenesin (MUCINEX D)  MG per 12 hr tablet, Take 1 tablet by mouth Every 12 (Twelve) Hours., Disp: , Rfl:   •  salsalate (DISALCID) 750 MG tablet, Take 1,500 mg by mouth 2 (Two) Times a Day., Disp: , Rfl:   •  vitamin A 8000 UNIT capsule, Take 8,000 Units by mouth Daily., Disp: , Rfl:   •  vitamin C (ASCORBIC ACID) 500 MG tablet, Take 500 mg by mouth Daily., Disp: , Rfl:   •  vitamin E 400 UNIT capsule, Take 800 Units by mouth Daily., Disp: , Rfl:   •  zolpidem (AMBIEN) 10 MG tablet, Take 1 tablet by mouth Every Night., Disp: 5 tablet, Rfl: 0  •  predniSONE (DELTASONE) 5 MG tablet, Take 5 mg by mouth Daily., Disp: , Rfl:     HPI:      Shanel Wall is a 69 y.o. female who presents today for return visit for bicuspid aortic valve and for evaluation of \"heart flutter\". She has history of dizziness for which she was evaluated in 2017 with a 30 day event monitor that was unrevealing. She was then directed to follow up PRN. She also had an echo at that time which revealed a probable bicuspid aortic valve without AI " or AS. Recently she developed palpitations. Present for a few months. Worsened by sitting up or standing. Resolved with coughing. Lasts a few seconds. Occurs a few times per day. Not associated with SOB, LH, dizziness. Denies near syncope, chest pain, syncope or SOB. Reviewed labs from Jan. 2020 which reveal normal TSH and WBC. She walks 30 min per day, 5 days per week.     Cardiac Risk Factors:    Social History     Socioeconomic History   • Marital status:      Spouse name: Not on file   • Number of children: Not on file   • Years of education: Not on file   • Highest education level: Not on file   Occupational History   • Occupation: unemployed   Tobacco Use   • Smoking status: Never Smoker   • Smokeless tobacco: Never Used   Substance and Sexual Activity   • Alcohol use: Yes     Alcohol/week: 2.0 standard drinks     Types: 1 Glasses of wine, 1 Shots of liquor per week     Comment: DAILY   • Drug use: No   • Sexual activity: Defer   Social History Narrative    Pt consumes 2 servings of caffeine per day.      Family History   Problem Relation Age of Onset   • Parkinsonism Mother    • Heart failure Father    • Kidney failure Father    • Hyperlipidemia Father    • No Known Problems Sister    • No Known Problems Brother    • Breast cancer Neg Hx    • Ovarian cancer Neg Hx    • Colon cancer Neg Hx    • Colon polyps Neg Hx        Review of Systems:  The following portions of the patient's history were reviewed and updated as appropriate: allergies, current medications and problem list.    Constitution: Negative for chills, fatigue, fever, and generalized weakness.   Cardiovascular: See HPI  Respiratory: See HPI  HENT: Negative for ear pain, nosebleeds, and tinnitus.  Gastrointestinal: Negative for abdominal pain, constipation, diarrhea, nausea and vomiting.   Genitourinary: No urinary symptoms.   Musculoskeletal: Negative for muscle cramps.  Neurological: Negative for dizziness, headaches, loss of balance,  numbness, and symptoms of stroke.  Psychiatric: Negative for depression, anxiety.       Objective:     Vitals:    08/19/20 1410   BP: 142/74   Pulse: 72   Temp: 97.3 °F (36.3 °C)   SpO2: 97%     GENERAL: This is a well-developed, well-nourished, female who is in no acute distress. Alert and oriented x3. Normal mood and affect.   SKIN: Pink and warm without rash or abnormality noted.   HEENT: Head is normocephalic and atraumatic. Pupils are equal and reactive to light bilaterally. Mucous membranes are pink and moist.   NECK: Supple without lymphadenopathy or thyromegaly. There is no jugular venous distention at 30°.  LUNGS: Clear to auscultation bilaterally without wheezing, rhonchi, or rales noted.   CARDIOVASCULAR: The heart has a regular rate with a normal S1 and S2. There is short systolic ejection murmur, no gallop, rub, or click appreciated. The PMI is nondisplaced. Carotid upstrokes are 2+ and symmetrical without bruits.   ABDOMEN: Soft and nondistended with positive bowel sounds x4. The patient denies tenderness of palpitation.   MUSCULOSKELETAL: There are no obvious bony abnormalities. Normal range of tenderness to palpation.   NEUROLOGICAL: Nonfocal.   PERIPHERAL VASCULAR: Femoral pulses are 2+ and symmetrical without bruits. Posterior tibial and dorsalis pedis pulses are 2+ and symmetrical. There is no peripheral edema.       ECG 12 Lead  Date/Time: 8/19/2020 2:37 PM  Performed by: Uzma Galvan APRN  Authorized by: Uzma Galvan APRN   Comparison: compared with previous ECG from 3/20/2018  Rhythm: sinus rhythm  BPM: 72  Conduction: left anterior fascicular block          Advance Care Planning   ACP discussion was held with the patient during this visit. Patient does not have an advance directive, information provided.      Assessment:       ICD-10-CM ICD-9-CM   1. Bicuspid aortic valve Q23.1 746.4   2. Palpitations R00.2 785.1          Plan:     1. 5 day monitor to evaluate source of  palpitations.  2. Follow-up in 3 weeks, sooner as needed.      Scribed for Nancy Wilkins MD by HEIKE Galvan, ENDY. 8/19/2020  14:45     I Nancy Wilkins MD personally performed the services described in this documentation as scribed by the above individual in my presence, and it is both accurate and complete.    Nancy Wilkins MD, FACC

## 2020-08-21 ENCOUNTER — DOCUMENTATION (OUTPATIENT)
Dept: CARDIOLOGY | Facility: CLINIC | Age: 70
End: 2020-08-21

## 2020-09-09 ENCOUNTER — TELEPHONE (OUTPATIENT)
Dept: CARDIOLOGY | Facility: CLINIC | Age: 70
End: 2020-09-09

## 2020-09-09 RX ORDER — BISOPROLOL FUMARATE 5 MG/1
TABLET, FILM COATED ORAL
Qty: 45 TABLET | Refills: 3 | Status: SHIPPED | OUTPATIENT
Start: 2020-09-09 | End: 2020-12-02

## 2020-09-09 NOTE — TELEPHONE ENCOUNTER
Discussed monitor results with PT- Seth Santo, may have bisoprolol 2.5 mg daily as needed- PT agrees with the plan and we will see her back in 1 year - scheduling notified to cancel appt tomorrow and move out 1 year-

## 2020-11-05 ENCOUNTER — TRANSCRIBE ORDERS (OUTPATIENT)
Dept: ADMINISTRATIVE | Facility: HOSPITAL | Age: 70
End: 2020-11-05

## 2020-11-05 DIAGNOSIS — Z12.31 VISIT FOR SCREENING MAMMOGRAM: Primary | ICD-10-CM

## 2020-12-02 ENCOUNTER — OFFICE VISIT (OUTPATIENT)
Dept: FAMILY MEDICINE CLINIC | Facility: CLINIC | Age: 70
End: 2020-12-02

## 2020-12-02 ENCOUNTER — TELEPHONE (OUTPATIENT)
Dept: FAMILY MEDICINE CLINIC | Facility: CLINIC | Age: 70
End: 2020-12-02

## 2020-12-02 VITALS
HEIGHT: 59 IN | SYSTOLIC BLOOD PRESSURE: 128 MMHG | HEART RATE: 68 BPM | WEIGHT: 142 LBS | TEMPERATURE: 97.1 F | DIASTOLIC BLOOD PRESSURE: 80 MMHG | OXYGEN SATURATION: 98 % | BODY MASS INDEX: 28.63 KG/M2

## 2020-12-02 DIAGNOSIS — M05.9 RHEUMATOID ARTHRITIS WITH POSITIVE RHEUMATOID FACTOR, INVOLVING UNSPECIFIED SITE (HCC): ICD-10-CM

## 2020-12-02 DIAGNOSIS — R53.83 FATIGUE, UNSPECIFIED TYPE: ICD-10-CM

## 2020-12-02 DIAGNOSIS — E03.9 HYPOTHYROIDISM, UNSPECIFIED TYPE: Primary | ICD-10-CM

## 2020-12-02 DIAGNOSIS — D58.2 ELEVATED HEMOGLOBIN (HCC): ICD-10-CM

## 2020-12-02 PROCEDURE — 99214 OFFICE O/P EST MOD 30 MIN: CPT | Performed by: INTERNAL MEDICINE

## 2020-12-02 NOTE — PROGRESS NOTES
Shanel Wall  1950  [id number]  Patient Care Team:  Fili Ray MD as PCP - General (Internal Medicine)    Shanel Wall is a 69 y.o. female here today to establish care.  This patient is accompanied by their self who contributes to the history of their care.    Chief Complaint:    Chief Complaint   Patient presents with   • Establish Care        History of Present Illness:   Willow Wall is here to establish.  She has a complicated medical history is profile below.  She is previously seen Dr. Olvera with [facility].  She is concerned as her hemoglobin hematocrit have been elevated.  She is on Enbrel as well as prednisone for severe rheumatoid arthritis.  She has been having CBCs about every 3 months.  She has chronic fatigue sleeps about 12 hours/day.  Denies any headaches.  No nosebleeds.  Additionally she has been without her Synthroid.  No heat or cold intolerance.  She questions the diagnosis of hypothyroidism.  She is frustrated as she is had loss of vision secondary to Plaquenil toxicity as well as left macular degeneration.  She was fitted for multifocal, cataract replacements however had complications from this.  She can no longer drive.  Her spirits remain high.  She is concerned about her  who she feels his memory has deteriorated over the past 3 years.  Denies any polyuria polydipsia.  He has her typical arthralgias and fatigue.  Is a rare palpitation, she has seen Dr. Wilkins and diagnosed with bicuspid aortic valve.  She is given bisoprolol however has not felt the need to take it.  No recent palpitations no syncope.    Past Medical History:   Diagnosis Date   • Anesthesia     PT HAS RHEUMATOID ARTHRITIS, PT HAS SPECIFIC INSTRUCTIONS PER RA MD, pt to bring INSTRUCTIONS AND RECS ATTACHED TO CHART AND PT TO DISCUSS WITH ANESTHESIA ON DATE OF PROCEDURE    • Arthritis    • Back disorder     degenerative disc disorder   • Baker's cyst of knee      "right   • Benign paroxysmal positional vertigo    • Cervical cancer (CMS/HCC)     cone biopsy   • Depression    • Deviated septum    • Disease of thyroid gland    • Diverticulosis    • Fibromyalgia    • GERD (gastroesophageal reflux disease)    • Hard to intubate     \"anterior trachea\"    • History of gallstones    • Hypoglycemia    • IBS (irritable bowel syndrome)    • Joint pain    • Measles    • Menopause    • Osteoarthritis (arthritis due to wear and tear of joints)    • Rheumatoid arthritis (CMS/HCC)    • Rheumatoid arthritis (CMS/HCC)    • Scoliosis    • Seasonal allergies    • Shingles    • Synovitis    • Tinnitus    • Vertigo    • Wears glasses        Past Surgical History:   Procedure Laterality Date   • ADENOIDECTOMY     • BLEPHAROPLASTY, QUAD Bilateral 09/18/2018    Dr. romano.   • BREAST EXCISIONAL BIOPSY Left     NO VISIBLE SCAR   • CERVICAL CONE BIOPSY     • COLONOSCOPY     • COSMETIC SURGERY      SHORT SCAR FACE LIFT PER DR ROMANO    • DILATATION AND CURETTAGE     • FACIAL COSMETIC SURGERY     • FOOT SURGERY Left     calcaneous    • KNEE ARTHROPLASTY Right     mirza    • KNEE ARTHROSCOPY Bilateral    • REDUCTION MAMMAPLASTY Bilateral    • TENDON REPAIR Right     HAND   • TONSILLECTOMY AND ADENOIDECTOMY     • TOTAL KNEE ARTHROPLASTY Left 4/3/2018    Procedure: TOTAL KNEE ARTHROPLASTY LEFT;  Surgeon: Mahesh Wesley MD;  Location:  [facility] OR;  Service: Orthopedics   • TOTAL SHOULDER ARTHROPLASTY W/ DISTAL CLAVICLE EXCISION Left 5/22/2017    Procedure: LEFT REVERSE TOTAL SHOULDER ARTHROPLASTY FOR FRACTURE;  Surgeon: Damaso Harris MD;  Location:  [facility] OR;  Service:    • TOTAL SHOULDER REPLACEMENT Left    • TUBAL ABDOMINAL LIGATION          Family History   Problem Relation Age of Onset   • Parkinsonism Mother    • Heart failure Father    • Kidney failure Father    • Hyperlipidemia Father    • No Known Problems Sister    • No Known Problems Brother    • Breast cancer Neg Hx    • Ovarian cancer Neg Hx    • " "Colon cancer Neg Hx    • Colon polyps Neg Hx        Social History     Socioeconomic History   • Marital status:      Spouse name: Not on file   • Number of children: Not on file   • Years of education: Not on file   • Highest education level: Not on file   Occupational History   • Occupation: unemployed   Tobacco Use   • Smoking status: Never Smoker   • Smokeless tobacco: Never Used   Substance and Sexual Activity   • Alcohol use: Yes     Alcohol/week: 2.0 standard drinks     Types: 1 Glasses of wine, 1 Shots of liquor per week     Comment: DAILY   • Drug use: No   • Sexual activity: Defer   Social History Narrative    Pt consumes 2 servings of caffeine per day.        Allergies   Allergen Reactions   • Adhesive Tape Itching       Review of Systems:    Review of Systems   Constitutional: Positive for fatigue.   Eyes: Positive for visual disturbance.   Respiratory: Negative.    Cardiovascular: Negative.    Gastrointestinal: Negative.    Endocrine: Negative.    Musculoskeletal: Positive for arthralgias.   Neurological: Negative.    Hematological: Negative.    Psychiatric/Behavioral: Negative.        Vitals:    12/02/20 1233   BP: 128/80   Pulse: 68   Temp: 97.1 °F (36.2 °C)   SpO2: 98%   Weight: 64.4 kg (142 lb)   Height: 149.9 cm (59\")     Body mass index is 28.68 kg/m².      Current Outpatient Medications:   •  ALLERGY SERUM INJECTION, Inject  under the skin Every 21 (Twenty-One) Days., Disp: , Rfl:   •  aluminum hydroxide-mag carbonate (GAVISCON EXTRA RELIEF) 160-105 MG chewable tablet chewable tablet, Chew 1 tablet Daily As Needed., Disp: , Rfl:   •  DULoxetine (CYMBALTA) 60 MG capsule, Take 60 mg by mouth Daily., Disp: , Rfl:   •  ENBREL 50 MG/ML injection, Inject 50 mg under the skin into the appropriate area as directed 1 (One) Time Per Week., Disp: , Rfl:   •  folic acid (FOLVITE) 1 MG tablet, Take 1 mg by mouth Daily., Disp: , Rfl:   •  folic acid-vit B6-vit B12 (FOLGARD) 2.2-25-1 MG tablet tablet, " Take 1 tablet by mouth 2 (Two) Times a Day., Disp: , Rfl:   •  lansoprazole (PREVACID) 15 MG capsule, Take 15 mg by mouth Daily., Disp: , Rfl:   •  levothyroxine (SYNTHROID, LEVOTHROID) 88 MCG tablet, Take 88 mcg by mouth Daily., Disp: , Rfl:   •  methotrexate 2.5 MG tablet, Take 9 tablets by mouth 1 (One) Time Per Week. MONDAYS Resume when ok with Dr. Wesley (Patient taking differently: Take 15 mg by mouth 1 (One) Time Per Week. MONDAYS Resume when ok with Dr. Wesley), Disp: , Rfl: 0  •  Multiple Vitamins-Minerals (SENIOR MULTIVITAMIN PLUS) tablet, Take 1 tablet by mouth Daily., Disp: , Rfl:   •  NON FORMULARY, Take 2 tablets by mouth Daily. Hydro Eye gelcaps, Disp: , Rfl:   •  polyethyl glycol-propyl glycol (SYSTANE) 0.4-0.3 % solution ophthalmic solution, Administer 2 drops to both eyes 5 (Five) Times a Day., Disp: , Rfl:   •  predniSONE (DELTASONE) 5 MG tablet, Take 5 mg by mouth Daily., Disp: , Rfl:   •  prochlorperazine (COMPAZINE) 10 MG tablet, Take 10 mg by mouth Every 6 (Six) Hours As Needed for Nausea or Vomiting., Disp: , Rfl:   •  pseudoephedrine-guaifenesin (MUCINEX D)  MG per 12 hr tablet, Take 1 tablet by mouth Every 12 (Twelve) Hours., Disp: , Rfl:   •  salsalate (DISALCID) 750 MG tablet, Take 1,500 mg by mouth 2 (Two) Times a Day., Disp: , Rfl:   •  vitamin A 8000 UNIT capsule, Take 8,000 Units by mouth Daily., Disp: , Rfl:   •  vitamin C (ASCORBIC ACID) 500 MG tablet, Take 500 mg by mouth Daily., Disp: , Rfl:   •  vitamin E 400 UNIT capsule, Take 800 Units by mouth Daily., Disp: , Rfl:   •  zolpidem (AMBIEN) 10 MG tablet, Take 1 tablet by mouth Every Night., Disp: 5 tablet, Rfl: 0  •  Calcium Carb-Cholecalciferol (CALCIUM-VITAMIN D) 600-400 MG-UNIT tablet, Take 1 tablet by mouth 2 (Two) Times a Day., Disp: , Rfl:     Physical Exam:    Physical Exam  Vitals signs and nursing note reviewed.   Constitutional:       General: She is not in acute distress.     Appearance: She is well-developed and  normal weight. She is not diaphoretic.   HENT:      Head: Normocephalic and atraumatic.      Right Ear: External ear normal.      Left Ear: External ear normal.      Mouth/Throat:      Pharynx: No oropharyngeal exudate.   Eyes:      General: No scleral icterus.        Right eye: No discharge.         Left eye: No discharge.      Extraocular Movements: Extraocular movements intact.      Conjunctiva/sclera: Conjunctivae normal.   Neck:      Musculoskeletal: Normal range of motion and neck supple. No muscular tenderness.      Thyroid: No thyromegaly.      Vascular: No JVD.      Trachea: No tracheal deviation.      Comments: No adenopathy or goiter.  Cardiovascular:      Rate and Rhythm: Normal rate and regular rhythm.      Pulses: Normal pulses.      Heart sounds: Normal heart sounds.      Comments: PMI nondisplaced  Pulmonary:      Effort: Pulmonary effort is normal.      Breath sounds: Normal breath sounds. No wheezing or rales.   Abdominal:      General: Bowel sounds are normal.      Palpations: Abdomen is soft.      Tenderness: There is no abdominal tenderness. There is no guarding or rebound.   Musculoskeletal:      Right lower leg: No edema.      Left lower leg: No edema.      Comments: Normal gait     Lymphadenopathy:      Cervical: No cervical adenopathy.   Skin:     General: Skin is warm and dry.      Capillary Refill: Capillary refill takes less than 2 seconds.      Coloration: Skin is not pale.      Findings: No rash.   Neurological:      Mental Status: She is alert and oriented to person, place, and time.      Motor: No abnormal muscle tone.      Coordination: Coordination normal.   Psychiatric:         Mood and Affect: Mood normal.         Behavior: Behavior normal.         Thought Content: Thought content normal.         Judgment: Judgment normal.         Procedures    Results Review:    I reviewed the patient's new clinical results.    Assessment/Plan:     Problem List Items Addressed This Visit         Endocrine    Hypothyroid - Primary    Relevant Medications    predniSONE (DELTASONE) 5 MG tablet    levothyroxine (SYNTHROID, LEVOTHROID) 88 MCG tablet    Other Relevant Orders    T3, free    T4, free    TSH       Musculoskeletal and Integument    Rheumatoid arthritis with positive rheumatoid factor (CMS/HCC)    Relevant Medications    predniSONE (DELTASONE) 5 MG tablet    ENBREL 50 MG/ML injection       Hematopoietic and Hemostatic    Elevated hemoglobin (CMS/HCC)       Other    Fatigue    Relevant Orders    CBC & Differential          Plan of care reviewed with patient at the conclusion of today's visit. Education was provided regarding diagnosis and management.  Patient verbalizes understanding of and agreement with management plan.    Return in about 2 months (around 2/2/2021).    Fili Ray MD    Please note that portions of this note may have been completed with a voice recognition program. Efforts were made to edit the dictations, but occasionally words are mistranscribed.

## 2020-12-07 ENCOUNTER — LAB (OUTPATIENT)
Dept: LAB | Facility: HOSPITAL | Age: 70
End: 2020-12-07

## 2020-12-07 DIAGNOSIS — E03.9 HYPOTHYROIDISM, UNSPECIFIED TYPE: ICD-10-CM

## 2020-12-07 DIAGNOSIS — R53.83 FATIGUE, UNSPECIFIED TYPE: ICD-10-CM

## 2020-12-07 PROCEDURE — 84481 FREE ASSAY (FT-3): CPT

## 2020-12-07 PROCEDURE — 36415 COLL VENOUS BLD VENIPUNCTURE: CPT

## 2020-12-07 PROCEDURE — 84439 ASSAY OF FREE THYROXINE: CPT

## 2020-12-07 PROCEDURE — 85025 COMPLETE CBC W/AUTO DIFF WBC: CPT

## 2020-12-07 PROCEDURE — 84443 ASSAY THYROID STIM HORMONE: CPT

## 2020-12-08 LAB
BASOPHILS # BLD AUTO: 0.06 10*3/MM3 (ref 0–0.2)
BASOPHILS NFR BLD AUTO: 0.9 % (ref 0–1.5)
DEPRECATED RDW RBC AUTO: 49.9 FL (ref 37–54)
EOSINOPHIL # BLD AUTO: 0.09 10*3/MM3 (ref 0–0.4)
EOSINOPHIL NFR BLD AUTO: 1.3 % (ref 0.3–6.2)
ERYTHROCYTE [DISTWIDTH] IN BLOOD BY AUTOMATED COUNT: 14.2 % (ref 12.3–15.4)
HCT VFR BLD AUTO: 47.8 % (ref 34–46.6)
HGB BLD-MCNC: 16.3 G/DL (ref 12–15.9)
IMM GRANULOCYTES # BLD AUTO: 0.02 10*3/MM3 (ref 0–0.05)
IMM GRANULOCYTES NFR BLD AUTO: 0.3 % (ref 0–0.5)
LYMPHOCYTES # BLD AUTO: 1.18 10*3/MM3 (ref 0.7–3.1)
LYMPHOCYTES NFR BLD AUTO: 17.2 % (ref 19.6–45.3)
MCH RBC QN AUTO: 33.2 PG (ref 26.6–33)
MCHC RBC AUTO-ENTMCNC: 34.1 G/DL (ref 31.5–35.7)
MCV RBC AUTO: 97.4 FL (ref 79–97)
MONOCYTES # BLD AUTO: 0.42 10*3/MM3 (ref 0.1–0.9)
MONOCYTES NFR BLD AUTO: 6.1 % (ref 5–12)
NEUTROPHILS NFR BLD AUTO: 5.08 10*3/MM3 (ref 1.7–7)
NEUTROPHILS NFR BLD AUTO: 74.2 % (ref 42.7–76)
NRBC BLD AUTO-RTO: 0 /100 WBC (ref 0–0.2)
PLATELET # BLD AUTO: 221 10*3/MM3 (ref 140–450)
PMV BLD AUTO: 9.8 FL (ref 6–12)
RBC # BLD AUTO: 4.91 10*6/MM3 (ref 3.77–5.28)
T3FREE SERPL-MCNC: 1.37 PG/ML (ref 2–4.4)
T4 FREE SERPL-MCNC: 0.48 NG/DL (ref 0.93–1.7)
TSH SERPL DL<=0.05 MIU/L-ACNC: 3.15 UIU/ML (ref 0.27–4.2)
WBC # BLD AUTO: 6.85 10*3/MM3 (ref 3.4–10.8)

## 2021-01-11 DIAGNOSIS — R79.89 ABNORMAL THYROID BLOOD TEST: Primary | ICD-10-CM

## 2021-02-04 ENCOUNTER — TELEPHONE (OUTPATIENT)
Dept: FAMILY MEDICINE CLINIC | Facility: CLINIC | Age: 71
End: 2021-02-04

## 2021-02-04 ENCOUNTER — OFFICE VISIT (OUTPATIENT)
Dept: FAMILY MEDICINE CLINIC | Facility: CLINIC | Age: 71
End: 2021-02-04

## 2021-02-04 VITALS
HEIGHT: 59 IN | WEIGHT: 143 LBS | SYSTOLIC BLOOD PRESSURE: 124 MMHG | OXYGEN SATURATION: 99 % | BODY MASS INDEX: 28.83 KG/M2 | HEART RATE: 77 BPM | DIASTOLIC BLOOD PRESSURE: 80 MMHG

## 2021-02-04 DIAGNOSIS — D58.2 ELEVATED HEMOGLOBIN (HCC): ICD-10-CM

## 2021-02-04 DIAGNOSIS — R07.82 INTERCOSTAL PAIN: ICD-10-CM

## 2021-02-04 DIAGNOSIS — G25.81 RESTLESS LEGS SYNDROME (RLS): ICD-10-CM

## 2021-02-04 DIAGNOSIS — M25.571 ACUTE RIGHT ANKLE PAIN: Primary | ICD-10-CM

## 2021-02-04 DIAGNOSIS — R79.89 ABNORMAL THYROID BLOOD TEST: ICD-10-CM

## 2021-02-04 PROCEDURE — 99214 OFFICE O/P EST MOD 30 MIN: CPT | Performed by: INTERNAL MEDICINE

## 2021-02-04 RX ORDER — GUAIFENESIN, PSEUDOEPHEDRINE HYDROCHLORIDE 600; 60 MG/1; MG/1
1 TABLET, EXTENDED RELEASE ORAL EVERY 12 HOURS
Qty: 60 TABLET | Refills: 3 | Status: SHIPPED | OUTPATIENT
Start: 2021-02-04 | End: 2021-04-05

## 2021-02-04 NOTE — PROGRESS NOTES
Shanel Wall  1950  [id number]  Patient Care Team:  Fili Ray MD as PCP - General (Internal Medicine)    Shanel Wall is a 70 y.o. female here today to establish care.  This patient is accompanied by their self who contributes to the history of their care.    Chief Complaint:    Chief Complaint   Patient presents with   • Hypothyroidism     2 mo f/u   • Rheumatoid Arthritis     2 mo f/u   • Ankle Injury     fell and sprained rt ankle slipped on ice. pulled muscle in chest        History of Present Illness:   Xiao presents to follow-up mainly elevated red blood cell count and abnormal thyroid functions.  She continues has significant heat and.  We were able to get her endocrinology appointment in April.  Her TSH has been normal however free T3 and free T4 both low.  Of been reluctant to replace these given her heat intolerance as well as her history of osteoporosis.  Her weights been stable.  She does have difficulty with physical activity secondary to debilitating rheumatoid arthritis.  She denies any nosebleeds headaches focal numbness or tingling.  Her rheumatologist had drawn a CBC that showed her hemoglobin hematocrit were now within normal limits however the upper limits.  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    Yesterday she slipped on the ice twisting her right ankle.  She does ambulate with a cane secondary to her rheumatoid.  She has all major joints replaced.  She rolled her right ankle however is able to bear weight.  It is tender right on the lateral malleolus.  There is no erythema.  She also pulled muscles in her ribs.  There is no chest pain with deep breathing.  No hemoptysis.  No cough.  Past Medical History:   Diagnosis Date   • Anesthesia     PT HAS RHEUMATOID ARTHRITIS, PT HAS SPECIFIC INSTRUCTIONS PER RA MD, pt to bring INSTRUCTIONS AND RECS ATTACHED TO CHART AND PT TO DISCUSS WITH ANESTHESIA ON DATE OF PROCEDURE    • Arthritis    •  "Back disorder     degenerative disc disorder   • Baker's cyst of knee     right   • Benign paroxysmal positional vertigo    • Cervical cancer (CMS/HCC)     cone biopsy   • Depression    • Deviated septum    • Disease of thyroid gland    • Diverticulosis    • Fibromyalgia    • GERD (gastroesophageal reflux disease)    • Hard to intubate     \"anterior trachea\"    • History of gallstones    • Hypoglycemia    • IBS (irritable bowel syndrome)    • Joint pain    • Measles    • Menopause    • Osteoarthritis (arthritis due to wear and tear of joints)    • Rheumatoid arthritis (CMS/HCC)    • Rheumatoid arthritis (CMS/HCC)    • Scoliosis    • Seasonal allergies    • Shingles    • Synovitis    • Tinnitus    • Vertigo    • Wears glasses        Past Surgical History:   Procedure Laterality Date   • ADENOIDECTOMY     • BLEPHAROPLASTY, QUAD Bilateral 09/18/2018    Dr. romano.   • BREAST EXCISIONAL BIOPSY Left     NO VISIBLE SCAR   • CERVICAL CONE BIOPSY     • COLONOSCOPY     • COSMETIC SURGERY      SHORT SCAR FACE LIFT PER DR ROMANO    • DILATATION AND CURETTAGE     • FACIAL COSMETIC SURGERY     • FOOT SURGERY Left     calcaneous    • KNEE ARTHROPLASTY Right     blue    • KNEE ARTHROSCOPY Bilateral    • REDUCTION MAMMAPLASTY Bilateral    • TENDON REPAIR Right     HAND   • TONSILLECTOMY AND ADENOIDECTOMY     • TOTAL KNEE ARTHROPLASTY Left 4/3/2018    Procedure: TOTAL KNEE ARTHROPLASTY LEFT;  Surgeon: Mahesh Wesley MD;  Location:  [facility];  Service: Orthopedics   • TOTAL SHOULDER ARTHROPLASTY W/ DISTAL CLAVICLE EXCISION Left 5/22/2017    Procedure: LEFT REVERSE TOTAL SHOULDER ARTHROPLASTY FOR FRACTURE;  Surgeon: Damaso Harris MD;  Location:  [facility];  Service:    • TOTAL SHOULDER REPLACEMENT Left    • TUBAL ABDOMINAL LIGATION          Family History   Problem Relation Age of Onset   • Parkinsonism Mother    • Heart failure Father    • Kidney failure Father    • Hyperlipidemia Father    • No Known Problems Sister    • No Known " Problems Brother    • Breast cancer Neg Hx    • Ovarian cancer Neg Hx    • Colon cancer Neg Hx    • Colon polyps Neg Hx        Social History     Socioeconomic History   • Marital status:      Spouse name: Not on file   • Number of children: Not on file   • Years of education: Not on file   • Highest education level: Not on file   Occupational History   • Occupation: unemployed   Tobacco Use   • Smoking status: Never Smoker   • Smokeless tobacco: Never Used   Substance and Sexual Activity   • Alcohol use: Yes     Alcohol/week: 2.0 standard drinks     Types: 1 Glasses of wine, 1 Shots of liquor per week     Comment: DAILY   • Drug use: No   • Sexual activity: Defer   Social History Narrative    Pt consumes 2 servings of caffeine per day.        Allergies   Allergen Reactions   • Adhesive Tape Itching       Review of Systems:    Review of Systems   Constitutional: Positive for fatigue. Negative for chills, fever, unexpected weight gain and unexpected weight loss.   HENT: Negative.  Negative for ear pain, postnasal drip, sinus pressure and sore throat.    Eyes: Positive for blurred vision. Negative for double vision and visual disturbance.   Respiratory: Negative.  Negative for cough, shortness of breath and wheezing.    Cardiovascular: Positive for chest pain. Negative for palpitations and leg swelling.   Gastrointestinal: Negative.  Negative for abdominal pain, blood in stool, diarrhea, nausea and vomiting.   Endocrine: Positive for heat intolerance. Negative for cold intolerance, polydipsia, polyphagia and polyuria.   Genitourinary: Negative for dysuria, flank pain and hematuria.   Musculoskeletal: Positive for arthralgias and joint swelling.   Skin: Negative for dry skin and rash.   Allergic/Immunologic: Positive for environmental allergies.   Neurological: Negative.  Negative for weakness, numbness and headache.   Hematological: Negative.    Psychiatric/Behavioral: Negative for self-injury, suicidal ideas  "and depressed mood.       Vitals:    02/04/21 1400   BP: 124/80   Pulse: 77   SpO2: 99%   Weight: 64.9 kg (143 lb)   Height: 149.9 cm (59\")     Body mass index is 28.88 kg/m².      Current Outpatient Medications:   •  ALLERGY SERUM INJECTION, Inject  under the skin Every 21 (Twenty-One) Days., Disp: , Rfl:   •  aluminum hydroxide-mag carbonate (GAVISCON EXTRA RELIEF) 160-105 MG chewable tablet chewable tablet, Chew 1 tablet Daily As Needed., Disp: , Rfl:   •  Calcium Carb-Cholecalciferol (CALCIUM-VITAMIN D) 600-400 MG-UNIT tablet, Take 1 tablet by mouth 2 (Two) Times a Day., Disp: , Rfl:   •  DULoxetine (CYMBALTA) 60 MG capsule, Take 60 mg by mouth Daily., Disp: , Rfl:   •  ENBREL 50 MG/ML injection, Inject 50 mg under the skin into the appropriate area as directed 1 (One) Time Per Week., Disp: , Rfl:   •  folic acid (FOLVITE) 1 MG tablet, Take 1 mg by mouth Daily., Disp: , Rfl:   •  folic acid-vit B6-vit B12 (FOLGARD) 2.2-25-1 MG tablet tablet, Take 1 tablet by mouth 2 (Two) Times a Day., Disp: , Rfl:   •  lansoprazole (PREVACID) 15 MG capsule, Take 15 mg by mouth Daily., Disp: , Rfl:   •  levothyroxine (SYNTHROID, LEVOTHROID) 88 MCG tablet, Take 88 mcg by mouth Daily., Disp: , Rfl:   •  methotrexate 2.5 MG tablet, Take 9 tablets by mouth 1 (One) Time Per Week. MONDAYS Resume when ok with Dr. Wesley (Patient taking differently: Take 15 mg by mouth 1 (One) Time Per Week. MONDAYS Resume when ok with Dr. Wesley), Disp: , Rfl: 0  •  Multiple Vitamins-Minerals (SENIOR MULTIVITAMIN PLUS) tablet, Take 1 tablet by mouth Daily., Disp: , Rfl:   •  NON FORMULARY, Take 2 tablets by mouth Daily. Hydro Eye gelcaps, Disp: , Rfl:   •  polyethyl glycol-propyl glycol (SYSTANE) 0.4-0.3 % solution ophthalmic solution, Administer 2 drops to both eyes 5 (Five) Times a Day., Disp: , Rfl:   •  predniSONE (DELTASONE) 5 MG tablet, Take 5 mg by mouth Daily., Disp: , Rfl:   •  prochlorperazine (COMPAZINE) 10 MG tablet, Take 10 mg by mouth Every 6 " (Six) Hours As Needed for Nausea or Vomiting., Disp: , Rfl:   •  pseudoephedrine-guaifenesin (MUCINEX D)  MG per 12 hr tablet, Take 1 tablet by mouth Every 12 (Twelve) Hours., Disp: , Rfl:   •  salsalate (DISALCID) 750 MG tablet, Take 1,500 mg by mouth 2 (Two) Times a Day., Disp: , Rfl:   •  vitamin A 8000 UNIT capsule, Take 8,000 Units by mouth Daily., Disp: , Rfl:   •  vitamin C (ASCORBIC ACID) 500 MG tablet, Take 500 mg by mouth Daily., Disp: , Rfl:   •  vitamin E 400 UNIT capsule, Take 800 Units by mouth Daily., Disp: , Rfl:   •  zolpidem (AMBIEN) 10 MG tablet, Take 1 tablet by mouth Every Night., Disp: 5 tablet, Rfl: 0  •  pseudoephedrine-guaifenesin (MUCINEX D)  MG per 12 hr tablet, Take 1 tablet by mouth Every 12 (Twelve) Hours., Disp: 60 tablet, Rfl: 3    Physical Exam:    Physical Exam  Vitals signs and nursing note reviewed.   Constitutional:       General: She is not in acute distress.     Appearance: She is well-developed. She is not diaphoretic.   HENT:      Head: Normocephalic and atraumatic.      Right Ear: External ear normal.      Left Ear: External ear normal.      Mouth/Throat:      Pharynx: No oropharyngeal exudate.   Eyes:      General: No scleral icterus.        Right eye: No discharge.      Conjunctiva/sclera: Conjunctivae normal.   Neck:      Musculoskeletal: Normal range of motion and neck supple.      Thyroid: No thyromegaly.      Vascular: No JVD.      Trachea: No tracheal deviation.   Cardiovascular:      Rate and Rhythm: Normal rate and regular rhythm.      Heart sounds: Normal heart sounds.      Comments: PMI nondisplaced  Pulmonary:      Effort: Pulmonary effort is normal.      Breath sounds: Normal breath sounds. No wheezing or rales.   Abdominal:      General: Bowel sounds are normal.      Palpations: Abdomen is soft.      Tenderness: There is no abdominal tenderness. There is no guarding or rebound.   Musculoskeletal:         General: Swelling present.      Comments:  Relates with a cane.  She is point tender at the anterior aspect of her right lateral malleolus.  There is some erythema.  She has pain with inversion and eversion.  I cannot appreciate any effusion.   Lymphadenopathy:      Cervical: No cervical adenopathy.   Skin:     General: Skin is warm and dry.      Capillary Refill: Capillary refill takes less than 2 seconds.      Coloration: Skin is not pale.      Findings: No rash.   Neurological:      Mental Status: She is alert and oriented to person, place, and time.      Motor: No abnormal muscle tone.      Coordination: Coordination normal.   Psychiatric:         Mood and Affect: Mood normal.         Judgment: Judgment normal.         Procedures    Results Review:    I reviewed the patient's new clinical results.    Assessment/Plan:      Problem List Items Addressed This Visit        Endocrine and Metabolic    Abnormal thyroid blood test    Current Assessment & Plan     Interestingly her TSH is normal however free T3 and free T4 are both suppressed.  She is willing and interested in getting an endocrinologist opinion about this.  Will avoid supplementation until formal endocrine evaluation.            Hematology and Neoplasia    Elevated hemoglobin (CMS/HCC)    Current Assessment & Plan     This remains at the upper end of elevated.  This was first noted by rheumatologist, she maintains treatment with Humira.  I have quested a Getachew 2 mutation in addition to a CBC.  These results be forwarded to rheumatology.  If any abnormalities of the Getachew mutation, hematology referral will follow.            Musculoskeletal and Injuries    Acute right ankle pain - Primary    Current Assessment & Plan     Is secondary to rolling her ankle, recommend ice elevation compression.  Plain films are requested him most concerned about an avulsion fracture.  If present, would recommend that she revisit Dr. Porter whom she  has seen in the past         Relevant Orders    XR Ankle 3+ View Right        Neuro    Restless legs syndrome (RLS)    Current Assessment & Plan     Stable on as needed hydrocodone use.  She has been apparently using the same prescription of hydrocodone for the past 5 to 7 years.  She will notify the office when she would need a refill at which time she is aware she will need to do a urine drug screen and medicine contract.            Symptoms and Signs    Intercostal pain    Relevant Orders    XR Chest PA & Lateral          Plan of care reviewed with patient at the conclusion of today's visit. Education was provided regarding diagnosis and management.  Patient verbalizes understanding of and agreement with management plan.    Return in about 3 months (around 5/4/2021).    Fili Ray MD    Please note that portions of this note may have been completed with a voice recognition program. Efforts were made to edit the dictations, but occasionally words are mistranscribed.

## 2021-02-04 NOTE — ASSESSMENT & PLAN NOTE
Stable on as needed hydrocodone use.  She has been apparently using the same prescription of hydrocodone for the past 5 to 7 years.  She will notify the office when she would need a refill at which time she is aware she will need to do a urine drug screen and medicine contract.

## 2021-02-04 NOTE — ASSESSMENT & PLAN NOTE
Is secondary to rolling her ankle, recommend ice elevation compression.  Plain films are requested him most concerned about an avulsion fracture.  If present, would recommend that she revisit Dr. Porter whom she  has seen in the past

## 2021-02-04 NOTE — ASSESSMENT & PLAN NOTE
Interestingly her TSH is normal however free T3 and free T4 are both suppressed.  She is willing and interested in getting an endocrinologist opinion about this.  Will avoid supplementation until formal endocrine evaluation.

## 2021-02-04 NOTE — ASSESSMENT & PLAN NOTE
This remains at the upper end of elevated.  This was first noted by rheumatologist, she maintains treatment with Humira.  I have quested a Getachew 2 mutation in addition to a CBC.  These results be forwarded to rheumatology.  If any abnormalities of the Getachew mutation, hematology referral will follow.

## 2021-02-04 NOTE — TELEPHONE ENCOUNTER
CLAY Ugarte called to let us know that she will be getting her xrays for cxr and ankle next week when she has her mammogram done.

## 2021-02-10 ENCOUNTER — APPOINTMENT (OUTPATIENT)
Dept: MAMMOGRAPHY | Facility: HOSPITAL | Age: 71
End: 2021-02-10

## 2021-02-17 ENCOUNTER — HOSPITAL ENCOUNTER (EMERGENCY)
Facility: HOSPITAL | Age: 71
Discharge: HOME | End: 2021-02-17
Attending: EMERGENCY MEDICINE | Admitting: EMERGENCY MEDICINE

## 2021-02-17 ENCOUNTER — APPOINTMENT (OUTPATIENT)
Dept: GENERAL RADIOLOGY | Facility: HOSPITAL | Age: 71
End: 2021-02-17

## 2021-02-17 VITALS
BODY MASS INDEX: 28.22 KG/M2 | DIASTOLIC BLOOD PRESSURE: 66 MMHG | HEIGHT: 59 IN | OXYGEN SATURATION: 95 % | SYSTOLIC BLOOD PRESSURE: 130 MMHG | HEART RATE: 76 BPM | RESPIRATION RATE: 16 BRPM | TEMPERATURE: 98.2 F | WEIGHT: 140 LBS

## 2021-02-17 DIAGNOSIS — S82.831A CLOSED FRACTURE OF DISTAL END OF RIGHT FIBULA, UNSPECIFIED FRACTURE MORPHOLOGY, INITIAL ENCOUNTER: ICD-10-CM

## 2021-02-17 DIAGNOSIS — R07.89 ATYPICAL CHEST PAIN: Primary | ICD-10-CM

## 2021-02-17 LAB
ALBUMIN SERPL-MCNC: 4 G/DL (ref 3.5–5.2)
ALBUMIN/GLOB SERPL: 1.5 G/DL
ALP SERPL-CCNC: 75 U/L (ref 39–117)
ALT SERPL W P-5'-P-CCNC: 18 U/L (ref 1–33)
ANION GAP SERPL CALCULATED.3IONS-SCNC: 6 MMOL/L (ref 5–15)
AST SERPL-CCNC: 31 U/L (ref 1–32)
BASOPHILS # BLD AUTO: 0.06 10*3/MM3 (ref 0–0.2)
BASOPHILS NFR BLD AUTO: 0.8 % (ref 0–1.5)
BILIRUB SERPL-MCNC: 0.2 MG/DL (ref 0–1.2)
BUN SERPL-MCNC: 13 MG/DL (ref 8–23)
BUN/CREAT SERPL: 15.9 (ref 7–25)
CALCIUM SPEC-SCNC: 9.2 MG/DL (ref 8.6–10.5)
CHLORIDE SERPL-SCNC: 101 MMOL/L (ref 98–107)
CO2 SERPL-SCNC: 34 MMOL/L (ref 22–29)
CREAT SERPL-MCNC: 0.82 MG/DL (ref 0.57–1)
D DIMER PPP FEU-MCNC: 0.35 MCGFEU/ML (ref 0–0.56)
DEPRECATED RDW RBC AUTO: 55.8 FL (ref 37–54)
EOSINOPHIL # BLD AUTO: 0.19 10*3/MM3 (ref 0–0.4)
EOSINOPHIL NFR BLD AUTO: 2.4 % (ref 0.3–6.2)
ERYTHROCYTE [DISTWIDTH] IN BLOOD BY AUTOMATED COUNT: 15.3 % (ref 12.3–15.4)
FLUAV RNA RESP QL NAA+PROBE: NOT DETECTED
FLUBV RNA RESP QL NAA+PROBE: NOT DETECTED
GFR SERPL CREATININE-BSD FRML MDRD: 69 ML/MIN/1.73
GLOBULIN UR ELPH-MCNC: 2.6 GM/DL
GLUCOSE SERPL-MCNC: 89 MG/DL (ref 65–99)
HCT VFR BLD AUTO: 47.3 % (ref 34–46.6)
HGB BLD-MCNC: 15.2 G/DL (ref 12–15.9)
HOLD SPECIMEN: NORMAL
IMM GRANULOCYTES # BLD AUTO: 0.03 10*3/MM3 (ref 0–0.05)
IMM GRANULOCYTES NFR BLD AUTO: 0.4 % (ref 0–0.5)
LIPASE SERPL-CCNC: 29 U/L (ref 13–60)
LYMPHOCYTES # BLD AUTO: 2.26 10*3/MM3 (ref 0.7–3.1)
LYMPHOCYTES NFR BLD AUTO: 28.5 % (ref 19.6–45.3)
MCH RBC QN AUTO: 32.2 PG (ref 26.6–33)
MCHC RBC AUTO-ENTMCNC: 32.1 G/DL (ref 31.5–35.7)
MCV RBC AUTO: 100.2 FL (ref 79–97)
MONOCYTES # BLD AUTO: 0.78 10*3/MM3 (ref 0.1–0.9)
MONOCYTES NFR BLD AUTO: 9.8 % (ref 5–12)
NEUTROPHILS NFR BLD AUTO: 4.6 10*3/MM3 (ref 1.7–7)
NEUTROPHILS NFR BLD AUTO: 58.1 % (ref 42.7–76)
NRBC BLD AUTO-RTO: 0 /100 WBC (ref 0–0.2)
NT-PROBNP SERPL-MCNC: 102.8 PG/ML (ref 0–900)
PLATELET # BLD AUTO: 208 10*3/MM3 (ref 140–450)
PMV BLD AUTO: 9.3 FL (ref 6–12)
POTASSIUM SERPL-SCNC: 3.5 MMOL/L (ref 3.5–5.2)
PROT SERPL-MCNC: 6.6 G/DL (ref 6–8.5)
QT INTERVAL: 382 MS
QTC INTERVAL: 409 MS
RBC # BLD AUTO: 4.72 10*6/MM3 (ref 3.77–5.28)
SARS-COV-2 RNA RESP QL NAA+PROBE: NOT DETECTED
SODIUM SERPL-SCNC: 141 MMOL/L (ref 136–145)
TROPONIN T SERPL-MCNC: <0.01 NG/ML (ref 0–0.03)
TROPONIN T SERPL-MCNC: <0.01 NG/ML (ref 0–0.03)
WBC # BLD AUTO: 7.92 10*3/MM3 (ref 3.4–10.8)
WHOLE BLOOD HOLD SPECIMEN: NORMAL
WHOLE BLOOD HOLD SPECIMEN: NORMAL

## 2021-02-17 PROCEDURE — 93005 ELECTROCARDIOGRAM TRACING: CPT

## 2021-02-17 PROCEDURE — 87636 SARSCOV2 & INF A&B AMP PRB: CPT | Performed by: NURSE PRACTITIONER

## 2021-02-17 PROCEDURE — 83880 ASSAY OF NATRIURETIC PEPTIDE: CPT

## 2021-02-17 PROCEDURE — 83690 ASSAY OF LIPASE: CPT

## 2021-02-17 PROCEDURE — 85379 FIBRIN DEGRADATION QUANT: CPT | Performed by: NURSE PRACTITIONER

## 2021-02-17 PROCEDURE — 80053 COMPREHEN METABOLIC PANEL: CPT

## 2021-02-17 PROCEDURE — 84484 ASSAY OF TROPONIN QUANT: CPT

## 2021-02-17 PROCEDURE — 93005 ELECTROCARDIOGRAM TRACING: CPT | Performed by: EMERGENCY MEDICINE

## 2021-02-17 PROCEDURE — 85025 COMPLETE CBC W/AUTO DIFF WBC: CPT

## 2021-02-17 PROCEDURE — 73610 X-RAY EXAM OF ANKLE: CPT

## 2021-02-17 PROCEDURE — 99284 EMERGENCY DEPT VISIT MOD MDM: CPT

## 2021-02-17 PROCEDURE — 71045 X-RAY EXAM CHEST 1 VIEW: CPT

## 2021-02-17 RX ORDER — ASPIRIN 81 MG/1
324 TABLET, CHEWABLE ORAL ONCE
Status: DISCONTINUED | OUTPATIENT
Start: 2021-02-17 | End: 2021-02-17 | Stop reason: HOSPADM

## 2021-02-17 RX ORDER — SODIUM CHLORIDE 0.9 % (FLUSH) 0.9 %
10 SYRINGE (ML) INJECTION AS NEEDED
Status: DISCONTINUED | OUTPATIENT
Start: 2021-02-17 | End: 2021-02-17 | Stop reason: HOSPADM

## 2021-02-17 NOTE — ED PROVIDER NOTES
"Subjective   Patient presents to the emergency department with complaint of chest pain onset this morning.  She awakened before 8 AM and noticed a sharp pain to the left of her sternum.  After a fall she sustained 2 weeks ago, she has had some mild discomfort.  However, today this was accompanied by a new pain in her left shoulder area.  She rates her pain as 5 out of 10 at that time.  She took her morning medications and laid down for several moments.  When she arose later in the morning, she noticed that she was slightly dizzy or woozy and slightly short of breath.  She wondered if she may be having an anxiety attack.    She states that 2 weeks ago she sustained a fall slipping on ice and her primary care provider had ordered outpatient x-rays of her chest and right ankle, but she did not get the films completed due to weather travel inhibitions.  She continues to walk on her right ankle, though this reproduces some persisting discomfort.  She denies any history of head injury.  She has no neurosensory complaint or focal weakness    Review of systems pertinent Covid: She has had no fever, chills, or loss of taste or smell.  Review of systems is positive for chest pain and shortness of breath as aforementioned with occasional rare cough.  She has had no nausea, vomiting or diarrhea.  She denies any dysuria.  She rates her current pain as 0 out of 10.    She denies any history of myocardial infarction or known coronary artery disease.  She has had no clots in the form of PE or DVT.  She has never had a heart cath.  She has seen cardiologist Dr. Wilkins in the last 12 months for \"fluttering problems\".  She does not take any anticoagulation and has no history of atrial flutter or atrial fib.      History provided by:  Patient   used: No    Chest Pain  Pain location:  Substernal area  Pain quality: sharp    Radiates to: accompanied by pain in her left shoulder blade.  Pain severity:  " Moderate  Onset quality:  Gradual  Duration:  4 hours  Timing:  Intermittent  Progression:  Resolved  Chronicity:  New  Context: at rest    Context: not breathing, not eating and not movement    Relieved by:  Nothing  Worsened by:  Nothing  Ineffective treatments:  None tried  Associated symptoms: anxiety, cough (mild ) and shortness of breath    Associated symptoms: no abdominal pain, no altered mental status, no back pain, no claudication, no dizziness, no fever, no headache, no heartburn, no lower extremity edema, no nausea, no orthopnea, no palpitations (although she has seen cardiology for palpitations in the past, she has experienced no palpitations today), no syncope and no weakness    Risk factors: no coronary artery disease, no diabetes mellitus, no hypertension, not male, no prior DVT/PE and no smoking        Review of Systems   Constitutional: Negative for fever.   Respiratory: Positive for cough (mild ) and shortness of breath.    Cardiovascular: Positive for chest pain. Negative for palpitations (although she has seen cardiology for palpitations in the past, she has experienced no palpitations today), orthopnea, claudication and syncope.   Gastrointestinal: Negative for abdominal pain, heartburn and nausea.   Musculoskeletal: Negative for back pain.   Neurological: Negative for dizziness, weakness and headaches.   Psychiatric/Behavioral: The patient is nervous/anxious.    All other systems reviewed and are negative.      Past Medical History:   Diagnosis Date   • Anesthesia     PT HAS RHEUMATOID ARTHRITIS, PT HAS SPECIFIC INSTRUCTIONS PER RA MD, pt to bring INSTRUCTIONS AND RECS ATTACHED TO CHART AND PT TO DISCUSS WITH ANESTHESIA ON DATE OF PROCEDURE    • Arthritis    • Back disorder     degenerative disc disorder   • Baker's cyst of knee     right   • Benign paroxysmal positional vertigo    • Cervical cancer (CMS/HCC)     cone biopsy   • Depression    • Deviated septum    • Disease of thyroid gland   "  • Diverticulosis    • Fibromyalgia    • GERD (gastroesophageal reflux disease)    • Hard to intubate     \"anterior trachea\"    • History of gallstones    • Hypoglycemia    • IBS (irritable bowel syndrome)    • Joint pain    • Measles    • Menopause    • Osteoarthritis (arthritis due to wear and tear of joints)    • Rheumatoid arthritis (CMS/HCC)    • Rheumatoid arthritis (CMS/HCC)    • Scoliosis    • Seasonal allergies    • Shingles    • Synovitis    • Tinnitus    • Vertigo    • Wears glasses        Allergies   Allergen Reactions   • Adhesive Tape Itching       Past Surgical History:   Procedure Laterality Date   • ADENOIDECTOMY     • BLEPHAROPLASTY, QUAD Bilateral 09/18/2018    Dr. romano.   • BREAST EXCISIONAL BIOPSY Left     NO VISIBLE SCAR   • CERVICAL CONE BIOPSY     • COLONOSCOPY     • COSMETIC SURGERY      SHORT SCAR FACE LIFT PER DR ROMANO    • DILATATION AND CURETTAGE     • FACIAL COSMETIC SURGERY     • FOOT SURGERY Left     calcaneous    • KNEE ARTHROPLASTY Right     blue    • KNEE ARTHROSCOPY Bilateral    • REDUCTION MAMMAPLASTY Bilateral    • TENDON REPAIR Right     HAND   • TONSILLECTOMY AND ADENOIDECTOMY     • TOTAL KNEE ARTHROPLASTY Left 4/3/2018    Procedure: TOTAL KNEE ARTHROPLASTY LEFT;  Surgeon: Mahesh Wesley MD;  Location:  [facility];  Service: Orthopedics   • TOTAL SHOULDER ARTHROPLASTY W/ DISTAL CLAVICLE EXCISION Left 5/22/2017    Procedure: LEFT REVERSE TOTAL SHOULDER ARTHROPLASTY FOR FRACTURE;  Surgeon: Damaso Harris MD;  Location:  [facility];  Service:    • TOTAL SHOULDER REPLACEMENT Left    • TUBAL ABDOMINAL LIGATION         Family History   Problem Relation Age of Onset   • Parkinsonism Mother    • Heart failure Father    • Kidney failure Father    • Hyperlipidemia Father    • No Known Problems Sister    • No Known Problems Brother    • Breast cancer Neg Hx    • Ovarian cancer Neg Hx    • Colon cancer Neg Hx    • Colon polyps Neg Hx        Social History     Socioeconomic History   • " Marital status:      Spouse name: Not on file   • Number of children: Not on file   • Years of education: Not on file   • Highest education level: Not on file   Occupational History   • Occupation: unemployed   Tobacco Use   • Smoking status: Never Smoker   • Smokeless tobacco: Never Used   Substance and Sexual Activity   • Alcohol use: Yes     Alcohol/week: 2.0 standard drinks     Types: 1 Glasses of wine, 1 Shots of liquor per week     Comment: DAILY   • Drug use: No   • Sexual activity: Defer   Social History Narrative    Pt consumes 2 servings of caffeine per day.            Objective   Physical Exam  Vitals signs and nursing note reviewed.   Constitutional:       General: She is not in acute distress.     Appearance: Normal appearance. She is well-developed. She is not ill-appearing or toxic-appearing.      Comments: Patient is an excellent historian.  Her vital signs are normal and she is in no acute distress.   HENT:      Head: Normocephalic.      Right Ear: External ear normal.      Left Ear: External ear normal.      Nose: Nose normal.      Mouth/Throat:      Mouth: Mucous membranes are moist.      Pharynx: No oropharyngeal exudate.   Eyes:      Conjunctiva/sclera: Conjunctivae normal.   Neck:      Musculoskeletal: Normal range of motion and neck supple. No muscular tenderness.      Vascular: No JVD.   Cardiovascular:      Rate and Rhythm: Normal rate and regular rhythm.      Heart sounds: Normal heart sounds. No murmur.   Pulmonary:      Effort: Pulmonary effort is normal. No respiratory distress.      Breath sounds: Normal breath sounds.   Abdominal:      General: Bowel sounds are normal. There is no distension.      Palpations: Abdomen is soft.      Tenderness: There is no abdominal tenderness.   Musculoskeletal: Normal range of motion.         General: No swelling or deformity.      Right lower leg: She exhibits tenderness. No edema.      Left lower leg: She exhibits no tenderness. No edema.       "Comments: Patient localizes tenderness at the lateral malleolus, mild.  No gross deformity.  Proximal and distal joints are negative.  Neurovascular exam is negative.   Skin:     General: Skin is warm and dry.      Capillary Refill: Capillary refill takes less than 2 seconds.      Coloration: Skin is not pale.      Findings: No lesion.   Neurological:      General: No focal deficit present.      Mental Status: She is alert and oriented to person, place, and time.      Comments: No Neurosensory deficit or focal weakness     Psychiatric:         Mood and Affect: Mood normal.         Behavior: Behavior normal.         Thought Content: Thought content normal.         Procedures           ED Course  ED Course as of Feb 17 2150   Wed Feb 17, 2021 2146 Patient cardiac work-up is very reassuring and negative for acute findings.  She has had no chest pain throughout her ED course.  With a heart score of 3, I have discussed with her our invitation to follow-up with our outpatient [facility] clinic.  She will accept this invitation but prefers to follow-up with Dr. Wilkins, if possible.  In addition, patient has a small fracture to the distal fibula.  She had to be persuaded to use the boot initially assuring that she would not use it \"because I do not walk very much\".  I was able to persuade her of the benefits of protection and immobilization and she will follow-up with Dr. Wesley with whom she already has an orthopedic relationship since he performed surgical repair on her opposite ankle in the past.  She insists she would not consider use of crutches.    [MS]   2149 We discussed parameters for concern that would warrant return to the emergency department.  Patient understands and concurs with this outpatient plan of care close follow-up    [MS]      ED Course User Index  [MS] Annie Smith APRN      Recent Results (from the past 24 hour(s))   ECG 12 Lead    Collection Time: 02/17/21 12:40 PM   Result Value " Ref Range    QT Interval 382 ms    QTC Interval 409 ms   Troponin    Collection Time: 02/17/21 12:46 PM    Specimen: Blood   Result Value Ref Range    Troponin T <0.010 0.000 - 0.030 ng/mL   Comprehensive Metabolic Panel    Collection Time: 02/17/21 12:46 PM    Specimen: Blood   Result Value Ref Range    Glucose 89 65 - 99 mg/dL    BUN 13 8 - 23 mg/dL    Creatinine 0.82 0.57 - 1.00 mg/dL    Sodium 141 136 - 145 mmol/L    Potassium 3.5 3.5 - 5.2 mmol/L    Chloride 101 98 - 107 mmol/L    CO2 34.0 (H) 22.0 - 29.0 mmol/L    Calcium 9.2 8.6 - 10.5 mg/dL    Total Protein 6.6 6.0 - 8.5 g/dL    Albumin 4.00 3.50 - 5.20 g/dL    ALT (SGPT) 18 1 - 33 U/L    AST (SGOT) 31 1 - 32 U/L    Alkaline Phosphatase 75 39 - 117 U/L    Total Bilirubin 0.2 0.0 - 1.2 mg/dL    eGFR Non African Amer 69 >60 mL/min/1.73    Globulin 2.6 gm/dL    A/G Ratio 1.5 g/dL    BUN/Creatinine Ratio 15.9 7.0 - 25.0    Anion Gap 6.0 5.0 - 15.0 mmol/L   Lipase    Collection Time: 02/17/21 12:46 PM    Specimen: Blood   Result Value Ref Range    Lipase 29 13 - 60 U/L   BNP    Collection Time: 02/17/21 12:46 PM    Specimen: Blood   Result Value Ref Range    proBNP 102.8 0.0 - 900.0 pg/mL   Light Blue Top    Collection Time: 02/17/21 12:46 PM   Result Value Ref Range    Extra Tube hold for add-on    Green Top (Gel)    Collection Time: 02/17/21 12:46 PM   Result Value Ref Range    Extra Tube Hold for add-ons.    Lavender Top    Collection Time: 02/17/21 12:46 PM   Result Value Ref Range    Extra Tube     Gold Top - SST    Collection Time: 02/17/21 12:46 PM   Result Value Ref Range    Extra Tube Hold for add-ons.    Gray Top - Ice    Collection Time: 02/17/21 12:46 PM   Result Value Ref Range    Extra Tube Hold for add-ons.    CBC Auto Differential    Collection Time: 02/17/21 12:46 PM    Specimen: Blood   Result Value Ref Range    WBC 7.92 3.40 - 10.80 10*3/mm3    RBC 4.72 3.77 - 5.28 10*6/mm3    Hemoglobin 15.2 12.0 - 15.9 g/dL    Hematocrit 47.3 (H) 34.0 - 46.6  %    .2 (H) 79.0 - 97.0 fL    MCH 32.2 26.6 - 33.0 pg    MCHC 32.1 31.5 - 35.7 g/dL    RDW 15.3 12.3 - 15.4 %    RDW-SD 55.8 (H) 37.0 - 54.0 fl    MPV 9.3 6.0 - 12.0 fL    Platelets 208 140 - 450 10*3/mm3    Neutrophil % 58.1 42.7 - 76.0 %    Lymphocyte % 28.5 19.6 - 45.3 %    Monocyte % 9.8 5.0 - 12.0 %    Eosinophil % 2.4 0.3 - 6.2 %    Basophil % 0.8 0.0 - 1.5 %    Immature Grans % 0.4 0.0 - 0.5 %    Neutrophils, Absolute 4.60 1.70 - 7.00 10*3/mm3    Lymphocytes, Absolute 2.26 0.70 - 3.10 10*3/mm3    Monocytes, Absolute 0.78 0.10 - 0.90 10*3/mm3    Eosinophils, Absolute 0.19 0.00 - 0.40 10*3/mm3    Basophils, Absolute 0.06 0.00 - 0.20 10*3/mm3    Immature Grans, Absolute 0.03 0.00 - 0.05 10*3/mm3    nRBC 0.0 0.0 - 0.2 /100 WBC   D-dimer, Quantitative    Collection Time: 02/17/21 12:46 PM    Specimen: Blood   Result Value Ref Range    D-Dimer, Quantitative 0.35 0.00 - 0.56 MCGFEU/mL   COVID-19 and FLU A/B PCR - Swab, Nasopharynx    Collection Time: 02/17/21  1:16 PM    Specimen: Nasopharynx; Swab   Result Value Ref Range    COVID19 Not Detected Not Detected - Ref. Range    Influenza A PCR Not Detected Not Detected    Influenza B PCR Not Detected Not Detected   Troponin    Collection Time: 02/17/21  2:58 PM    Specimen: Blood   Result Value Ref Range    Troponin T <0.010 0.000 - 0.030 ng/mL     Note: In addition to lab results from this visit, the labs listed above may include labs taken at another facility or during a different encounter within the last 24 hours. Please correlate lab times with ED admission and discharge times for further clarification of the services performed during this visit.    XR Ankle 3+ View Right   Preliminary Result   Tiny fracture seen of the most distal aspect of the lateral   malleolus with no evidence of displacement. There is associated soft   tissue swelling. The remainder of the bony structures are unremarkable.       D:  02/17/2021   E:  02/17/2021              XR Chest 1  "View   Preliminary Result   No acute cardiopulmonary disease.       D:  02/17/2021   E:  02/17/2021                Vitals:    02/17/21 1239 02/17/21 1330 02/17/21 1500 02/17/21 1630   BP: 138/75 142/86 113/59 130/66   BP Location: Right arm Right arm Right arm Right arm   Patient Position: Sitting Sitting Sitting Sitting   Pulse: 67 61 80 76   Resp: 15 16 16 16   Temp: 98.2 °F (36.8 °C)      TempSrc: Oral      SpO2: 93% 93% 96% 95%   Weight: 63.5 kg (140 lb)      Height: 149.9 cm (59\")        Medications - No data to display  ECG/EMG Results (last 24 hours)     Procedure Component Value Units Date/Time    ECG 12 Lead [[id number]] Collected: 02/17/21 1240     Updated: 02/17/21 1337        ECG 12 Lead         ECG 12 Lead   Final Result   Test Reason : cp   Blood Pressure : **/** mmHG   Vent. Rate : 069 BPM     Atrial Rate : 069 BPM      P-R Int : 158 ms          QRS Dur : 086 ms       QT Int : 382 ms       P-R-T Axes : 024 -44 015 degrees      QTc Int : 409 ms      Sinus rhythm with premature atrial complexes   Left axis deviation   Abnormal ECG   When compared with ECG of 20-MAR-2018 15:36,   premature atrial complexes are now present   Confirmed by CRISTHIAN GONSALEZ ([id number]) on 2/17/2021 9:43:23 PM      Referred By:  EDMD           Confirmed By:CRISTHIAN GONSALEZ      ECG 12 Lead    (Results Pending)                                            MDM    Final diagnoses:   Atypical chest pain   Closed fracture of distal end of right fibula, unspecified fracture morphology, initial encounter     DISCHARGE    Patient discharged in stable condition.    Reviewed implications of results, diagnosis, meds, responsibility to follow up, warning signs and symptoms of possible worsening, potential complications and reasons to return to ER.    Patient/Family voiced understanding of above instructions.    Discussed plan for discharge, as there is no emergent indication for admission.  Pt/family is agreeable and understands need for follow up " and possible repeat testing.  Pt/family is aware that discharge does not mean that nothing is wrong but that it indicates no emergency is currently present that requires admission and they must continue care with follow-up as given below or with a physician of their choice.     FOLLOW-UP  Mahesh Wesley MD  [address]  [place] [address]  [phone]          [facility] CARDIOLOG[facility]  [address] [id number] [phone]             Medication List      Changed    methotrexate 2.5 MG tablet  Take 9 tablets by mouth 1 (One) Time Per Week. MONDAYS  Resume when ok with Dr. Wesley  What changed: how much to take                 Annie Smith, APRN  02/17/21 0304

## 2021-02-17 NOTE — ED NOTES
Walking boot was given to patient but patient did not want to put it on at this time.       Nandini Moore, RN  02/17/21 9089

## 2021-02-17 NOTE — DISCHARGE INSTRUCTIONS
Anticipate a call from the ambulatory cardiology center tomorrow for follow up.  Use the walking boot on the left foot without exception until you are seen by Dr. Wesley.  Call his office tomorrow for a follow up appointment to monitor your recovery.  Tylenol and as needed for discomfort.  Thank you     Return to the ED as needed for worsening symptoms or concerns.

## 2021-02-19 LAB
QT INTERVAL: 370 MS
QT INTERVAL: 382 MS
QTC INTERVAL: 409 MS
QTC INTERVAL: 418 MS

## 2021-03-03 ENCOUNTER — HOSPITAL ENCOUNTER (OUTPATIENT)
Dept: MAMMOGRAPHY | Facility: HOSPITAL | Age: 71
Discharge: HOME | End: 2021-03-03
Admitting: INTERNAL MEDICINE

## 2021-03-03 DIAGNOSIS — Z12.31 VISIT FOR SCREENING MAMMOGRAM: ICD-10-CM

## 2021-03-03 PROCEDURE — 77063 BREAST TOMOSYNTHESIS BI: CPT | Performed by: RADIOLOGY

## 2021-03-03 PROCEDURE — 77067 SCR MAMMO BI INCL CAD: CPT

## 2021-03-03 PROCEDURE — 77067 SCR MAMMO BI INCL CAD: CPT | Performed by: RADIOLOGY

## 2021-03-03 PROCEDURE — 77063 BREAST TOMOSYNTHESIS BI: CPT

## 2021-04-05 ENCOUNTER — LAB (OUTPATIENT)
Dept: LAB | Facility: HOSPITAL | Age: 71
End: 2021-04-05

## 2021-04-05 ENCOUNTER — OFFICE VISIT (OUTPATIENT)
Dept: ENDOCRINOLOGY | Facility: CLINIC | Age: 71
End: 2021-04-05

## 2021-04-05 VITALS
DIASTOLIC BLOOD PRESSURE: 78 MMHG | OXYGEN SATURATION: 97 % | HEART RATE: 72 BPM | BODY MASS INDEX: 28.83 KG/M2 | WEIGHT: 143 LBS | HEIGHT: 59 IN | SYSTOLIC BLOOD PRESSURE: 140 MMHG

## 2021-04-05 DIAGNOSIS — R94.6 ABNORMAL THYROID FUNCTION TEST: Primary | ICD-10-CM

## 2021-04-05 DIAGNOSIS — Z79.52 LONG TERM SYSTEMIC STEROID USER: ICD-10-CM

## 2021-04-05 DIAGNOSIS — D58.2 ELEVATED HEMOGLOBIN (HCC): ICD-10-CM

## 2021-04-05 DIAGNOSIS — R61 SWEATING INCREASE: ICD-10-CM

## 2021-04-05 LAB
FSH SERPL-ACNC: 76.1 MIU/ML
LH SERPL-ACNC: 43.9 MIU/ML
T4 FREE SERPL-MCNC: 0.54 NG/DL (ref 0.93–1.7)

## 2021-04-05 PROCEDURE — 36415 COLL VENOUS BLD VENIPUNCTURE: CPT

## 2021-04-05 PROCEDURE — 84439 ASSAY OF FREE THYROXINE: CPT | Performed by: INTERNAL MEDICINE

## 2021-04-05 PROCEDURE — 83002 ASSAY OF GONADOTROPIN (LH): CPT | Performed by: INTERNAL MEDICINE

## 2021-04-05 PROCEDURE — 84480 ASSAY TRIIODOTHYRONINE (T3): CPT | Performed by: INTERNAL MEDICINE

## 2021-04-05 PROCEDURE — 84305 ASSAY OF SOMATOMEDIN: CPT | Performed by: INTERNAL MEDICINE

## 2021-04-05 PROCEDURE — 99204 OFFICE O/P NEW MOD 45 MIN: CPT | Performed by: INTERNAL MEDICINE

## 2021-04-05 PROCEDURE — 84443 ASSAY THYROID STIM HORMONE: CPT | Performed by: INTERNAL MEDICINE

## 2021-04-05 PROCEDURE — 82670 ASSAY OF TOTAL ESTRADIOL: CPT | Performed by: INTERNAL MEDICINE

## 2021-04-05 PROCEDURE — 84481 FREE ASSAY (FT-3): CPT | Performed by: INTERNAL MEDICINE

## 2021-04-05 PROCEDURE — 83001 ASSAY OF GONADOTROPIN (FSH): CPT | Performed by: INTERNAL MEDICINE

## 2021-04-05 RX ORDER — MULTIPLE VITAMINS W/ MINERALS TAB 9MG-400MCG
1 TAB ORAL DAILY
COMMUNITY
End: 2021-08-04

## 2021-04-05 RX ORDER — MULTIVITAMIN WITH IRON
1 TABLET ORAL DAILY
COMMUNITY
End: 2021-04-05

## 2021-04-05 NOTE — PROGRESS NOTES
Chief Complaint   Patient presents with   • Establish Care   • Abnormal Thyroid Labs        New patient who is being seen in consultation regarding abnormal thyroid labs at the request of Fili Ray MD     HPI   Shanel Wall is a 70 y.o. female who presents for evaluation of abnormal thyroid function testing.    Patient presents today for evaluation of abnormal thyroid function tests approximately 20 years ago.  Patient reports that thyroid hormone was discontinued last summer by Dr. Ray. Patient reports that she took thyroid hormone for a few years prior to that.  She was previously taking Synthroid 88 mcg daily.  She is unsure if she has had a significant change in symptoms since discontinuing medication.    Patient denies palpiltations, anxiety.  Patient reports changes in bowel habits. Patient reports history of IBS with intermittent constipation and diarrhea.   Patient reports heat intolerance of increased sweating.  She reports she went through menopause approximately 10 years ago  Patient reports changes in weight. 15-20 pounds of weight gain.  Patient reports that nails are dry and brittle.  Denies changes in hair.  Patient denies tremor.  Patient reports decreased energy level.  Patient reports profuse sweating. Patient reports that this started when she went through menopause but has worsened.  She does report some increase in ring size, increasing in shoe size    Patient denies history of previous head/neck radiation.  Patient denies history of recent iodine exposure.  Patient denies taking OTC supplements such as biotin.  Patient reports that her mom had hypothyroidism.     Patient reports that prior to reestablishing care with Dr. Ray she was followed by Dr. Olvera for several years.    Note from December patient's medication list, she reports that she has been taking at least 5 mg/day for 20+ years.  She reports that she try to wean herself down in the past and was able to get to as  "little as 3 mg.  She reports rheumatology does not have any plans of discontinuing steroids.    Past Medical History:   Diagnosis Date   • Abnormal EKG    • Anesthesia     PT HAS RHEUMATOID ARTHRITIS, PT HAS SPECIFIC INSTRUCTIONS PER RA MD, pt to bring INSTRUCTIONS AND RECS ATTACHED TO CHART AND PT TO DISCUSS WITH ANESTHESIA ON DATE OF PROCEDURE    • Arthritis    • Back disorder     degenerative disc disorder   • Baker's cyst of knee     right   • Benign paroxysmal positional vertigo    • Cervical cancer (CMS/HCC)     cone biopsy   • Depression    • Deviated septum    • Deviated septum    • Disease of thyroid gland    • Diverticulosis    • Diverticulosis    • Fibromyalgia    • Fibromyalgia    • Gall stones    • GERD (gastroesophageal reflux disease)    • Hard to intubate     \"anterior trachea\"    • History of gallstones    • Hypoglycemia    • Hypothyroidism    • IBS (irritable bowel syndrome)    • Joint pain    • Macular degeneration    • Measles    • Menopause    • Multiple food allergies    • Osteoarthritis    • Osteoarthritis (arthritis due to wear and tear of joints)    • Popliteal cyst    • Retinal disease, bilateral    • Rheumatoid aortitis    • Rheumatoid arthritis (CMS/HCC)    • Rheumatoid arthritis (CMS/HCC)    • Ruptured tendon    • Scoliosis    • Scoliosis    • Seasonal allergies    • Shingles    • Sleep apnea    • Suppression of immune system subtherapeutic (CMS/HCC)    • Synovitis    • Synovitis    • Tinnitus    • Tinnitus    • TMJ (dislocation of temporomandibular joint)    • Vertigo    • Wears glasses      Past Surgical History:   Procedure Laterality Date   • ADENOIDECTOMY     • BILATERAL BREAST REDUCTION     • BLEPHAROPLASTY     • BLEPHAROPLASTY, QUAD Bilateral 09/18/2018    Dr. romano.   • BREAST EXCISIONAL BIOPSY Left     NO VISIBLE SCAR   • BREAST FIBROADENOMA SURGERY     • CALCANEOUS OPEN REDUCTION INTERNAL FIXATION     • CATARACT EXTRACTION Bilateral    • CERVICAL CONE BIOPSY     • COLONOSCOPY   "   • COSMETIC SURGERY      SHORT SCAR FACE LIFT PER DR RODRIGUEZ    • DILATATION AND CURETTAGE     • FACIAL COSMETIC SURGERY     • FACIAL COSMETIC SURGERY     • FOOT SURGERY Left     calcaneous    • KNEE ARTHROPLASTY Right     mirza    • KNEE ARTHROSCOPY Bilateral    • REDUCTION MAMMAPLASTY Bilateral    • REPLACEMENT TOTAL KNEE Right    • REPLACEMENT TOTAL KNEE Left    • TENDON REPAIR Right     HAND   • TENDON REPAIR      R hand   • TONSILLECTOMY AND ADENOIDECTOMY     • TOTAL KNEE ARTHROPLASTY Left 4/3/2018    Procedure: TOTAL KNEE ARTHROPLASTY LEFT;  Surgeon: Mahesh Wesley MD;  Location:  [facility];  Service: Orthopedics   • TOTAL SHOULDER ARTHROPLASTY W/ DISTAL CLAVICLE EXCISION Left 5/22/2017    Procedure: LEFT REVERSE TOTAL SHOULDER ARTHROPLASTY FOR FRACTURE;  Surgeon: Damaso Harris MD;  Location:  [facility];  Service:    • TOTAL SHOULDER REPLACEMENT Left    • TOTAL SHOULDER REPLACEMENT Left    • TUBAL ABDOMINAL LIGATION        Family History   Problem Relation Age of Onset   • Parkinsonism Mother    • Heart failure Father    • Kidney failure Father    • Hyperlipidemia Father    • No Known Problems Sister    • No Known Problems Brother    • Breast cancer Neg Hx    • Ovarian cancer Neg Hx    • Colon cancer Neg Hx    • Colon polyps Neg Hx       Social History     Socioeconomic History   • Marital status:      Spouse name: Not on file   • Number of children: Not on file   • Years of education: Not on file   • Highest education level: Not on file   Tobacco Use   • Smoking status: Never Smoker   • Smokeless tobacco: Never Used   Vaping Use   • Vaping Use: Never used   Substance and Sexual Activity   • Alcohol use: Yes     Alcohol/week: 2.0 standard drinks     Types: 1 Glasses of wine, 1 Shots of liquor per week     Comment: DAILY   • Drug use: No   • Sexual activity: Defer      Allergies   Allergen Reactions   • Adhesive Tape Itching      Current Outpatient Medications on File Prior to Visit   Medication Sig  Dispense Refill   • aluminum hydroxide-mag carbonate (GAVISCON EXTRA RELIEF) 160-105 MG chewable tablet chewable tablet Chew 1 tablet Daily As Needed.     • Calcium Carb-Cholecalciferol (CALCIUM-VITAMIN D) 600-400 MG-UNIT tablet Take 1 tablet by mouth 2 (Two) Times a Day.     • Denosumab (PROLIA SC) Inject  under the skin into the appropriate area as directed Every 6 (Six) Months.     • DULoxetine (CYMBALTA) 60 MG capsule Take 60 mg by mouth Daily.     • ENBREL 50 MG/ML injection Inject 50 mg under the skin into the appropriate area as directed 1 (One) Time Per Week.     • folic acid (FOLVITE) 1 MG tablet Take 1 mg by mouth Daily.     • Folic Acid-B2-B6-B12-D-Ca-Phos (FOLGARD PO) Take 1 tablet by mouth 2 (two) times a day.     • lansoprazole (PREVACID) 15 MG capsule Take 15 mg by mouth Daily.     • levothyroxine (SYNTHROID, LEVOTHROID) 88 MCG tablet Take 88 mcg by mouth Daily.     • methotrexate 2.5 MG tablet Take 9 tablets by mouth 1 (One) Time Per Week. MONDAYS  Resume when ok with Dr. Wesley (Patient taking differently: Take 15 mg by mouth 1 (One) Time Per Week. MONDAYS  Resume when ok with Dr. Wesley)  0   • Multiple Vitamins-Minerals (SENIOR MULTIVITAMIN PLUS) tablet Take 1 tablet by mouth Daily.     • multivitamin with minerals tablet tablet Take 1 tablet by mouth Daily.     • NON FORMULARY Take 2 tablets by mouth Daily. Hydro Eye gelcaps     • NON FORMULARY Allergy shots, once every 4 weeks.     • polyethyl glycol-propyl glycol (SYSTANE) 0.4-0.3 % solution ophthalmic solution Administer 2 drops to both eyes 5 (Five) Times a Day.     • predniSONE (DELTASONE) 5 MG tablet Take 5 mg by mouth Daily.     • prochlorperazine (COMPAZINE) 10 MG tablet Take 10 mg by mouth Every 6 (Six) Hours As Needed for Nausea or Vomiting.     • pseudoephedrine-guaifenesin (MUCINEX D)  MG per 12 hr tablet Take 1 tablet by mouth Every 12 (Twelve) Hours.     • Saline 0.2 % solution 1 application into the nostril(s) as directed by  provider 2 (two) times a day.     • salsalate (DISALCID) 750 MG tablet Take 750 mg by mouth 4 (Four) Times a Day.     • vitamin A 8000 UNIT capsule Take 8,000 Units by mouth Daily.     • vitamin C (ASCORBIC ACID) 500 MG tablet Take 500 mg by mouth Daily.     • vitamin E 400 UNIT capsule Take 800 Units by mouth Daily.     • zolpidem (AMBIEN) 10 MG tablet Take 1 tablet by mouth Every Night. 5 tablet 0   • pseudoephedrine-guaifenesin (MUCINEX D)  MG per 12 hr tablet Take 1 tablet by mouth Every 12 (Twelve) Hours. 60 tablet 3   • [DISCONTINUED] ALLERGY SERUM INJECTION Inject  under the skin Every 21 (Twenty-One) Days.     • [DISCONTINUED] Alum Hydroxide-Mag Carbonate (GAVISCON PO) Take 1 tablet by mouth As Needed.     • [DISCONTINUED] Folic Acid-B2-B6-B12-D-Ca-Phos (FOLGARD PO) Take 1 tablet by mouth 2 (two) times a day.     • [DISCONTINUED] folic acid-vit B6-vit B12 (FOLGARD) 2.2-25-1 MG tablet tablet Take 1 tablet by mouth 2 (Two) Times a Day.     • [DISCONTINUED] Vitamins A & D (Vitamin A & D) 8000-400 units capsule Take 1 capsule by mouth Daily.       No current facility-administered medications on file prior to visit.        Review of Systems   Constitutional: Positive for diaphoresis and fatigue.   HENT: Positive for dental problem, drooling, ear pain, hearing loss, mouth sores, postnasal drip, rhinorrhea, tinnitus, trouble swallowing and voice change.    Eyes: Positive for photophobia, itching and visual disturbance.   Respiratory: Positive for cough. Negative for shortness of breath.    Cardiovascular: Positive for palpitations.   Gastrointestinal: Positive for abdominal distention, constipation and diarrhea.   Endocrine: Positive for heat intolerance, polydipsia and polyuria.   Musculoskeletal: Positive for arthralgias, joint swelling, neck pain and neck stiffness.   Skin: Positive for dry skin. Negative for rash.   Allergic/Immunologic: Positive for environmental allergies and food allergies.  "  Neurological: Positive for dizziness. Negative for tremors.   Psychiatric/Behavioral: Positive for sleep disturbance. The patient is not nervous/anxious.        Vitals:    04/05/21 1412   BP: 140/78   Pulse: 72   SpO2: 97%   Weight: 64.9 kg (143 lb)   Height: 149.9 cm (59\")   Body mass index is 28.88 kg/m².     Physical Exam  Vitals reviewed.   Constitutional:       General: She is not in acute distress.  HENT:      Head: Normocephalic and atraumatic.      Right Ear: Hearing normal.      Left Ear: Hearing normal.      Nose: Nose normal.   Eyes:      General: Lids are normal.   Neck:      Thyroid: No thyromegaly or thyroid tenderness.   Cardiovascular:      Rate and Rhythm: Normal rate and regular rhythm.      Heart sounds: No murmur heard.     Pulmonary:      Effort: Pulmonary effort is normal.      Breath sounds: Normal breath sounds and air entry.   Abdominal:      General: Abdomen is flat. Bowel sounds are normal.      Palpations: Abdomen is soft.      Tenderness: There is no abdominal tenderness.   Lymphadenopathy:      Head:      Right side of head: No submandibular adenopathy.      Left side of head: No submandibular adenopathy.      Cervical: No cervical adenopathy.   Skin:     General: Skin is warm and dry.      Findings: No rash.   Neurological:      General: No focal deficit present.      Mental Status: She is alert.      Deep Tendon Reflexes: Reflexes are normal and symmetric.   Psychiatric:         Mood and Affect: Mood and affect normal.         Behavior: Behavior is cooperative.        Labs/Imaging  Results for FERNANDO DELGADO (MRN [id number]) as of 4/5/2021 14:48   Ref. Range 12/7/2020 14:15   TSH Baseline Latest Ref Range: 0.270 - 4.200 uIU/mL 3.150   Free T4 Latest Ref Range: 0.93 - 1.70 ng/dL 0.48 (L)   T3, Free Latest Ref Range: 2.00 - 4.40 pg/mL 1.37 (L)       Assessment and Plan    Diagnoses and all orders for this visit:    1. Abnormal thyroid function test (Primary)  -Patient previously " took Synthroid 88 mcg daily and reports this was discontinued last summer.  Exact reasons for discontinuation are not known.  -Patient not currently taking any thyroid hormone per her report.  -Patient's most recent thyroid function tests are discordant.  TSH would suggest normal thyroid function well free T3 and free T4 suggest hypothyroidism.  No prior testing is available for comparison.  -We will request prior thyroid function testing.  -Plan to repeat thyroid function testing today  -Determine next steps after review of repeat labs  -     TSH  -     T4, Free  -     T3, Free  -     T4  -     T3    2. Sweating increase  -Evaluating thyroid, as above  -Patient reports development of hot flashes and cessation of menses around 10 years ago  -Patient reports increased ring size, shoe size  -Screen for hormonal etiologies of increased sweating  -     Estradiol  -     Follicle Stimulating Hormone  -     Luteinizing Hormone  -     Insulin-like Growth Factor    3. Long term systemic steroid user  -Patient currently taking prednisone 5 mg daily  -Patient reports she has been taking her current dose of prednisone are higher for the last 22 years  -Discussed that patient likely has iatrogenic adrenal insufficiency.  -Discussed that patient should not suddenly discontinue steroids.  Discussed that if discontinuation of steroids forever desired she would need to pass ACTH stimulation test to discontinuation.  Patient voiced understanding.  -Reviewed sick day rules.    Addendum dated 4/14/2021  Received results of labs from prior PCP office  Labs dated 1/30/2020  TSH 1.7  Labs dated 5/2/2019  TSH 0.64  Labs dated 9/13/2018  TSH 0.85         Return in about 3 months (around 7/5/2021), or if symptoms worsen or fail to improve, for Next scheduled follow up. The patient was instructed to contact the clinic with any interval questions or concerns.    Cristiane Araiza MD     Please note that portions of this document were completed  using a voice recognition program. Efforts were made to edit the dictations, but occasionally words are mis-transcribed.

## 2021-04-06 DIAGNOSIS — R94.6 ABNORMAL THYROID FUNCTION TEST: Primary | ICD-10-CM

## 2021-04-06 LAB
ESTRADIOL SERPL HS-MCNC: <5 PG/ML
T3 SERPL-MCNC: 46 NG/DL (ref 80–200)
T3FREE SERPL-MCNC: 1.35 PG/ML (ref 2–4.4)
T4 SERPL-MCNC: 2.88 MCG/DL (ref 4.5–11.7)
TSH SERPL DL<=0.05 MIU/L-ACNC: 2.23 UIU/ML (ref 0.27–4.2)

## 2021-04-07 ENCOUNTER — TELEPHONE (OUTPATIENT)
Dept: ENDOCRINOLOGY | Facility: CLINIC | Age: 71
End: 2021-04-07

## 2021-04-07 NOTE — TELEPHONE ENCOUNTER
Fax request to Dr. Olvera office:  Fax:  [phone], phone:  [phone]    Requested:  Thyroid Function Test  (from 2-3 years ago)

## 2021-04-07 NOTE — TELEPHONE ENCOUNTER
----- Message from Cristiane Araiza MD sent at 4/5/2021  4:54 PM EDT -----  Could you please obtain prior thyroid function testing (for the past 2-3 years) from [facility] (Dr. Olvera)?

## 2021-04-08 LAB — IGF-I SERPL-MCNC: 144 NG/ML (ref 52–196)

## 2021-04-09 ENCOUNTER — TELEPHONE (OUTPATIENT)
Dept: ENDOCRINOLOGY | Facility: CLINIC | Age: 71
End: 2021-04-09

## 2021-04-09 NOTE — TELEPHONE ENCOUNTER
----- Message from Cristiane Araiza MD sent at 4/8/2021  5:35 PM EDT -----  See letter, please let me know what patient prefers.

## 2021-04-09 NOTE — TELEPHONE ENCOUNTER
Informed patient of lab results, pt voiced understanding.  Patient agree to taking Levothyroxine and to do labs in 1 month.    Dr. Araiza, please send levothyroxine to pharmacy and add lab order to chart.   Thank you.

## 2021-04-14 DIAGNOSIS — R94.6 ABNORMAL THYROID FUNCTION TEST: Primary | ICD-10-CM

## 2021-04-14 RX ORDER — LEVOTHYROXINE SODIUM 0.05 MG/1
50 TABLET ORAL DAILY
Qty: 30 TABLET | Refills: 3 | Status: SHIPPED | OUTPATIENT
Start: 2021-04-14 | End: 2021-05-21

## 2021-05-20 ENCOUNTER — LAB (OUTPATIENT)
Dept: LAB | Facility: HOSPITAL | Age: 71
End: 2021-05-20

## 2021-05-20 DIAGNOSIS — R94.6 ABNORMAL THYROID FUNCTION TEST: ICD-10-CM

## 2021-05-20 LAB
T3FREE SERPL-MCNC: 1.25 PG/ML (ref 2–4.4)
T4 FREE SERPL-MCNC: 0.6 NG/DL (ref 0.93–1.7)
TSH SERPL DL<=0.05 MIU/L-ACNC: 1.64 UIU/ML (ref 0.27–4.2)

## 2021-05-20 PROCEDURE — 84439 ASSAY OF FREE THYROXINE: CPT

## 2021-05-20 PROCEDURE — 84481 FREE ASSAY (FT-3): CPT

## 2021-05-20 PROCEDURE — 84443 ASSAY THYROID STIM HORMONE: CPT

## 2021-05-21 ENCOUNTER — TELEPHONE (OUTPATIENT)
Dept: ENDOCRINOLOGY | Facility: CLINIC | Age: 71
End: 2021-05-21

## 2021-05-21 DIAGNOSIS — R94.6 ABNORMAL THYROID FUNCTION TEST: ICD-10-CM

## 2021-05-21 RX ORDER — LEVOTHYROXINE SODIUM 0.07 MG/1
75 TABLET ORAL DAILY
Qty: 30 TABLET | Refills: 3 | Status: SHIPPED | OUTPATIENT
Start: 2021-05-21 | End: 2021-09-14 | Stop reason: SDUPTHER

## 2021-05-21 NOTE — TELEPHONE ENCOUNTER
----- Message from Cristiane Araiza MD sent at 5/21/2021 10:04 AM EDT -----  See letter, please contact patient/mail orders.

## 2021-05-21 NOTE — TELEPHONE ENCOUNTER
"Informed patient of lab results, pt voiced understanding.      -pt states that repeating labs at [facility] or another lab \"its just not going to happen.\" because her  drives pt around and he is not going to want to take her.    -informed pt of the rx for Levothyroxine 75 mcg that was sent to her pharmacy.  Pt voiced understanding.  "

## 2021-05-26 DIAGNOSIS — D75.1 ERYTHROCYTOSIS: Primary | ICD-10-CM

## 2021-05-28 ENCOUNTER — TELEPHONE (OUTPATIENT)
Dept: FAMILY MEDICINE CLINIC | Facility: CLINIC | Age: 71
End: 2021-05-28

## 2021-05-28 NOTE — TELEPHONE ENCOUNTER
Contacted patient to schedule medicare wellness visit. Pt states she does not do wellness visits and did not want to schedule.

## 2021-06-18 ENCOUNTER — LAB (OUTPATIENT)
Dept: LAB | Facility: HOSPITAL | Age: 71
End: 2021-06-18

## 2021-06-18 DIAGNOSIS — D75.1 ERYTHROCYTOSIS: ICD-10-CM

## 2021-06-18 PROCEDURE — 36415 COLL VENOUS BLD VENIPUNCTURE: CPT

## 2021-06-24 LAB
CITATION REF LAB TEST: NORMAL
JAK2 GENE MUT ANL BLD/T: NORMAL
JAK2 P.V617F BLD/T QL: NORMAL
LAB DIRECTOR NAME PROVIDER: NORMAL
LAB DIRECTOR NAME PROVIDER: NORMAL
LABORATORY COMMENT REPORT: NORMAL
LABORATORY COMMENT REPORT: NORMAL
REF LAB TEST METHOD: NORMAL
REFLEX: NORMAL
SPECIMEN PREPARATION: NORMAL

## 2021-07-09 ENCOUNTER — TELEPHONE (OUTPATIENT)
Dept: ENDOCRINOLOGY | Facility: CLINIC | Age: 71
End: 2021-07-09

## 2021-07-09 NOTE — TELEPHONE ENCOUNTER
Please advise.  Thank you.    Pt had labs done at [facility], two weeks ago.  I don't see any labs in chart.

## 2021-07-09 NOTE — TELEPHONE ENCOUNTER
patient states she had the labs done at [facility] on [address].    Informed pt I will contact [facility] lab and have them fax us the lab results.    Once Dr. Araiza reviews the labs, will contact the pt.

## 2021-08-04 ENCOUNTER — OFFICE VISIT (OUTPATIENT)
Dept: FAMILY MEDICINE CLINIC | Facility: CLINIC | Age: 71
End: 2021-08-04

## 2021-08-04 VITALS
DIASTOLIC BLOOD PRESSURE: 80 MMHG | OXYGEN SATURATION: 97 % | SYSTOLIC BLOOD PRESSURE: 136 MMHG | HEIGHT: 59 IN | BODY MASS INDEX: 28.79 KG/M2 | WEIGHT: 142.8 LBS | HEART RATE: 74 BPM

## 2021-08-04 DIAGNOSIS — M26.641 ARTHRITIS OF RIGHT TEMPOROMANDIBULAR JOINT: ICD-10-CM

## 2021-08-04 DIAGNOSIS — E03.9 HYPOTHYROIDISM, UNSPECIFIED TYPE: Primary | ICD-10-CM

## 2021-08-04 DIAGNOSIS — E16.2 HYPOGLYCEMIA: ICD-10-CM

## 2021-08-04 DIAGNOSIS — M05.79 RHEUMATOID ARTHRITIS INVOLVING MULTIPLE SITES WITH POSITIVE RHEUMATOID FACTOR (HCC): ICD-10-CM

## 2021-08-04 DIAGNOSIS — D75.1 ERYTHROCYTOSIS: ICD-10-CM

## 2021-08-04 DIAGNOSIS — R61 CHRONIC NIGHT SWEATS: ICD-10-CM

## 2021-08-04 PROCEDURE — 99214 OFFICE O/P EST MOD 30 MIN: CPT | Performed by: INTERNAL MEDICINE

## 2021-08-04 RX ORDER — CYCLOSPORINE 0.5 MG/ML
EMULSION OPHTHALMIC
COMMUNITY
Start: 2021-07-01 | End: 2022-04-13

## 2021-08-04 RX ORDER — HYDROCODONE BITARTRATE AND ACETAMINOPHEN 10; 325 MG/1; MG/1
1 TABLET ORAL EVERY 6 HOURS PRN
Qty: 60 TABLET | Refills: 0 | Status: SHIPPED | OUTPATIENT
Start: 2021-08-04 | End: 2022-08-18 | Stop reason: SDUPTHER

## 2021-08-04 NOTE — PROGRESS NOTES
"Shanel Wall  1950  [id number]  Patient Care Team:  Fili Ray MD as PCP - General (Internal Medicine)  Cristiane Araiza MD as Consulting Physician (Endocrinology)    Shanel Wall is a 70 y.o. female here today for follow up.     This patient is accompanied by their self who contributes to the history of their care.    Chief Complaint:    Chief Complaint   Patient presents with   • Hypothyroidism     Follow up   • Earache     started 18 months ago. Feels like an ice pick stabbing pain         History of Present Illness:  I have reviewed and/or updated the patient's past medical, past surgical, family, social history, problem list and allergies as appropriate.     Has wrapped up endocrinology * confounders  In Free t4- must be done by dialysis ( at [facility]). Currently on 75 mCg synthroid. Still with hotflashes. Reports right ear pan for 18 mons. Sharp. Tried bite guard and saw Dr. Enamorado with oral surgery, told bite guard was unnecessary. Seen by ENT, ear ok. Dentist felt tooth abscess- root canal performed with no improvement. Pain comes and goes, always present. Can not identify exacerbating factors. Also has chronic neck pain from djd. Reports preauricular \"deadness and numbness\" No exaceraating factors. May have bruxism. Still wears retainer.     Profuse night sweats.  No weight loss.  Continues to have intermittent low blood sugar.  Is on chronic prednisone.  No nausea no vomiting no lightheadedness.  Evaluated her high hemoglobin with a JAK2 mutation found to be unremarkable.  He has significant fatigue as well.  Is maintained on Synthroid as above.  She indicates she has been on Cymbalta for years.  She does need a refill on her Norco.  She usually gets 60 tablets and this lasts her approximately 2 years.    Review of Systems   Constitutional: Positive for diaphoresis and fatigue. Negative for unexpected weight gain and unexpected weight loss.   HENT:        Shoulder pain, right, right " "ear pain   Eyes: Negative.    Respiratory: Negative.    Cardiovascular: Negative.  Negative for palpitations.   Gastrointestinal: Negative.    Endocrine: Positive for heat intolerance. Negative for cold intolerance.   Genitourinary: Negative for hematuria.   Musculoskeletal: Positive for arthralgias and neck pain.   Skin: Positive for dry skin.   Neurological: Positive for dizziness.   Hematological: Negative for adenopathy. Bruises/bleeds easily.       Vitals:    08/04/21 1343   BP: 136/80   Pulse: 74   SpO2: 97%   Weight: 64.8 kg (142 lb 12.8 oz)   Height: 149.9 cm (59.02\")   PainSc:   3   PainLoc: Ear     Body mass index is 28.83 kg/m².    Physical Exam  Vitals and nursing note reviewed.   Constitutional:       General: She is not in acute distress.     Appearance: She is well-developed. She is not diaphoretic.   HENT:      Head: Normocephalic and atraumatic.      Right Ear: Tympanic membrane, ear canal and external ear normal.      Left Ear: Tympanic membrane and external ear normal.      Ears:      Comments: She is markedly tender at her right tempo- mandibular joint minimal crepitus noted.     Mouth/Throat:      Mouth: Mucous membranes are moist.      Pharynx: Oropharynx is clear. No oropharyngeal exudate.   Eyes:      General: No scleral icterus.        Right eye: No discharge.         Left eye: No discharge.      Extraocular Movements: Extraocular movements intact.      Conjunctiva/sclera: Conjunctivae normal.   Neck:      Thyroid: No thyromegaly.      Vascular: No JVD.      Trachea: No tracheal deviation.   Cardiovascular:      Rate and Rhythm: Normal rate and regular rhythm.      Pulses: Normal pulses.      Heart sounds: Normal heart sounds.      Comments: PMI nondisplaced  Pulmonary:      Effort: Pulmonary effort is normal.      Breath sounds: Normal breath sounds. No wheezing or rales.   Abdominal:      General: Bowel sounds are normal.      Palpations: Abdomen is soft.      Tenderness: There is no " abdominal tenderness. There is no guarding or rebound.   Musculoskeletal:      Cervical back: Normal range of motion and neck supple.      Comments: Antalgic gait with ambulates with a cane   Lymphadenopathy:      Cervical: No cervical adenopathy.   Skin:     General: Skin is warm.      Capillary Refill: Capillary refill takes less than 2 seconds.      Coloration: Skin is not pale.      Findings: No rash.      Comments: She is diaphoretic at present.   Neurological:      Mental Status: She is alert and oriented to person, place, and time.      Motor: No abnormal muscle tone.      Coordination: Coordination normal.   Psychiatric:         Judgment: Judgment normal.         Procedures    Results Review:    I reviewed the patient's new clinical results.    Assessment/Plan:  This is a 70-year-old female with essentially stable erythrocytosis, fatigue, diaphoresis.  She is mental compromised for her rheumatoid arthritis.  Endocrinology feels that she is chemically euthyroid.  I have recommended hematology evaluation.  Differential also includes adrenal insufficiency, she has been on chronic prednisone and likely may be dependent upon this for life.  I do believe she has TMJ or possible trigeminal neuralgia.  I did offer her referral to  maxillofacial pain clinic however she will consider this.  Also read offered pain management for possible injection therapy and she will consider.  Problem List Items Addressed This Visit        ENT    Arthritis of right temporomandibular joint       Endocrine and Metabolic    Hypoglycemia    Hypothyroid - Primary    Relevant Medications    predniSONE (DELTASONE) 5 MG tablet    levothyroxine (Synthroid) 75 MCG tablet       Hematology and Neoplasia    Erythrocytosis    Relevant Medications    folic acid (FOLVITE) 1 MG tablet    Folic Acid-B2-B6-B12-D-Ca-Phos (FOLGARD PO)    Other Relevant Orders    Ambulatory Referral to Hematology       Musculoskeletal and Injuries    Rheumatoid  arthritis involving multiple sites (CMS/[facility])    Relevant Medications    predniSONE (DELTASONE) 5 MG tablet    ENBREL 50 MG/ML injection    HYDROcodone-acetaminophen (NORCO)  MG per tablet       Symptoms and Signs    Chronic night sweats    Relevant Orders    Ambulatory Referral to Hematology          Plan of care reviewed with patient at the conclusion of today's visit. Education was provided regarding diagnosis and management.  Patient verbalizes understanding of and agreement with management plan.    Return in about 6 months (around 2/4/2022).    Fili Ray MD    Please note that portions of this note may have been completed with a voice recognition program. Efforts were made to edit the dictations, but occasionally words are mistranscribed.

## 2021-09-02 ENCOUNTER — HOSPITAL ENCOUNTER (OUTPATIENT)
Dept: PULMONOLOGY | Facility: HOSPITAL | Age: 71
Discharge: HOME | End: 2021-09-02

## 2021-09-02 ENCOUNTER — CONSULT (OUTPATIENT)
Dept: ONCOLOGY | Facility: CLINIC | Age: 71
End: 2021-09-02

## 2021-09-02 ENCOUNTER — LAB (OUTPATIENT)
Dept: LAB | Facility: HOSPITAL | Age: 71
End: 2021-09-02

## 2021-09-02 VITALS
OXYGEN SATURATION: 97 % | TEMPERATURE: 97.6 F | RESPIRATION RATE: 18 BRPM | DIASTOLIC BLOOD PRESSURE: 75 MMHG | HEART RATE: 83 BPM | WEIGHT: 143 LBS | HEIGHT: 59 IN | BODY MASS INDEX: 28.83 KG/M2 | SYSTOLIC BLOOD PRESSURE: 152 MMHG

## 2021-09-02 DIAGNOSIS — D75.1 ERYTHROCYTOSIS: Chronic | ICD-10-CM

## 2021-09-02 DIAGNOSIS — D75.1 ERYTHROCYTOSIS: Primary | Chronic | ICD-10-CM

## 2021-09-02 DIAGNOSIS — D75.1 ERYTHROCYTOSIS: ICD-10-CM

## 2021-09-02 LAB
ALBUMIN SERPL-MCNC: 4.2 G/DL (ref 3.5–5.2)
ALBUMIN/GLOB SERPL: 1.3 G/DL
ALP SERPL-CCNC: 92 U/L (ref 39–117)
ALT SERPL W P-5'-P-CCNC: 18 U/L (ref 1–33)
ANION GAP SERPL CALCULATED.3IONS-SCNC: 14 MMOL/L (ref 5–15)
ARTERIAL PATENCY WRIST A: ABNORMAL
AST SERPL-CCNC: 33 U/L (ref 1–32)
ATMOSPHERIC PRESS: ABNORMAL MM[HG]
BASE EXCESS BLDA CALC-SCNC: 5.3 MMOL/L (ref 0–2)
BDY SITE: ABNORMAL
BILIRUB SERPL-MCNC: 0.2 MG/DL (ref 0–1.2)
BODY TEMPERATURE: 37 C
BUN SERPL-MCNC: 13 MG/DL (ref 8–23)
BUN/CREAT SERPL: 14 (ref 7–25)
CALCIUM SPEC-SCNC: 9.8 MG/DL (ref 8.6–10.5)
CHLORIDE SERPL-SCNC: 101 MMOL/L (ref 98–107)
CO2 BLDA-SCNC: 31.2 MMOL/L (ref 22–33)
CO2 SERPL-SCNC: 27 MMOL/L (ref 22–29)
COHGB MFR BLD: 0.7 % (ref 0–2)
CREAT SERPL-MCNC: 0.93 MG/DL (ref 0.57–1)
ERYTHROCYTE [DISTWIDTH] IN BLOOD BY AUTOMATED COUNT: 15.9 % (ref 12.3–15.4)
GFR SERPL CREATININE-BSD FRML MDRD: 60 ML/MIN/1.73
GLOBULIN UR ELPH-MCNC: 3.2 GM/DL
GLUCOSE SERPL-MCNC: 106 MG/DL (ref 65–99)
HCO3 BLDA-SCNC: 29.9 MMOL/L (ref 20–26)
HCT VFR BLD AUTO: 52.2 % (ref 34–46.6)
HCT VFR BLD CALC: 51.3 %
HGB BLD-MCNC: 16.9 G/DL (ref 12–15.9)
HGB BLDA-MCNC: 16.7 G/DL (ref 14–18)
INHALED O2 CONCENTRATION: 21 %
LYMPHOCYTES # BLD AUTO: 0.8 10*3/MM3 (ref 0.7–3.1)
LYMPHOCYTES NFR BLD AUTO: 14.5 % (ref 19.6–45.3)
MCH RBC QN AUTO: 31.8 PG (ref 26.6–33)
MCHC RBC AUTO-ENTMCNC: 32.3 G/DL (ref 31.5–35.7)
MCV RBC AUTO: 98.3 FL (ref 79–97)
METHGB BLD QL: 0.3 % (ref 0–1.5)
MODALITY: ABNORMAL
MONOCYTES # BLD AUTO: 0.2 10*3/MM3 (ref 0.1–0.9)
MONOCYTES NFR BLD AUTO: 3.4 % (ref 5–12)
NEUTROPHILS NFR BLD AUTO: 4.7 10*3/MM3 (ref 1.7–7)
NEUTROPHILS NFR BLD AUTO: 82.1 % (ref 42.7–76)
NOTE: ABNORMAL
OXYHGB MFR BLDV: 94.2 % (ref 94–99)
PCO2 BLDA: 42.2 MM HG (ref 35–45)
PCO2 TEMP ADJ BLD: 42.2 MM HG (ref 35–45)
PH BLDA: 7.46 PH UNITS (ref 7.35–7.45)
PH, TEMP CORRECTED: 7.46 PH UNITS
PLATELET # BLD AUTO: 231 10*3/MM3 (ref 140–450)
PMV BLD AUTO: 6.8 FL (ref 6–12)
PO2 BLDA: 72.7 MM HG (ref 83–108)
PO2 TEMP ADJ BLD: 72.7 MM HG (ref 83–108)
POTASSIUM SERPL-SCNC: 4.4 MMOL/L (ref 3.5–5.2)
PROT SERPL-MCNC: 7.4 G/DL (ref 6–8.5)
RBC # BLD AUTO: 5.31 10*6/MM3 (ref 3.77–5.28)
SODIUM SERPL-SCNC: 142 MMOL/L (ref 136–145)
VENTILATOR MODE: ABNORMAL
WBC # BLD AUTO: 5.7 10*3/MM3 (ref 3.4–10.8)

## 2021-09-02 PROCEDURE — 82375 ASSAY CARBOXYHB QUANT: CPT

## 2021-09-02 PROCEDURE — 36415 COLL VENOUS BLD VENIPUNCTURE: CPT

## 2021-09-02 PROCEDURE — 36600 WITHDRAWAL OF ARTERIAL BLOOD: CPT

## 2021-09-02 PROCEDURE — 80053 COMPREHEN METABOLIC PANEL: CPT

## 2021-09-02 PROCEDURE — 99205 OFFICE O/P NEW HI 60 MIN: CPT | Performed by: INTERNAL MEDICINE

## 2021-09-02 PROCEDURE — 82668 ASSAY OF ERYTHROPOIETIN: CPT

## 2021-09-02 PROCEDURE — 85025 COMPLETE CBC W/AUTO DIFF WBC: CPT

## 2021-09-02 PROCEDURE — 82607 VITAMIN B-12: CPT

## 2021-09-02 PROCEDURE — 83050 HGB METHEMOGLOBIN QUAN: CPT

## 2021-09-02 PROCEDURE — 82805 BLOOD GASES W/O2 SATURATION: CPT

## 2021-09-02 NOTE — PROGRESS NOTES
CHIEF COMPLAINT: Erythrocytosis JAK2 negative    REASON FOR REFERRAL: Same      RECORDS OBTAINED  Records of the patients history including those obtained from primary care were reviewed and summarized in detail.    Oncology/Hematology History Overview Note   1.  Erythrocytosis with negative JAK2  2.  Hypothyroidism  3.  Rheumatoid arthritis  4.  Stage I sacral decubiti with severe diverticulosis with numerous fecaliths and extensive pelvic adhesions on colonoscopy 1/16/2019 Matthew Munoztai  5.  History of closed left humeral fracture  6. Obstructive sleep apnea      Hematology history timeline:  -5/23/2017 hemoglobin 11.2 with hematocrit 36.2% otherwise unremarkable CBC and CMP and differential.  -11/16/2018 ultrasound liver done by rheumatologist shows F0 elastography with gallstones.  No mention of imaging of spleen.  -11/20/2020 hematocrit 48.4% with normal white count platelet count and differential.  -12/7/2020 hematocrit 47.8% otherwise unremarkable CBC  -2/17/2021 hematocrit 47.3% with normal white count platelet count differential.  -3/11/2021 hematocrit 47.6% otherwise unremarkable CBC  -5/25/2011 hematocrit 49.1% with normal white count platelet count and differential with unremarkable CMP.  Sedimentation rate 2 with C-reactive protein less than 1.  -6/18/2021 JAK2 V6 17F mutation negative.  Exon 12-15 mutation negative.  -9/2/2021 [facility] medical oncology hematology consult: No palpable splenomegaly.  No erythromelalgia or pruritus.  No early satiety.  No plethora.  Hematocrit minimally elevated.  We will check SAMANTHA reticulin and MPL mutations as well as ABG and carboxyhemoglobin as well as erythropoietin level.  If no hint of myeloproliferative disorder, but will get to pulmonary medicine to investigate for causes of intermittent hypoxia unless he is frankly hypoxic at which point it becomes more apparent the cause of this.  She has no known reasons for hypoxia.  If erythropoietin level high, will do  "imaging of abdomen to look for evidence of hepatocellular malignancy or renal cell carcinoma which while being a much less common cause overall of erythrocytosis, these are malignancies that can produce erythropoietin.  Suspicions are low however. She does have obstructive sleep apnea and is noncompliant and virtually never wears her CPAP as it gives her claustrophobia. I overwhelmingly think that is the most likely culprit in this process of likely secondary erythrocytosis but we shall see.     Erythrocytosis       HISTORY OF PRESENT ILLNESS:  The patient is a 70 y.o.  female, referred for JAK2 negative erythrocytosis.    REVIEW OF SYSTEMS:  No erythromelalgia, early satiety, pruritus, plethora.    Past Medical History:   Diagnosis Date   • Abnormal EKG    • Anesthesia     PT HAS RHEUMATOID ARTHRITIS, PT HAS SPECIFIC INSTRUCTIONS PER RA MD, pt to bring INSTRUCTIONS AND RECS ATTACHED TO CHART AND PT TO DISCUSS WITH ANESTHESIA ON DATE OF PROCEDURE    • Arthritis    • Back disorder     degenerative disc disorder   • Baker's cyst of knee     right   • Benign paroxysmal positional vertigo    • Cervical cancer (CMS/HCC)     cone biopsy   • Depression    • Deviated septum    • Deviated septum    • Disease of thyroid gland    • Diverticulosis    • Diverticulosis    • Fibromyalgia    • Fibromyalgia    • Gall stones    • GERD (gastroesophageal reflux disease)    • Hard to intubate     \"anterior trachea\"    • History of gallstones    • Hypoglycemia    • Hypothyroidism    • IBS (irritable bowel syndrome)    • Joint pain    • Macular degeneration    • Measles    • Menopause    • Multiple food allergies    • Osteoarthritis    • Osteoarthritis (arthritis due to wear and tear of joints)    • Popliteal cyst    • Retinal disease, bilateral    • Rheumatoid aortitis    • Rheumatoid arthritis (CMS/HCC)    • Rheumatoid arthritis (CMS/HCC)    • Ruptured tendon    • Scoliosis    • Scoliosis    • Seasonal allergies    • Shingles    • Sleep " apnea    • Suppression of immune system subtherapeutic (CMS/HCC)    • Synovitis    • Synovitis    • Tinnitus    • Tinnitus    • TMJ (dislocation of temporomandibular joint)    • Vertigo    • Wears glasses      Past Surgical History:   Procedure Laterality Date   • ADENOIDECTOMY     • BILATERAL BREAST REDUCTION     • BLEPHAROPLASTY     • BLEPHAROPLASTY, QUAD Bilateral 09/18/2018    Dr. romano.   • BREAST EXCISIONAL BIOPSY Left     NO VISIBLE SCAR   • BREAST FIBROADENOMA SURGERY     • CALCANEOUS OPEN REDUCTION INTERNAL FIXATION     • CATARACT EXTRACTION Bilateral    • CERVICAL CONE BIOPSY     • COLONOSCOPY     • COSMETIC SURGERY      SHORT SCAR FACE LIFT PER DR ROMANO    • DILATATION AND CURETTAGE     • FACIAL COSMETIC SURGERY     • FACIAL COSMETIC SURGERY     • FOOT SURGERY Left     calcaneous    • KNEE ARTHROPLASTY Right     blue    • KNEE ARTHROSCOPY Bilateral    • REDUCTION MAMMAPLASTY Bilateral    • REPLACEMENT TOTAL KNEE Right    • REPLACEMENT TOTAL KNEE Left    • TENDON REPAIR Right     HAND   • TENDON REPAIR      R hand   • TONSILLECTOMY AND ADENOIDECTOMY     • TOTAL KNEE ARTHROPLASTY Left 4/3/2018    Procedure: TOTAL KNEE ARTHROPLASTY LEFT;  Surgeon: Mahesh Wesley MD;  Location:  [facility];  Service: Orthopedics   • TOTAL SHOULDER ARTHROPLASTY W/ DISTAL CLAVICLE EXCISION Left 5/22/2017    Procedure: LEFT REVERSE TOTAL SHOULDER ARTHROPLASTY FOR FRACTURE;  Surgeon: Damaso Harris MD;  Location:  [facility];  Service:    • TOTAL SHOULDER REPLACEMENT Left    • TOTAL SHOULDER REPLACEMENT Left    • TUBAL ABDOMINAL LIGATION         Current Outpatient Medications on File Prior to Visit   Medication Sig Dispense Refill   • aluminum hydroxide-mag carbonate (GAVISCON EXTRA RELIEF) 160-105 MG chewable tablet chewable tablet Chew 1 tablet Daily As Needed.     • Calcium Carb-Cholecalciferol (CALCIUM-VITAMIN D) 600-400 MG-UNIT tablet Take 1 tablet by mouth 2 (Two) Times a Day.     • Denosumab (PROLIA SC) Inject  under the  skin into the appropriate area as directed Every 6 (Six) Months.     • DULoxetine (CYMBALTA) 60 MG capsule Take 60 mg by mouth Daily.     • ENBREL 50 MG/ML injection Inject 50 mg under the skin into the appropriate area as directed 1 (One) Time Per Week.     • folic acid (FOLVITE) 1 MG tablet Take 1 mg by mouth Daily.     • Folic Acid-B2-B6-B12-D-Ca-Phos (FOLGARD PO) Take 1 tablet by mouth 2 (two) times a day.     • HYDROcodone-acetaminophen (NORCO)  MG per tablet Take 1 tablet by mouth Every 6 (Six) Hours As Needed for Moderate Pain . 60 tablet 0   • lansoprazole (PREVACID) 15 MG capsule Take 15 mg by mouth Daily.     • levothyroxine (Synthroid) 75 MCG tablet Take 1 tablet by mouth Daily. 30 tablet 3   • methotrexate 2.5 MG tablet Take 9 tablets by mouth 1 (One) Time Per Week. MONDAYS  Resume when ok with Dr. Wesley (Patient taking differently: Take 15 mg by mouth 1 (One) Time Per Week. MONDAYS  Resume when ok with Dr. Wesley)  0   • Multiple Vitamins-Minerals (SENIOR MULTIVITAMIN PLUS) tablet Take 1 tablet by mouth Daily.     • NON FORMULARY Take 2 tablets by mouth Daily. Hydro Eye gelcaps     • polyethyl glycol-propyl glycol (SYSTANE) 0.4-0.3 % solution ophthalmic solution Administer 2 drops to both eyes 5 (Five) Times a Day.     • predniSONE (DELTASONE) 5 MG tablet Take 5 mg by mouth Daily.     • prochlorperazine (COMPAZINE) 10 MG tablet Take 10 mg by mouth Every 6 (Six) Hours As Needed for Nausea or Vomiting.     • pseudoephedrine-guaifenesin (MUCINEX D)  MG per 12 hr tablet Take 1 tablet by mouth Every 12 (Twelve) Hours.     • Restasis 0.05 % ophthalmic emulsion      • Saline 0.2 % solution 1 application into the nostril(s) as directed by provider 2 (two) times a day.     • salsalate (DISALCID) 750 MG tablet Take 750 mg by mouth 4 (Four) Times a Day.     • vitamin A 8000 UNIT capsule Take 8,000 Units by mouth Daily.     • vitamin C (ASCORBIC ACID) 500 MG tablet Take 500 mg by mouth Daily.     • vitamin  "E 400 UNIT capsule Take 800 Units by mouth Daily.     • zolpidem (AMBIEN) 10 MG tablet Take 1 tablet by mouth Every Night. 5 tablet 0     No current facility-administered medications on file prior to visit.       Allergies   Allergen Reactions   • Adhesive Tape Itching       Social History     Socioeconomic History   • Marital status:      Spouse name: Not on file   • Number of children: Not on file   • Years of education: Not on file   • Highest education level: Not on file   Tobacco Use   • Smoking status: Never Smoker   • Smokeless tobacco: Never Used   Vaping Use   • Vaping Use: Never used   Substance and Sexual Activity   • Alcohol use: Yes     Alcohol/week: 2.0 standard drinks     Types: 1 Glasses of wine, 1 Shots of liquor per week     Comment: DAILY   • Drug use: No   • Sexual activity: Defer       Family History   Problem Relation Age of Onset   • Parkinsonism Mother    • Heart failure Father    • Kidney failure Father    • Hyperlipidemia Father    • No Known Problems Sister    • No Known Problems Brother    • Breast cancer Neg Hx    • Ovarian cancer Neg Hx    • Colon cancer Neg Hx    • Colon polyps Neg Hx        PHYSICAL EXAM:  Rheumatoid arthritic changes.  No plethora.  No palpable hepatosplenomegaly    /75   Pulse 83   Temp 97.6 °F (36.4 °C)   Resp 18   Ht 149.9 cm (59\")   Wt 64.9 kg (143 lb)   SpO2 97%   BMI 28.88 kg/m²     ECOG score: 0               Lab Results   Component Value Date    HGB 15.2 02/17/2021    HCT 47.3 (H) 02/17/2021    .2 (H) 02/17/2021     02/17/2021    WBC 7.92 02/17/2021    NEUTROABS 4.60 02/17/2021    LYMPHSABS 2.26 02/17/2021    MONOSABS 0.78 02/17/2021    EOSABS 0.19 02/17/2021    BASOSABS 0.06 02/17/2021     Lab Results   Component Value Date    GLUCOSE 89 02/17/2021    BUN 13 02/17/2021    CREATININE 0.82 02/17/2021     02/17/2021    K 3.5 02/17/2021     02/17/2021    CO2 34.0 (H) 02/17/2021    CALCIUM 9.2 02/17/2021    PROTEINTOT " 6.6 02/17/2021    ALBUMIN 4.00 02/17/2021    BILITOT 0.2 02/17/2021    ALKPHOS 75 02/17/2021    AST 31 02/17/2021    ALT 18 02/17/2021         Assessment/Plan   1.  Erythrocytosis with negative JAK2  2.  Hypothyroidism  3.  Rheumatoid arthritis  4.  Stage I sacral decubiti with severe diverticulosis with numerous fecaliths and extensive pelvic adhesions on colonoscopy 1/16/2019 Matthew Hassan  5.  History of closed left humeral fracture  6. Obstructive sleep apnea      Hematology history timeline:  -5/23/2017 hemoglobin 11.2 with hematocrit 36.2% otherwise unremarkable CBC and CMP and differential.  -11/16/2018 ultrasound liver done by rheumatologist shows F0 elastography with gallstones.  No mention of imaging of spleen.  -11/20/2020 hematocrit 48.4% with normal white count platelet count and differential.  -12/7/2020 hematocrit 47.8% otherwise unremarkable CBC  -2/17/2021 hematocrit 47.3% with normal white count platelet count differential.  -3/11/2021 hematocrit 47.6% otherwise unremarkable CBC  -5/25/2011 hematocrit 49.1% with normal white count platelet count and differential with unremarkable CMP.  Sedimentation rate 2 with C-reactive protein less than 1.  -6/18/2021 JAK2 V6 17F mutation negative.  Exon 12-15 mutation negative.  -9/2/2021 [facility] medical oncology hematology consult: No palpable splenomegaly.  No erythromelalgia or pruritus.  No early satiety.  No plethora.  Hematocrit minimally elevated.  We will check SAMANTHA reticulin and MPL mutations as well as ABG and carboxyhemoglobin as well as erythropoietin level.  If no hint of myeloproliferative disorder, but will get to pulmonary medicine to investigate for causes of intermittent hypoxia unless he is frankly hypoxic at which point it becomes more apparent the cause of this.  She has no known reasons for hypoxia.  If erythropoietin level high, will do imaging of abdomen to look for evidence of hepatocellular malignancy or renal cell carcinoma which while  being a much less common cause overall of erythrocytosis, these are malignancies that can produce erythropoietin.  Suspicions are low however. She does have obstructive sleep apnea and is noncompliant and virtually never wears her CPAP as it gives her claustrophobia. I overwhelmingly think that is the most likely culprit in this process of likely secondary erythrocytosis but we shall see.    Total time of care today inclusive of time spent today prior to her arrival reviewing multiple records and extracting them throughout the past medical record and summarizing as above and during visit translating this information and the differential diagnosis of mild erythrocytosis and after visit instituting this plan took 1 hour patient care throughout the day today.      Jose Shaw MD    9/2/2021

## 2021-09-03 LAB
EPO SERPL-ACNC: 17.5 MIU/ML (ref 2.6–18.5)
VIT B12 BLD-MCNC: >2000 PG/ML (ref 211–946)

## 2021-09-10 ENCOUNTER — LAB (OUTPATIENT)
Dept: LAB | Facility: HOSPITAL | Age: 71
End: 2021-09-10

## 2021-09-10 DIAGNOSIS — D75.1 ERYTHROCYTOSIS: Primary | ICD-10-CM

## 2021-09-10 DIAGNOSIS — D75.1 ERYTHROCYTOSIS: ICD-10-CM

## 2021-09-10 PROCEDURE — 36415 COLL VENOUS BLD VENIPUNCTURE: CPT

## 2021-09-14 DIAGNOSIS — R94.6 ABNORMAL THYROID FUNCTION TEST: ICD-10-CM

## 2021-09-14 RX ORDER — LEVOTHYROXINE SODIUM 0.07 MG/1
75 TABLET ORAL DAILY
Qty: 30 TABLET | Refills: 3 | Status: SHIPPED | OUTPATIENT
Start: 2021-09-14 | End: 2022-01-11

## 2021-09-14 NOTE — TELEPHONE ENCOUNTER
Caller: Shanel Wall    Relationship: Self    Best call back number:[phone]    Medication needed:   Requested Prescriptions     Pending Prescriptions Disp Refills   • levothyroxine (Synthroid) 75 MCG tablet 30 tablet 3     Sig: Take 1 tablet by mouth Daily.       When do you need the refill by: 09/14    What additional details did the patient provide when requesting the medication: PATIENT IS OUT OF THE MEDICATION     Does the patient have less than a 3 day supply:  [x] Yes  [] No    What is the patient's preferred pharmacy: [facility] [address] [facility] [phone] [facility] [phone] FX

## 2021-09-14 NOTE — TELEPHONE ENCOUNTER
Rx Refill Note  Requested Prescriptions     Pending Prescriptions Disp Refills   • levothyroxine (Synthroid) 75 MCG tablet 30 tablet 3     Sig: Take 1 tablet by mouth Daily.      Last office visit with prescribing clinician: 8/4/2021      Next office visit with prescribing clinician: none           Sarita Landers MA  09/14/21, 09:51 EDT

## 2021-09-16 LAB
CALR EXON 9 MUT ANL BLD/T: NORMAL
CITATION REF LAB TEST: NORMAL
CITATION REF LAB TEST: NORMAL
LAB DIRECTOR NAME PROVIDER: NORMAL
LAB DIRECTOR NAME PROVIDER: NORMAL
Lab: NORMAL
MPL GENE MUT TESTED BLD/T: NORMAL
MPL P.W515L+W515K+S505N BLD/T QL: NORMAL
REF LAB TEST METHOD: NORMAL
SERVICE CMNT-IMP: NORMAL

## 2021-09-27 ENCOUNTER — OFFICE VISIT (OUTPATIENT)
Dept: ONCOLOGY | Facility: CLINIC | Age: 71
End: 2021-09-27

## 2021-09-27 VITALS
SYSTOLIC BLOOD PRESSURE: 164 MMHG | DIASTOLIC BLOOD PRESSURE: 63 MMHG | WEIGHT: 141.7 LBS | BODY MASS INDEX: 28.56 KG/M2 | TEMPERATURE: 97.2 F | RESPIRATION RATE: 18 BRPM | HEIGHT: 59 IN | OXYGEN SATURATION: 97 % | HEART RATE: 74 BPM

## 2021-09-27 DIAGNOSIS — D75.1 ERYTHROCYTOSIS: Primary | Chronic | ICD-10-CM

## 2021-09-27 PROCEDURE — 99214 OFFICE O/P EST MOD 30 MIN: CPT | Performed by: INTERNAL MEDICINE

## 2021-09-27 NOTE — PROGRESS NOTES
CHIEF COMPLAINT: Secondary erythrocytosis    Problem List:  Oncology/Hematology History Overview Note   1.  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  Hypothyroidism  3.  Rheumatoid arthritis  4.  Stage I sacral decubiti with severe diverticulosis with numerous fecaliths and extensive pelvic adhesions on colonoscopy 1/16/2019 Matthew Munoztai  5.  History of closed left humeral fracture  6. Obstructive sleep apnea      Hematology history timeline:  -5/23/2017 hemoglobin 11.2 with hematocrit 36.2% otherwise unremarkable CBC and CMP and differential.  -11/16/2018 ultrasound liver done by rheumatologist shows F0 elastography with gallstones.  No mention of imaging of spleen.  -11/20/2020 hematocrit 48.4% with normal white count platelet count and differential.  -12/7/2020 hematocrit 47.8% otherwise unremarkable CBC  -2/17/2021 hematocrit 47.3% with normal white count platelet count differential.  -3/11/2021 hematocrit 47.6% otherwise unremarkable CBC  -5/25/2011 hematocrit 49.1% with normal white count platelet count and differential with unremarkable CMP.  Sedimentation rate 2 with C-reactive protein less than 1.  -6/18/2021 JAK2 V6 17F mutation negative.  Exon 12-15 mutation negative.  -9/2/2021 [facility] medical oncology hematology consult: No palpable splenomegaly.  No erythromelalgia or pruritus.  No early satiety.  No plethora.  Hematocrit minimally elevated.  We will check SAMANTHA reticulin and MPL mutations as well as ABG and carboxyhemoglobin as well as erythropoietin level.  If no hint of myeloproliferative disorder, but will get to pulmonary medicine to investigate for causes of intermittent hypoxia unless he is frankly hypoxic at which point it becomes more apparent the cause of this.  She has no known reasons for hypoxia.  If erythropoietin level high, will do imaging of abdomen to look for  evidence of hepatocellular malignancy or renal cell carcinoma which while being a much less common cause overall of erythrocytosis, these are malignancies that can produce erythropoietin.  Suspicions are low however. She does have obstructive sleep apnea and is noncompliant and virtually never wears her CPAP as it gives her claustrophobia. I overwhelmingly think that is the most likely culprit in this process of likely secondary erythrocytosis but we shall see.    -9/27/2021 [facility] hematology follow-up visit: 9/2/2021 data showed hematocrit 52.2% with otherwise unremarkable CBC.  Erythropoietin upper end of normal 17.5.  CMP unremarkable.  B12 greater than 2000.  MPL and SAMANTHA R mutations negative.  PO2 72.7 with carboxyhemoglobin normal 0.7.  In combination with a low PO2 and obstructive sleep apnea and absence of molecular evidence or physical exam evidence of myeloproliferative disorder, this is almost assuredly secondary erythrocytosis and I would not phlebotomize unless the hematocrit got well over 60%.  She needs to see pulmonary medicine and make sure she religiously wears her CPAP.  Erythropoietin level is not significantly elevated and the odds of underlying malignancy is very low.  I will see on an as-needed basis.     Erythrocytosis       HISTORY OF PRESENT ILLNESS:  The patient is a 70 y.o. female, here for follow up on management of secondary erythrocytosis    Past Medical History:   Diagnosis Date   • Abnormal EKG    • Anesthesia     PT HAS RHEUMATOID ARTHRITIS, PT HAS SPECIFIC INSTRUCTIONS PER RA MD, pt to bring INSTRUCTIONS AND RECS ATTACHED TO CHART AND PT TO DISCUSS WITH ANESTHESIA ON DATE OF PROCEDURE    • Arthritis    • Back disorder     degenerative disc disorder   • Baker's cyst of knee     right   • Benign paroxysmal positional vertigo    • Cervical cancer (CMS/HCC)     cone biopsy   • Depression    • Deviated septum    • Deviated septum    • Disease of thyroid gland    • Diverticulosis    •  "Diverticulosis    • Fibromyalgia    • Fibromyalgia    • Gall stones    • GERD (gastroesophageal reflux disease)    • Hard to intubate     \"anterior trachea\"    • History of gallstones    • Hypoglycemia    • Hypothyroidism    • IBS (irritable bowel syndrome)    • Joint pain    • Macular degeneration    • Measles    • Menopause    • Multiple food allergies    • Osteoarthritis    • Osteoarthritis (arthritis due to wear and tear of joints)    • Popliteal cyst    • Retinal disease, bilateral    • Rheumatoid aortitis    • Rheumatoid arthritis (CMS/HCC)    • Rheumatoid arthritis (CMS/HCC)    • Ruptured tendon    • Scoliosis    • Scoliosis    • Seasonal allergies    • Shingles    • Sleep apnea    • Suppression of immune system subtherapeutic (CMS/HCC)    • Synovitis    • Synovitis    • Tinnitus    • Tinnitus    • TMJ (dislocation of temporomandibular joint)    • Vertigo    • Wears glasses      Past Surgical History:   Procedure Laterality Date   • ADENOIDECTOMY     • BILATERAL BREAST REDUCTION     • BLEPHAROPLASTY     • BLEPHAROPLASTY, QUAD Bilateral 09/18/2018    Dr. romano.   • BREAST EXCISIONAL BIOPSY Left     NO VISIBLE SCAR   • BREAST FIBROADENOMA SURGERY     • CALCANEOUS OPEN REDUCTION INTERNAL FIXATION     • CATARACT EXTRACTION Bilateral    • CERVICAL CONE BIOPSY     • COLONOSCOPY     • COSMETIC SURGERY      SHORT SCAR FACE LIFT PER DR ROMANO    • DILATATION AND CURETTAGE     • FACIAL COSMETIC SURGERY     • FACIAL COSMETIC SURGERY     • FOOT SURGERY Left     calcaneous    • KNEE ARTHROPLASTY Right     blue    • KNEE ARTHROSCOPY Bilateral    • REDUCTION MAMMAPLASTY Bilateral    • REPLACEMENT TOTAL KNEE Right    • REPLACEMENT TOTAL KNEE Left    • TENDON REPAIR Right     HAND   • TENDON REPAIR      R hand   • TONSILLECTOMY AND ADENOIDECTOMY     • TOTAL KNEE ARTHROPLASTY Left 4/3/2018    Procedure: TOTAL KNEE ARTHROPLASTY LEFT;  Surgeon: Mahesh Wesley MD;  Location: [facility];  Service: Orthopedics   • TOTAL SHOULDER " "ARTHROPLASTY W/ DISTAL CLAVICLE EXCISION Left 5/22/2017    Procedure: LEFT REVERSE TOTAL SHOULDER ARTHROPLASTY FOR FRACTURE;  Surgeon: Damaso Harris MD;  Location: [facility];  Service:    • TOTAL SHOULDER REPLACEMENT Left    • TOTAL SHOULDER REPLACEMENT Left    • TUBAL ABDOMINAL LIGATION         Allergies   Allergen Reactions   • Adhesive Tape Itching       Family History and Social History reviewed and changed as necessary    REVIEW OF SYSTEM:   No new somatic complaints.  No erythromelalgia or pruritus.  No significant claudication    PHYSICAL EXAM:  Arthralgias and arthritis with destructive changes consistent with rheumatoid arthritis.  No palpable hepatosplenomegaly.    Vitals:    09/27/21 1335   BP: 164/63   Pulse: 74   Resp: 18   Temp: 97.2 °F (36.2 °C)   TempSrc: Infrared   SpO2: 97%   Weight: 64.3 kg (141 lb 11.2 oz)   Height: 149.9 cm (59\")     Vitals:    09/27/21 1335   PainSc:   4          ECOG score: 1           Vitals reviewed.        Lab Results   Component Value Date    HGB 16.9 (H) 09/02/2021    HCT 52.2 (H) 09/02/2021    MCV 98.3 (H) 09/02/2021     09/02/2021    WBC 5.70 09/02/2021    NEUTROABS 4.70 09/02/2021    LYMPHSABS 0.80 09/02/2021    MONOSABS 0.20 09/02/2021    EOSABS 0.19 02/17/2021    BASOSABS 0.06 02/17/2021       Lab Results   Component Value Date    GLUCOSE 106 (H) 09/02/2021    BUN 13 09/02/2021    CREATININE 0.93 09/02/2021     09/02/2021    K 4.4 09/02/2021     09/02/2021    CO2 27.0 09/02/2021    CALCIUM 9.8 09/02/2021    PROTEINTOT 7.4 09/02/2021    ALBUMIN 4.20 09/02/2021    BILITOT 0.2 09/02/2021    ALKPHOS 92 09/02/2021    AST 33 (H) 09/02/2021    ALT 18 09/02/2021             ASSESSMENT & PLAN:  1.  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  Hypothyroidism  3.  Rheumatoid arthritis  4.  Stage I sacral decubiti with severe diverticulosis " with numerous fecaliths and extensive pelvic adhesions on colonoscopy 1/16/2019 aMtthew Hassan  5.  History of closed left humeral fracture  6. Obstructive sleep apnea      Hematology history timeline:  -5/23/2017 hemoglobin 11.2 with hematocrit 36.2% otherwise unremarkable CBC and CMP and differential.  -11/16/2018 ultrasound liver done by rheumatologist shows F0 elastography with gallstones.  No mention of imaging of spleen.  -11/20/2020 hematocrit 48.4% with normal white count platelet count and differential.  -12/7/2020 hematocrit 47.8% otherwise unremarkable CBC  -2/17/2021 hematocrit 47.3% with normal white count platelet count differential.  -3/11/2021 hematocrit 47.6% otherwise unremarkable CBC  -5/25/2011 hematocrit 49.1% with normal white count platelet count and differential with unremarkable CMP.  Sedimentation rate 2 with C-reactive protein less than 1.  -6/18/2021 JAK2 V6 17F mutation negative.  Exon 12-15 mutation negative.  -9/2/2021 [facility] medical oncology hematology consult: No palpable splenomegaly.  No erythromelalgia or pruritus.  No early satiety.  No plethora.  Hematocrit minimally elevated.  We will check SAMANTHA reticulin and MPL mutations as well as ABG and carboxyhemoglobin as well as erythropoietin level.  If no hint of myeloproliferative disorder, but will get to pulmonary medicine to investigate for causes of intermittent hypoxia unless he is frankly hypoxic at which point it becomes more apparent the cause of this.  She has no known reasons for hypoxia.  If erythropoietin level high, will do imaging of abdomen to look for evidence of hepatocellular malignancy or renal cell carcinoma which while being a much less common cause overall of erythrocytosis, these are malignancies that can produce erythropoietin.  Suspicions are low however. She does have obstructive sleep apnea and is noncompliant and virtually never wears her CPAP as it gives her claustrophobia. I overwhelmingly think that is  the most likely culprit in this process of likely secondary erythrocytosis but we shall see.    -9/27/2021 [facility] hematology follow-up visit: 9/2/2021 data showed hematocrit 52.2% with otherwise unremarkable CBC.  Erythropoietin upper end of normal 17.5.  CMP unremarkable.  B12 greater than 2000.  MPL and SAMANTHA R mutations negative.  PO2 72.7 with carboxyhemoglobin normal 0.7.  In combination with a low PO2 and obstructive sleep apnea and absence of molecular evidence or physical exam evidence of myeloproliferative disorder, this is almost assuredly secondary erythrocytosis and I would not phlebotomize unless the hematocrit got well over 60%.  She needs to see pulmonary medicine and make sure she religiously wears her CPAP.  Erythropoietin level is not significantly elevated and the odds of underlying malignancy is very low.  I will see on an as-needed basis.    Total time of care today inclusive of time spent today reviewing interval data as outlined above and during visit translating that information to her and after visit encouraging her to follow-up with Dr. Ray to see pulmonary medicine and to manage her sleep apnea and modest hypoxia aggressively to ensure there is no underlying lung disease took 30 minutes of total patient care time throughout the day today  Jose Shaw MD    09/27/2021

## 2021-10-06 ENCOUNTER — OFFICE VISIT (OUTPATIENT)
Dept: FAMILY MEDICINE CLINIC | Facility: CLINIC | Age: 71
End: 2021-10-06

## 2021-10-06 VITALS
SYSTOLIC BLOOD PRESSURE: 142 MMHG | BODY MASS INDEX: 28.75 KG/M2 | HEIGHT: 59 IN | DIASTOLIC BLOOD PRESSURE: 68 MMHG | OXYGEN SATURATION: 96 % | HEART RATE: 72 BPM | WEIGHT: 142.6 LBS

## 2021-10-06 DIAGNOSIS — D75.1 ERYTHROCYTOSIS: ICD-10-CM

## 2021-10-06 DIAGNOSIS — M05.9 RHEUMATOID ARTHRITIS WITH POSITIVE RHEUMATOID FACTOR, INVOLVING UNSPECIFIED SITE (HCC): ICD-10-CM

## 2021-10-06 DIAGNOSIS — Z79.899 MEDICATION QUANTITY CHANGED TO THREE MONTH SUPPLY: ICD-10-CM

## 2021-10-06 DIAGNOSIS — R06.81 APNEA: ICD-10-CM

## 2021-10-06 DIAGNOSIS — Z51.81 MEDICATION MONITORING ENCOUNTER: ICD-10-CM

## 2021-10-06 DIAGNOSIS — G47.33 OSA (OBSTRUCTIVE SLEEP APNEA): Primary | ICD-10-CM

## 2021-10-06 DIAGNOSIS — F51.01 PRIMARY INSOMNIA: ICD-10-CM

## 2021-10-06 PROCEDURE — 99214 OFFICE O/P EST MOD 30 MIN: CPT | Performed by: INTERNAL MEDICINE

## 2021-10-06 NOTE — ASSESSMENT & PLAN NOTE
She is in agreement would like a second opinion on her sleep disorder.  Refer to sleep medicine.  Patient referred for sleep study at home.  She will need assistance working with multiple masks and pressures.   1

## 2021-10-06 NOTE — PROGRESS NOTES
Shanel Wall  1950  [id number]  Patient Care Team:  Fili Ray MD as PCP - General (Internal Medicine)  Cristiane Araiza MD as Consulting Physician (Endocrinology)    Shanel Wall is a 70 y.o. female here today for follow up.     This patient is accompanied by their self who contributes to the history of their care.    Chief Complaint:    Chief Complaint   Patient presents with   • Results     From Dr. Shaw and sleep study         History of Present Illness:  I have reviewed and/or updated the patient's past medical, past surgical, family, social history, problem list and allergies as appropriate.   She had a rather in-depth evaluation by Dr. Shaw.  Turns out her polycythemia is likely secondary to nocturnal hypoxemia.  She brings with her today her sleep study report.  She does have about 244 episodes of apnea and some significant hypoxemia as low as the mid 70s.  She never saw a sleep specialist this was a outpatient overnight study.  She could not tolerate the nasal pillow.  She indicates that the pressures are too high.  She does have significant fatigue.  She would reconsider sleep apnea treatment that she knows the ramifications on her body.  Continues to take Ambien at night.  Her arthralgias are constant and stable.  Denies any fevers chills or night sweats.  She recently had her third booster.       Review of Systems   Constitutional: Positive for fatigue. Negative for unexpected weight gain and unexpected weight loss.   HENT: Negative.    Eyes: Positive for blurred vision.   Respiratory: Positive for apnea.    Cardiovascular: Negative for leg swelling.   Endocrine: Positive for heat intolerance.   Musculoskeletal: Positive for arthralgias.   Skin: Negative.    Hematological: Negative for adenopathy. Does not bruise/bleed easily.   Psychiatric/Behavioral: Positive for sleep disturbance.       Vitals:    10/06/21 1257   BP: 142/68   Pulse: 72   SpO2: 96%   Weight: 64.7 kg (142 lb  "9.6 oz)   Height: 149.9 cm (59.02\")     Body mass index is 28.79 kg/m².    Physical Exam  Vitals and nursing note reviewed.   Constitutional:       General: She is not in acute distress.     Appearance: She is well-developed. She is not diaphoretic.   HENT:      Head: Normocephalic and atraumatic.      Right Ear: Tympanic membrane and external ear normal.      Left Ear: Tympanic membrane and external ear normal.      Mouth/Throat:      Mouth: Mucous membranes are moist.      Pharynx: No oropharyngeal exudate.   Eyes:      General: No scleral icterus.        Right eye: No discharge.      Conjunctiva/sclera: Conjunctivae normal.   Neck:      Thyroid: No thyromegaly.      Vascular: No JVD.      Trachea: No tracheal deviation.   Cardiovascular:      Rate and Rhythm: Normal rate and regular rhythm.      Heart sounds: Normal heart sounds.      Comments: PMI nondisplaced  Pulmonary:      Effort: Pulmonary effort is normal.      Breath sounds: Normal breath sounds. No wheezing or rales.   Abdominal:      General: Bowel sounds are normal.      Palpations: Abdomen is soft.      Tenderness: There is no abdominal tenderness. There is no guarding or rebound.   Musculoskeletal:      Cervical back: Normal range of motion and neck supple.      Comments: Antalgic gait   Lymphadenopathy:      Cervical: No cervical adenopathy.   Skin:     General: Skin is warm and dry.      Capillary Refill: Capillary refill takes less than 2 seconds.      Coloration: Skin is not pale.      Findings: No rash.   Neurological:      Mental Status: She is alert and oriented to person, place, and time.      Motor: No abnormal muscle tone.      Coordination: Coordination normal.   Psychiatric:         Mood and Affect: Mood normal.         Behavior: Behavior normal.         Judgment: Judgment normal.         Procedures    Results Review:    I reviewed the patient's new clinical results.    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  Refer to sleep medicine.  Patient referred for sleep study at home.  She will need assistance working with multiple masks and pressures.         Relevant Medications    folic acid (FOLVITE) 1 MG tablet    Folic Acid-B2-B6-B12-D-Ca-Phos (FOLGARD PO)       Musculoskeletal and Injuries    Rheumatoid arthritis with positive rheumatoid factor (HCC)    Relevant Medications    predniSONE (DELTASONE) 5 MG tablet    ENBREL 50 MG/ML injection       Pulmonary and Pneumonias    Apnea    Current Assessment & Plan     He is in agreement to reevaluate her  sleep disordered breathing.            Sleep    Insomnia      Other Visit Diagnoses     DANICA (obstructive sleep apnea)    -  Primary    Relevant Orders    Ambulatory Referral to Sleep Medicine    Medication quantity changed to three month supply        Medication monitoring encounter        Relevant Orders    Compliance Drug Analysis, Ur - Urine, Clean Catch          Plan of care reviewed with patient at the conclusion of today's visit. Education was provided regarding diagnosis and management.  Patient verbalizes understanding of and agreement with management plan.    No follow-ups on file.    Fili Ray MD    Please note that portions of this note may have been completed with a voice recognition program. Efforts were made to edit the dictations, but occasionally words are mistranscribed.

## 2021-10-11 LAB — DRUGS UR: NORMAL

## 2021-10-13 RX ORDER — GUAIFENESIN AND PSEUDOEPHEDRINE HYDROCHLORIDE 600; 60 MG/1; MG/1
TABLET, EXTENDED RELEASE ORAL
Qty: 72 TABLET | Refills: 6 | Status: SHIPPED | OUTPATIENT
Start: 2021-10-13 | End: 2022-04-13 | Stop reason: SDUPTHER

## 2021-12-16 ENCOUNTER — OFFICE VISIT (OUTPATIENT)
Dept: SLEEP MEDICINE | Facility: HOSPITAL | Age: 71
End: 2021-12-16

## 2021-12-16 VITALS
OXYGEN SATURATION: 94 % | DIASTOLIC BLOOD PRESSURE: 80 MMHG | WEIGHT: 140 LBS | HEIGHT: 59 IN | BODY MASS INDEX: 28.22 KG/M2 | HEART RATE: 62 BPM | SYSTOLIC BLOOD PRESSURE: 181 MMHG

## 2021-12-16 DIAGNOSIS — G47.33 OSA (OBSTRUCTIVE SLEEP APNEA): Primary | ICD-10-CM

## 2021-12-16 DIAGNOSIS — G47.19 EXCESSIVE DAYTIME SLEEPINESS: ICD-10-CM

## 2021-12-16 PROCEDURE — 99204 OFFICE O/P NEW MOD 45 MIN: CPT | Performed by: INTERNAL MEDICINE

## 2021-12-16 NOTE — PROGRESS NOTES
Shanel Wall is a 70 y.o. female.   Chief Complaint   Patient presents with   • Sleeping Problem       HPI     70 y.o. female seen in consultation at the request of Fili Ray MD for evaluation of the above.     She has a longstanding history of daytime fatigue and nonrestorative sleep. She says this has gone on for almost a long as she can remember.    She has been noted to snore. She awakens with a dry mouth. She has a history of difficulty falling asleep and does take Ambien.    She underwent a home sleep apnea test on 4/21/2020 ordered by Dr. Olvera. This revealed a severe degree of obstructive sleep apnea with AHI of 31. She was placed on auto CPAP therapy but had difficulty tolerating it and returned it.    More recently, she was discovered to have mild polycythemia. She saw Dr. Shaw and had an extensive evaluation and was not felt to have a primary hematologic issue. Dr. Shaw felt possibly untreated obstructive sleep apnea was playing a role.    She averages about 9 hours of sleep per night by her best estimate. She does awaken three or four times. With Ambien it does not take her long to get to sleep.    [place] Scale is: 5/24    The patient's relevant past medical, surgical, family, and social history reviewed and updated in Epic as appropriate.    Current medications are:   Current Outpatient Medications:   •  aluminum hydroxide-mag carbonate (GAVISCON EXTRA RELIEF) 160-105 MG chewable tablet chewable tablet, Chew 1 tablet Daily As Needed., Disp: , Rfl:   •  Calcium Carb-Cholecalciferol (CALCIUM-VITAMIN D) 600-400 MG-UNIT tablet, Take 1 tablet by mouth 2 (Two) Times a Day., Disp: , Rfl:   •  Denosumab (PROLIA SC), Inject  under the skin into the appropriate area as directed Every 6 (Six) Months., Disp: , Rfl:   •  DULoxetine (CYMBALTA) 60 MG capsule, Take 60 mg by mouth Daily., Disp: , Rfl:   •  ENBREL 50 MG/ML injection, Inject 50 mg under the skin into the appropriate area as directed  1 (One) Time Per Week., Disp: , Rfl:   •  folic acid (FOLVITE) 1 MG tablet, Take 1 mg by mouth Daily., Disp: , Rfl:   •  Folic Acid-B2-B6-B12-D-Ca-Phos (FOLGARD PO), Take 1 tablet by mouth 2 (two) times a day., Disp: , Rfl:   •  HYDROcodone-acetaminophen (NORCO)  MG per tablet, Take 1 tablet by mouth Every 6 (Six) Hours As Needed for Moderate Pain ., Disp: 60 tablet, Rfl: 0  •  lansoprazole (PREVACID) 15 MG capsule, Take 15 mg by mouth Daily., Disp: , Rfl:   •  levothyroxine (Synthroid) 75 MCG tablet, Take 1 tablet by mouth Daily., Disp: 30 tablet, Rfl: 3  •  methotrexate 2.5 MG tablet, Take 9 tablets by mouth 1 (One) Time Per Week. MONDAYS Resume when ok with Dr. Wesley (Patient taking differently: Take 15 mg by mouth 1 (One) Time Per Week. MONDAYS Resume when ok with Dr. Wesley), Disp: , Rfl: 0  •  Mucinex D  MG per 12 hr tablet, TAKE ONE TABLET BY MOUTH EVERY 12 HOURS, Disp: 72 tablet, Rfl: 6  •  Multiple Vitamins-Minerals (SENIOR MULTIVITAMIN PLUS) tablet, Take 1 tablet by mouth Daily., Disp: , Rfl:   •  NON FORMULARY, Take 2 tablets by mouth Daily. Hydro Eye gelcaps, Disp: , Rfl:   •  polyethyl glycol-propyl glycol (SYSTANE) 0.4-0.3 % solution ophthalmic solution, Administer 2 drops to both eyes 5 (Five) Times a Day., Disp: , Rfl:   •  predniSONE (DELTASONE) 5 MG tablet, Take 5 mg by mouth Daily., Disp: , Rfl:   •  prochlorperazine (COMPAZINE) 10 MG tablet, Take 10 mg by mouth Every 6 (Six) Hours As Needed for Nausea or Vomiting., Disp: , Rfl:   •  Restasis 0.05 % ophthalmic emulsion, , Disp: , Rfl:   •  Saline 0.2 % solution, 1 application into the nostril(s) as directed by provider 2 (two) times a day., Disp: , Rfl:   •  salsalate (DISALCID) 750 MG tablet, Take 750 mg by mouth 4 (Four) Times a Day., Disp: , Rfl:   •  vitamin A 8000 UNIT capsule, Take 8,000 Units by mouth Daily., Disp: , Rfl:   •  vitamin C (ASCORBIC ACID) 500 MG tablet, Take 500 mg by mouth Daily., Disp: , Rfl:   •  vitamin E 400 UNIT  "capsule, Take 800 Units by mouth Daily., Disp: , Rfl:   •  zolpidem (AMBIEN) 10 MG tablet, Take 1 tablet by mouth Every Night., Disp: 5 tablet, Rfl: 0.    Review of Systems    Review of Systems  ROS documented in patient questionnaire ×14 systems.  Reviewed with patient.  Otherwise negative except as noted in HPI.    Physical Exam    Blood pressure (!) 181/80, pulse 62, height 149.9 cm (59\"), weight 63.5 kg (140 lb), SpO2 94 %, not currently breastfeeding. Body mass index is 28.28 kg/m².    Physical Exam  Vitals and nursing note reviewed.   Constitutional:       Appearance: Normal appearance. She is well-developed.   HENT:      Head: Normocephalic and atraumatic.      Nose: Nose normal.      Mouth/Throat:      Mouth: Mucous membranes are moist.      Pharynx: Oropharynx is clear. No oropharyngeal exudate.      Comments: Class IV airway  Eyes:      General: No scleral icterus.     Conjunctiva/sclera: Conjunctivae normal.   Neck:      Thyroid: No thyromegaly.      Trachea: No tracheal deviation.   Cardiovascular:      Rate and Rhythm: Normal rate and regular rhythm.      Heart sounds: No murmur heard.  No friction rub. No gallop.    Pulmonary:      Effort: Pulmonary effort is normal. No respiratory distress.      Breath sounds: No wheezing or rales.   Musculoskeletal:         General: No deformity. Normal range of motion.   Skin:     General: Skin is warm and dry.      Findings: No rash.   Neurological:      Mental Status: She is alert and oriented to person, place, and time.   Psychiatric:         Behavior: Behavior normal.         Thought Content: Thought content normal.         DATA:    Reviewed HSAT dated 4/21/2020 with AHI 31    Reviewed 10/6/2021 note from Dr. Ray as well as recent note from Dr. Shaw    ASSESSMENT:    Problem List Items Addressed This Visit        Pulmonary Problems    DANICA (obstructive sleep apnea) - Primary       Other    Excessive daytime sleepiness          70-year-old female with severe " obstructive sleep apnea diagnosed via a home study last year as described above. She tolerated CPAP poorly. She did try a nasal mask which she did like but she felt the pressures were a bit high. It sounds as if she did not feel that she was getting any support or encouragement in using the CPAP and was not sure what good it was doing her.    PLAN:    1. I went over the diagnosis of obstructive sleep apnea as well as her previous sleep study and discussed the diagnosis, how it occurs in patients, and the long-term effects on her body if left untreated.  2. I went over treatment options for obstructive sleep apnea including CPAP therapy. We talked about various mask options.  3. After all the above she seemed recommitted to trying CPAP therapy as she was not enamored of any other options.  4. We will set her up with a different [employer] company and follow her closely in the sleep center    I have reviewed the results of my evaluation and impression and discussed my recommendations in detail with the patient.    Level of Risk Moderate due to: mild exacerbation of one chronic illness as well as a moderate risk of morbidity from untreated obstructive sleep apnea    Signed by  Ramana Hampton MD    December 16, 2021      CC: Fili Ray MD Banks, William R, MD

## 2022-01-11 DIAGNOSIS — R94.6 ABNORMAL THYROID FUNCTION TEST: ICD-10-CM

## 2022-01-11 RX ORDER — LEVOTHYROXINE SODIUM 0.07 MG/1
TABLET ORAL
Qty: 30 TABLET | Refills: 3 | Status: SHIPPED | OUTPATIENT
Start: 2022-01-11 | End: 2022-04-13 | Stop reason: SDUPTHER

## 2022-01-11 NOTE — TELEPHONE ENCOUNTER
Rx Refill Note  Requested Prescriptions     Pending Prescriptions Disp Refills   • levothyroxine (SYNTHROID, LEVOTHROID) 75 MCG tablet [Pharmacy Med Name: LEVOTHYROXINE 75 MCG TABLET] 30 tablet 3     Sig: TAKE ONE TABLET BY MOUTH DAILY      Last office visit with prescribing clinician: 10/6/2021      Next office visit with prescribing clinician: Visit date not found            Mirian Swift MA  01/11/22, 11:51 EST

## 2022-01-31 ENCOUNTER — TRANSCRIBE ORDERS (OUTPATIENT)
Dept: ADMINISTRATIVE | Facility: HOSPITAL | Age: 72
End: 2022-01-31

## 2022-01-31 DIAGNOSIS — Z12.31 VISIT FOR SCREENING MAMMOGRAM: Primary | ICD-10-CM

## 2022-03-07 ENCOUNTER — HOSPITAL ENCOUNTER (OUTPATIENT)
Dept: MAMMOGRAPHY | Facility: HOSPITAL | Age: 72
Discharge: HOME | End: 2022-03-07
Admitting: INTERNAL MEDICINE

## 2022-03-07 DIAGNOSIS — Z12.31 VISIT FOR SCREENING MAMMOGRAM: ICD-10-CM

## 2022-03-07 PROCEDURE — 77063 BREAST TOMOSYNTHESIS BI: CPT | Performed by: RADIOLOGY

## 2022-03-07 PROCEDURE — 77067 SCR MAMMO BI INCL CAD: CPT | Performed by: RADIOLOGY

## 2022-03-07 PROCEDURE — 77067 SCR MAMMO BI INCL CAD: CPT

## 2022-03-07 PROCEDURE — 77063 BREAST TOMOSYNTHESIS BI: CPT

## 2022-04-04 ENCOUNTER — OFFICE VISIT (OUTPATIENT)
Dept: SLEEP MEDICINE | Facility: HOSPITAL | Age: 72
End: 2022-04-04

## 2022-04-04 VITALS
SYSTOLIC BLOOD PRESSURE: 168 MMHG | BODY MASS INDEX: 28.22 KG/M2 | WEIGHT: 140 LBS | DIASTOLIC BLOOD PRESSURE: 70 MMHG | HEART RATE: 65 BPM | HEIGHT: 59 IN | OXYGEN SATURATION: 96 %

## 2022-04-04 DIAGNOSIS — G47.33 OSA (OBSTRUCTIVE SLEEP APNEA): Primary | ICD-10-CM

## 2022-04-04 DIAGNOSIS — G47.19 EXCESSIVE DAYTIME SLEEPINESS: ICD-10-CM

## 2022-04-04 PROCEDURE — 99214 OFFICE O/P EST MOD 30 MIN: CPT | Performed by: INTERNAL MEDICINE

## 2022-04-04 NOTE — PROGRESS NOTES
Subjective:     Chief Complaint:   Chief Complaint   Patient presents with   • Follow-up       HPI:    Shanel Wall is a 71 y.o. female here for follow-up of obstructive sleep apnea.    She has a longstanding history of daytime fatigue and nonrestorative sleep. She says this has gone on for almost a long as she can remember.     She has a history of difficulty falling asleep and does take Ambien.     She underwent a home sleep apnea test on 4/21/2020 ordered by Dr. Olvera. This revealed a severe degree of obstructive sleep apnea with AHI of 31. She was placed on auto CPAP therapy but had difficulty tolerating it and returned it.     More recently, she was discovered to have mild polycythemia. She saw Dr. Shaw and had an extensive evaluation and was not felt to have a primary hematologic issue. Dr. Shaw felt possibly untreated obstructive sleep apnea was playing a role.     I saw her in consultation in the sleep center and reinitiated CPAP therapy.  She finally got her machine just over a month ago.  It is set at a range of 6-16 cm H2O.  Her download indicates an average of 6 hours and 45 minutes of usage per night.  She has used it on 89% of the nights recorded.  She uses a nasal mask and finds this to be comfortable.  Her AHI is normal at 4.7 and her mask leak is acceptable currently.    She does not note much improvement in her daytime somnolence at this point.  She is tolerating therapy well.    Further details are as follows:    [place] Scale scored as 5/24.    Type of mask: nasal mask    Overall, she is benefiting from PAP therapy.    Current medications are:   Current Outpatient Medications:   •  aluminum hydroxide-mag carbonate (GAVISCON EXTRA RELIEF) 160-105 MG chewable tablet chewable tablet, Chew 1 tablet Daily As Needed., Disp: , Rfl:   •  Calcium Carb-Cholecalciferol (CALCIUM-VITAMIN D) 600-400 MG-UNIT tablet, Take 1 tablet by mouth 2 (Two) Times a Day., Disp: , Rfl:   •  Denosumab  (PROLIA SC), Inject  under the skin into the appropriate area as directed Every 6 (Six) Months., Disp: , Rfl:   •  DULoxetine (CYMBALTA) 60 MG capsule, Take 60 mg by mouth Daily., Disp: , Rfl:   •  ENBREL 50 MG/ML injection, Inject 50 mg under the skin into the appropriate area as directed 1 (One) Time Per Week., Disp: , Rfl:   •  folic acid (FOLVITE) 1 MG tablet, Take 1 mg by mouth Daily., Disp: , Rfl:   •  Folic Acid-B2-B6-B12-D-Ca-Phos (FOLGARD PO), Take 1 tablet by mouth 2 (two) times a day., Disp: , Rfl:   •  HYDROcodone-acetaminophen (NORCO)  MG per tablet, Take 1 tablet by mouth Every 6 (Six) Hours As Needed for Moderate Pain ., Disp: 60 tablet, Rfl: 0  •  lansoprazole (PREVACID) 15 MG capsule, Take 15 mg by mouth Daily., Disp: , Rfl:   •  levothyroxine (SYNTHROID, LEVOTHROID) 75 MCG tablet, TAKE ONE TABLET BY MOUTH DAILY, Disp: 30 tablet, Rfl: 3  •  methotrexate 2.5 MG tablet, Take 9 tablets by mouth 1 (One) Time Per Week. MONDAYS Resume when ok with Dr. Wesley (Patient taking differently: Take 15 mg by mouth 1 (One) Time Per Week. MONDAYS Resume when ok with Dr. Wesley), Disp: , Rfl: 0  •  Mucinex D  MG per 12 hr tablet, TAKE ONE TABLET BY MOUTH EVERY 12 HOURS, Disp: 72 tablet, Rfl: 6  •  Multiple Vitamins-Minerals (SENIOR MULTIVITAMIN PLUS) tablet, Take 1 tablet by mouth Daily., Disp: , Rfl:   •  NON FORMULARY, Take 2 tablets by mouth Daily. Hydro Eye gelcaps, Disp: , Rfl:   •  polyethyl glycol-propyl glycol (SYSTANE) 0.4-0.3 % solution ophthalmic solution, Administer 2 drops to both eyes 5 (Five) Times a Day., Disp: , Rfl:   •  predniSONE (DELTASONE) 5 MG tablet, Take 5 mg by mouth Daily., Disp: , Rfl:   •  prochlorperazine (COMPAZINE) 10 MG tablet, Take 10 mg by mouth Every 6 (Six) Hours As Needed for Nausea or Vomiting., Disp: , Rfl:   •  Restasis 0.05 % ophthalmic emulsion, , Disp: , Rfl:   •  Saline 0.2 % solution, 1 application into the nostril(s) as directed by provider 2 (two) times a day.,  Disp: , Rfl:   •  salsalate (DISALCID) 750 MG tablet, Take 750 mg by mouth 4 (Four) Times a Day., Disp: , Rfl:   •  vitamin A 8000 UNIT capsule, Take 8,000 Units by mouth Daily., Disp: , Rfl:   •  vitamin C (ASCORBIC ACID) 500 MG tablet, Take 500 mg by mouth Daily., Disp: , Rfl:   •  vitamin E 400 UNIT capsule, Take 800 Units by mouth Daily., Disp: , Rfl:   •  zolpidem (AMBIEN) 10 MG tablet, Take 1 tablet by mouth Every Night., Disp: 5 tablet, Rfl: 0.    The patient's relevant past medical, surgical, family and social history were reviewed and updated in Epic as appropriate.     ROS:    Review of Systems      Objective:    Physical Exam  Vitals reviewed.   Constitutional:       Appearance: She is well-developed.   HENT:      Head: Normocephalic and atraumatic.      Mouth/Throat:      Mouth: Mucous membranes are moist.      Pharynx: Oropharynx is clear.      Comments: Class IV airway  Neck:      Thyroid: No thyromegaly.   Cardiovascular:      Rate and Rhythm: Normal rate and regular rhythm.      Heart sounds: No murmur heard.    No friction rub. No gallop.   Pulmonary:      Effort: Pulmonary effort is normal. No respiratory distress.      Breath sounds: No wheezing or rales.   Musculoskeletal:      Cervical back: Neck supple.   Skin:     General: Skin is warm and dry.   Neurological:      Mental Status: She is alert and oriented to person, place, and time.   Psychiatric:         Behavior: Behavior normal.         Thought Content: Thought content normal.         Data:    Patient's PAP download was personally interpreted by me and has not otherwise been independently reported.    PAP download findings:  Average pressure: 10  Average AHI:  5  Average minutes in large leak per night: acceptable    Assessment:    Problem List Items Addressed This Visit        Pulmonary Problems    DANICA (obstructive sleep apnea) - Primary    Relevant Orders    PAP Therapy       Other    Excessive daytime sleepiness          1. Severe  obstructive sleep apnea: Currently adequately treated with auto CPAP 6 to 16 cm H2O with a nasal mask based upon her download  2. Excessive daytime somnolence: Ongoing without significant subjective improvement at this point  3. Polycythemia: Her latest hemoglobin on 3/25 was 15.3 which is within normal range.  It was felt her polycythemia was largely related to her severe obstructive sleep apnea    Plan:     1. No change in auto CPAP settings  2. No change in mask configuration  3. Hopefully over time she will notice an improvement in her daytime functionality and continued normal hemoglobin level  4. Continued follow-up in the sleep center to reassess her clinical response and download      Discussed in detail with the patient.  She will call prior to her follow up visit for any new problems.    Level of Risk Moderate due to: two stable chronic illnesses     Moderate risk of morbidity due to obstructive sleep apnea    Signed by  Ramana Hampton MD

## 2022-04-13 ENCOUNTER — OFFICE VISIT (OUTPATIENT)
Dept: FAMILY MEDICINE CLINIC | Facility: CLINIC | Age: 72
End: 2022-04-13

## 2022-04-13 VITALS
DIASTOLIC BLOOD PRESSURE: 68 MMHG | BODY MASS INDEX: 28.47 KG/M2 | HEIGHT: 59 IN | SYSTOLIC BLOOD PRESSURE: 140 MMHG | HEART RATE: 67 BPM | WEIGHT: 141.2 LBS | OXYGEN SATURATION: 97 %

## 2022-04-13 DIAGNOSIS — D75.1 POLYCYTHEMIA SECONDARY TO HYPOXIA: Primary | ICD-10-CM

## 2022-04-13 DIAGNOSIS — M05.79 RHEUMATOID ARTHRITIS INVOLVING MULTIPLE SITES WITH POSITIVE RHEUMATOID FACTOR: ICD-10-CM

## 2022-04-13 DIAGNOSIS — G47.33 OSA (OBSTRUCTIVE SLEEP APNEA): ICD-10-CM

## 2022-04-13 DIAGNOSIS — R94.6 ABNORMAL THYROID FUNCTION TEST: ICD-10-CM

## 2022-04-13 DIAGNOSIS — E03.9 HYPOTHYROIDISM, UNSPECIFIED TYPE: ICD-10-CM

## 2022-04-13 PROCEDURE — 99214 OFFICE O/P EST MOD 30 MIN: CPT | Performed by: INTERNAL MEDICINE

## 2022-04-13 RX ORDER — GUAIFENESIN, PSEUDOEPHEDRINE HYDROCHLORIDE 600; 60 MG/1; MG/1
1 TABLET, EXTENDED RELEASE ORAL EVERY 12 HOURS
Qty: 72 TABLET | Refills: 6 | Status: SHIPPED | OUTPATIENT
Start: 2022-04-13

## 2022-04-13 RX ORDER — LEVOTHYROXINE SODIUM 0.07 MG/1
75 TABLET ORAL DAILY
Qty: 90 TABLET | Refills: 3 | Status: SHIPPED | OUTPATIENT
Start: 2022-04-13

## 2022-04-13 RX ORDER — DULOXETIN HYDROCHLORIDE 60 MG/1
1 CAPSULE, DELAYED RELEASE ORAL DAILY
COMMUNITY
Start: 2022-03-22 | End: 2022-08-18

## 2022-04-13 NOTE — PROGRESS NOTES
"Shanel Wall  1950  [id number]  Patient Care Team:  Fili aRy MD as PCP - General (Internal Medicine)  Cristiane Araiza MD as Consulting Physician (Endocrinology)    Shanel Wall is a 71 y.o. female here today for follow up.     This patient is accompanied by their self who contributes to the history of their care.    Chief Complaint:    Chief Complaint   Patient presents with   • Sleep Apnea     F/U         History of Present Illness:  I have reviewed and/or updated the patient's past medical, past surgical, family, social history, problem list and allergies as appropriate.      Having difficulty exercising 2/2 to SI and lower back pain. Exacerbated with weightbearing.. Occasional pain into legs. Denies numbness/tingling/weakness.  She continues to take her Synthroid.  Denies any cold intolerance however has a intolerance.  Denies any palpitations.  She has been intolerant of her CPAP she looks like she is averaging 5 hours per night.  Unfortunately has not helped her fatigue much however her hemoglobin is improved.    Would like to have her 4th covid injections ( her and her )    Review of Systems   Constitutional: Negative.    HENT: Negative.    Eyes: Positive for visual disturbance.   Respiratory: Positive for apnea.    Cardiovascular: Negative for chest pain and palpitations.   Gastrointestinal: Negative.    Endocrine: Positive for heat intolerance.   Musculoskeletal: Positive for arthralgias.   Neurological: Negative for weakness and numbness.   Psychiatric/Behavioral: Positive for sleep disturbance.       Vitals:    04/13/22 1340   BP: 140/68   Pulse: 67   SpO2: 97%   Weight: 64 kg (141 lb 3.2 oz)   Height: 149.9 cm (59.02\")     Body mass index is 28.5 kg/m².    Physical Exam    Procedures    Results Review:    I reviewed the patient's new clinical results.    Assessment/Plan:    Problem List Items Addressed This Visit        Endocrine and Metabolic    Hypothyroid    " Current Assessment & Plan     Clinically euthyroid.  I think sorry intolerant could be steroid and duloxetine use.           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  She will continue her compliance with CPAP.             Other Visit Diagnoses     Abnormal thyroid function test        Relevant Medications    levothyroxine (SYNTHROID, LEVOTHROID) 75 MCG tablet      We discussed the potential of sending to pain management for back injections and consideration of rhizotomy however she had experience with SI injections with no improvement.  She feels she is managing okay at present.    Plan of care reviewed with patient at the conclusion of today's visit. Education was provided regarding diagnosis and management.  Patient verbalizes understanding of and agreement with management plan.    Return in about 6 months (around 10/13/2022) for hypothyroidsm.    Fili Ray MD      Please note than portions of this note were completed [facility] a Voice [employer] Program

## 2022-06-29 ENCOUNTER — OFFICE VISIT (OUTPATIENT)
Dept: ORTHOPEDIC SURGERY | Facility: CLINIC | Age: 72
End: 2022-06-29

## 2022-06-29 VITALS
DIASTOLIC BLOOD PRESSURE: 86 MMHG | WEIGHT: 136 LBS | HEIGHT: 59 IN | BODY MASS INDEX: 27.42 KG/M2 | SYSTOLIC BLOOD PRESSURE: 144 MMHG

## 2022-06-29 DIAGNOSIS — Z96.653 S/P TOTAL KNEE ARTHROPLASTY, BILATERAL: Primary | ICD-10-CM

## 2022-06-29 DIAGNOSIS — Z98.890 HISTORY OF REVERSE TOTAL REPLACEMENT OF LEFT SHOULDER JOINT: ICD-10-CM

## 2022-06-29 PROCEDURE — 99203 OFFICE O/P NEW LOW 30 MIN: CPT | Performed by: ORTHOPAEDIC SURGERY

## 2022-06-29 RX ORDER — POLYETHYLENE GLYCOL 400 AND PROPYLENE GLYCOL 4; 3 MG/ML; MG/ML
GEL OPHTHALMIC
COMMUNITY
End: 2022-10-13

## 2022-06-29 NOTE — PROGRESS NOTES
[facility] Note    Subjective     Chief Complaint   Patient presents with   • Left Knee - Follow-up     4 years Status post left knee replacement 4/3/18   • Right Knee - Follow-up     S/p right total knee replacement 2014 Dr. Burton Carlos   • Left Shoulder - Follow-up     S/p total shoulder arthroplasty- Dr. Damaso Harris May 2017        HPI    It has been 3  year(s) since Ms. Wall's last visit. She returns to clinic today for follow-up of bilateral knee arthroplasty and left shoulder arthroplasty. The issue has been ongoing for 4+ year(s). She rates her pain a 0/10 on the pain scale. Previous/current treatments: bracing, cane/walker, NSAIDS, physical therapy, weight loss and oral steroids. Current symptoms: same as prior visit. The pain is worse with certain movements; pain medication and/or NSAID improve the pain. Overall, she is doing better.  Both of her knees and her left shoulder are functioning well.  100% improvement in her knees compared to her preoperative symptoms.    I have reviewed the following portions of the patient's history and agree with: History of Present Illness and Review of Systems    Patient Active Problem List   Diagnosis   • Abnormal EKG   • Dizziness   • Lightheadedness   • Gastroesophageal reflux disease without esophagitis   • Rheumatoid arthritis involving multiple sites (HCC)   • Hypoglycemia   • Insomnia   • Osteoporosis   • Arthropathy of shoulder region   • Status post reverse total replacement of left shoulder   • Hypothyroid   • Acute blood loss anemia, mild, asymptomatic   • Postoperative urinary retention   • Bicuspid aortic valve   • Rheumatoid arthritis involving left knee with positive rheumatoid factor (HCC)   • Benign paroxysmal positional vertigo   • Neck pain   • Ataxia   • Nocturnal headaches   • Status post total left knee replacement   • Hypokalemia, replaced   • Acute postoperative pain   • Fatigue   • Restless legs syndrome (RLS)   •  "Irritable bowel syndrome with diarrhea   • Diverticulosis of large intestine without hemorrhage   • Palpitations   • Elevated hemoglobin (HCC)   • Rheumatoid arthritis with positive rheumatoid factor (HCC)   • Abnormal thyroid blood test   • Acute right ankle pain   • Intercostal pain   • Polycythemia secondary to hypoxia   • Arthritis of right temporomandibular joint   • Chronic night sweats   • Arthritis of right temporomandibular joint   • Apnea   • DANICA (obstructive sleep apnea)   • Excessive daytime sleepiness     Past Medical History:   Diagnosis Date   • Abnormal EKG    • Anesthesia     PT HAS RHEUMATOID ARTHRITIS, PT HAS SPECIFIC INSTRUCTIONS PER RA MD, pt to bring INSTRUCTIONS AND RECS ATTACHED TO CHART AND PT TO DISCUSS WITH ANESTHESIA ON DATE OF PROCEDURE    • Arthritis    • Back disorder     degenerative disc disorder   • Baker's cyst of knee     right   • Benign paroxysmal positional vertigo    • Cervical cancer (HCC)     cone biopsy   • Depression    • Deviated septum    • Deviated septum    • Disease of thyroid gland    • Diverticulosis    • Diverticulosis    • Fibromyalgia    • Fibromyalgia    • Gall stones    • GERD (gastroesophageal reflux disease)    • Hard to intubate     \"anterior trachea\"    • History of gallstones    • Hypoglycemia    • Hypothyroidism    • IBS (irritable bowel syndrome)    • Joint pain    • Macular degeneration    • Measles    • Menopause    • Multiple food allergies    • Osteoarthritis    • Osteoarthritis (arthritis due to wear and tear of joints)    • Popliteal cyst    • Retinal disease, bilateral    • Rheumatoid aortitis    • Rheumatoid arthritis (HCC)    • Rheumatoid arthritis (HCC)    • Ruptured tendon    • Scoliosis    • Scoliosis    • Seasonal allergies    • Shingles    • Sleep apnea    • Suppression of immune system subtherapeutic (HCC)    • Synovitis    • Synovitis    • Tinnitus    • Tinnitus    • TMJ (dislocation of temporomandibular joint)    • Vertigo    • Wears " glasses       Past Surgical History:   Procedure Laterality Date   • ADENOIDECTOMY     • BILATERAL BREAST REDUCTION     • BLEPHAROPLASTY     • BLEPHAROPLASTY, QUAD Bilateral 09/18/2018    Dr. romano.   • BREAST EXCISIONAL BIOPSY Left     NO VISIBLE SCAR   • BREAST FIBROADENOMA SURGERY     • CALCANEOUS OPEN REDUCTION INTERNAL FIXATION     • CATARACT EXTRACTION Bilateral    • CERVICAL CONE BIOPSY     • COLONOSCOPY     • COSMETIC SURGERY      SHORT SCAR FACE LIFT PER DR ROMANO    • DILATATION AND CURETTAGE     • FACIAL COSMETIC SURGERY     • FACIAL COSMETIC SURGERY     • FOOT SURGERY Left     calcaneous    • KNEE ARTHROPLASTY Right     blue    • KNEE ARTHROSCOPY Bilateral    • REDUCTION MAMMAPLASTY Bilateral    • REPLACEMENT TOTAL KNEE Right    • REPLACEMENT TOTAL KNEE Left    • TENDON REPAIR Right     HAND   • TENDON REPAIR      R hand   • TONSILLECTOMY AND ADENOIDECTOMY     • TOTAL KNEE ARTHROPLASTY Left 4/3/2018    Procedure: TOTAL KNEE ARTHROPLASTY LEFT;  Surgeon: Mahesh Wesley MD;  Location:  [facility];  Service: Orthopedics   • TOTAL SHOULDER ARTHROPLASTY W/ DISTAL CLAVICLE EXCISION Left 5/22/2017    Procedure: LEFT REVERSE TOTAL SHOULDER ARTHROPLASTY FOR FRACTURE;  Surgeon: Damaso Harris MD;  Location:  [facility];  Service:    • TOTAL SHOULDER REPLACEMENT Left    • TOTAL SHOULDER REPLACEMENT Left    • TUBAL ABDOMINAL LIGATION        Family History   Problem Relation Age of Onset   • Parkinsonism Mother    • Thyroid disease Mother    • Heart failure Father    • Kidney failure Father    • Hyperlipidemia Father    • Heart disease Father    • Sleep apnea Sister    • Thyroid disease Sister    • Obesity Sister    • No Known Problems Brother    • Breast cancer Neg Hx    • Ovarian cancer Neg Hx    • Colon cancer Neg Hx    • Colon polyps Neg Hx      Social History     Socioeconomic History   • Marital status:    Tobacco Use   • Smoking status: Never Smoker   • Smokeless tobacco: Never Used   Vaping Use   •  Vaping Use: Never used   Substance and Sexual Activity   • Alcohol use: Yes     Alcohol/week: 2.0 standard drinks     Types: 1 Glasses of wine, 1 Shots of liquor per week     Comment: DAILY   • Drug use: No   • Sexual activity: Defer      Current Outpatient Medications on File Prior to Visit   Medication Sig Dispense Refill   • aluminum hydroxide-mag carbonate (GAVISCON EXTRA RELIEF) 160-105 MG chewable tablet chewable tablet Chew 1 tablet Daily As Needed.     • Calcium Carb-Cholecalciferol (CALCIUM-VITAMIN D) 600-400 MG-UNIT tablet Take 1 tablet by mouth 2 (Two) Times a Day.     • Denosumab (PROLIA SC) Inject  under the skin into the appropriate area as directed Every 6 (Six) Months.     • DULoxetine (CYMBALTA) 60 MG capsule Take 1 capsule by mouth Daily.     • ENBREL 50 MG/ML injection Inject 50 mg under the skin into the appropriate area as directed 1 (One) Time Per Week.     • folic acid-vit B6-vit B12 (FOLGARD) 2.2-25-1 MG tablet tablet Take 1 tablet by mouth Every 12 (Twelve) Hours.     • HYDROcodone-acetaminophen (NORCO)  MG per tablet Take 1 tablet by mouth Every 6 (Six) Hours As Needed for Moderate Pain . 60 tablet 0   • lansoprazole (PREVACID) 15 MG capsule Take 15 mg by mouth Daily.     • levothyroxine (SYNTHROID, LEVOTHROID) 75 MCG tablet Take 1 tablet by mouth Daily. 90 tablet 3   • methotrexate 2.5 MG tablet Take 9 tablets by mouth 1 (One) Time Per Week. MONDAYS  Resume when ok with Dr. Wesley (Patient taking differently: Take 15 mg by mouth 1 (One) Time Per Week. MONDAYS  Resume when ok with Dr. Wesley)  0   • Multiple Vitamins-Minerals (SENIOR MULTIVITAMIN PLUS) tablet Take 1 tablet by mouth Daily.     • NON FORMULARY Take 2 tablets by mouth Daily. Hydro Eye gelcaps     • Polyethyl Glycol-Propyl Glycol (Systane) 0.4-0.3 % gel Systane Liquid Gel     • polyethyl glycol-propyl glycol (SYSTANE) 0.4-0.3 % solution ophthalmic solution Administer 2 drops to both eyes 5 (Five) Times a Day.     • predniSONE  (DELTASONE) 5 MG tablet Take 5 mg by mouth Daily.     • prochlorperazine (COMPAZINE) 10 MG tablet Take 10 mg by mouth Every 6 (Six) Hours As Needed for Nausea or Vomiting.     • pseudoephedrine-guaifenesin (Mucinex D)  MG per 12 hr tablet Take 1 tablet by mouth Every 12 (Twelve) Hours. 72 tablet 6   • Saline 0.2 % solution 1 application into the nostril(s) as directed by provider 2 (two) times a day.     • salsalate (DISALCID) 750 MG tablet Take 750 mg by mouth 4 (Four) Times a Day.     • vitamin A 8000 UNIT capsule Take 8,000 Units by mouth Daily.     • vitamin C (ASCORBIC ACID) 500 MG tablet Take 500 mg by mouth Daily.     • vitamin E 400 UNIT capsule Take 800 Units by mouth Daily.     • zolpidem (AMBIEN) 10 MG tablet Take 1 tablet by mouth Every Night. 5 tablet 0     No current facility-administered medications on file prior to visit.      Allergies   Allergen Reactions   • Adhesive Tape Itching   • Erythromycin Hives   • Pseudoephedrine Hives        Review of Systems   Constitutional: Negative for activity change, appetite change, chills, diaphoresis, fatigue, fever and unexpected weight change.   HENT: Negative for congestion, dental problem, drooling, ear discharge, ear pain, facial swelling, hearing loss, mouth sores, nosebleeds, postnasal drip, rhinorrhea, sinus pressure, sneezing, sore throat, tinnitus, trouble swallowing and voice change.    Eyes: Negative for photophobia, pain, discharge, redness, itching and visual disturbance.   Respiratory: Negative for apnea, cough, choking, chest tightness, shortness of breath, wheezing and stridor.    Cardiovascular: Negative for chest pain, palpitations and leg swelling.   Gastrointestinal: Negative for abdominal distention, abdominal pain, anal bleeding, blood in stool, constipation, diarrhea, nausea, rectal pain and vomiting.   Endocrine: Negative for cold intolerance, heat intolerance, polydipsia, polyphagia and polyuria.   Genitourinary: Negative for  "decreased urine volume, difficulty urinating, dysuria, enuresis, flank pain, frequency, genital sores, hematuria and urgency.   Musculoskeletal: Positive for arthralgias. Negative for back pain, gait problem, joint swelling, myalgias, neck pain and neck stiffness.   Skin: Negative for color change, pallor, rash and wound.   Allergic/Immunologic: Negative for environmental allergies, food allergies and immunocompromised state.   Neurological: Negative for dizziness, tremors, seizures, syncope, facial asymmetry, speech difficulty, weakness, light-headedness, numbness and headaches.   Hematological: Negative for adenopathy. Does not bruise/bleed easily.   Psychiatric/Behavioral: Negative for agitation, behavioral problems, confusion, decreased concentration, dysphoric mood, hallucinations, self-injury, sleep disturbance and suicidal ideas. The patient is not nervous/anxious and is not hyperactive.         Objective      Physical Exam  /86   Ht 149.9 cm (59.02\")   Wt 61.7 kg (136 lb)   BMI 27.45 kg/m²     Body mass index is 27.45 kg/m².    General:   Mental Status:  Alert   Appearance: Cooperative, in no acute distress   Build and Nutrition: Well-nourished well-developed female   Orientation: Alert and oriented to person, place and time   Posture: Normal   Gait: Nonantalgic    Integument:   Right knee: Incision well-healed   Left knee: Incision well-healed    Upper Extremities:   Left Shoulder:    Tenderness:  None    Swelling:  None    Range of motion:  External rotation:  30°       Forward flexion:  120°       Abduction:   100°  Deformities:  None    Lower Extremities:   Right Knee:    Tenderness:  None    Effusion:  None    Swelling:  None    Crepitus:  None    Atrophy:  None    Range of motion:  Extension: 0°       Flexion: 130°  Instability:  No varus laxity, no valgus laxity, negative anterior drawer  Deformities:  None   Left Knee:    Tenderness:  None    Effusion:  None    Swelling: "  None    Crepitus: None    Atrophy:  None    Range of motion:  Extension: 0°       Flexion: 130°  Instability:  No varus laxity, no valgus laxity, negative anterior drawer  Deformities:  None      Imaging/Studies  Imaging Results (Last 24 Hours)     Procedure Component Value Units Date/Time    XR Knee 3 View Bilateral [[id number]] Resulted: 06/29/22 1535     Updated: 06/29/22 1537    Narrative:      Right Knee Radiographs  Indication: status-post right total knee arthroplasty  Views: AP, lateral, and sunrise views of the right knee    Comparison: 1/7/2014    Findings:   The components are well aligned, with no signs of loosening or failure.    Small calcification at the superior pole the patella.    Left Knee Radiographs  Indication: status-post left total knee arthroplasty  Views: AP, lateral, and sunrise views of the left knee    Comparison: 4/8/2019    Findings:   The components are well aligned, with no signs of loosening or failure.      XR Shoulder 2+ View Left [[id number]] Resulted: 06/29/22 1534     Updated: 06/29/22 1535    Narrative:      Left Shoulder Radiographs  Indication: left shoulder pain  Views: AP, outlet and axillary views of the left shoulder    Comparison: no prior studies available for review    Findings:  Reverse total shoulder arthroplasty in good position, with no gross signs   of loosening or failure.              Assessment and Plan     Diagnoses and all orders for this visit:    1. S/P total knee arthroplasty, bilateral (Primary)  -     XR Knee 3 View Bilateral    2. History of reverse total replacement of left shoulder joint  -     XR Shoulder 2+ View Left        1. S/P total knee arthroplasty, bilateral    2. History of reverse total replacement of left shoulder joint        I reviewed my findings with the patient.  Both of her knees are functioning well, as well as her left shoulder.  She will follow-up in 3 years with repeat x-rays, but sooner for any problems.    Return in about 3  years (around 6/29/2025) for Recheck with X-Rays.      Mahesh Wesley MD  06/29/22  17:47 EDT

## 2022-08-07 NOTE — PROGRESS NOTES
"Subjective   Chief Complaint   Patient presents with   • Gynecologic Exam     Pt here due to left breast pain and thickness for the pass couple months     Shanel Wall is a 71 y.o. year old .  No LMP recorded. Patient is postmenopausal.  She presents to be seen because of new onset left breast pain and thickness which she first noticed 2 months ago.She states about 2 months ago she had a bruised type feeling her pain in her left breast.  She had no visible bruise or skin changes to the breast at that time.  She has no known injury to breast.  She said her breast is having on sensation and heaviness.  She states that she felt areas on the outer lateral aspect of her left breast had areas of skin thickening.  She states within the last week she has had a similar pain on the upper inner quadrant of her right breast.  She denies any palpable areas of concern.  Denies nipple discharge from either breast.  She denies any redness of breast or skin dimpling.  She had a mammogram 3/2022 which was birads 2.  She reports she has had a fibroadenoma in the past.  She has had bilateral breast reduction surgery.    The following portions of the patient's history were reviewed and updated as appropriate:current medications and allergies    Social History    Tobacco Use      Smoking status: Never Smoker      Smokeless tobacco: Never Used         Objective   /70 (BP Location: Right arm, Patient Position: Sitting, Cuff Size: Adult)   Ht 154.9 cm (61\")   Wt 63.6 kg (140 lb 3.2 oz)   Breastfeeding No   BMI 26.49 kg/m²   Breasts: breasts appear normal, no suspicious masses, no skin or nipple changes or axillary nodes, symmetric fibrous changes in both upper outer quadrants, surgical scars noted from bilateral breast reduction surgery.,  Patient reports tenderness with palpation at 2:00 in her upper quadrant of right breast and pain at 1-2 o'clock upper outer quadrant of left breast leading to axilla.   " probable cystic tissue noted with palpation of these areas.    Lab Review   No data reviewed    Imaging   Mammogram report        Assessment   1. Mastodynia of bilateral breasts  2. Thickening of skin on left breast     Plan   1. Findings of physical exam discussed with patient.  Given his this has changed her bilateral breast recommend diagnostic mammogram for further evaluation.  Suspect cystic changes.  2. The importance of keeping all planned follow-up and taking all medications as prescribed was emphasized.  3. Order sent for bilateral diagnostic mammogram.  Discussed with patient scheduling will call to schedule this procedure for her.    No orders of the defined types were placed in this encounter.         This note was electronically signed.    Yessica Jose, APRN  August 8, 2022

## 2022-08-08 ENCOUNTER — PATIENT ROUNDING (BHMG ONLY) (OUTPATIENT)
Dept: OBSTETRICS AND GYNECOLOGY | Facility: CLINIC | Age: 72
End: 2022-08-08

## 2022-08-08 ENCOUNTER — OFFICE VISIT (OUTPATIENT)
Dept: OBSTETRICS AND GYNECOLOGY | Facility: CLINIC | Age: 72
End: 2022-08-08

## 2022-08-08 VITALS
WEIGHT: 140.2 LBS | BODY MASS INDEX: 26.47 KG/M2 | DIASTOLIC BLOOD PRESSURE: 70 MMHG | SYSTOLIC BLOOD PRESSURE: 130 MMHG | HEIGHT: 61 IN

## 2022-08-08 DIAGNOSIS — R23.4 BREAST SKIN CHANGES: ICD-10-CM

## 2022-08-08 DIAGNOSIS — N64.4 MASTODYNIA: Primary | ICD-10-CM

## 2022-08-08 PROCEDURE — 99203 OFFICE O/P NEW LOW 30 MIN: CPT | Performed by: NURSE PRACTITIONER

## 2022-08-08 NOTE — PROGRESS NOTES
A [employer] message has been sent to the patient for PATIENT ROUNDING with [facility].

## 2022-08-09 DIAGNOSIS — Z12.31 ENCOUNTER FOR SCREENING MAMMOGRAM FOR MALIGNANT NEOPLASM OF BREAST: ICD-10-CM

## 2022-08-09 DIAGNOSIS — N64.4 MASTODYNIA: Primary | ICD-10-CM

## 2022-08-11 DIAGNOSIS — N64.4 MASTODYNIA: Primary | ICD-10-CM

## 2022-08-18 ENCOUNTER — OFFICE VISIT (OUTPATIENT)
Dept: FAMILY MEDICINE CLINIC | Facility: CLINIC | Age: 72
End: 2022-08-18

## 2022-08-18 VITALS
DIASTOLIC BLOOD PRESSURE: 78 MMHG | SYSTOLIC BLOOD PRESSURE: 132 MMHG | HEART RATE: 73 BPM | OXYGEN SATURATION: 96 % | WEIGHT: 139 LBS | HEIGHT: 61 IN | BODY MASS INDEX: 26.24 KG/M2

## 2022-08-18 DIAGNOSIS — M05.79 RHEUMATOID ARTHRITIS INVOLVING MULTIPLE SITES WITH POSITIVE RHEUMATOID FACTOR: ICD-10-CM

## 2022-08-18 DIAGNOSIS — D75.1 POLYCYTHEMIA SECONDARY TO HYPOXIA: ICD-10-CM

## 2022-08-18 DIAGNOSIS — F32.89 OTHER DEPRESSION: ICD-10-CM

## 2022-08-18 DIAGNOSIS — E03.9 HYPOTHYROIDISM, UNSPECIFIED TYPE: Primary | ICD-10-CM

## 2022-08-18 DIAGNOSIS — R06.81 APNEA: ICD-10-CM

## 2022-08-18 PROBLEM — F32.A DEPRESSION: Status: ACTIVE | Noted: 2022-08-18

## 2022-08-18 PROCEDURE — 99214 OFFICE O/P EST MOD 30 MIN: CPT | Performed by: INTERNAL MEDICINE

## 2022-08-18 RX ORDER — HYDROCODONE BITARTRATE AND ACETAMINOPHEN 10; 325 MG/1; MG/1
1 TABLET ORAL EVERY 6 HOURS PRN
Qty: 60 TABLET | Refills: 0 | Status: SHIPPED | OUTPATIENT
Start: 2022-08-18 | End: 2022-10-13 | Stop reason: SDUPTHER

## 2022-08-18 RX ORDER — DULOXETIN HYDROCHLORIDE 30 MG/1
90 CAPSULE, DELAYED RELEASE ORAL DAILY
Qty: 270 CAPSULE | Refills: 3 | Status: SHIPPED | OUTPATIENT
Start: 2022-08-18

## 2022-08-18 NOTE — PROGRESS NOTES
"Shanel Wall  1950  [id number]  Patient Care Team:  Fili Ray MD as PCP - General (Internal Medicine)  Cristiane Araiza MD as Consulting Physician (Endocrinology)    Shanel Wall is a 71 y.o. female here today for follow up.     This patient is accompanied by their self who contributes to the history of their care.    Chief Complaint:    Chief Complaint   Patient presents with   • Arthritis     Lab work results, She has a copy of results         History of Present Illness:  I have reviewed and/or updated the patient's past medical, past surgical, family, social history, problem list and allergies as appropriate.     Overall physically she is doing well especially can.  She is struggling with irritability and depression with her 's denial of his dementia.  She denies any suicidal homicidal thoughts.  With her vision loss and his memory loss has been quite difficult.  She has been on duloxetine 3 mg for years.  She is sleeping better on CPAP and getting used to it.  Hemoglobin and hematocrit are improved.  Continue seeing her again for her thyroid issues.  He is currently maintained on 75 mcg Synthroid daily still with intolerance    Review of Systems   Constitutional: Positive for fatigue.   Eyes: Negative.    Respiratory: Positive for apnea.    Endocrine: Positive for heat intolerance. Negative for cold intolerance.   Genitourinary: Negative.    Musculoskeletal: Positive for arthralgias.   Allergic/Immunologic: Positive for environmental allergies.   Psychiatric/Behavioral: Positive for agitation.       Vitals:    08/18/22 1342   BP: 132/78   Pulse: 73   SpO2: 96%   Weight: 63 kg (139 lb)   Height: 154.9 cm (60.98\")     Body mass index is 26.28 kg/m².    Physical Exam  Vitals and nursing note reviewed.   Constitutional:       General: She is not in acute distress.     Appearance: She is well-developed. She is not diaphoretic.   HENT:      Head: Normocephalic and atraumatic. "      Right Ear: External ear normal.      Left Ear: External ear normal.      Mouth/Throat:      Mouth: Mucous membranes are moist.      Pharynx: No oropharyngeal exudate.   Eyes:      General: No scleral icterus.        Right eye: No discharge.      Extraocular Movements: Extraocular movements intact.      Conjunctiva/sclera: Conjunctivae normal.      Pupils: Pupils are equal, round, and reactive to light.   Neck:      Thyroid: No thyromegaly.      Vascular: No JVD.      Trachea: No tracheal deviation.   Cardiovascular:      Rate and Rhythm: Normal rate and regular rhythm.      Heart sounds: Normal heart sounds.      Comments: PMI nondisplaced  Pulmonary:      Effort: Pulmonary effort is normal.      Breath sounds: Normal breath sounds. No wheezing or rales.   Abdominal:      General: Bowel sounds are normal.      Palpations: Abdomen is soft.      Tenderness: There is no abdominal tenderness. There is no guarding or rebound.   Musculoskeletal:      Cervical back: Normal range of motion and neck supple.      Comments: Antalgic gait.  Ambulates with cane   Lymphadenopathy:      Cervical: No cervical adenopathy.   Skin:     General: Skin is warm and dry.      Capillary Refill: Capillary refill takes less than 2 seconds.      Coloration: Skin is not pale.      Findings: No rash.   Neurological:      Mental Status: She is alert and oriented to person, place, and time.      Motor: No abnormal muscle tone.      Coordination: Coordination normal.   Psychiatric:         Mood and Affect: Mood normal.         Behavior: Behavior normal.         Judgment: Judgment normal.         Procedures    Results Review:    I reviewed the patient's new clinical results.    Assessment/Plan:     Problem List Items Addressed This Visit        Endocrine and Metabolic    Hypothyroid - Primary    Current Assessment & Plan     Niccoli euthyroid on 75 mcg of Synthroid daily.  She will continue this.         Relevant Medications    predniSONE  (DELTASONE) 5 MG tablet    levothyroxine (SYNTHROID, LEVOTHROID) 75 MCG tablet       Hematology and Neoplasia    Polycythemia secondary to hypoxia    Current Assessment & Plan     Hemoglobin and hematocrit continue to improve with CPAP compliancy.  We will continue this.         Relevant Medications    folic acid-vit B6-vit B12 (FOLGARD) 2.2-25-1 MG tablet tablet       Mental Health    Depression    Current Assessment & Plan     Patient's depression is recurrent and is moderate without psychosis. Their depression is currently in partial remission and the condition is worsening. This will be reassessed at the next regular appointment. F/U as described:We have increased her duloxetine to 90 mg daily..         Relevant Medications    prochlorperazine (COMPAZINE) 10 MG tablet    zolpidem (AMBIEN) 10 MG tablet    DULoxetine (Cymbalta) 30 MG capsule       Musculoskeletal and Injuries    Rheumatoid arthritis involving multiple sites (HCC)    Relevant Medications    predniSONE (DELTASONE) 5 MG tablet    ENBREL 50 MG/ML injection    HYDROcodone-acetaminophen (NORCO)  MG per tablet       Pulmonary and Pneumonias    Apnea    Current Assessment & Plan     Improving with CPAP.  Encouraged continued use               Plan of care reviewed with patient at the conclusion of today's visit. Education was provided regarding diagnosis and management.  Patient verbalizes understanding of and agreement with management plan.    No follow-ups on file.    Fili Ray MD      Please note than portions of this note were completed [facility] a Voice Recognition Program

## 2022-08-18 NOTE — ASSESSMENT & PLAN NOTE
Patient's depression is recurrent and is moderate without psychosis. Their depression is currently in partial remission and the condition is worsening. This will be reassessed at the next regular appointment. F/U as described:We have increased her duloxetine to 90 mg daily..

## 2022-09-01 ENCOUNTER — PRIOR AUTHORIZATION (OUTPATIENT)
Dept: FAMILY MEDICINE CLINIC | Facility: CLINIC | Age: 72
End: 2022-09-01

## 2022-09-01 NOTE — TELEPHONE ENCOUNTER
Caller: [facility]    Relationship:     Best call back number:[phone]    What medications are you currently taking:   Current Outpatient Medications on File Prior to Visit   Medication Sig Dispense Refill   • aluminum hydroxide-mag carbonate (GAVISCON EXTRA RELIEF) 160-105 MG chewable tablet chewable tablet Chew 1 tablet Daily As Needed.     • Calcium Carb-Cholecalciferol (CALCIUM-VITAMIN D) 600-400 MG-UNIT tablet Take 1 tablet by mouth 2 (Two) Times a Day.     • Denosumab (PROLIA SC) Inject  under the skin into the appropriate area as directed Every 6 (Six) Months.     • DULoxetine (Cymbalta) 30 MG capsule Take 3 capsules by mouth Daily. 270 capsule 3   • ENBREL 50 MG/ML injection Inject 50 mg under the skin into the appropriate area as directed 1 (One) Time Per Week.     • folic acid-vit B6-vit B12 (FOLGARD) 2.2-25-1 MG tablet tablet Take 1 tablet by mouth Every 12 (Twelve) Hours.     • HYDROcodone-acetaminophen (NORCO)  MG per tablet Take 1 tablet by mouth Every 6 (Six) Hours As Needed for Moderate Pain . 60 tablet 0   • lansoprazole (PREVACID) 15 MG capsule Take 15 mg by mouth Daily.     • levothyroxine (SYNTHROID, LEVOTHROID) 75 MCG tablet Take 1 tablet by mouth Daily. 90 tablet 3   • methotrexate 2.5 MG tablet Take 9 tablets by mouth 1 (One) Time Per Week. MONDAYS  Resume when ok with Dr. Wesley (Patient taking differently: Take 15 mg by mouth 1 (One) Time Per Week. MONDAYS  Resume when ok with Dr. Wesley)  0   • Multiple Vitamins-Minerals (SENIOR MULTIVITAMIN PLUS) tablet Take 1 tablet by mouth Daily.     • NON FORMULARY Take 2 tablets by mouth Daily. Hydro Eye gelcaps     • Polyethyl Glycol-Propyl Glycol (Systane) 0.4-0.3 % gel Systane Liquid Gel     • polyethyl glycol-propyl glycol (SYSTANE) 0.4-0.3 % solution ophthalmic solution Administer 2 drops to both eyes 5 (Five) Times a Day.     • predniSONE (DELTASONE) 5 MG tablet Take 5 mg by mouth Daily.     • prochlorperazine (COMPAZINE) 10 MG tablet Take  10 mg by mouth Every 6 (Six) Hours As Needed for Nausea or Vomiting.     • pseudoephedrine-guaifenesin (Mucinex D)  MG per 12 hr tablet Take 1 tablet by mouth Every 12 (Twelve) Hours. 72 tablet 6   • Saline 0.2 % solution 1 application into the nostril(s) as directed by provider 2 (two) times a day.     • salsalate (DISALCID) 750 MG tablet Take 750 mg by mouth 4 (Four) Times a Day.     • vitamin A 8000 UNIT capsule Take 8,000 Units by mouth Daily.     • vitamin C (ASCORBIC ACID) 500 MG tablet Take 500 mg by mouth Daily.     • vitamin E 400 UNIT capsule Take 800 Units by mouth Daily.     • zolpidem (AMBIEN) 10 MG tablet Take 1 tablet by mouth Every Night. 5 tablet 0     No current facility-administered medications on file prior to visit.          Which medication are you concerned about: DULOXETINE    Who prescribed you this medication: DR MILLAN    What are your concerns: [facility] PRIOR AUTHORIZATIONS WOULD LIKE TO KNOW WHY PATIENT CAN NOT TAKE THE 2 CAPSULES DAILY WHICH IS IN THEIR ALLOWANCE. PLEASE ADVISE AND CALL [facility] BACK WITH REF#[id number]

## 2022-09-02 NOTE — TELEPHONE ENCOUNTER
Contacted insurance to clarify. This auth is approved from 8/19/2022- further notice with a quantity limit of 270 capsules

## 2022-09-12 ENCOUNTER — HOSPITAL ENCOUNTER (OUTPATIENT)
Dept: MAMMOGRAPHY | Facility: HOSPITAL | Age: 72
Discharge: HOME | End: 2022-09-12
Admitting: NURSE PRACTITIONER

## 2022-09-12 DIAGNOSIS — N64.4 MASTODYNIA: ICD-10-CM

## 2022-09-12 PROBLEM — Z01.419 WELL WOMAN EXAM WITH ROUTINE GYNECOLOGICAL EXAM: Status: ACTIVE | Noted: 2022-09-12

## 2022-09-12 PROCEDURE — G0279 TOMOSYNTHESIS, MAMMO: HCPCS

## 2022-09-12 PROCEDURE — G0279 TOMOSYNTHESIS, MAMMO: HCPCS | Performed by: RADIOLOGY

## 2022-09-12 PROCEDURE — 77066 DX MAMMO INCL CAD BI: CPT | Performed by: RADIOLOGY

## 2022-09-12 PROCEDURE — 77066 DX MAMMO INCL CAD BI: CPT

## 2022-09-14 NOTE — TELEPHONE ENCOUNTER
(Key: [id number]) - [id number]  DULoxetine HCl 30MG dr capsules  Status: PA Response - Approved  Created: August 18th, 2022 [phone]  Sent: September 1st, 2022    Approved on September 2  Approved. This drug has been approved under the Member's Medicare Part D benefit. Approved quantity: 270 <> per 90 day(s). You may fill up to a 90 day supply except for those on Specialty Tier 5, which can be filled up to a 30 day supply. Please call the pharmacy to process the prescription claim.    (Key: [id number]) - [id number]  HYDROcodone-Acetaminophen 10-325MG tablets  Status: PA Response - Approved  Created: August 18th, 2022 [phone]  Sent: August 31st, 2022    Approved on August 31  Approved. The requested medication is covered on the formulary with a Quantity Limit (QL) of <<180 tablets>> per <<30>> days. Since this request is within the QL, it does not require a Coverage Determination Request. Please call the pharmacy to process the prescription claim. The drug is not paying at the pharmacy because of the opioid safety rules. We do not show that you filled an opioid drug in the last 90 days. Therefore, your first fill of this drug is limited to a 7 day supply. Future fills of this drug will be allowed up to the standard day supply. Please call the pharmacy to change the day supply to up to 7 days.

## 2022-10-01 ENCOUNTER — HOSPITAL ENCOUNTER (EMERGENCY)
Facility: HOSPITAL | Age: 72
Discharge: HOME | End: 2022-10-01
Attending: EMERGENCY MEDICINE | Admitting: EMERGENCY MEDICINE

## 2022-10-01 ENCOUNTER — APPOINTMENT (OUTPATIENT)
Dept: GENERAL RADIOLOGY | Facility: HOSPITAL | Age: 72
End: 2022-10-01

## 2022-10-01 VITALS
DIASTOLIC BLOOD PRESSURE: 64 MMHG | WEIGHT: 135 LBS | RESPIRATION RATE: 16 BRPM | HEIGHT: 59 IN | OXYGEN SATURATION: 94 % | TEMPERATURE: 98.7 F | HEART RATE: 63 BPM | BODY MASS INDEX: 27.21 KG/M2 | SYSTOLIC BLOOD PRESSURE: 136 MMHG

## 2022-10-01 DIAGNOSIS — S42.031A CLOSED DISPLACED FRACTURE OF ACROMIAL END OF RIGHT CLAVICLE, INITIAL ENCOUNTER: Primary | ICD-10-CM
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
## 2022-10-01 PROCEDURE — 73030 X-RAY EXAM OF SHOULDER: CPT

## 2022-10-01 PROCEDURE — 99282 EMERGENCY DEPT VISIT SF MDM: CPT

## 2022-10-01 RX ORDER — HYDROCODONE BITARTRATE AND ACETAMINOPHEN 5; 325 MG/1; MG/1
.5-1 TABLET ORAL EVERY 6 HOURS PRN
Qty: 12 TABLET | Refills: 0 | Status: SHIPPED | OUTPATIENT
Start: 2022-10-01 | End: 2022-10-13

## 2022-10-01 RX ORDER — HYDROCODONE BITARTRATE AND ACETAMINOPHEN 5; 325 MG/1; MG/1
.5-1 TABLET ORAL EVERY 6 HOURS PRN
Qty: 12 TABLET | Refills: 0 | Status: CANCELLED | OUTPATIENT
Start: 2022-10-01

## 2022-10-01 NOTE — DISCHARGE INSTRUCTIONS
Follow-up with your primary care provider in 1 week for recheck.  Return to the emergency department immediately if you have any change or worsening of symptoms.  Do not drive while taking the medicines we have prescribed.

## 2022-10-01 NOTE — ED PROVIDER NOTES
" EMERGENCY DEPARTMENT ENCOUNTER    Pt Name: Shanel Wall  MRN: [id number]  Pt :   1950  Room Number:    Date of encounter:  10/1/2022  PCP: Fili Ray MD  ED Provider: Clarence Fuchs PA-C    Historian: Patient      HPI:  Chief Complaint: Fell yesterday, injured right shoulder        Context: Shanel Wall is a 71 y.o. female who presents to the ED c/o of falling onto her garage landing as she was leaning out the door to hit the garage door button.  She complains of pain swelling and bruising to the right shoulder distal clavicle area.  Able to range her shoulder, but has increased pain in certain positions.  No distal paresis or paresthesias.  No shortness of breath chest pain sputum or hemoptysis.  She denies other symptoms or injuries at this time.      PAST MEDICAL HISTORY  Past Medical History:   Diagnosis Date   • Abnormal EKG    • Anesthesia     PT HAS RHEUMATOID ARTHRITIS, PT HAS SPECIFIC INSTRUCTIONS PER RA MD, pt to bring INSTRUCTIONS AND RECS ATTACHED TO CHART AND PT TO DISCUSS WITH ANESTHESIA ON DATE OF PROCEDURE    • Arthritis    • Back disorder     degenerative disc disorder   • Baker's cyst of knee     right   • Benign paroxysmal positional vertigo    • Cervical cancer (HCC)     cone biopsy   • Depression    • Deviated septum    • Deviated septum    • Disease of thyroid gland    • Diverticulosis    • Diverticulosis    • Fibromyalgia    • Fibromyalgia    • Gall stones    • GERD (gastroesophageal reflux disease)    • Hard to intubate     \"anterior trachea\"    • History of gallstones    • Hypoglycemia    • Hypothyroidism    • IBS (irritable bowel syndrome)    • Joint pain    • Macular degeneration    • Measles    • Menopause    • Multiple food allergies    • Osteoarthritis    • Osteoarthritis (arthritis due to wear and tear of joints)    • Popliteal cyst    • Retinal disease, bilateral    • Rheumatoid aortitis    • Rheumatoid arthritis (HCC)    • Rheumatoid " arthritis (HCC)    • Ruptured tendon    • Scoliosis    • Scoliosis    • Seasonal allergies    • Shingles    • Sleep apnea    • Suppression of immune system subtherapeutic (HCC)    • Synovitis    • Synovitis    • Tinnitus    • Tinnitus    • TMJ (dislocation of temporomandibular joint)    • Vertigo    • Wears glasses          PAST SURGICAL HISTORY  Past Surgical History:   Procedure Laterality Date   • ADENOIDECTOMY     • BILATERAL BREAST REDUCTION     • BLEPHAROPLASTY     • BLEPHAROPLASTY, QUAD Bilateral 09/18/2018    Dr. romano.   • BREAST EXCISIONAL BIOPSY Left     NO VISIBLE SCAR   • BREAST FIBROADENOMA SURGERY     • CALCANEOUS OPEN REDUCTION INTERNAL FIXATION     • CATARACT EXTRACTION Bilateral    • CERVICAL CONE BIOPSY     • COLONOSCOPY     • COSMETIC SURGERY      SHORT SCAR FACE LIFT PER DR ROMANO    • DILATATION AND CURETTAGE     • FACIAL COSMETIC SURGERY     • FACIAL COSMETIC SURGERY     • FOOT SURGERY Left     calcaneous    • KNEE ARTHROPLASTY Right     blue    • KNEE ARTHROSCOPY Bilateral    • REDUCTION MAMMAPLASTY Bilateral    • REPLACEMENT TOTAL KNEE Right    • REPLACEMENT TOTAL KNEE Left    • TENDON REPAIR Right     HAND   • TENDON REPAIR      R hand   • TONSILLECTOMY AND ADENOIDECTOMY     • TOTAL KNEE ARTHROPLASTY Left 04/03/2018    Procedure: TOTAL KNEE ARTHROPLASTY LEFT;  Surgeon: Mahesh Wesley MD;  Location: [facility] OR;  Service: Orthopedics   • TOTAL SHOULDER ARTHROPLASTY W/ DISTAL CLAVICLE EXCISION Left 05/22/2017    Procedure: LEFT REVERSE TOTAL SHOULDER ARTHROPLASTY FOR FRACTURE;  Surgeon: Damaso Harris MD;  Location:  [facility] OR;  Service:    • TOTAL SHOULDER REPLACEMENT Left    • TOTAL SHOULDER REPLACEMENT Left    • TUBAL ABDOMINAL LIGATION           FAMILY HISTORY  Family History   Problem Relation Age of Onset   • Parkinsonism Mother    • Thyroid disease Mother    • Heart failure Father    • Kidney failure Father    • Hyperlipidemia Father    • Heart disease Father    • Sleep apnea Sister     • Thyroid disease Sister    • Obesity Sister    • No Known Problems Brother    • Breast cancer Neg Hx    • Ovarian cancer Neg Hx    • Colon cancer Neg Hx    • Colon polyps Neg Hx          SOCIAL HISTORY  Social History     Socioeconomic History   • Marital status:    • Number of children: 1   Tobacco Use   • Smoking status: Never Smoker   • Smokeless tobacco: Never Used   Vaping Use   • Vaping Use: Never used   Substance and Sexual Activity   • Alcohol use: Yes     Alcohol/week: 2.0 standard drinks     Types: 1 Glasses of wine, 1 Shots of liquor per week     Comment: DAILY   • Drug use: No   • Sexual activity: Not Currently     Partners: Male         ALLERGIES  Adhesive tape, Erythromycin, and Pseudoephedrine        REVIEW OF SYSTEMS  Review of Systems   Constitutional: Positive for activity change. Negative for fatigue.   HENT: Negative for congestion, rhinorrhea and sore throat.    Respiratory: Negative for cough, shortness of breath and wheezing.    Cardiovascular: Negative for chest pain and palpitations.   Gastrointestinal: Negative for abdominal pain, nausea and vomiting.   Musculoskeletal: Positive for arthralgias. Negative for back pain, joint swelling, myalgias and neck pain.   Neurological: Negative for dizziness, syncope and light-headedness.          All systems reviewed and negative except for those discussed in HPI.       PHYSICAL EXAM    I have reviewed the triage vital signs and nursing notes.    ED Triage Vitals   Temp Pulse Resp BP SpO2   -- -- 10/01/22 1042 10/01/22 1044 10/01/22 1045     16 136/64 94 %      Temp src Heart Rate Source Patient Position BP Location FiO2 (%)   -- -- -- -- --              Physical Exam  GENERAL:   Pleasant awake alert and oriented nontoxic-appearing afebrile hemodynamic stable, not in acute distress.  HENT: Nares patent.  EYES: No scleral icterus.  CV: Regular rhythm, regular rate.  RESPIRATORY: Normal effort.  No audible wheezes, rales or rhonchi.  ABDOMEN:  Soft, nontender  MUSCULOSKELETAL: No deformities.  She has discrete bruising over the distal clavicular area of her right shoulder.  This area is point tender to palpation as well.  Shoulder otherwise has full range of motion and nontender to palpation.  Neurovascular status is intact distally.  NEURO: Alert, moves all extremities, follows commands.  SKIN: Warm, dry, no rash visualized.        LAB RESULTS  No results found for this or any previous visit (from the past 24 hour(s)).    If labs were ordered, I independently reviewed the results.        RADIOLOGY  XR Shoulder 2+ View Right    Result Date: 10/1/2022  DATE OF EXAM: 10/1/2022 10:54 AM  PROCEDURE: XR SHOULDER 2+ VW RIGHT-  INDICATIONS: fall, pain  COMPARISON: Chest x-ray 2/17/2021  TECHNIQUE: 3 images of the right shoulder  FINDINGS: There is mild soft tissue swelling subcutaneous fat stranding in the supraclavicular region. There is an acute mildly displaced fracture of the distal clavicle without definite intra-articular extension. The acromioclavicular joint is congruent without abnormal widening. No additional acute fractures are identified. There is narrowing of the subacromial space compatible with underlying rotator cuff tearing. There is mild osteophytic change of the glenohumeral and acromioclavicular joints. Unremarkable appearance of the partially imaged thorax.      Acute mildly displaced extra-articular fracture of the distal clavicle.  This report was finalized on 10/1/2022 11:26 AM by Justus Henderson MD.              PROCEDURES    Procedures    No orders to display       MEDICATIONS GIVEN IN ER    Medications - No data to display      PROGRESS, DATA ANALYSIS, CONSULTS, AND MEDICAL DECISION MAKING    All labs have been independently reviewed by me.  All radiology studies have been reviewed by me and the radiologist dictating the report.   EKG's have been independently viewed and interpreted by me.      Differential diagnoses: Clavicle  fracture, pneumothorax, other shoulder fracture.           She has an acute mildly displaced fracture of the distal clavicle.  We will place her in a shoulder immobilizer, prescribed hydrocodone for pain, she is to recheck with her primary care provider in 1 week.  Return if any change or worsening.  She verbalizes understanding and is agreeable to plan.      AS OF 14:31 EDT VITALS:    BP - 136/64  HR - 63  TEMP - 98.7 °F (37.1 °C)  O2 SATS - 94%      PATRICIA reviewed by Kin Johnson MD           DIAGNOSIS  Final diagnoses:   Closed displaced fracture of acromial end of right clavicle, initial encounter   Fall at home, initial encounter         DISPOSITION  DISCHARGE    Patient discharged in stable condition.    Reviewed implications of results, diagnosis, meds, responsibility to follow up, warning signs and symptoms of possible worsening, potential complications and reasons to return to ER.    Patient/Family voiced understanding of above instructions.    Discussed plan for discharge, as there is no emergent indication for admission.  Pt/family is agreeable and understands need for follow up and possible repeat testing.  Pt/family is aware that discharge does not mean that nothing is wrong but that it indicates no emergency is currently present that requires admission and they must continue care with follow-up as given below or with a physician of their choice.     FOLLOW-UP  Fili Ray MD  [address] [place], [place] [id number] [phone]    In 1 week  For recheck         Medication List      Changed    * HYDROcodone-acetaminophen  MG per tablet  Commonly known as: NORCO  Take 1 tablet by mouth Every 6 (Six) Hours As Needed for Moderate Pain .  What changed: Another medication with the same name was added. Make sure you understand how and when to take each.     * HYDROcodone-acetaminophen 5-325 MG per tablet  Commonly known as: NORCO  Take 0.5-1 tablets by mouth Every 6 (Six) Hours As Needed  for Severe Pain.  What changed: You were already taking a medication with the same name, and this prescription was added. Make sure you understand how and when to take each.     methotrexate 2.5 MG tablet  Take 9 tablets by mouth 1 (One) Time Per Week. MONDAYS  Resume when ok with Dr. Wesley  What changed: how much to take         * This list has 2 medication(s) that are the same as other medications prescribed for you. Read the directions carefully, and ask your doctor or other care provider to review them with you.               Where to Get Your Medications      These medications were sent to [facility] [id number] - [place], [place] - [address] [place] [address] [address] [place] - [phone]  - [phone] [place] [address]    Phone: [phone]   · HYDROcodone-acetaminophen 5-325 MG per tablet                  Clarence Fuchs PA-C  10/01/22 2652

## 2022-10-10 RX ORDER — ZOLPIDEM TARTRATE 10 MG/1
10 TABLET ORAL NIGHTLY
Qty: 30 TABLET | Refills: 3 | Status: SHIPPED | OUTPATIENT
Start: 2022-10-10 | End: 2022-10-13

## 2022-10-10 RX ORDER — ZOLPIDEM TARTRATE 10 MG/1
10 TABLET ORAL NIGHTLY
Qty: 5 TABLET | Refills: 0 | Status: SHIPPED | OUTPATIENT
Start: 2022-10-10 | End: 2023-03-06

## 2022-10-10 NOTE — TELEPHONE ENCOUNTER
Caller: Shanel Wall    Relationship: Self    Best call back number: [phone]    Requested Prescriptions:   Requested Prescriptions     Pending Prescriptions Disp Refills   • zolpidem (AMBIEN) 10 MG tablet 5 tablet 0     Sig: Take 1 tablet by mouth Every Night.        Pharmacy where request should be sent: [facility] [id number] [address] [place] - [phone]  - [phone] FX     Additional details provided by patient: OUT OF MEDICATION AND RHEUMATOLOGIST IS ON VACATION. STATED THAT YOU TOLD HER TO CALL IF YOU NEEDED THIS MEDICATION CALLED IN.    Does the patient have less than a 3 day supply:  [x] Yes  [] No    Radha Nicolas MA   10/10/22 15:33 EDT

## 2022-10-10 NOTE — TELEPHONE ENCOUNTER
Rx Refill Note  Requested Prescriptions     Pending Prescriptions Disp Refills   • zolpidem (AMBIEN) 10 MG tablet 5 tablet 0     Sig: Take 1 tablet by mouth Every Night.      Last office visit with prescribing clinician: 8/18/2022      Next office visit with prescribing clinician: Visit date not found     CSA: 10/6/2021   UDS: 10/6/2021     {TIP  Is Refill Pharmacy correct?:23}  Mario Grey MA  10/10/22, 16:02 EDT

## 2022-10-11 ENCOUNTER — TELEPHONE (OUTPATIENT)
Dept: SLEEP MEDICINE | Facility: HOSPITAL | Age: 72
End: 2022-10-11

## 2022-10-11 NOTE — TELEPHONE ENCOUNTER
Caller: Shanel Wall    Relationship to patient: Self    Best call back number: [phone]    Type of visit: 6 MONTH F/U      Requested date: PATIENT HAS APPOINTMENT SCHEDULED FOR 12/14 3PM     If rescheduling, when is the original appointment: 10/3/2022    Additional notes: REQUESTING A TELEHEALTH VISIT FOR THE 12/14 3PM .

## 2022-10-12 ENCOUNTER — TELEPHONE (OUTPATIENT)
Dept: FAMILY MEDICINE CLINIC | Facility: CLINIC | Age: 72
End: 2022-10-12

## 2022-10-12 NOTE — TELEPHONE ENCOUNTER
Hub staff attempted to follow warm transfer process and was unsuccessful     Caller: Shanel Wall    Relationship to patient: Self    Best call back number: [phone]    Patient is needing: I WAS ATTEMPTING TO RETURN A CALL TO EV THAT PATIENT HAD RECEIVED.

## 2022-10-13 ENCOUNTER — HOSPITAL ENCOUNTER (OUTPATIENT)
Dept: GENERAL RADIOLOGY | Facility: HOSPITAL | Age: 72
Discharge: HOME | End: 2022-10-13
Admitting: INTERNAL MEDICINE

## 2022-10-13 ENCOUNTER — OFFICE VISIT (OUTPATIENT)
Dept: FAMILY MEDICINE CLINIC | Facility: CLINIC | Age: 72
End: 2022-10-13

## 2022-10-13 VITALS
WEIGHT: 140.4 LBS | HEIGHT: 59 IN | SYSTOLIC BLOOD PRESSURE: 126 MMHG | DIASTOLIC BLOOD PRESSURE: 62 MMHG | BODY MASS INDEX: 28.3 KG/M2 | HEART RATE: 69 BPM | OXYGEN SATURATION: 98 %

## 2022-10-13 DIAGNOSIS — S42.034D CLOSED NONDISPLACED FRACTURE OF ACROMIAL END OF RIGHT CLAVICLE WITH ROUTINE HEALING: ICD-10-CM

## 2022-10-13 DIAGNOSIS — M05.79 RHEUMATOID ARTHRITIS INVOLVING MULTIPLE SITES WITH POSITIVE RHEUMATOID FACTOR: ICD-10-CM

## 2022-10-13 DIAGNOSIS — M54.41 ACUTE BILATERAL LOW BACK PAIN WITH BILATERAL SCIATICA: ICD-10-CM

## 2022-10-13 DIAGNOSIS — M54.42 ACUTE BILATERAL LOW BACK PAIN WITH BILATERAL SCIATICA: ICD-10-CM

## 2022-10-13 DIAGNOSIS — M54.42 ACUTE BILATERAL LOW BACK PAIN WITH BILATERAL SCIATICA: Primary | ICD-10-CM

## 2022-10-13 DIAGNOSIS — Z23 NEEDS FLU SHOT: ICD-10-CM

## 2022-10-13 DIAGNOSIS — M54.41 ACUTE BILATERAL LOW BACK PAIN WITH BILATERAL SCIATICA: Primary | ICD-10-CM

## 2022-10-13 PROCEDURE — G0008 ADMIN INFLUENZA VIRUS VAC: HCPCS | Performed by: INTERNAL MEDICINE

## 2022-10-13 PROCEDURE — 90662 IIV NO PRSV INCREASED AG IM: CPT | Performed by: INTERNAL MEDICINE

## 2022-10-13 PROCEDURE — 96372 THER/PROPH/DIAG INJ SC/IM: CPT | Performed by: INTERNAL MEDICINE

## 2022-10-13 PROCEDURE — 72100 X-RAY EXAM L-S SPINE 2/3 VWS: CPT

## 2022-10-13 PROCEDURE — 99214 OFFICE O/P EST MOD 30 MIN: CPT | Performed by: INTERNAL MEDICINE

## 2022-10-13 RX ORDER — DULOXETIN HYDROCHLORIDE 60 MG/1
1 CAPSULE, DELAYED RELEASE ORAL DAILY
COMMUNITY
Start: 2022-07-08 | End: 2023-03-06

## 2022-10-13 RX ORDER — METHYLPREDNISOLONE ACETATE 80 MG/ML
80 INJECTION, SUSPENSION INTRA-ARTICULAR; INTRALESIONAL; INTRAMUSCULAR; SOFT TISSUE ONCE
Status: COMPLETED | OUTPATIENT
Start: 2022-10-13 | End: 2022-10-13

## 2022-10-13 RX ORDER — HYDROCODONE BITARTRATE AND ACETAMINOPHEN 10; 325 MG/1; MG/1
1 TABLET ORAL EVERY 6 HOURS PRN
Qty: 60 TABLET | Refills: 0 | Status: SHIPPED | OUTPATIENT
Start: 2022-10-13 | End: 2023-01-17 | Stop reason: SDUPTHER

## 2022-10-13 RX ADMIN — METHYLPREDNISOLONE ACETATE 80 MG: 80 INJECTION, SUSPENSION INTRA-ARTICULAR; INTRALESIONAL; INTRAMUSCULAR; SOFT TISSUE at 13:53

## 2022-10-13 NOTE — PROGRESS NOTES
"Shanel Wall  1950  [id number]  Patient Care Team:  Fili Ray MD as PCP - General (Internal Medicine)  Cristiane Araiza MD as Consulting Physician (Endocrinology)    Shanel Wall is a 71 y.o. female here today for follow up.     This patient is accompanied by their self who contributes to the history of their care.    Chief Complaint:    Chief Complaint   Patient presents with   • Hospital Follow Up Visit     back   • Back Pain         History of Present Illness:  I have reviewed and/or updated the patient's past medical, past surgical, family, social history, problem list and allergies as appropriate.     71 y.o. female who presented to the ED 10/2  After  falling onto her garage landing as she was leaning out the door to hit the garage door button.  She complained of pain swelling and bruising to the right shoulder distal clavicle area.  Able to range her shoulder, but had increased pain in certain positions.  No distal paresis or paresthesias.   Was Dx with distal right clavicular fracture.  Still with some pain in shoulder, however, secondary to compensating, she has developed low back pain. Her norco has helped.  She is having now more frequent bilateral sciatic pain. Radiates into mid bilateral thighs. Low back tends to freeze at times making standing difficult at times.  She has been on chronic prednisone.  She does have documented osteoporosis on Prolia.      Review of Systems   Constitutional: Positive for fatigue.   Respiratory: Negative.    Cardiovascular: Negative.    Endocrine: Negative.    Musculoskeletal: Positive for arthralgias and back pain.   Neurological: Negative for weakness and numbness.   Psychiatric/Behavioral: Negative.        Vitals:    10/13/22 1240   BP: 126/62   Pulse: 69   SpO2: 98%   Weight: 63.7 kg (140 lb 6.4 oz)   Height: 149.9 cm (59.02\")   PainSc:   8   PainLoc: Back     Body mass index is 28.34 kg/m².    Physical Exam  Vitals and nursing note " reviewed.   Constitutional:       General: She is not in acute distress.     Appearance: She is well-developed. She is not diaphoretic.   HENT:      Head: Normocephalic and atraumatic.      Right Ear: External ear normal.      Left Ear: External ear normal.      Mouth/Throat:      Pharynx: No oropharyngeal exudate.   Eyes:      General: No scleral icterus.        Right eye: No discharge.      Conjunctiva/sclera: Conjunctivae normal.   Neck:      Thyroid: No thyromegaly.      Vascular: No JVD.      Trachea: No tracheal deviation.   Cardiovascular:      Rate and Rhythm: Normal rate and regular rhythm.      Heart sounds: Normal heart sounds.      Comments: PMI nondisplaced  Pulmonary:      Effort: Pulmonary effort is normal.      Breath sounds: Normal breath sounds. No wheezing or rales.   Abdominal:      General: Bowel sounds are normal.      Palpations: Abdomen is soft.      Tenderness: There is no abdominal tenderness. There is no guarding or rebound.   Musculoskeletal:      Cervical back: Normal range of motion and neck supple.      Comments: Antalgic gait.  Ambulates with a cane.  Limited range of motion with spine AP flexion extension.  Straight leg raise negative.  She is extremely stiff with bilateral leg extension.  There is some SI tenderness to palpation.  There is lumbar tenderness because the waistline some paraspinous guarding.   Lymphadenopathy:      Cervical: No cervical adenopathy.   Skin:     General: Skin is warm and dry.      Capillary Refill: Capillary refill takes less than 2 seconds.      Coloration: Skin is not pale.      Findings: No rash.   Neurological:      Mental Status: She is alert and oriented to person, place, and time.      Motor: No abnormal muscle tone.      Coordination: Coordination normal.   Psychiatric:         Judgment: Judgment normal.         Procedures    Results Review:    I reviewed the patient's new clinical results.  DATE OF EXAM: 10/1/2022 10:54 AM     PROCEDURE: XR  SHOULDER 2+ VW RIGHT-     INDICATIONS: fall, pain     COMPARISON: Chest x-ray 2/17/2021     TECHNIQUE: 3 images of the right shoulder     FINDINGS:  There is mild soft tissue swelling subcutaneous fat stranding in the  supraclavicular region. There is an acute mildly displaced fracture of  the distal clavicle without definite intra-articular extension. The  acromioclavicular joint is congruent without abnormal widening. No  additional acute fractures are identified. There is narrowing of the  subacromial space compatible with underlying rotator cuff tearing. There  is mild osteophytic change of the glenohumeral and acromioclavicular  joints. Unremarkable appearance of the partially imaged thorax.      IMPRESSION:  Acute mildly displaced extra-articular fracture of the distal clavicle.     This report was finalized on 10/1/2022 11:26 AM by Justus Henderson MD.  Assessment/Plan:     Problem List Items Addressed This Visit        Musculoskeletal and Injuries    Rheumatoid arthritis involving multiple sites (HCC)    Relevant Medications    predniSONE (DELTASONE) 5 MG tablet    ENBREL 50 MG/ML injection    HYDROcodone-acetaminophen (NORCO)  MG per tablet    Closed nondisplaced fracture of acromial end of right clavicle with routine healing    Current Assessment & Plan     Offered referral to orthopedic for her clavicle fracture however she declines at present.  She feels this is improving.  Biggest concern is her back.         Acute bilateral low back pain with bilateral sciatica - Primary    Current Assessment & Plan     Given her known osteoporosis, her chronic steroid use recommended x-ray to evaluate for compression fracture.  In the absence of this, however referred her to physical therapy.  She should continue to ambulate with therapy.  I have refilled her Norco.  Medrol Dosepak 80 mg x 1 today.         Relevant Medications    methylPREDNISolone acetate (DEPO-medrol) injection 80 mg    Other Relevant Orders     Ambulatory Referral to Physical Therapy Evaluate and treat    XR Spine Lumbar 2 or 3 View       Plan of care reviewed with patient at the conclusion of today's visit. Education was provided regarding diagnosis and management.  Patient verbalizes understanding of and agreement with management plan.    Return in about 4 weeks (around 11/10/2022) for Shoulder and back pain.    Fili Ray MD      Please note than portions of this note were completed wt a Voice Recognition Program

## 2022-10-13 NOTE — ASSESSMENT & PLAN NOTE
Offered referral to orthopedic for her clavicle fracture however she declines at present.  She feels this is improving.  Biggest concern is her back.

## 2022-10-13 NOTE — ASSESSMENT & PLAN NOTE
Given her known osteoporosis, her chronic steroid use recommended x-ray to evaluate for compression fracture.  In the absence of this, however referred her to physical therapy.  She should continue to ambulate with therapy.  I have refilled her Norco.  Medrol Dosepak 80 mg x 1 today.

## 2022-10-22 DIAGNOSIS — M54.41 ACUTE BILATERAL LOW BACK PAIN WITH BILATERAL SCIATICA: Primary | ICD-10-CM

## 2022-10-22 DIAGNOSIS — M54.42 ACUTE BILATERAL LOW BACK PAIN WITH BILATERAL SCIATICA: Primary | ICD-10-CM

## 2022-11-07 ENCOUNTER — TELEPHONE (OUTPATIENT)
Dept: FAMILY MEDICINE CLINIC | Facility: CLINIC | Age: 72
End: 2022-11-07

## 2022-11-07 DIAGNOSIS — M54.2 NECK PAIN: ICD-10-CM

## 2022-11-07 DIAGNOSIS — S42.034D CLOSED NONDISPLACED FRACTURE OF ACROMIAL END OF RIGHT CLAVICLE WITH ROUTINE HEALING: Primary | ICD-10-CM

## 2022-11-07 NOTE — TELEPHONE ENCOUNTER
Contacted patient, she reports that she is having ongoing issues with R shoulder and cervical spine. She would like to have all these area imaged as well

## 2022-11-07 NOTE — TELEPHONE ENCOUNTER
Caller: Shanel Wall    Relationship: Self    Best call back number: [phone]    Who are you requesting to speak with (clinical staff, provider,  specific staff member): EV     What was the call regarding: MRI FOR CERVICAL SPINE AND RIGHT SHOULDER     Do you require a callback: YES

## 2022-11-22 DIAGNOSIS — M05.79 RHEUMATOID ARTHRITIS INVOLVING MULTIPLE SITES WITH POSITIVE RHEUMATOID FACTOR: ICD-10-CM

## 2022-11-22 DIAGNOSIS — M54.42 ACUTE BILATERAL LOW BACK PAIN WITH BILATERAL SCIATICA: ICD-10-CM

## 2022-11-22 DIAGNOSIS — M54.2 NECK PAIN: Primary | ICD-10-CM

## 2022-11-22 DIAGNOSIS — S42.034D CLOSED NONDISPLACED FRACTURE OF ACROMIAL END OF RIGHT CLAVICLE WITH ROUTINE HEALING: ICD-10-CM

## 2022-11-22 DIAGNOSIS — M54.41 ACUTE BILATERAL LOW BACK PAIN WITH BILATERAL SCIATICA: ICD-10-CM

## 2022-12-03 ENCOUNTER — HOSPITAL ENCOUNTER (OUTPATIENT)
Dept: MRI IMAGING | Facility: HOSPITAL | Age: 72
Discharge: HOME | End: 2022-12-03

## 2022-12-03 DIAGNOSIS — M54.42 ACUTE BILATERAL LOW BACK PAIN WITH BILATERAL SCIATICA: ICD-10-CM

## 2022-12-03 DIAGNOSIS — M05.79 RHEUMATOID ARTHRITIS INVOLVING MULTIPLE SITES WITH POSITIVE RHEUMATOID FACTOR: ICD-10-CM

## 2022-12-03 DIAGNOSIS — S42.034D CLOSED NONDISPLACED FRACTURE OF ACROMIAL END OF RIGHT CLAVICLE WITH ROUTINE HEALING: ICD-10-CM

## 2022-12-03 DIAGNOSIS — M54.2 NECK PAIN: ICD-10-CM

## 2022-12-03 DIAGNOSIS — M54.41 ACUTE BILATERAL LOW BACK PAIN WITH BILATERAL SCIATICA: ICD-10-CM

## 2022-12-03 PROCEDURE — 73221 MRI JOINT UPR EXTREM W/O DYE: CPT

## 2022-12-03 PROCEDURE — 72141 MRI NECK SPINE W/O DYE: CPT

## 2022-12-03 PROCEDURE — 72148 MRI LUMBAR SPINE W/O DYE: CPT

## 2022-12-04 NOTE — PROGRESS NOTES
Significant rotator cuff damage/tearing in Shoulder. I would.recommend.Dr Pride of ortho unless she has someone else in mind. Please.advise

## 2022-12-04 NOTE — PROGRESS NOTES
Cervical spine shows malalignent of vertebrae 3,4,5. Also disc/arthritis complexes of multiple levels which somewhat narrow the canals the exiting nerve roots.exit...(again PT, pain management may help- also consider Neuro surgery consultation if arm.weakness.present

## 2022-12-04 NOTE — PROGRESS NOTES
Mri lower spine shows  arthritis/disc bulging that compromises.some exiting nerve roots , especially left Lumbar 5. ( would.have left leg pain/possible.numbeness and weakness. Also narrowing around spinal cord levels 4 AND 5.  Would recommend PT and possible interventiinal pain management.  Would seek neurosurg opinion if leg.weakness.is present.  Please.advise

## 2022-12-05 ENCOUNTER — TELEPHONE (OUTPATIENT)
Dept: FAMILY MEDICINE CLINIC | Facility: CLINIC | Age: 72
End: 2022-12-05

## 2022-12-05 DIAGNOSIS — G89.29 CHRONIC RIGHT SHOULDER PAIN: ICD-10-CM

## 2022-12-05 DIAGNOSIS — M54.2 NECK PAIN: Primary | ICD-10-CM

## 2022-12-05 DIAGNOSIS — M54.41 ACUTE BILATERAL LOW BACK PAIN WITH BILATERAL SCIATICA: ICD-10-CM

## 2022-12-05 DIAGNOSIS — M54.42 ACUTE BILATERAL LOW BACK PAIN WITH BILATERAL SCIATICA: ICD-10-CM

## 2022-12-05 DIAGNOSIS — M25.511 CHRONIC RIGHT SHOULDER PAIN: ICD-10-CM

## 2022-12-05 NOTE — TELEPHONE ENCOUNTER
Caller: Shanel Wall    Relationship: Self    Best call back number: [phone]    What was the call regarding: PATIENT IS NEEDING SOME MORE INFORMATION ON THE MRI'S SHE HAD OVER THE WEEKEND. PATIENT WOULD LIKE TO DISCUSS TORN ROTATOR CUFF.     Do you require a callback: YES PLEASE

## 2022-12-14 ENCOUNTER — OFFICE VISIT (OUTPATIENT)
Dept: SLEEP MEDICINE | Facility: HOSPITAL | Age: 72
End: 2022-12-14

## 2022-12-14 VITALS
DIASTOLIC BLOOD PRESSURE: 73 MMHG | BODY MASS INDEX: 26.09 KG/M2 | SYSTOLIC BLOOD PRESSURE: 173 MMHG | WEIGHT: 138.2 LBS | OXYGEN SATURATION: 91 % | HEART RATE: 85 BPM | HEIGHT: 61 IN

## 2022-12-14 DIAGNOSIS — G47.33 OSA (OBSTRUCTIVE SLEEP APNEA): Primary | ICD-10-CM

## 2022-12-14 DIAGNOSIS — G47.19 EXCESSIVE DAYTIME SLEEPINESS: ICD-10-CM

## 2022-12-14 PROCEDURE — 99213 OFFICE O/P EST LOW 20 MIN: CPT | Performed by: INTERNAL MEDICINE

## 2022-12-14 RX ORDER — MELOXICAM 15 MG/1
TABLET ORAL
COMMUNITY
Start: 2022-11-04 | End: 2023-04-04 | Stop reason: SDUPTHER

## 2022-12-14 RX ORDER — HYDROCODONE BITARTRATE AND ACETAMINOPHEN 10; 325 MG/1; MG/1
1 TABLET ORAL EVERY 4 HOURS
COMMUNITY
End: 2022-12-22

## 2022-12-14 NOTE — PROGRESS NOTES
Subjective:     Chief Complaint:   Chief Complaint   Patient presents with   • Follow-up       HPI:    Shanel Wall is a 71 y.o. female here for follow-up of obstructive sleep apnea.    She has a longstanding history of daytime fatigue and nonrestorative sleep. She says this has gone on for almost a long as she can remember.     She has a history of difficulty falling asleep and does take Ambien.     She underwent a home sleep apnea test on 4/21/2020 ordered by Dr. Olvera. This revealed a severe degree of obstructive sleep apnea with AHI of 31. She was placed on auto CPAP therapy but had difficulty tolerating it and returned it.     Subsequently, she was discovered to have mild polycythemia. She saw Dr. Shaw and had an extensive evaluation and was not felt to have a primary hematologic issue. Dr. Shaw felt possibly untreated obstructive sleep apnea was playing a role.     I saw her in initial consultation in the sleep center in December 2021.  I suggested reinitiating auto CPAP therapy.  She got her machine in March.  She is here for a follow-up visit.    Her download indicates usage on the majority of the nights.  She did miss period of time in late September and early October after she injured herself in a fall.  Otherwise she has used a [place] night.  Her usage is a bit low with 5 and half hours per night.  She uses a nasal pillow mask.  Her AHI is normal.  Her leak is low.    Further details are as follows:    [place] Scale scored as 5/24.    Type of mask: nasal mask    Overall, she is benefiting from PAP therapy.    Current medications are:   Current Outpatient Medications:   •  aluminum hydroxide-mag carbonate (GAVISCON EXTRA RELIEF) 160-105 MG chewable tablet chewable tablet, Chew 1 tablet Daily As Needed., Disp: , Rfl:   •  Calcium Carb-Cholecalciferol (CALCIUM-VITAMIN D) 600-400 MG-UNIT tablet, Take 1 tablet by mouth 2 (Two) Times a Day., Disp: , Rfl:   •  Denosumab (PROLIA SC), Inject   under the skin into the appropriate area as directed Every 6 (Six) Months., Disp: , Rfl:   •  DULoxetine (Cymbalta) 30 MG capsule, Take 3 capsules by mouth Daily., Disp: 270 capsule, Rfl: 3  •  DULoxetine (CYMBALTA) 60 MG capsule, Take 1 capsule by mouth Daily., Disp: , Rfl:   •  ENBREL 50 MG/ML injection, Inject 50 mg under the skin into the appropriate area as directed 1 (One) Time Per Week., Disp: , Rfl:   •  folic acid-vit B6-vit B12 (FOLGARD) 2.2-25-1 MG tablet tablet, Take 1 tablet by mouth Every 12 (Twelve) Hours., Disp: , Rfl:   •  HYDROcodone-acetaminophen (NORCO)  MG per tablet, Take 1 tablet by mouth Every 6 (Six) Hours As Needed for Moderate Pain., Disp: 60 tablet, Rfl: 0  •  lansoprazole (PREVACID) 15 MG capsule, Take 15 mg by mouth Daily., Disp: , Rfl:   •  levothyroxine (SYNTHROID, LEVOTHROID) 75 MCG tablet, Take 1 tablet by mouth Daily., Disp: 90 tablet, Rfl: 3  •  methotrexate 2.5 MG tablet, Take 9 tablets by mouth 1 (One) Time Per Week. MONDAYS Resume when ok with Dr. Wesley (Patient taking differently: Take 15 mg by mouth 1 (One) Time Per Week. MONDAYS Resume when ok with Dr. Wesley), Disp: , Rfl: 0  •  Multiple Vitamins-Minerals (SENIOR MULTIVITAMIN PLUS) tablet, Take 1 tablet by mouth Daily., Disp: , Rfl:   •  NON FORMULARY, Take 2 tablets by mouth Daily. Hydro Eye gelcaps, Disp: , Rfl:   •  predniSONE (DELTASONE) 5 MG tablet, Take 5 mg by mouth Daily., Disp: , Rfl:   •  prochlorperazine (COMPAZINE) 10 MG tablet, Take 10 mg by mouth Every 6 (Six) Hours As Needed for Nausea or Vomiting., Disp: , Rfl:   •  pseudoephedrine-guaifenesin (Mucinex D)  MG per 12 hr tablet, Take 1 tablet by mouth Every 12 (Twelve) Hours., Disp: 72 tablet, Rfl: 6  •  Saline 0.2 % solution, 1 application into the nostril(s) as directed by provider 2 (two) times a day., Disp: , Rfl:   •  salsalate (DISALCID) 750 MG tablet, Take 750 mg by mouth 4 (Four) Times a Day., Disp: , Rfl:   •  vitamin A 8000 UNIT capsule,  Take 8,000 Units by mouth Daily., Disp: , Rfl:   •  vitamin C (ASCORBIC ACID) 500 MG tablet, Take 500 mg by mouth Daily., Disp: , Rfl:   •  vitamin E 400 UNIT capsule, Take 800 Units by mouth Daily., Disp: , Rfl:   •  zolpidem (AMBIEN) 10 MG tablet, Take 1 tablet by mouth Every Night., Disp: 5 tablet, Rfl: 0  •  HYDROcodone-acetaminophen (NORCO)  MG per tablet, Take 1 tablet by mouth Every 4 (Four) Hours., Disp: , Rfl:   •  meloxicam (MOBIC) 15 MG tablet, , Disp: , Rfl: .    The patient's relevant past medical, surgical, family and social history were reviewed and updated in Epic as appropriate.     ROS:    Review of Systems      Objective:    Physical Exam  Vitals reviewed.   Constitutional:       Appearance: She is well-developed.   HENT:      Head: Normocephalic and atraumatic.      Mouth/Throat:      Mouth: Mucous membranes are moist.      Pharynx: Oropharynx is clear.      Comments: Class IV airway  Neck:      Thyroid: No thyromegaly.   Cardiovascular:      Rate and Rhythm: Normal rate and regular rhythm.      Heart sounds: No murmur heard.    No friction rub. No gallop.   Pulmonary:      Effort: Pulmonary effort is normal. No respiratory distress.      Breath sounds: No wheezing or rales.   Musculoskeletal:      Cervical back: Neck supple.   Skin:     General: Skin is warm and dry.   Neurological:      Mental Status: She is alert and oriented to person, place, and time.   Psychiatric:         Behavior: Behavior normal.         Thought Content: Thought content normal.         Data:    Patient's PAP download was personally interpreted by me and has not otherwise been independently reported.    PAP download findings:  Average pressure: 10  Average AHI:  4  Average minutes in large leak per night: low    Assessment:    Problem List Items Addressed This Visit        Pulmonary Problems    DANICA (obstructive sleep apnea) - Primary    Relevant Orders    PAP Therapy       Other    Excessive daytime sleepiness        1. Severe obstructive sleep apnea: Currently adequately treated with auto CPAP 6 to 16 cm H2O with a nasal mask based upon her download.  Compliance is good though usage time is a bit low.  2. Excessive daytime somnolence: Improved but still present.  3. Polycythemia: Improved on CPAP therapy    Plan:     1. She thinks this is about as good as it will get with her ability to use CPAP.  She does not think there are any major obstacles to her using it beyond its intrinsic nature.  She does not think that anything can be improved.  2. No change in auto CPAP settings  3. No change in mask configuration  4. I renewed her supplies for the next year\  5. Routine follow-up      Discussed in detail with the patient.  She will call prior to her follow up visit for any new problems.    Level of Risk Low due to: one stable chronic illnesss         Signed by  Ramana Hampton MD

## 2022-12-21 ENCOUNTER — APPOINTMENT (OUTPATIENT)
Dept: MRI IMAGING | Facility: HOSPITAL | Age: 72
End: 2022-12-21

## 2022-12-22 ENCOUNTER — OFFICE VISIT (OUTPATIENT)
Dept: ORTHOPEDIC SURGERY | Facility: CLINIC | Age: 72
End: 2022-12-22

## 2022-12-22 VITALS
SYSTOLIC BLOOD PRESSURE: 134 MMHG | HEIGHT: 61 IN | BODY MASS INDEX: 26.1 KG/M2 | WEIGHT: 138.23 LBS | DIASTOLIC BLOOD PRESSURE: 82 MMHG

## 2022-12-22 DIAGNOSIS — M12.811 ROTATOR CUFF TEAR ARTHROPATHY OF RIGHT SHOULDER: ICD-10-CM

## 2022-12-22 DIAGNOSIS — M75.121 NONTRAUMATIC COMPLETE TEAR OF RIGHT ROTATOR CUFF: Primary | ICD-10-CM

## 2022-12-22 DIAGNOSIS — M75.101 ROTATOR CUFF TEAR ARTHROPATHY OF RIGHT SHOULDER: ICD-10-CM

## 2022-12-22 DIAGNOSIS — R26.89 FUNCTIONAL GAIT ABNORMALITY: ICD-10-CM

## 2022-12-22 DIAGNOSIS — S42.031A CLOSED DISPLACED FRACTURE OF ACROMIAL END OF RIGHT CLAVICLE, INITIAL ENCOUNTER: ICD-10-CM

## 2022-12-22 PROCEDURE — 99214 OFFICE O/P EST MOD 30 MIN: CPT | Performed by: ORTHOPAEDIC SURGERY

## 2022-12-22 NOTE — PROGRESS NOTES
[facility] Office Visit - Trae Pride MD    Office Visit       Patient Name: Shanel Wall    Chief Complaint:   Chief Complaint   Patient presents with   • Right Shoulder - Pain     Fall 3 months ago (09.2022)       Referring Physician: Fili Ray MD-I appreciate the referral    History of Present Illness:   Shanel Wall is a 71 y.o. female who presents with right body part: shoulder Reason: pain.  Onset:Onset: mechanical fall. The issue has been ongoing for 3 month(s). Pain is a 1/10 on the pain scale. Pain is described as Pain Characterization: shooting. Associated symptoms include Symptoms: stiffness. The pain is worse with lying on affected side; pain medication and/or NSAID improve the pain. Previous treatments have included: oral steroids.  I have reviewed the patient's history of present illness as noted/entered above.    I have reviewed the patient's past medical history, surgical history, social history, family history, medications, and allergies as noted in the electronic medical record and as noted/entered.  I have reviewed the patient's review of systems as noted/enter and updated as noted in the patient's HPI.    Right shoulder    Fall 3 months ago acute pain, chronic issue    Treated with hydrocodone, NSAIDs, oral steroids    [place]    Rheumatoid arthritis    Significant cervical spine findings noted on MRI in the system as well.  Evidence of right chronic massive irreparable rotator cuff tears on the right.    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  The clavicle fracture is doing better she does have chronic rotator cuff findings noted.  Fortunately she has relatively well-preserved active range of  "motion.  Given her clinical function reverse shoulder arthroplasty would not be recommended at this time.      Subjective   Subjective      Review of Systems   Constitutional: Positive for diaphoresis and fatigue. Negative for chills and fever.   HENT: Positive for ear pain, facial swelling, postnasal drip, sinus pressure, sneezing and tinnitus. Negative for congestion and dental problem.    Eyes: Negative.  Negative for blurred vision.   Respiratory: Positive for apnea. Negative for shortness of breath.    Cardiovascular: Negative.  Negative for leg swelling.   Gastrointestinal: Positive for constipation and nausea. Negative for abdominal pain.   Endocrine: Positive for heat intolerance. Negative for polyuria.   Genitourinary: Negative.  Negative for difficulty urinating.   Musculoskeletal: Positive for arthralgias.   Skin: Negative.    Allergic/Immunologic: Negative.    Neurological: Positive for light-headedness.   Hematological: Negative.  Negative for adenopathy.   Psychiatric/Behavioral: Negative.  Negative for behavioral problems.        Past Medical History:   Past Medical History:   Diagnosis Date   • Abnormal EKG    • Acromioclavicular separation    • Anesthesia     PT HAS RHEUMATOID ARTHRITIS, PT HAS SPECIFIC INSTRUCTIONS PER RA MD, pt to bring INSTRUCTIONS AND RECS ATTACHED TO CHART AND PT TO DISCUSS WITH ANESTHESIA ON DATE OF PROCEDURE    • Arthritis    • Arthritis of back    • Arthritis of neck    • Back disorder     degenerative disc disorder   • Baker's cyst of knee     right   • Benign paroxysmal positional vertigo    • Cervical cancer (HCC)     cone biopsy   • Cervical disc disorder    • Depression    • Deviated septum    • Deviated septum    • Disease of thyroid gland    • Diverticulosis    • Diverticulosis    • Fibromyalgia    • Fibromyalgia    • Fracture, clavicle    • Fracture, foot    • Gall stones    • GERD (gastroesophageal reflux disease)    • Hard to intubate     \"anterior trachea\"    • " History of gallstones    • Hypoglycemia    • Hypothyroidism    • IBS (irritable bowel syndrome)    • Joint pain    • Knee swelling    • Low back strain    • Lumbosacral disc disease    • Macular degeneration    • Measles    • Menopause    • Multiple food allergies    • Neck strain    • Neuroma of foot    • Osteoarthritis    • Osteoarthritis (arthritis due to wear and tear of joints)    • Periarthritis of shoulder    • Popliteal cyst    • Retinal disease, bilateral    • Rheumatoid aortitis    • Rheumatoid arthritis (HCC)    • Rheumatoid arthritis (HCC)    • Rotator cuff syndrome    • Ruptured tendon    • Scoliosis    • Scoliosis    • Seasonal allergies    • Shingles    • Sleep apnea    • Suppression of immune system subtherapeutic (HCC)    • Synovitis    • Synovitis    • Tinnitus    • Tinnitus    • TMJ (dislocation of temporomandibular joint)    • Vertigo    • Wears glasses        Past Surgical History:   Past Surgical History:   Procedure Laterality Date   • ADENOIDECTOMY     • BILATERAL BREAST REDUCTION     • BLEPHAROPLASTY     • BLEPHAROPLASTY, QUAD Bilateral 09/18/2018    Dr. romano.   • BREAST EXCISIONAL BIOPSY Left     NO VISIBLE SCAR   • BREAST FIBROADENOMA SURGERY     • CALCANEOUS OPEN REDUCTION INTERNAL FIXATION     • CATARACT EXTRACTION Bilateral    • CERVICAL CONE BIOPSY     • COLONOSCOPY     • COSMETIC SURGERY      SHORT SCAR FACE LIFT PER DR ROMANO    • DILATATION AND CURETTAGE     • FACIAL COSMETIC SURGERY     • FACIAL COSMETIC SURGERY     • FOOT SURGERY Left     calcaneous    • HAND SURGERY     • JOINT REPLACEMENT     • KNEE ARTHROPLASTY Right     mirza    • KNEE ARTHROSCOPY Bilateral    • REDUCTION MAMMAPLASTY Bilateral    • REPLACEMENT TOTAL KNEE Right    • REPLACEMENT TOTAL KNEE Left    • SHOULDER SURGERY     • TENDON REPAIR Right     HAND   • TENDON REPAIR      R hand   • TONSILLECTOMY AND ADENOIDECTOMY     • TOTAL KNEE ARTHROPLASTY Left 04/03/2018    Procedure: TOTAL KNEE ARTHROPLASTY LEFT;   Surgeon: Mahesh Wesley MD;  Location:  [facility];  Service: Orthopedics   • TOTAL SHOULDER ARTHROPLASTY W/ DISTAL CLAVICLE EXCISION Left 05/22/2017    Procedure: LEFT REVERSE TOTAL SHOULDER ARTHROPLASTY FOR FRACTURE;  Surgeon: Damaso Harris MD;  Location:  [facility];  Service:    • TOTAL SHOULDER REPLACEMENT Left    • TOTAL SHOULDER REPLACEMENT Left    • TUBAL ABDOMINAL LIGATION         Family History:   Family History   Problem Relation Age of Onset   • Parkinsonism Mother    • Thyroid disease Mother    • Heart failure Father    • Kidney failure Father    • Hyperlipidemia Father    • Heart disease Father    • Sleep apnea Sister    • Thyroid disease Sister    • Obesity Sister    • No Known Problems Brother    • Breast cancer Neg Hx    • Ovarian cancer Neg Hx    • Colon cancer Neg Hx    • Colon polyps Neg Hx        Social History:   Social History     Socioeconomic History   • Marital status:    • Number of children: 1   Tobacco Use   • Smoking status: Never   • Smokeless tobacco: Never   Vaping Use   • Vaping Use: Never used   Substance and Sexual Activity   • Alcohol use: Yes     Alcohol/week: 2.0 standard drinks     Types: 1 Glasses of wine, 1 Shots of liquor per week     Comment: DAILY   • Drug use: Never   • Sexual activity: Not Currently     Partners: Male       Medications:   Current Outpatient Medications:   •  aluminum hydroxide-mag carbonate (GAVISCON EXTRA RELIEF) 160-105 MG chewable tablet chewable tablet, Chew 1 tablet Daily As Needed., Disp: , Rfl:   •  Calcium Carb-Cholecalciferol (CALCIUM-VITAMIN D) 600-400 MG-UNIT tablet, Take 1 tablet by mouth 2 (Two) Times a Day., Disp: , Rfl:   •  Denosumab (PROLIA SC), Inject  under the skin into the appropriate area as directed Every 6 (Six) Months., Disp: , Rfl:   •  DULoxetine (Cymbalta) 30 MG capsule, Take 3 capsules by mouth Daily., Disp: 270 capsule, Rfl: 3  •  DULoxetine (CYMBALTA) 60 MG capsule, Take 1 capsule by mouth Daily., Disp: , Rfl:   •   ENBREL 50 MG/ML injection, Inject 50 mg under the skin into the appropriate area as directed 1 (One) Time Per Week., Disp: , Rfl:   •  folic acid-vit B6-vit B12 (FOLGARD) 2.2-25-1 MG tablet tablet, Take 1 tablet by mouth Every 12 (Twelve) Hours., Disp: , Rfl:   •  HYDROcodone-acetaminophen (NORCO)  MG per tablet, Take 1 tablet by mouth Every 6 (Six) Hours As Needed for Moderate Pain., Disp: 60 tablet, Rfl: 0  •  lansoprazole (PREVACID) 15 MG capsule, Take 15 mg by mouth Daily., Disp: , Rfl:   •  levothyroxine (SYNTHROID, LEVOTHROID) 75 MCG tablet, Take 1 tablet by mouth Daily., Disp: 90 tablet, Rfl: 3  •  meloxicam (MOBIC) 15 MG tablet, , Disp: , Rfl:   •  methotrexate 2.5 MG tablet, Take 9 tablets by mouth 1 (One) Time Per Week. MONDAYS Resume when ok with Dr. Wesley (Patient taking differently: Take 15 mg by mouth 1 (One) Time Per Week. MONDAYS Resume when ok with Dr. Wesley), Disp: , Rfl: 0  •  Multiple Vitamins-Minerals (SENIOR MULTIVITAMIN PLUS) tablet, Take 1 tablet by mouth Daily., Disp: , Rfl:   •  NON FORMULARY, Take 2 tablets by mouth Daily. Hydro Eye gelcaps, Disp: , Rfl:   •  predniSONE (DELTASONE) 5 MG tablet, Take 5 mg by mouth Daily., Disp: , Rfl:   •  prochlorperazine (COMPAZINE) 10 MG tablet, Take 10 mg by mouth Every 6 (Six) Hours As Needed for Nausea or Vomiting., Disp: , Rfl:   •  pseudoephedrine-guaifenesin (Mucinex D)  MG per 12 hr tablet, Take 1 tablet by mouth Every 12 (Twelve) Hours., Disp: 72 tablet, Rfl: 6  •  Saline 0.2 % solution, 1 application into the nostril(s) as directed by provider 2 (two) times a day., Disp: , Rfl:   •  vitamin A 8000 UNIT capsule, Take 8,000 Units by mouth Daily., Disp: , Rfl:   •  vitamin C (ASCORBIC ACID) 500 MG tablet, Take 500 mg by mouth Daily., Disp: , Rfl:   •  vitamin E 400 UNIT capsule, Take 800 Units by mouth Daily., Disp: , Rfl:   •  zolpidem (AMBIEN) 10 MG tablet, Take 1 tablet by mouth Every Night., Disp: 5 tablet, Rfl: 0  •  salsalate  "(DISALCID) 750 MG tablet, Take 750 mg by mouth 4 (Four) Times a Day., Disp: , Rfl:     Allergies:   Allergies   Allergen Reactions   • Adhesive Tape Itching   • Erythromycin Hives   • Pseudoephedrine Hives       The following portions of the patient's history were reviewed and updated as appropriate: allergies, current medications, past family history, past medical history, past social history, past surgical history and problem list.        Objective    Objective      Vital Signs:   Vitals:    12/22/22 1312   BP: 134/82   Weight: 62.7 kg (138 lb 3.7 oz)   Height: 154.9 cm (60.98\")       Ortho Exam:  Right shoulder incredibly stoic incredible deltoid compensation no significant distal clavicle pain at this point.  Weakness with Jobes testing and external rotation as anticipated but excellent clinical function.    Results Review:   Imaging Results (Last 24 Hours)     ** No results found for the last 24 hours. **        XR Shoulder 2+ View Right    Result Date: 10/1/2022  Acute mildly displaced extra-articular fracture of the distal clavicle.  This report was finalized on 10/1/2022 11:26 AM by Justus Henderson MD.      MRI Cervical Spine Without Contrast    Result Date: 12/3/2022  Multilevel degenerative disc disease of the cervical spine as described. The most abnormal level is C5-6 where disc bulging results in not only neural foraminal narrowing but also spinal canal stenosis. There is no acute cervical spine bony abnormality, focal disc protrusion or free disc fragment.   This report was finalized on 12/3/2022 2:42 PM by González Hirsch MD.        MRI Shoulder Right Without Contrast    Result Date: 12/3/2022  Impression: 1.Complete full-thickness tear involving the entirety of the supraspinatus component of the cuff. There is also evidence of partial thickness undersurface tear involving the infraspinatus component. 2.High-grade partial-thickness tear involving the subscapularis component. There may be a full-thickness " disruption of the superior fibers as well. 3.Evidence for likely disruption of the biceps anchor with medial dislocation of the biceps tendon from the intertubercular sulcus and slight partial retraction. 4.Advanced tendinopathy and atrophy throughout the rotator cuff and rotator cuff musculature. 5.Moderate osteoarthritis of the glenohumeral joint. Age-related changes of the acromioclavicular joint are noted. 6.Evidence for subacute fracture involving the distal clavicle proximal to the coracoclavicular ligament attachment. There is associated callus formation. The coracoclavicular ligament remains intact. Electronically Signed: Andre Delgado MD  12/3/2022 3:35 PM EST  Workstation ID: [id number]      I reviewed the x-rays and MRI as above.  She has significant degenerative neck findings-she is going to discuss with her primary care doc about a formal neurosurgery appointment for that.  With regards to her right shoulder she has significant chronic massive irreparable rotator cuff tear supraspinatus infraspinatus subscapularis labral tearing arthritis, biceps tearing    Procedures             Assessment / Plan      Assessment/Plan:   Problem List Items Addressed This Visit        Musculoskeletal and Injuries    Closed displaced fracture of lateral end of right clavicle    Nontraumatic complete tear of right rotator cuff - Primary    Rotator cuff tear arthropathy of right shoulder       Symptoms and Signs    Functional gait abnormality       Right shoulder chronic massive irreparable rotator cuff tears evidence of early rotator cuff tear arthropathy in the setting rheumatoid arthritis.  She may eventually be a candidate for reverse shoulder arthroplasty however she is quite stoic with regards to active range of motion at this time she is not pseudoparalytic.  Conservative course recommended patient agrees.  PT Rx provided for gait balance and shoulder Reading protocol.  She notes she has difficult vision uses a cane  trouble with gait and balance disorder.    Follow Up: 3 months to reassess, right shoulder 3 views at that time and right clavicle 2 views        Trae Pride MD, FAAOS  Orthopedic Surgeon  Fellowship Trained Shoulder and Elbow Surgeon  [facility]  Orthopedics and Sports Medicine  [address] [place] [address], [address]  [place] [address]    12/22/22  14:05 EST

## 2022-12-27 ENCOUNTER — TELEPHONE (OUTPATIENT)
Dept: FAMILY MEDICINE CLINIC | Facility: CLINIC | Age: 72
End: 2022-12-27

## 2022-12-27 DIAGNOSIS — M54.42 ACUTE BILATERAL LOW BACK PAIN WITH BILATERAL SCIATICA: Primary | ICD-10-CM

## 2022-12-27 DIAGNOSIS — M54.41 ACUTE BILATERAL LOW BACK PAIN WITH BILATERAL SCIATICA: Primary | ICD-10-CM

## 2022-12-27 NOTE — TELEPHONE ENCOUNTER
Caller: Shanel Wall    Relationship: Self    Best call back number: [phone]    What orders are you requesting (i.e. lab or imaging): REFERRAL FOR A NEUROSURGEON    In what timeframe would the patient need to come in: ASAP    Where will you receive your lab/imaging services: [facility]    Additional notes: PATIENT ADVISES SHE NEEDS A REFERRAL FOR A NEUROSURGEON AT [facility]. PLEASE ADVISE.

## 2022-12-27 NOTE — TELEPHONE ENCOUNTER
Patient is concerned about bulging discs and leg weakness after her abnormal lumbar MRI. She is requesting a referral

## 2023-01-04 DIAGNOSIS — M54.50 LUMBAR PAIN: Primary | ICD-10-CM

## 2023-01-04 DIAGNOSIS — M54.2 CERVICAL PAIN: ICD-10-CM

## 2023-01-04 NOTE — TELEPHONE ENCOUNTER
Provider: MONTY     Caller: FERNANDO DELGADO     Phone Number: [phone]    Reason for Call: PATIENT STATES THAT HER REFERRAL NEEDS TO [facility] LUMBAR AND CERVICAL SPINE

## 2023-01-17 DIAGNOSIS — M05.79 RHEUMATOID ARTHRITIS INVOLVING MULTIPLE SITES WITH POSITIVE RHEUMATOID FACTOR: ICD-10-CM

## 2023-01-17 NOTE — TELEPHONE ENCOUNTER
Caller: Shanel Wall    Relationship: Self    Best call back number: [phone]    Requested Prescriptions:   Requested Prescriptions     Pending Prescriptions Disp Refills   • HYDROcodone-acetaminophen (NORCO)  MG per tablet 60 tablet 0     Sig: Take 1 tablet by mouth Every 6 (Six) Hours As Needed for Moderate Pain.        Pharmacy where request should be sent: [facility] [id number] [address] [facility] [phone] [facility] [phone] FX     Additional details provided by patient:     Does the patient have less than a 3 day supply:  [x] Yes  [] No    Would you like a call back once the refill request has been completed: [x] Yes [] No    If the office needs to give you a call back, can they leave a voicemail: [x] Yes [] No    Milad Jurado Rep   01/17/23 14:35 EST

## 2023-01-18 RX ORDER — HYDROCODONE BITARTRATE AND ACETAMINOPHEN 10; 325 MG/1; MG/1
1 TABLET ORAL EVERY 6 HOURS PRN
Qty: 60 TABLET | Refills: 0 | Status: SHIPPED | OUTPATIENT
Start: 2023-01-18 | End: 2023-03-06 | Stop reason: SDUPTHER

## 2023-01-18 NOTE — TELEPHONE ENCOUNTER
Pt scheduled for 2/1/2023. She wanted to f/u after her visit with Dr. Deng. Please advise on refills.

## 2023-01-25 ENCOUNTER — OFFICE VISIT (OUTPATIENT)
Dept: NEUROSURGERY | Facility: CLINIC | Age: 73
End: 2023-01-25
Payer: MEDICARE

## 2023-01-25 VITALS — WEIGHT: 137.4 LBS | HEIGHT: 59 IN | BODY MASS INDEX: 27.7 KG/M2 | TEMPERATURE: 96.9 F

## 2023-01-25 DIAGNOSIS — M48.061 SPINAL STENOSIS OF LUMBAR REGION WITHOUT NEUROGENIC CLAUDICATION: Primary | ICD-10-CM

## 2023-01-25 DIAGNOSIS — M48.02 CERVICAL SPINAL STENOSIS: ICD-10-CM

## 2023-01-25 DIAGNOSIS — M47.812 CERVICAL SPONDYLOSIS: ICD-10-CM

## 2023-01-25 PROCEDURE — 99203 OFFICE O/P NEW LOW 30 MIN: CPT | Performed by: NEUROLOGICAL SURGERY

## 2023-01-25 NOTE — PROGRESS NOTES
Patient: Shanel Wall  : 1950    Primary Care Provider: Fili Ray MD    Requesting Provider: As above        History    Chief Complaint: Neck and back pain.    History of Present Illness: Ms. Wall is a 72-year-old unemployed woman with neck and back difficulties dating back some 15 to 20 years.  She has had both knees replaced.  She has had a left shoulder replacement.  She now has a rotator cuff tear on the right.  At the end of September she suffered a fall and struck her right shoulder, right hip, and chest.  Thereafter she manifested an increase in her baseline neck and arm pain.  Over time the symptoms have largely dissipated.  She has no radicular arm or leg symptoms.  In the past she has had some sciatica but that is no longer present.  She has seen Dr. Wright in the past.    Review of Systems   Constitutional: Positive for activity change and fatigue. Negative for appetite change, chills, diaphoresis, fever and unexpected weight change.   HENT: Positive for ear pain, hearing loss, postnasal drip, sinus pressure, sneezing, tinnitus, trouble swallowing and voice change. Negative for congestion, dental problem, drooling, ear discharge, facial swelling, mouth sores, nosebleeds, rhinorrhea, sinus pain and sore throat.    Eyes: Positive for photophobia and visual disturbance. Negative for pain, discharge, redness and itching.   Respiratory: Positive for apnea. Negative for cough, choking, chest tightness, shortness of breath, wheezing and stridor.    Cardiovascular: Negative for chest pain, palpitations and leg swelling.   Gastrointestinal: Positive for abdominal pain, constipation and nausea. Negative for abdominal distention, anal bleeding, blood in stool, diarrhea, rectal pain and vomiting.   Endocrine: Positive for heat intolerance. Negative for cold intolerance, polydipsia, polyphagia and polyuria.   Genitourinary: Negative for decreased urine volume, difficulty urinating,  "dysuria, enuresis, flank pain, frequency, genital sores, hematuria and urgency.   Musculoskeletal: Positive for back pain, gait problem and neck pain. Negative for arthralgias, joint swelling, myalgias and neck stiffness.   Skin: Negative for color change, pallor, rash and wound.   Allergic/Immunologic: Positive for environmental allergies, food allergies and immunocompromised state.   Neurological: Positive for dizziness, speech difficulty, weakness, light-headedness and numbness. Negative for tremors, seizures, syncope, facial asymmetry and headaches.   Hematological: Negative for adenopathy. Does not bruise/bleed easily.   Psychiatric/Behavioral: Positive for decreased concentration. Negative for agitation, behavioral problems, confusion, dysphoric mood, hallucinations, self-injury, sleep disturbance and suicidal ideas. The patient is nervous/anxious. The patient is not hyperactive.    All other systems reviewed and are negative.      The patient's past medical history, past surgical history, family history, and social history have been reviewed at length in the electronic medical record.      Physical Exam:   Temp 96.9 °F (36.1 °C)   Ht 149.9 cm (59\")   Wt 62.3 kg (137 lb 6.4 oz)   BMI 27.75 kg/m²   CONSTITUTIONAL: Patient is well-nourished, pleasant and appears stated age.  MUSCULOSKELETAL:  Neck tenderness to palpation is not observed.   ROM in the neck is normal.  Range of motion in her low back is limited in all directions.  Straight leg raising is negative.  Norbert signs are negative.  NEUROLOGICAL:  Orientation, memory, attention span, language function, and cognition have been examined and are intact.  Strength is intact in the upper and lower extremities to direct testing.  Muscle tone is normal throughout.  Station and gait are somewhat unbalanced.  Sensation is intact to light touch testing throughout.  Deep tendon reflexes are 1+ and symmetrical.  Marisabel's Sign is negative bilaterally. No clonus " is elicited.  Coordination is intact.      Medical Decision Making    Data Review:   (All imaging studies were personally reviewed unless stated otherwise)  MRIs of the cervical and lumbar spine both dated 12/3/2022 are reviewed.  The cervical study demonstrates diffuse spondylosis.  There is moderate canal stenosis throughout the mid cervical spine.  There is no cord compromise.    The lumbar study demonstrates diffuse degenerative disc disease and moderate diffuse stenosis.  There is diffuse facet arthropathy.  Bilateral joint effusions are noted at L4-5.    Diagnosis:   1.  Mechanical neck pain without radiculopathy or myelopathy.  2.  Mechanical low back pain.  3.  Lumbar stenosis without neurogenic claudication.    Treatment Options:   I have reassured the patient.  Presently, she does not require surgical intervention.  I have reviewed her imaging studies with her.  She would like to pursue some physical therapy to work on her balance.  I think that is perfectly reasonable.  She will follow-up with neurosurgery on an as-needed basis.       Diagnosis Plan   1. Spinal stenosis of lumbar region without neurogenic claudication        2. Cervical spondylosis        3. Cervical spinal stenosis            Scribed for Isaias Caballero MD by Renee Hemphill VALENTINE 1/25/2023 12:39 EST      I, Dr. Caballero, personally performed the services described in the documentation, as scribed in my presence, and it is both accurate and complete.

## 2023-03-06 ENCOUNTER — OFFICE VISIT (OUTPATIENT)
Dept: FAMILY MEDICINE CLINIC | Facility: CLINIC | Age: 73
End: 2023-03-06
Payer: MEDICARE

## 2023-03-06 VITALS
WEIGHT: 135.2 LBS | HEIGHT: 59 IN | SYSTOLIC BLOOD PRESSURE: 132 MMHG | BODY MASS INDEX: 27.26 KG/M2 | HEART RATE: 85 BPM | OXYGEN SATURATION: 95 % | DIASTOLIC BLOOD PRESSURE: 82 MMHG

## 2023-03-06 DIAGNOSIS — M05.79 RHEUMATOID ARTHRITIS INVOLVING MULTIPLE SITES WITH POSITIVE RHEUMATOID FACTOR: ICD-10-CM

## 2023-03-06 DIAGNOSIS — M25.552 LEFT HIP PAIN: ICD-10-CM

## 2023-03-06 DIAGNOSIS — F51.01 PRIMARY INSOMNIA: ICD-10-CM

## 2023-03-06 DIAGNOSIS — Z51.81 MEDICATION MONITORING ENCOUNTER: Primary | ICD-10-CM

## 2023-03-06 PROCEDURE — 99214 OFFICE O/P EST MOD 30 MIN: CPT | Performed by: INTERNAL MEDICINE

## 2023-03-06 RX ORDER — HYDROCODONE BITARTRATE AND ACETAMINOPHEN 10; 325 MG/1; MG/1
1 TABLET ORAL EVERY 6 HOURS PRN
Qty: 60 TABLET | Refills: 0 | Status: SHIPPED | OUTPATIENT
Start: 2023-03-06

## 2023-03-06 RX ORDER — ZOLPIDEM TARTRATE 10 MG/1
10 TABLET ORAL NIGHTLY PRN
Qty: 90 TABLET | Refills: 3 | Status: SHIPPED | OUTPATIENT
Start: 2023-03-06

## 2023-03-06 NOTE — ASSESSMENT & PLAN NOTE
ITB versus trochanteric bursitis versus sciatica.  I have asked her physical therapy to address this

## 2023-03-06 NOTE — PROGRESS NOTES
"Shanel Wall  1950  [id number]  Patient Care Team:  Fili Ray MD as PCP - General (Internal Medicine)  Cristiane Araiza MD as Consulting Physician (Endocrinology)    Shanel Wall is a 72 y.o. female here today for follow up.     This patient is accompanied by their self who contributes to the history of their care.    Chief Complaint:    Chief Complaint   Patient presents with   • Shoulder Pain     F/U doing better   • Back Pain     F/U  Do better        History of Present Illness:  I have reviewed and/or updated the patient's past medical, past surgical, family, social history, problem list and allergies as appropriate.      Shanel is here to f/u shoulder pain, back and neck pain after fall. After 2 months she is doing much better. Her Shoulder ROM is markedly improved. Neurosurgery recommended pT, nothing operable. Off salycate and on meloxicam  With left indigestion.  Her cbc has improved, using CPAP 4 hours per night. Still using ambien which helps with CPAP.  Still using a scant amount of hydrocodone for rheumatoid as well as her recent clavicular fracture back pain and neck pain.  UDS needs to be updated.  She is still struggling with her 's dementia and his unwillingness to address this.      Review of Systems   Constitutional: Positive for fatigue.   Eyes: Positive for blurred vision.   Cardiovascular: Negative.    Gastrointestinal: Negative.    Endocrine: Negative.    Musculoskeletal: Positive for arthralgias.   Neurological: Negative.    Psychiatric/Behavioral: Positive for sleep disturbance.       Vitals:    03/06/23 1353   BP: 132/82   Pulse: 85   SpO2: 95%   Weight: 61.3 kg (135 lb 3.2 oz)   Height: 149.9 cm (59.02\")     Body mass index is 27.29 kg/m².    Physical Exam  Vitals and nursing note reviewed.   Constitutional:       General: She is not in acute distress.     Appearance: She is well-developed. She is not diaphoretic.   HENT:      Head: Normocephalic " and atraumatic.      Right Ear: External ear normal.      Left Ear: External ear normal.      Nose: Nose normal.      Mouth/Throat:      Pharynx: No oropharyngeal exudate.   Eyes:      General: No scleral icterus.        Right eye: No discharge.      Conjunctiva/sclera: Conjunctivae normal.   Neck:      Thyroid: No thyromegaly.      Vascular: No JVD.      Trachea: No tracheal deviation.   Cardiovascular:      Rate and Rhythm: Normal rate and regular rhythm.      Heart sounds: Normal heart sounds.      Comments: PMI nondisplaced  Pulmonary:      Effort: Pulmonary effort is normal.      Breath sounds: Normal breath sounds. No wheezing or rales.   Abdominal:      General: Bowel sounds are normal.      Palpations: Abdomen is soft.      Tenderness: There is no abdominal tenderness. There is no guarding or rebound.   Musculoskeletal:      Cervical back: Normal range of motion and neck supple.      Comments: Antalgic/cane use.  Is point tender left trochanteric region.  Straight leg raise is negative ABR negative   Lymphadenopathy:      Cervical: No cervical adenopathy.   Skin:     General: Skin is warm and dry.      Capillary Refill: Capillary refill takes less than 2 seconds.      Coloration: Skin is not pale.      Findings: No rash.   Neurological:      Mental Status: She is alert and oriented to person, place, and time.      Motor: No abnormal muscle tone.      Coordination: Coordination normal.   Psychiatric:         Mood and Affect: Mood normal.         Behavior: Behavior normal.         Judgment: Judgment normal.         Procedures    Results Review:    I reviewed the patient's new clinical results.  Hemoglobin and hematocrit now normal with CPAP use    Assessment/Plan:     Problem List Items Addressed This Visit        Musculoskeletal and Injuries    Rheumatoid arthritis involving multiple sites (HCC)    Relevant Medications    predniSONE (DELTASONE) 5 MG tablet    ENBREL 50 MG/ML injection    meloxicam (MOBIC) 15  MG tablet    HYDROcodone-acetaminophen (NORCO)  MG per tablet    zolpidem (AMBIEN) 10 MG tablet    Left hip pain    Current Assessment & Plan     ITB versus trochanteric bursitis versus sciatica.  I have asked her physical therapy to address this            Sleep    Insomnia    Current Assessment & Plan     Refill Ambien authorized.  UDS CSA requested.  Valentin up-to-date        Other Visit Diagnoses     Medication monitoring encounter    -  Primary    Relevant Medications    zolpidem (AMBIEN) 10 MG tablet    Other Relevant Orders    Compliance Drug Analysis, Ur - Urine, Clean Catch          Plan of care reviewed with patient at the conclusion of today's visit. Education was provided regarding diagnosis and management.  Patient verbalizes understanding of and agreement with management plan.    Return in about 3 months (around 6/6/2023) for pain/insomnia.    Fili Ray MD      Please note than portions of this note were completed wt a Voice Recognition Program

## 2023-03-07 ENCOUNTER — PRIOR AUTHORIZATION (OUTPATIENT)
Dept: FAMILY MEDICINE CLINIC | Facility: CLINIC | Age: 73
End: 2023-03-07
Payer: MEDICARE

## 2023-03-07 NOTE — TELEPHONE ENCOUNTER
(Key: [id number])  Med:Zolpidem Tartrate 10MG tablets  Status:sent to plan,awaiting determination   Created:03/07/2023

## 2023-03-10 NOTE — TELEPHONE ENCOUNTER
Left voicemail to notify patient further       ? Pay out of pocket?? Has been on this for years   Hub can relay and document.

## 2023-03-13 ENCOUNTER — PRIOR AUTHORIZATION (OUTPATIENT)
Dept: FAMILY MEDICINE CLINIC | Facility: CLINIC | Age: 73
End: 2023-03-13
Payer: MEDICARE

## 2023-03-13 NOTE — TELEPHONE ENCOUNTER
Contacted patient to discuss further, denial due to dx codes. PA resubmitted with appropriate diagnosis association.

## 2023-03-23 ENCOUNTER — TELEPHONE (OUTPATIENT)
Dept: FAMILY MEDICINE CLINIC | Facility: CLINIC | Age: 73
End: 2023-03-23

## 2023-03-23 NOTE — TELEPHONE ENCOUNTER
Caller: Shanel Wall    Relationship: Self    Best call back number: [phone]    What is the medical concern/diagnosis: HIP BURSITIS     What specialty or service is being requested: PAIN MANAGEMENT

## 2023-03-24 DIAGNOSIS — M70.61 TROCHANTERIC BURSITIS OF BOTH HIPS: ICD-10-CM

## 2023-03-24 DIAGNOSIS — M25.559 HIP PAIN: Primary | ICD-10-CM

## 2023-03-24 DIAGNOSIS — M70.62 TROCHANTERIC BURSITIS OF BOTH HIPS: ICD-10-CM

## 2023-04-04 RX ORDER — OXYMETAZOLINE HYDROCHLORIDE 0.05 G/100ML
2 SPRAY NASAL 2 TIMES DAILY
COMMUNITY

## 2023-04-04 RX ORDER — FOLIC ACID 1 MG/1
1 TABLET ORAL EVERY 24 HOURS
COMMUNITY
Start: 2023-02-23

## 2023-04-04 RX ORDER — MELOXICAM 15 MG/1
1 TABLET ORAL EVERY 24 HOURS
COMMUNITY
Start: 2023-02-23

## 2023-04-06 ENCOUNTER — OFFICE VISIT (OUTPATIENT)
Dept: PAIN MEDICINE | Facility: CLINIC | Age: 73
End: 2023-04-06
Payer: MEDICARE

## 2023-04-06 VITALS
OXYGEN SATURATION: 94 % | RESPIRATION RATE: 12 BRPM | DIASTOLIC BLOOD PRESSURE: 76 MMHG | WEIGHT: 134.2 LBS | BODY MASS INDEX: 27.05 KG/M2 | SYSTOLIC BLOOD PRESSURE: 120 MMHG | TEMPERATURE: 96.9 F | HEART RATE: 86 BPM | HEIGHT: 59 IN

## 2023-04-06 DIAGNOSIS — M70.62 TROCHANTERIC BURSITIS OF LEFT HIP: Primary | ICD-10-CM

## 2023-04-06 DIAGNOSIS — M47.812 CERVICAL SPONDYLOSIS WITHOUT MYELOPATHY: ICD-10-CM

## 2023-04-06 DIAGNOSIS — M47.817 LUMBOSACRAL SPONDYLOSIS WITHOUT MYELOPATHY: ICD-10-CM

## 2023-04-06 PROCEDURE — 1125F AMNT PAIN NOTED PAIN PRSNT: CPT | Performed by: STUDENT IN AN ORGANIZED HEALTH CARE EDUCATION/TRAINING PROGRAM

## 2023-04-06 PROCEDURE — 99204 OFFICE O/P NEW MOD 45 MIN: CPT | Performed by: STUDENT IN AN ORGANIZED HEALTH CARE EDUCATION/TRAINING PROGRAM

## 2023-04-06 NOTE — PROGRESS NOTES
04/06/2023      Referring Physician: Fili Ray MD  [address] [place] [address]    Primary Physician: Fili Ray MD    CHIEF COMPLAINT or REASON FOR VISIT: Hip Pain (New patient)      HISTORY OF PRESENT ILLNESS:  Ms. Shanel Wall is 72 y.o. female who presents as a new patient referral for evaluation treatment of chronic polyarthralgia secondary to rheumatoid arthritis and more principally chronic low back pain and left hip pain.  Patient states that she had a fall at the end of 2022 which caused significant neck and shoulder pain however this has since resolved.  She did see Dr. Isaias Caballero, neurosurgery, in early 2023 for this issue however no surgery was recommended.  Today her primary pain complaint is left thigh pain which began while undergoing physical therapy for both neck and low back pain this year.  She describes an achy pain which is worse with palpation; denies numbness or tingling.  Her low back pain is primarily mechanical axial, exacerbated by extension and prolonged standing, ameliorated with rest.  She does use a straight cane for balance issues.  She unfortunately is very preoccupied with taking care of her  who has multiple medical appointments upcoming for both elevated PSA and dementia.  Patient denies any bowel or bladder dysfunction, lower extremity weakness, new onset saddle anesthesia or unexplained weight loss.  She is treated for her rheumatoid arthritis at [facility].          Objective Pain Scoring:   BRIEF PAIN INVENTORY:  Total score:   Pain Score    04/06/23 1156   PainSc:   8   PainLoc: Hip      PHQ-2: PHQ-2 Total Score: 4  PHQ-9: PHQ-9: Brief Depression Severity Measure Score: 9  Opioid Risk Tool:         Review of Systems:   ROS negative except as otherwise noted     Past Medical History:   Past Medical History:   Diagnosis Date   • Abnormal EKG    • Acromioclavicular separation    • Anesthesia     PT HAS RHEUMATOID  "ARTHRITIS, PT HAS SPECIFIC INSTRUCTIONS PER RA MD, pt to bring INSTRUCTIONS AND RECS ATTACHED TO CHART AND PT TO DISCUSS WITH ANESTHESIA ON DATE OF PROCEDURE    • Arthritis    • Arthritis of back    • Arthritis of neck    • Back disorder     degenerative disc disorder   • Baker's cyst of knee     right   • Benign paroxysmal positional vertigo    • Cervical cancer     cone biopsy   • Cervical disc disorder    • Depression    • Deviated septum    • Deviated septum    • Disease of thyroid gland    • Diverticulosis    • Diverticulosis    • Fibromyalgia    • Fibromyalgia    • Fracture, clavicle    • Fracture, foot    • Gall stones    • GERD (gastroesophageal reflux disease)    • Hard to intubate     \"anterior trachea\"    • History of gallstones    • HL (hearing loss)    • Hypoglycemia    • Hypothyroidism    • IBS (irritable bowel syndrome)    • Joint pain    • Knee swelling    • Low back strain    • Lumbosacral disc disease    • Macular degeneration    • Measles    • Menopause    • Multiple food allergies    • Neck strain    • Neuroma of foot    • Osteoarthritis    • Osteoarthritis (arthritis due to wear and tear of joints)    • Periarthritis of shoulder    • Popliteal cyst    • Retinal disease, bilateral    • Rheumatoid aortitis    • Rheumatoid arthritis    • Rheumatoid arthritis    • Rotator cuff syndrome    • Ruptured tendon    • Scoliosis    • Scoliosis    • Seasonal allergies    • Shingles    • Sleep apnea    • Suppression of immune system subtherapeutic    • Synovitis    • Synovitis    • Tinnitus    • Tinnitus    • TMJ (dislocation of temporomandibular joint)    • Vertigo    • Wears glasses          Past Surgical History:   Past Surgical History:   Procedure Laterality Date   • ADENOIDECTOMY     • BILATERAL BREAST REDUCTION     • BLEPHAROPLASTY     • BLEPHAROPLASTY, QUAD Bilateral 09/18/2018    Dr. romano.   • BREAST EXCISIONAL BIOPSY Left     NO VISIBLE SCAR   • BREAST FIBROADENOMA SURGERY     • CALCANEOUS OPEN " REDUCTION INTERNAL FIXATION     • CATARACT EXTRACTION Bilateral    • CERVICAL CONE BIOPSY     • COLONOSCOPY     • COSMETIC SURGERY      SHORT SCAR FACE LIFT PER DR RODRIGUEZ    • DILATATION AND CURETTAGE     • FACIAL COSMETIC SURGERY     • FACIAL COSMETIC SURGERY     • FOOT SURGERY Left     calcaneous    • HAND SURGERY     • JOINT REPLACEMENT     • KNEE ARTHROPLASTY Right     blue    • KNEE ARTHROSCOPY Bilateral    • REDUCTION MAMMAPLASTY Bilateral    • REPLACEMENT TOTAL KNEE Right    • REPLACEMENT TOTAL KNEE Left    • SHOULDER SURGERY     • TENDON REPAIR Right     HAND   • TENDON REPAIR      R hand   • TONSILLECTOMY AND ADENOIDECTOMY     • TOTAL KNEE ARTHROPLASTY Left 04/03/2018    Procedure: TOTAL KNEE ARTHROPLASTY LEFT;  Surgeon: Mahesh Wesley MD;  Location:  [facility];  Service: Orthopedics   • TOTAL SHOULDER ARTHROPLASTY W/ DISTAL CLAVICLE EXCISION Left 05/22/2017    Procedure: LEFT REVERSE TOTAL SHOULDER ARTHROPLASTY FOR FRACTURE;  Surgeon: Damaso Harris MD;  Location:  [facility];  Service:    • TOTAL SHOULDER REPLACEMENT Left    • TOTAL SHOULDER REPLACEMENT Left    • TUBAL ABDOMINAL LIGATION           Family History   Family History   Problem Relation Age of Onset   • Parkinsonism Mother    • Thyroid disease Mother    • Heart failure Father    • Kidney failure Father    • Hyperlipidemia Father    • Heart disease Father    • Sleep apnea Sister    • Thyroid disease Sister    • Obesity Sister    • No Known Problems Brother    • Breast cancer Neg Hx    • Ovarian cancer Neg Hx    • Colon cancer Neg Hx    • Colon polyps Neg Hx          Social History   Social History     Socioeconomic History   • Marital status:    • Number of children: 1   Tobacco Use   • Smoking status: Never   • Smokeless tobacco: Never   Vaping Use   • Vaping Use: Never used   Substance and Sexual Activity   • Alcohol use: Yes     Alcohol/week: 2.0 standard drinks     Types: 1 Glasses of wine, 1 Shots of liquor per week     Comment:  DAILY   • Drug use: Never   • Sexual activity: Defer     Partners: Male        Medications:     Current Outpatient Medications:   •  aluminum hydroxide-mag carbonate (GAVISCON EXTRA RELIEF) 160-105 MG chewable tablet chewable tablet, Chew 1 tablet Daily As Needed., Disp: , Rfl:   •  Calcium Carb-Cholecalciferol (CALCIUM-VITAMIN D) 600-400 MG-UNIT tablet, Take 1 tablet by mouth 2 (Two) Times a Day., Disp: , Rfl:   •  Denosumab (PROLIA SC), Inject  under the skin into the appropriate area as directed Every 6 (Six) Months., Disp: , Rfl:   •  DULoxetine (Cymbalta) 30 MG capsule, Take 3 capsules by mouth Daily., Disp: 270 capsule, Rfl: 3  •  ENBREL 50 MG/ML injection, Inject 1 mL under the skin into the appropriate area as directed 1 (One) Time Per Week., Disp: , Rfl:   •  folic acid (FOLVITE) 1 MG tablet, Take 1 tablet by mouth Daily., Disp: , Rfl:   •  HYDROcodone-acetaminophen (NORCO)  MG per tablet, Take 1 tablet by mouth Every 6 (Six) Hours As Needed for Moderate Pain., Disp: 60 tablet, Rfl: 0  •  lansoprazole (PREVACID) 15 MG capsule, Take 1 capsule by mouth Daily., Disp: , Rfl:   •  levothyroxine (SYNTHROID, LEVOTHROID) 75 MCG tablet, Take 1 tablet by mouth Daily., Disp: 90 tablet, Rfl: 3  •  meloxicam (MOBIC) 15 MG tablet, Take 1 tablet by mouth Daily., Disp: , Rfl:   •  methotrexate 2.5 MG tablet, Take 9 tablets by mouth 1 (One) Time Per Week. MONDAYS Resume when ok with Dr. Wesley (Patient taking differently: Take 6 tablets by mouth 1 (One) Time Per Week. MONDAYS Resume when ok with Dr. Wesley), Disp: , Rfl: 0  •  Multiple Vitamins-Minerals (SENIOR MULTIVITAMIN PLUS) tablet, Take 1 tablet by mouth Daily., Disp: , Rfl:   •  NON FORMULARY, Take 2 tablets by mouth Daily. Hydro Eye gelcaps, Disp: , Rfl:   •  oxymetazoline (AFRIN) 0.05 % nasal spray, 2 sprays into the nostril(s) as directed by provider 2 (Two) Times a Day., Disp: , Rfl:   •  predniSONE (DELTASONE) 5 MG tablet, Take 1 tablet by mouth Daily., Disp:  ", Rfl:   •  prochlorperazine (COMPAZINE) 10 MG tablet, Take 1 tablet by mouth Every 6 (Six) Hours As Needed for Nausea or Vomiting., Disp: , Rfl:   •  pseudoephedrine-guaifenesin (Mucinex D)  MG per 12 hr tablet, Take 1 tablet by mouth Every 12 (Twelve) Hours., Disp: 72 tablet, Rfl: 6  •  Saline 0.2 % solution, 1 application into the nostril(s) as directed by provider 2 (two) times a day. (Patient not taking: Reported on 4/4/2023), Disp: , Rfl:   •  vitamin A 8000 UNIT capsule, Take 1 capsule by mouth Daily., Disp: , Rfl:   •  vitamin C (ASCORBIC ACID) 500 MG tablet, Take 1 tablet by mouth Daily., Disp: , Rfl:   •  vitamin E 400 UNIT capsule, Take 2 capsules by mouth Daily., Disp: , Rfl:   •  zolpidem (AMBIEN) 10 MG tablet, Take 1 tablet by mouth At Night As Needed for Sleep., Disp: 90 tablet, Rfl: 3        Physical Exam:     Vitals:    04/06/23 1156   BP: 120/76   BP Location: Right arm   Patient Position: Sitting   Cuff Size: Adult   Pulse: 86   Resp: 12   Temp: 96.9 °F (36.1 °C)   TempSrc: Infrared   SpO2: 94%   Weight: 60.9 kg (134 lb 3.2 oz)   Height: 149.9 cm (59\")   PainSc:   8   PainLoc: Hip        General: Alert and oriented, No acute distress.   HEENT: Normocephalic, atraumatic.   Cardiovascular: No gross edema  Respiratory: Respirations are non-labored    Cervical Spine:   No masses or atrophy  Range of motion - Flexion normal. Extension normal. Lateral rotation normal.   Palpation -tender bilaterally  Facet loading positive bilaterally  Spurling's - negative     Thoracic Spine:   Inspection: no gross abnormality  Paraspinal muscle palpation: nontender  Spinous process palpation: nontender    Lumbar Spine:   No masses or atrophy  Range of motion - Flexion normal. Extension normal. Right Lat Bending normal. Left Lat Bending normal  Facet Loading: Positive bilaterally  Facet Palpation - positive bilaterally  PSIS tenderness -positive bilaterally  Norbert's/EDENILSON/Thigh thrust - Negative " bilaterally  Straight leg raise: Negative bilaterally  Slump test: Negative bilaterally  Tenderness with palpation of the left greater trochanter    Motor Exam:  Strength: Rate on 1-5 scale Right Left    L1/2- hip flexion 5 5    L3- knee extension 5 5    L4- ankle dorsiflexion 5 5    L5- great toe extension 5 5    S1- ankle plantarflexion 5 5    Sensory Exam: Full and equal sensation to light touch throughout.    Patient does have stigmata of rheumatoid including ulnar deviation of fingers and MTP hypertrophy    Neurologic: Cranial Nerves II-XII are grossly intact.   Neumann’s -negative bilaterally   Clonus -negative bilaterally    Psychiatric: Cooperative.   Gait: Antalgic flexed forward   Assistive Devices: Straight cane    Imaging Studies:   Results for orders placed during the hospital encounter of 12/03/22    MRI Lumbar Spine Without Contrast    Narrative  DATE OF EXAM: 12/3/2022 12:59 PM    PROCEDURE: MRI LUMBAR SPINE WO CONTRAST-    INDICATIONS: pain after fall; M54.2-Cervicalgia; M54.42-Lumbago with  sciatica, left side; M54.41-Lumbago with sciatica, right side;  M05.79-Rheumatoid arthritis with rheumatoid factor of multiple sites  without organ or systems involvement; S42.034D-Nondisplaced fracture of  lateral end of right clavicle, subsequent encounter for fracture with  routine healing    COMPARISON: No comparisons available.    TECHNIQUE: Routine magnetic resonance imaging of the lumbar spine was  performed without the administration of contrast.    FINDINGS:  There is diffuse osteopenia. There are degenerative changes of the  endplates of L2 and L3 and at L5-S1 with loss of disc height. There are  no paraspinal masses. The spinal cord terminates at the normal L1-L2  level and the visualized cord is normal.    T12-L1: No significant central canal or neural foraminal narrowing.  No  significant facet degeneration.  No significant ligamentum flavum hypertrophy.    L1-L2: There is a left paracentral disc  bulge asymmetry with mild  effacement of the ventral thecal sac but no spinal canal or neural  foraminal stenosis.    L2-L3: There is asymmetric disc bulge with effacement of the ventral  thecal sac. Disc bulging combines with congenitally shortened pedicles  and facet hypertrophy resulting in mild spinal canal and neural  foraminal narrowing.    L3-L4: There is asymmetric disc bulge with effacement of the ventral  thecal sac. Disc bulging combines with congenitally shortened pedicles  and facet hypertrophy resulting in moderate spinal canal and mild neural  foraminal narrowing.    L4-L5: There is asymmetric disc bulge with effacement of the ventral  thecal sac. Disc bulging combines with congenitally shortened pedicles  and facet hypertrophy resulting in moderate bilateral neural foraminal  and severe spinal canal stenosis.    L5-S1: There is a left paracentral disc protrusion measuring up to 8 x 8  mm. This disc material is resulting in significant mass effect on the  adjacent nerve root.    Impression  Multilevel degenerative disc disease. On the left side at L5-S1 there is  a focal protrusion with mass effect on the exiting nerve root.      This report was finalized on 12/3/2022 4:35 PM by González Hirsch MD.      Impression & Plan:   Ms. Shanel Wall is a 72 y.o. female with past medical history significant for RA, depression, GERD who presents to the pain clinic for evaluation and treatment of left hip pain and chronic low back pain, chronic neck pain.  I personally reviewed her lumbar MRI dated 12/3/2022 which demonstrates multilevel degenerative disc disease with Modic endplate changes at L5/S1 and L5 IEP Schmorl's node; facet arthropathy; severe canal stenosis at L2/3, L3/4, L4/5 and L5/S1.  Cervical MRI dated 12/3/2022 demonstrates canal stenosis at C4/5 and C5/6 with degenerative arthropathy worse at C4-C6.    Clinical examination consistent with left trochanteric bursitis; lumbar spondylosis/facet  pain; cervical spondylosis/facet pain.  We discussed left GTB injection patient would like to proceed ASAP.  Additionally we discussed lumbar medial branch blocks and if appropriate ablation.  We will then address her cervical pain after first focusing on her more painful generators.    1. Trochanteric bursitis of left hip    2. Lumbosacral spondylosis without myelopathy    3. Cervical spondylosis without myelopathy        PLAN:  1. Medication Recommendations: Continue per [facility].  Can consider Butrans patch for multifocal rheumatic pain.    2. Physical Therapy: Continue HEP    3. Psychological: Consider pain psych referral if PHQ remains elevated after interventions    4. Complementary and alternative (CAM) Therapies:     5. Labs: None indicated     6. Imaging: MRI reviewed with patient utilizing 3D model to explain pathology    7. Interventions: Schedule left trochanteric bursa steroid injection with fluoroscopy (CPT 02079, 83328).  Schedule bilateral L4/5 and L5/S1 medial branch blocks (CPT 11325, 84829).  If the first blocks provide greater than 80% relief will schedule a second set of medial branch blocks.  If second set of medial branch blocks provides at least 80% relief will schedule rhizotomy.    8. Referrals: None indicated     9. Records requested: n/a    10. Lifestyle goals:    Follow-up 4 months      [facility]  Nain Alvarenga MD

## 2023-04-07 ENCOUNTER — TELEPHONE (OUTPATIENT)
Dept: PAIN MEDICINE | Facility: CLINIC | Age: 73
End: 2023-04-07
Payer: MEDICARE

## 2023-04-07 NOTE — TELEPHONE ENCOUNTER
[facility] called me today and said we were unable to add anymore people on for the 11th.   I was told I needed to r/s Shanel Duke 1950 [who was added last night] to another day that she would like, I called to inform her of the situation and reschedule her but patient states she wants to cancel her procedure anyways and is transferring her care to another pain clinic so she can have treatment and not have to fast.  appt has been cancelled.

## 2023-04-07 NOTE — TELEPHONE ENCOUNTER
Patient reports that after establishing with Dr. Alvarenga it was not a good fit, she was unable to follow up with provider as needed, due to many provider cancellations or unavailability    She was recommended regularly scheduled injections at the surgery center. She feels this is too extensive and would like to consult with a different pain management specialist    She is requesting new referral

## 2023-04-07 NOTE — TELEPHONE ENCOUNTER
PATIENT STATES THAT THE APPOINTMENT WITH DR GRANT DID NOT WORK OUT. SHE IS REQUESTING TO GET AN APPOINTMENT WITH PAIN MANAGEMENT ON THE [facility] S[facility].

## 2023-04-11 DIAGNOSIS — M54.50 LUMBAR PAIN: ICD-10-CM

## 2023-04-11 DIAGNOSIS — M70.62 TROCHANTERIC BURSITIS OF BOTH HIPS: ICD-10-CM

## 2023-04-11 DIAGNOSIS — M70.61 TROCHANTERIC BURSITIS OF BOTH HIPS: ICD-10-CM

## 2023-04-11 DIAGNOSIS — M54.2 CERVICAL PAIN: ICD-10-CM

## 2023-04-11 DIAGNOSIS — M25.559 HIP PAIN, UNSPECIFIED LATERALITY: Primary | ICD-10-CM

## 2023-04-13 ENCOUNTER — TELEPHONE (OUTPATIENT)
Dept: FAMILY MEDICINE CLINIC | Facility: CLINIC | Age: 73
End: 2023-04-13
Payer: MEDICARE

## 2023-04-13 NOTE — TELEPHONE ENCOUNTER
Caller: Shanel Wall    Relationship: Self    Best call back number: [phone]    What is the best time to reach you: ANYTIME     Who are you requesting to speak with (clinical staff, provider,  specific staff member): CLINICAL STAFF    What was the call regarding: PATIENT IS REQUESTING A CALL BACK FROM [facility]. SHE DID NOT WISH TO DISCUSS WHAT THE MESSAGE WAS ABOUT.     Do you require a callback: YES

## 2023-04-19 ENCOUNTER — PATIENT MESSAGE (OUTPATIENT)
Dept: FAMILY MEDICINE CLINIC | Facility: CLINIC | Age: 73
End: 2023-04-19
Payer: MEDICARE

## 2023-05-03 DIAGNOSIS — R94.6 ABNORMAL THYROID FUNCTION TEST: ICD-10-CM

## 2023-05-03 RX ORDER — LEVOTHYROXINE SODIUM 0.07 MG/1
TABLET ORAL
Qty: 90 TABLET | Refills: 3 | Status: SHIPPED | OUTPATIENT
Start: 2023-05-03

## 2023-05-03 NOTE — TELEPHONE ENCOUNTER
Rx Refill Note  Requested Prescriptions     Pending Prescriptions Disp Refills   • levothyroxine (SYNTHROID, LEVOTHROID) 75 MCG tablet [Pharmacy Med Name: LEVOTHYROXINE 75 MCG TABLET] 90 tablet 3     Sig: TAKE ONE TABLET BY MOUTH DAILY      Last office visit with prescribing clinician: 3/6/2023   Last telemedicine visit with prescribing clinician: Visit date not found   Next office visit with prescribing clinician: Visit date not found                         Would you like a call back once the refill request has been completed: [] Yes [] No    If the office needs to give you a call back, can they leave a voicemail: [] Yes [] No    Bing Beavers MA  05/03/23, 09:10 EDT

## 2023-05-05 RX ORDER — GUAIFENESIN AND PSEUDOEPHEDRINE HYDROCHLORIDE 600; 60 MG/1; MG/1
TABLET, EXTENDED RELEASE ORAL
Qty: 72 TABLET | Refills: 6 | Status: SHIPPED | OUTPATIENT
Start: 2023-05-05

## 2023-05-05 NOTE — TELEPHONE ENCOUNTER
Rx Refill Note  Requested Prescriptions     Pending Prescriptions Disp Refills   • Mucinex D  MG per 12 hr tablet [Pharmacy Med Name: MUCINEX D -60 MG TABLET] 72 tablet 6     Sig: TAKE ONE TABLET BY MOUTH EVERY 12 HOURS      Last office visit with prescribing clinician: 3/6/2023   Last telemedicine visit with prescribing clinician: Visit date not found   Next office visit with prescribing clinician: Visit date not found                         Would you like a call back once the refill request has been completed: [] Yes [] No    If the office needs to give you a call back, can they leave a voicemail: [] Yes [] No    Ana Maria Cartagena MA  05/05/23, 08:41 EDT

## 2023-05-24 ENCOUNTER — TELEPHONE (OUTPATIENT)
Dept: FAMILY MEDICINE CLINIC | Facility: CLINIC | Age: 73
End: 2023-05-24
Payer: MEDICARE

## 2023-05-24 NOTE — TELEPHONE ENCOUNTER
Caller: Shanel Wall    Relationship: Self    Best call back number: [phone]    What is the best time to reach you: ANYTIME    Who are you requesting to speak with (clinical staff, provider,  specific staff member): VE    Do you know the name of the person who called:  PATIENT/ MORGAN    What was the call regarding: WENT TO HAVE URINALYSIS AT [facility] AND WAS TOLD THE LAB DOES NOT DO URINALYSIS THERE.  PATIENT VERY UPSET    Do you require a callback: YES

## 2023-06-12 ENCOUNTER — OFFICE VISIT (OUTPATIENT)
Dept: ORTHOPEDIC SURGERY | Facility: CLINIC | Age: 73
End: 2023-06-12
Payer: MEDICARE

## 2023-06-12 VITALS
SYSTOLIC BLOOD PRESSURE: 120 MMHG | BODY MASS INDEX: 26 KG/M2 | WEIGHT: 129 LBS | HEIGHT: 59 IN | DIASTOLIC BLOOD PRESSURE: 78 MMHG

## 2023-06-12 DIAGNOSIS — M70.62 TROCHANTERIC BURSITIS OF LEFT HIP: Primary | ICD-10-CM

## 2023-06-12 RX ORDER — ROPIVACAINE HYDROCHLORIDE 5 MG/ML
4 INJECTION, SOLUTION EPIDURAL; INFILTRATION; PERINEURAL
Status: COMPLETED | OUTPATIENT
Start: 2023-06-12 | End: 2023-06-12

## 2023-06-12 RX ORDER — LEVOCETIRIZINE DIHYDROCHLORIDE 5 MG/1
TABLET, FILM COATED ORAL DAILY
COMMUNITY
Start: 2023-05-04

## 2023-06-12 RX ORDER — TRIAMCINOLONE ACETONIDE 40 MG/ML
40 INJECTION, SUSPENSION INTRA-ARTICULAR; INTRAMUSCULAR
Status: COMPLETED | OUTPATIENT
Start: 2023-06-12 | End: 2023-06-12

## 2023-06-12 RX ADMIN — TRIAMCINOLONE ACETONIDE 40 MG: 40 INJECTION, SUSPENSION INTRA-ARTICULAR; INTRAMUSCULAR at 14:14

## 2023-06-12 RX ADMIN — ROPIVACAINE HYDROCHLORIDE 4 ML: 5 INJECTION, SOLUTION EPIDURAL; INFILTRATION; PERINEURAL at 14:14

## 2023-06-12 NOTE — PROGRESS NOTES
Procedure   - Large Joint Arthrocentesis: L greater trochanteric bursa on 6/12/2023 2:14 PM  Indications: pain  Details: 21 G needle, lateral approach  Medications: 4 mL ropivacaine 0.5 %; 40 mg triamcinolone acetonide 40 MG/ML  Outcome: tolerated well, no immediate complications  Procedure, treatment alternatives, risks and benefits explained, specific risks discussed. Consent was given by the patient. Immediately prior to procedure a time out was called to verify the correct patient, procedure, equipment, support staff and site/side marked as required. Patient was prepped and draped in the usual sterile fashion.

## 2023-06-12 NOTE — PROGRESS NOTES
[facility] Note    Subjective     Chief Complaint   Patient presents with   • Left Hip - Pain        HPI    It has been 1  year(s) since Ms. Wall's last visit. She returns to clinic today for new evaluation and treatment of left hip pain. The issue has been ongoing for 3 month(s). She rates her pain a 0-10/10 on the pain scale. Previous/current treatments: cane/walker, NSAIDS, physical therapy, and oral steroids. Current symptoms: pain and swelling. The pain is worse with walking and standing; resting improve the pain. Overall, she is doing the same.  Pain is on the lateral aspect of the hip.  She has been doing physical therapy without significant improvement.    I have reviewed the following portions of the patient's history and agree with: History of Present Illness and Review of Systems    Patient Active Problem List   Diagnosis   • Abnormal EKG   • Dizziness   • Lightheadedness   • Gastroesophageal reflux disease without esophagitis   • Rheumatoid arthritis involving multiple sites   • Hypoglycemia   • Insomnia   • Osteoporosis   • Arthropathy of shoulder region   • Status post reverse total replacement of left shoulder   • Hypothyroid   • Acute blood loss anemia, mild, asymptomatic   • Postoperative urinary retention   • Bicuspid aortic valve   • Rheumatoid arthritis involving left knee with positive rheumatoid factor   • Benign paroxysmal positional vertigo   • Neck pain   • Ataxia   • Nocturnal headaches   • Status post total left knee replacement   • Hypokalemia, replaced   • Acute postoperative pain   • Fatigue   • Restless legs syndrome (RLS)   • Irritable bowel syndrome with diarrhea   • Diverticulosis of large intestine without hemorrhage   • Palpitations   • Elevated hemoglobin   • Rheumatoid arthritis with positive rheumatoid factor   • Abnormal thyroid blood test   • Acute right ankle pain   • Intercostal pain   • Polycythemia secondary to hypoxia   • Arthritis of right  "temporomandibular joint   • Chronic night sweats   • Arthritis of right temporomandibular joint   • Apnea   • DANICA (obstructive sleep apnea)   • Excessive daytime sleepiness   • Depression   • Well woman exam with routine gynecological exam   • Closed displaced fracture of lateral end of right clavicle   • Acute bilateral low back pain with bilateral sciatica   • Nontraumatic complete tear of right rotator cuff   • Rotator cuff tear arthropathy of right shoulder   • Functional gait abnormality   • Left hip pain     Past Medical History:   Diagnosis Date   • Abnormal EKG    • Acromioclavicular separation    • Anesthesia     PT HAS RHEUMATOID ARTHRITIS, PT HAS SPECIFIC INSTRUCTIONS PER RA MD, pt to bring INSTRUCTIONS AND RECS ATTACHED TO CHART AND PT TO DISCUSS WITH ANESTHESIA ON DATE OF PROCEDURE    • Arthritis    • Arthritis of back    • Arthritis of neck    • Back disorder     degenerative disc disorder   • Baker's cyst of knee     right   • Benign paroxysmal positional vertigo    • Cervical cancer     cone biopsy   • Cervical disc disorder    • Depression    • Deviated septum    • Deviated septum    • Disease of thyroid gland    • Diverticulosis    • Diverticulosis    • Fibromyalgia    • Fibromyalgia    • Fracture, clavicle    • Fracture, foot    • Gall stones    • GERD (gastroesophageal reflux disease)    • Hard to intubate     \"anterior trachea\"    • History of gallstones    • HL (hearing loss)    • Hypoglycemia    • Hypothyroidism    • IBS (irritable bowel syndrome)    • Joint pain    • Knee swelling    • Low back strain    • Lumbosacral disc disease    • Macular degeneration    • Measles    • Menopause    • Multiple food allergies    • Neck strain    • Neuroma of foot    • Osteoarthritis    • Osteoarthritis (arthritis due to wear and tear of joints)    • Periarthritis of shoulder    • Popliteal cyst    • Retinal disease, bilateral    • Rheumatoid aortitis    • Rheumatoid arthritis    • Rheumatoid arthritis    • " Rotator cuff syndrome    • Ruptured tendon    • Scoliosis    • Scoliosis    • Seasonal allergies    • Shingles    • Sleep apnea    • Suppression of immune system subtherapeutic    • Synovitis    • Synovitis    • Tinnitus    • Tinnitus    • TMJ (dislocation of temporomandibular joint)    • Vertigo    • Wears glasses       Past Surgical History:   Procedure Laterality Date   • ADENOIDECTOMY     • BILATERAL BREAST REDUCTION     • BLEPHAROPLASTY     • BLEPHAROPLASTY, QUAD Bilateral 09/18/2018    Dr. romano.   • BREAST EXCISIONAL BIOPSY Left     NO VISIBLE SCAR   • BREAST FIBROADENOMA SURGERY     • CALCANEOUS OPEN REDUCTION INTERNAL FIXATION     • CATARACT EXTRACTION Bilateral    • CERVICAL CONE BIOPSY     • COLONOSCOPY     • COSMETIC SURGERY      SHORT SCAR FACE LIFT PER DR ROMANO    • DILATATION AND CURETTAGE     • FACIAL COSMETIC SURGERY     • FACIAL COSMETIC SURGERY     • FOOT SURGERY Left     calcaneous    • HAND SURGERY     • JOINT REPLACEMENT     • KNEE ARTHROPLASTY Right     blue    • KNEE ARTHROSCOPY Bilateral    • REDUCTION MAMMAPLASTY Bilateral    • REPLACEMENT TOTAL KNEE Right    • REPLACEMENT TOTAL KNEE Left    • SHOULDER SURGERY     • TENDON REPAIR Right     HAND   • TENDON REPAIR      R hand   • TONSILLECTOMY AND ADENOIDECTOMY     • TOTAL KNEE ARTHROPLASTY Left 04/03/2018    Procedure: TOTAL KNEE ARTHROPLASTY LEFT;  Surgeon: Mahesh Wesley MD;  Location: [facility] OR;  Service: Orthopedics   • TOTAL SHOULDER ARTHROPLASTY W/ DISTAL CLAVICLE EXCISION Left 05/22/2017    Procedure: LEFT REVERSE TOTAL SHOULDER ARTHROPLASTY FOR FRACTURE;  Surgeon: Damaso Harris MD;  Location: [facility] OR;  Service:    • TOTAL SHOULDER REPLACEMENT Left    • TOTAL SHOULDER REPLACEMENT Left    • TUBAL ABDOMINAL LIGATION        Family History   Problem Relation Age of Onset   • Parkinsonism Mother    • Thyroid disease Mother    • Heart failure Father    • Kidney failure Father    • Hyperlipidemia Father    • Heart disease Father    •  Sleep apnea Sister    • Thyroid disease Sister    • Obesity Sister    • No Known Problems Brother    • Breast cancer Neg Hx    • Ovarian cancer Neg Hx    • Colon cancer Neg Hx    • Colon polyps Neg Hx      Social History     Socioeconomic History   • Marital status:    • Number of children: 1   Tobacco Use   • Smoking status: Never   • Smokeless tobacco: Never   Vaping Use   • Vaping Use: Never used   Substance and Sexual Activity   • Alcohol use: Yes     Alcohol/week: 2.0 standard drinks     Types: 1 Glasses of wine, 1 Shots of liquor per week     Comment: DAILY   • Drug use: Never   • Sexual activity: Defer     Partners: Male      Current Outpatient Medications on File Prior to Visit   Medication Sig Dispense Refill   • aluminum hydroxide-mag carbonate (GAVISCON EXTRA RELIEF) 160-105 MG chewable tablet chewable tablet Chew 1 tablet Daily As Needed.     • Calcium Carb-Cholecalciferol (CALCIUM-VITAMIN D) 600-400 MG-UNIT tablet Take 1 tablet by mouth 2 (Two) Times a Day.     • Denosumab (PROLIA SC) Inject  under the skin into the appropriate area as directed Every 6 (Six) Months.     • DULoxetine (Cymbalta) 30 MG capsule Take 3 capsules by mouth Daily. 270 capsule 3   • ENBREL 50 MG/ML injection Inject 1 mL under the skin into the appropriate area as directed 1 (One) Time Per Week.     • folic acid (FOLVITE) 1 MG tablet Take 1 tablet by mouth Daily.     • HYDROcodone-acetaminophen (NORCO)  MG per tablet Take 1 tablet by mouth Every 6 (Six) Hours As Needed for Moderate Pain. 60 tablet 0   • lansoprazole (PREVACID) 15 MG capsule Take 1 capsule by mouth Daily.     • levocetirizine (XYZAL) 5 MG tablet Daily.     • levothyroxine (SYNTHROID, LEVOTHROID) 75 MCG tablet TAKE ONE TABLET BY MOUTH DAILY 90 tablet 3   • meloxicam (MOBIC) 15 MG tablet Take 1 tablet by mouth Daily.     • methotrexate 2.5 MG tablet Take 9 tablets by mouth 1 (One) Time Per Week. MONDAYS  Resume when ok with Dr. Wesley (Patient taking  differently: Take 6 tablets by mouth 1 (One) Time Per Week. MONDAYS  Resume when ok with Dr. Wesley)  0   • Mucinex D  MG per 12 hr tablet TAKE ONE TABLET BY MOUTH EVERY 12 HOURS 72 tablet 6   • Multiple Vitamins-Minerals (SENIOR MULTIVITAMIN PLUS) tablet Take 1 tablet by mouth Daily.     • NON FORMULARY Take 2 tablets by mouth Daily. Hydro Eye gelcaps     • oxymetazoline (AFRIN) 0.05 % nasal spray 2 sprays into the nostril(s) as directed by provider 2 (Two) Times a Day.     • predniSONE (DELTASONE) 5 MG tablet Take 1 tablet by mouth Daily.     • prochlorperazine (COMPAZINE) 10 MG tablet Take 1 tablet by mouth Every 6 (Six) Hours As Needed for Nausea or Vomiting.     • Saline 0.2 % solution 1 application into the nostril(s) as directed by provider 2 (two) times a day.     • vitamin A 8000 UNIT capsule Take 1 capsule by mouth Daily.     • vitamin C (ASCORBIC ACID) 500 MG tablet Take 1 tablet by mouth Daily.     • vitamin E 400 UNIT capsule Take 2 capsules by mouth Daily.     • zolpidem (AMBIEN) 10 MG tablet Take 1 tablet by mouth At Night As Needed for Sleep. 90 tablet 3     No current facility-administered medications on file prior to visit.      Allergies   Allergen Reactions   • Adhesive Tape Itching   • Erythromycin Hives   • Pseudoephedrine Hives        Review of Systems   Constitutional:  Negative for activity change, appetite change, chills, diaphoresis, fatigue, fever and unexpected weight change.   HENT:  Negative for congestion, dental problem, drooling, ear discharge, ear pain, facial swelling, hearing loss, mouth sores, nosebleeds, postnasal drip, rhinorrhea, sinus pressure, sneezing, sore throat, tinnitus, trouble swallowing and voice change.    Eyes:  Negative for photophobia, pain, discharge, redness, itching and visual disturbance.   Respiratory:  Negative for apnea, cough, choking, chest tightness, shortness of breath, wheezing and stridor.    Cardiovascular:  Negative for chest pain,  "palpitations and leg swelling.   Gastrointestinal:  Negative for abdominal distention, abdominal pain, anal bleeding, blood in stool, constipation, diarrhea, nausea, rectal pain and vomiting.   Endocrine: Negative for cold intolerance, heat intolerance, polydipsia, polyphagia and polyuria.   Genitourinary:  Negative for decreased urine volume, difficulty urinating, dysuria, enuresis, flank pain, frequency, genital sores, hematuria and urgency.   Musculoskeletal:  Positive for arthralgias. Negative for back pain, gait problem, joint swelling, myalgias, neck pain and neck stiffness.   Skin:  Negative for color change, pallor, rash and wound.   Allergic/Immunologic: Negative for environmental allergies, food allergies and immunocompromised state.   Neurological:  Negative for dizziness, tremors, seizures, syncope, facial asymmetry, speech difficulty, weakness, light-headedness, numbness and headaches.   Hematological:  Negative for adenopathy. Does not bruise/bleed easily.   Psychiatric/Behavioral:  Negative for agitation, behavioral problems, confusion, decreased concentration, dysphoric mood, hallucinations, self-injury, sleep disturbance and suicidal ideas. The patient is not nervous/anxious and is not hyperactive.       Objective      Physical Exam  /78   Ht 148.6 cm (58.5\")   Wt 58.5 kg (129 lb)   BMI 26.50 kg/m²     Body mass index is 26.5 kg/m².    General:   Mental Status:  Alert   Appearance: Cooperative, in no acute distress   Build and Nutrition: Well-nourished well-developed female   Orientation: Alert and oriented to person, place and time   Posture: Normal   Gait: Nonantalgic    Integument:   Left hip: No skin lesions, no rash, no ecchymosis    Lower Extremity:   Left Hip:    Tenderness:  Lateral hip    Swelling:  None    Crepitus:  None    Atrophy:  None    Range of motion:  External Rotation: 30°       Internal Rotation: 30°       Flexion:  100°       Extension:  0°   Instability: "  None  Deformities:  None  Functional testing: Negative [facility]    No leg length discrepancy      Imaging/Studies  Imaging Results (Last 24 Hours)     Procedure Component Value Units Date/Time    XR Hip With or Without Pelvis 2 - 3 View Left [[id number]] Resulted: 06/12/23 1404     Updated: 06/12/23 1405    Narrative:      Left Hip Radiographs  Indication: left hip pain  Views: low AP pelvis and lateral of the left hip    Comparison: no prior studies available for review    Findings:   No acute bony abnormalities.  No unusual features.  Mild irregularity at   the greater trochanter laterally.  No unusual bony features.            Assessment and Plan     Diagnoses and all orders for this visit:    1. Trochanteric bursitis of left hip (Primary)  -     XR Hip With or Without Pelvis 2 - 3 View Left  -     - Large Joint Arthrocentesis: L greater trochanteric bursa        1. Trochanteric bursitis of left hip        I reviewed my findings with the patient.  She has trochanteric bursitis, and she would like to have an injection today.  This was provided, and I will see her back in 3 months, but sooner for any problems.  She should also continue her hip stretching/hip exercises.    Procedure Note:  The potential benefits of performing a therapeutic left hip trochanteric bursal injection, as well as potential risks (including, but not limited to infection, swelling, pain, bleeding, bruising, nerve/blood vessel damage, skin color changes, transient elevation in blood glucose levels, and fat atrophy) were discussed with the patient.  After informed consent, timeout procedure was performed, and the skin on the lateral aspect of the left hip was prepped with chlorhexidine soap and alcohol, after which ethyl chloride was applied to the skin at the injection site. Via the lateral approach, 1ml of Kenalog 40mg/ml mixed with 4ml 0.5% ropivacaine plain was injected into the trochanteric bursa.  The patient tolerated the procedure  well, experiencing 85% improvement a few minutes following the injection. There were no complications.  Band-Aid was applied to the injection site. Post-procedural instructions were given to the patient and/or their caregiver.      Return in about 3 months (around 9/12/2023).      Mahesh Wesley MD  06/12/23  14:37 EDT

## 2023-06-17 ENCOUNTER — PATIENT MESSAGE (OUTPATIENT)
Dept: FAMILY MEDICINE CLINIC | Facility: CLINIC | Age: 73
End: 2023-06-17
Payer: MEDICARE

## 2023-06-18 LAB
DRUGS UR: NORMAL
Lab: NORMAL

## 2023-07-13 PROBLEM — F41.9 ANXIETY: Status: ACTIVE | Noted: 2023-07-13

## 2023-07-13 PROBLEM — M25.511 PAIN IN JOINT OF RIGHT SHOULDER: Status: ACTIVE | Noted: 2023-07-13

## 2023-08-02 RX ORDER — HYDROCODONE BITARTRATE AND ACETAMINOPHEN 5; 325 MG/1; MG/1
1 TABLET ORAL EVERY 6 HOURS PRN
Qty: 28 TABLET | Refills: 0 | Status: SHIPPED | OUTPATIENT
Start: 2023-08-02

## 2023-08-16 ENCOUNTER — TRANSCRIBE ORDERS (OUTPATIENT)
Dept: LAB | Facility: HOSPITAL | Age: 73
End: 2023-08-16
Payer: MEDICARE

## 2023-08-16 ENCOUNTER — LAB (OUTPATIENT)
Dept: LAB | Facility: HOSPITAL | Age: 73
End: 2023-08-16
Payer: MEDICARE

## 2023-08-16 DIAGNOSIS — M81.0 SENILE OSTEOPOROSIS: Primary | ICD-10-CM

## 2023-08-16 DIAGNOSIS — M81.0 SENILE OSTEOPOROSIS: ICD-10-CM

## 2023-08-16 DIAGNOSIS — Z79.899 ENCOUNTER FOR LONG-TERM (CURRENT) USE OF OTHER MEDICATIONS: ICD-10-CM

## 2023-08-16 DIAGNOSIS — R53.83 FATIGUE, UNSPECIFIED TYPE: ICD-10-CM

## 2023-08-16 LAB
ALBUMIN SERPL-MCNC: 4.1 G/DL (ref 3.5–5.2)
ALBUMIN/GLOB SERPL: 1.6 G/DL
ALP SERPL-CCNC: 86 U/L (ref 39–117)
ALT SERPL W P-5'-P-CCNC: 20 U/L (ref 1–33)
ANION GAP SERPL CALCULATED.3IONS-SCNC: 10.2 MMOL/L (ref 5–15)
AST SERPL-CCNC: 28 U/L (ref 1–32)
BILIRUB SERPL-MCNC: 0.4 MG/DL (ref 0–1.2)
BUN SERPL-MCNC: 17 MG/DL (ref 8–23)
BUN/CREAT SERPL: 21.3 (ref 7–25)
CALCIUM SPEC-SCNC: 9.8 MG/DL (ref 8.6–10.5)
CHLORIDE SERPL-SCNC: 100 MMOL/L (ref 98–107)
CO2 SERPL-SCNC: 29.8 MMOL/L (ref 22–29)
CREAT SERPL-MCNC: 0.8 MG/DL (ref 0.57–1)
EGFRCR SERPLBLD CKD-EPI 2021: 79.4 ML/MIN/1.73
GLOBULIN UR ELPH-MCNC: 2.6 GM/DL
GLUCOSE SERPL-MCNC: 104 MG/DL (ref 65–99)
POTASSIUM SERPL-SCNC: 4.8 MMOL/L (ref 3.5–5.2)
PROT SERPL-MCNC: 6.7 G/DL (ref 6–8.5)
SODIUM SERPL-SCNC: 140 MMOL/L (ref 136–145)

## 2023-08-16 PROCEDURE — 36415 COLL VENOUS BLD VENIPUNCTURE: CPT

## 2023-08-16 PROCEDURE — 80053 COMPREHEN METABOLIC PANEL: CPT

## 2023-08-28 ENCOUNTER — TRANSCRIBE ORDERS (OUTPATIENT)
Dept: FAMILY MEDICINE CLINIC | Facility: CLINIC | Age: 73
End: 2023-08-28
Payer: MEDICARE

## 2023-08-28 DIAGNOSIS — M05.79 RHEUMATOID ARTHRITIS INVOLVING MULTIPLE SITES WITH POSITIVE RHEUMATOID FACTOR: Primary | ICD-10-CM

## 2023-08-28 DIAGNOSIS — Z12.31 ENCOUNTER FOR SCREENING MAMMOGRAM FOR BREAST CANCER: Primary | ICD-10-CM

## 2023-08-28 RX ORDER — HYDROCODONE BITARTRATE AND ACETAMINOPHEN 5; 325 MG/1; MG/1
1 TABLET ORAL EVERY 6 HOURS PRN
Qty: 28 TABLET | Refills: 0 | Status: SHIPPED | OUTPATIENT
Start: 2023-08-28

## 2023-09-01 RX ORDER — DULOXETIN HYDROCHLORIDE 30 MG/1
CAPSULE, DELAYED RELEASE ORAL
Qty: 30 CAPSULE | Refills: 1 | Status: SHIPPED | OUTPATIENT
Start: 2023-09-01

## 2023-09-01 NOTE — TELEPHONE ENCOUNTER
Rx Refill Note  Requested Prescriptions     Pending Prescriptions Disp Refills    DULoxetine (CYMBALTA) 30 MG capsule [Pharmacy Med Name: DULoxetine HCL DR 30 MG CAPSULE] 270 capsule 3     Sig: TAKE THREE CAPSULES BY MOUTH DAILY      Last office visit with prescribing clinician: 7/13/2023   Last telemedicine visit with prescribing clinician: Visit date not found   Next office visit with prescribing clinician: Visit date not found                         Would you like a call back once the refill request has been completed: [] Yes [] No    If the office needs to give you a call back, can they leave a voicemail: [] Yes [] No    April KEN Sharma  09/01/23, 09:59 EDT

## 2023-09-22 DIAGNOSIS — M05.79 RHEUMATOID ARTHRITIS INVOLVING MULTIPLE SITES WITH POSITIVE RHEUMATOID FACTOR: ICD-10-CM

## 2023-09-22 NOTE — TELEPHONE ENCOUNTER
Caller: Shanel Wall    Relationship: Self    Best call back number: [phone]     Requested Prescriptions:   Requested Prescriptions     Pending Prescriptions Disp Refills    HYDROcodone-acetaminophen (NORCO) 5-325 MG per tablet 28 tablet 0     Sig: Take 1 tablet by mouth Every 6 (Six) Hours As Needed for Severe Pain.        Pharmacy where request should be sent: [facility] [id number] [address] [facility] [phone] [facility] [phone] FX     Last office visit with prescribing clinician: 7/13/2023   Last telemedicine visit with prescribing clinician: Visit date not found   Next office visit with prescribing clinician: Visit date not found     Additional details provided by patient: PATIENT HAS 2 PILLS LEFT.    Does the patient have less than a 3 day supply:  [x] Yes  [] No    Would you like a call back once the refill request has been completed: [] Yes [x] No    If the office needs to give you a call back, can they leave a voicemail: [] Yes [x] No    Milad Rodriguez Rep   09/22/23 11:58 EDT

## 2023-09-25 RX ORDER — HYDROCODONE BITARTRATE AND ACETAMINOPHEN 5; 325 MG/1; MG/1
1 TABLET ORAL EVERY 6 HOURS PRN
Qty: 28 TABLET | Refills: 0 | Status: SHIPPED | OUTPATIENT
Start: 2023-09-25

## 2023-09-25 NOTE — TELEPHONE ENCOUNTER
Rx Refill Note  Requested Prescriptions     Pending Prescriptions Disp Refills    HYDROcodone-acetaminophen (NORCO) 5-325 MG per tablet 28 tablet 0     Sig: Take 1 tablet by mouth Every 6 (Six) Hours As Needed for Severe Pain.      Last office visit with prescribing clinician: 7/13/2023     Next office visit with prescribing clinician: 10/26/2023     FRANCISCA 06-  UDS 06-  Mame England MA  09/25/23, 09:28 EDT

## 2023-10-17 ENCOUNTER — TELEPHONE (OUTPATIENT)
Dept: FAMILY MEDICINE CLINIC | Facility: CLINIC | Age: 73
End: 2023-10-17

## 2023-10-17 RX ORDER — DULOXETIN HYDROCHLORIDE 30 MG/1
90 CAPSULE, DELAYED RELEASE ORAL DAILY
Qty: 270 CAPSULE | Refills: 2 | Status: SHIPPED | OUTPATIENT
Start: 2023-10-17

## 2023-10-17 NOTE — TELEPHONE ENCOUNTER
[facility] called about patient's prescription of DULoxetine 30 MG capsules 3x a day. They would like to see if you wanted it to be 90 capsules instead. If so, can you go ahead and send it over, please.    [facility]    Phone: [phone] Fax: [phone]

## 2023-10-18 DIAGNOSIS — M05.79 RHEUMATOID ARTHRITIS INVOLVING MULTIPLE SITES WITH POSITIVE RHEUMATOID FACTOR: ICD-10-CM

## 2023-10-18 NOTE — TELEPHONE ENCOUNTER
Caller: Shanel Wall    Relationship: Self    Best call back number: [phone]     Requested Prescriptions:   Requested Prescriptions     Pending Prescriptions Disp Refills    HYDROcodone-acetaminophen (NORCO) 5-325 MG per tablet 28 tablet 0     Sig: Take 1 tablet by mouth Every 6 (Six) Hours As Needed for Severe Pain.        Pharmacy where request should be sent: [facility] [id number] [address] [facility] [phone] [facility] [phone] FX     Last office visit with prescribing clinician: 7/13/2023   Last telemedicine visit with prescribing clinician: Visit date not found   Next office visit with prescribing clinician: 10/26/2023     Additional details provided by patient:     Does the patient have less than a 3 day supply:  [x] Yes  [] No    Would you like a call back once the refill request has been completed: [] Yes [x] No    If the office needs to give you a call back, can they leave a voicemail: [] Yes [x] No    Racheal Arredondo, PCT   10/18/23 12:32 EDT

## 2023-10-19 RX ORDER — HYDROCODONE BITARTRATE AND ACETAMINOPHEN 5; 325 MG/1; MG/1
1 TABLET ORAL EVERY 6 HOURS PRN
Qty: 28 TABLET | Refills: 0 | Status: SHIPPED | OUTPATIENT
Start: 2023-10-19

## 2023-11-02 DIAGNOSIS — M05.79 RHEUMATOID ARTHRITIS INVOLVING MULTIPLE SITES WITH POSITIVE RHEUMATOID FACTOR: ICD-10-CM

## 2023-11-02 DIAGNOSIS — Z51.81 MEDICATION MONITORING ENCOUNTER: ICD-10-CM

## 2023-11-02 RX ORDER — ZOLPIDEM TARTRATE 10 MG/1
10 TABLET ORAL NIGHTLY PRN
Qty: 90 TABLET | Refills: 0 | Status: SHIPPED | OUTPATIENT
Start: 2023-11-02

## 2023-11-02 NOTE — TELEPHONE ENCOUNTER
Rx Refill Note  Requested Prescriptions     Pending Prescriptions Disp Refills    zolpidem (AMBIEN) 10 MG tablet [Pharmacy Med Name: ZOLPIDEM TARTRATE 10 MG TABLET] 90 tablet      Sig: TAKE ONE TABLET BY MOUTH ONCE NIGHTLY AS NEEDED FOR SLEEP      Last office visit with prescribing clinician: 7/13/2023   Last telemedicine visit with prescribing clinician: Visit date not found   Next office visit with prescribing clinician: 11/30/2023                         Would you like a call back once the refill request has been completed: [] Yes [] No    If the office needs to give you a call back, can they leave a voicemail: [] Yes [] No    Chantal Rios MA  11/02/23, 14:01 EDT

## 2023-11-10 ENCOUNTER — HOSPITAL ENCOUNTER (OUTPATIENT)
Dept: MAMMOGRAPHY | Facility: HOSPITAL | Age: 73
Discharge: HOME | End: 2023-11-10
Admitting: INTERNAL MEDICINE
Payer: MEDICARE

## 2023-11-10 DIAGNOSIS — Z12.31 ENCOUNTER FOR SCREENING MAMMOGRAM FOR BREAST CANCER: ICD-10-CM

## 2023-11-10 PROCEDURE — 77063 BREAST TOMOSYNTHESIS BI: CPT

## 2023-11-10 PROCEDURE — 77067 SCR MAMMO BI INCL CAD: CPT

## 2023-11-13 DIAGNOSIS — M05.79 RHEUMATOID ARTHRITIS INVOLVING MULTIPLE SITES WITH POSITIVE RHEUMATOID FACTOR: ICD-10-CM

## 2023-11-13 RX ORDER — HYDROCODONE BITARTRATE AND ACETAMINOPHEN 5; 325 MG/1; MG/1
1 TABLET ORAL EVERY 6 HOURS PRN
Qty: 28 TABLET | Refills: 0 | Status: SHIPPED | OUTPATIENT
Start: 2023-11-13

## 2023-11-13 NOTE — TELEPHONE ENCOUNTER
Caller: Shanel Wall    Relationship: Self    Best call back number:       [phone] (Mobile)     Requested Prescriptions:   Requested Prescriptions     Pending Prescriptions Disp Refills    HYDROcodone-acetaminophen (NORCO) 5-325 MG per tablet 28 tablet 0     Sig: Take 1 tablet by mouth Every 6 (Six) Hours As Needed for Severe Pain.      Pharmacy where request should be sent:     [facility] [id number] [address] [facility] [phone] [facility] [phone]      Last office visit with prescribing clinician: 7/13/2023   Last telemedicine visit with prescribing clinician: Visit date not found   Next office visit with prescribing clinician: 11/30/2023     Additional details provided by patient:     PATIENT STATED SHE HAS (1) TABLET LEFT OF MEDICATION    Does the patient have less than a 3 day supply:  [x] Yes  [] No    Would you like a call back once the refill request has been completed: [] Yes [] No    If the office needs to give you a call back, can they leave a voicemail: [] Yes [] No    Milad George Rep   11/13/23 09:24 EST

## 2023-11-30 ENCOUNTER — OFFICE VISIT (OUTPATIENT)
Dept: FAMILY MEDICINE CLINIC | Facility: CLINIC | Age: 73
End: 2023-11-30
Payer: MEDICARE

## 2023-11-30 ENCOUNTER — LAB (OUTPATIENT)
Dept: LAB | Facility: HOSPITAL | Age: 73
End: 2023-11-30
Payer: MEDICARE

## 2023-11-30 VITALS
SYSTOLIC BLOOD PRESSURE: 122 MMHG | DIASTOLIC BLOOD PRESSURE: 76 MMHG | HEIGHT: 59 IN | WEIGHT: 134 LBS | HEART RATE: 72 BPM | OXYGEN SATURATION: 99 % | BODY MASS INDEX: 27.01 KG/M2

## 2023-11-30 DIAGNOSIS — D64.9 ANEMIA, UNSPECIFIED TYPE: ICD-10-CM

## 2023-11-30 DIAGNOSIS — M05.79 RHEUMATOID ARTHRITIS INVOLVING MULTIPLE SITES WITH POSITIVE RHEUMATOID FACTOR: ICD-10-CM

## 2023-11-30 DIAGNOSIS — D75.1 POLYCYTHEMIA SECONDARY TO HYPOXIA: ICD-10-CM

## 2023-11-30 DIAGNOSIS — E61.1 IRON DEFICIENCY: ICD-10-CM

## 2023-11-30 DIAGNOSIS — R94.6 ABNORMAL THYROID FUNCTION TEST: ICD-10-CM

## 2023-11-30 DIAGNOSIS — D58.2 ELEVATED HEMOGLOBIN: ICD-10-CM

## 2023-11-30 DIAGNOSIS — M81.0 OSTEOPOROSIS WITHOUT CURRENT PATHOLOGICAL FRACTURE, UNSPECIFIED OSTEOPOROSIS TYPE: ICD-10-CM

## 2023-11-30 DIAGNOSIS — G25.81 RESTLESS LEGS SYNDROME (RLS): Primary | ICD-10-CM

## 2023-11-30 LAB
ALBUMIN SERPL-MCNC: 4.4 G/DL (ref 3.5–5.2)
ALBUMIN/GLOB SERPL: 1.5 G/DL
ALP SERPL-CCNC: 96 U/L (ref 39–117)
ALT SERPL W P-5'-P-CCNC: 22 U/L (ref 1–33)
ANION GAP SERPL CALCULATED.3IONS-SCNC: 9 MMOL/L (ref 5–15)
AST SERPL-CCNC: 29 U/L (ref 1–32)
BILIRUB SERPL-MCNC: 0.3 MG/DL (ref 0–1.2)
BUN SERPL-MCNC: 16 MG/DL (ref 8–23)
BUN/CREAT SERPL: 18.8 (ref 7–25)
CALCIUM SPEC-SCNC: 9.8 MG/DL (ref 8.6–10.5)
CHLORIDE SERPL-SCNC: 100 MMOL/L (ref 98–107)
CO2 SERPL-SCNC: 32 MMOL/L (ref 22–29)
CREAT SERPL-MCNC: 0.85 MG/DL (ref 0.57–1)
DEPRECATED RDW RBC AUTO: 43.3 FL (ref 37–54)
EGFRCR SERPLBLD CKD-EPI 2021: 73.8 ML/MIN/1.73
ERYTHROCYTE [DISTWIDTH] IN BLOOD BY AUTOMATED COUNT: 13.1 % (ref 12.3–15.4)
GLOBULIN UR ELPH-MCNC: 2.9 GM/DL
GLUCOSE SERPL-MCNC: 109 MG/DL (ref 65–99)
HCT VFR BLD AUTO: 46.4 % (ref 34–46.6)
HGB BLD-MCNC: 14.9 G/DL (ref 12–15.9)
MAGNESIUM SERPL-MCNC: 2.3 MG/DL (ref 1.6–2.4)
MCH RBC QN AUTO: 29.6 PG (ref 26.6–33)
MCHC RBC AUTO-ENTMCNC: 32.1 G/DL (ref 31.5–35.7)
MCV RBC AUTO: 92.1 FL (ref 79–97)
PHOSPHATE SERPL-MCNC: 3.9 MG/DL (ref 2.5–4.5)
PLATELET # BLD AUTO: 261 10*3/MM3 (ref 140–450)
PMV BLD AUTO: 9.6 FL (ref 6–12)
POTASSIUM SERPL-SCNC: 4.8 MMOL/L (ref 3.5–5.2)
PROT SERPL-MCNC: 7.3 G/DL (ref 6–8.5)
RBC # BLD AUTO: 5.04 10*6/MM3 (ref 3.77–5.28)
SODIUM SERPL-SCNC: 141 MMOL/L (ref 136–145)
WBC NRBC COR # BLD AUTO: 6.91 10*3/MM3 (ref 3.4–10.8)

## 2023-11-30 PROCEDURE — 84100 ASSAY OF PHOSPHORUS: CPT

## 2023-11-30 PROCEDURE — 84466 ASSAY OF TRANSFERRIN: CPT

## 2023-11-30 PROCEDURE — 83735 ASSAY OF MAGNESIUM: CPT

## 2023-11-30 PROCEDURE — 99214 OFFICE O/P EST MOD 30 MIN: CPT | Performed by: INTERNAL MEDICINE

## 2023-11-30 PROCEDURE — 85027 COMPLETE CBC AUTOMATED: CPT

## 2023-11-30 PROCEDURE — 36415 COLL VENOUS BLD VENIPUNCTURE: CPT

## 2023-11-30 PROCEDURE — 83540 ASSAY OF IRON: CPT

## 2023-11-30 PROCEDURE — 80053 COMPREHEN METABOLIC PANEL: CPT

## 2023-11-30 RX ORDER — LEVOCETIRIZINE DIHYDROCHLORIDE 5 MG/1
5 TABLET, FILM COATED ORAL DAILY
Qty: 90 TABLET | Refills: 2 | Status: SHIPPED | OUTPATIENT
Start: 2023-11-30

## 2023-11-30 RX ORDER — PROCHLORPERAZINE MALEATE 10 MG
10 TABLET ORAL EVERY 6 HOURS PRN
Qty: 60 TABLET | Refills: 1 | Status: SHIPPED | OUTPATIENT
Start: 2023-11-30

## 2023-11-30 RX ORDER — HYDROCODONE BITARTRATE AND ACETAMINOPHEN 5; 325 MG/1; MG/1
1 TABLET ORAL EVERY 6 HOURS PRN
Qty: 60 TABLET | Refills: 0 | Status: SHIPPED | OUTPATIENT
Start: 2023-11-30

## 2023-11-30 RX ORDER — ROPINIROLE 0.25 MG/1
0.25 TABLET, FILM COATED ORAL NIGHTLY
Qty: 90 TABLET | Refills: 2 | Status: SHIPPED | OUTPATIENT
Start: 2023-11-30

## 2023-11-30 RX ORDER — GABAPENTIN 300 MG/1
300 CAPSULE ORAL 2 TIMES DAILY
Qty: 60 CAPSULE | Refills: 2 | Status: SHIPPED | OUTPATIENT
Start: 2023-11-30

## 2023-11-30 RX ORDER — LEVOTHYROXINE SODIUM 0.07 MG/1
75 TABLET ORAL DAILY
Qty: 90 TABLET | Refills: 3 | Status: SHIPPED | OUTPATIENT
Start: 2023-11-30

## 2023-11-30 RX ORDER — DULOXETIN HYDROCHLORIDE 30 MG/1
90 CAPSULE, DELAYED RELEASE ORAL DAILY
Qty: 270 CAPSULE | Refills: 2 | Status: SHIPPED | OUTPATIENT
Start: 2023-11-30

## 2023-11-30 RX ORDER — BUSPIRONE HYDROCHLORIDE 10 MG/1
10 TABLET ORAL 2 TIMES DAILY
Qty: 180 TABLET | Refills: 3 | Status: SHIPPED | OUTPATIENT
Start: 2023-11-30

## 2023-11-30 NOTE — PROGRESS NOTES
"Shanel Wall  1950  [id number]  Patient Care Team:  Fili Ray MD as PCP - General (Internal Medicine)  Cristiane Araiza MD as Consulting Physician (Endocrinology)  Nain Alvarenga MD as Consulting Physician (Pain Medicine)    Shanel Wall is a 72 y.o. female here today for follow up.     This patient is accompanied by their self who contributes to the history of their care.    Chief Complaint:    Chief Complaint   Patient presents with    Restless Legs Syndrome    SI joint    Dizziness        History of Present Illness:  I have reviewed and/or updated the patient's past medical, past surgical, family, social history, problem list and allergies as appropriate.     She is struggling with her husbands progressive dementia  Oct 19th her SI joints flared. Has dicussed with pain management.; however would like to consider alternatives. She takes pain medication  hydrocodone at night which helps with with RLS. She is also on Meloxica; however she finds it unhelpful.     She has had a recurrence of vertgo. She has not done PT. She can control I. Avoids standing. No nausea. Meclizine dose not help.  She declines PT referral.  She is having worsening foot pain and would like to see Dr. Tripp again.    She is not using CPAP. Between taht and rls her sleep is miserable. Her Hb and Hct have improved, but she would like to try to live without cpap    Review of Systems   Constitutional:  Positive for fatigue.   Cardiovascular: Negative.    Musculoskeletal:  Positive for arthralgias.        SI pain, foot pain   Neurological:  Positive for dizziness.   Psychiatric/Behavioral:  Positive for sleep disturbance.        Vitals:    11/30/23 1402   BP: 122/76   Pulse: 72   SpO2: 99%   Weight: 60.8 kg (134 lb)   Height: 148.6 cm (58.5\")     Body mass index is 27.53 kg/m².    Physical Exam  Vitals and nursing note reviewed.   Constitutional:       General: She is not in acute distress.     Appearance: " She is well-developed. She is not diaphoretic.   HENT:      Head: Normocephalic and atraumatic.      Right Ear: External ear normal.      Left Ear: External ear normal.      Mouth/Throat:      Pharynx: No oropharyngeal exudate.   Eyes:      General: No scleral icterus.        Right eye: No discharge.      Conjunctiva/sclera: Conjunctivae normal.   Neck:      Thyroid: No thyromegaly.      Vascular: No JVD.      Trachea: No tracheal deviation.   Cardiovascular:      Rate and Rhythm: Normal rate and regular rhythm.      Heart sounds: Normal heart sounds.      Comments: PMI nondisplaced  Pulmonary:      Effort: Pulmonary effort is normal.      Breath sounds: Normal breath sounds. No wheezing or rales.   Abdominal:      General: Bowel sounds are normal.      Palpations: Abdomen is soft.      Tenderness: There is no abdominal tenderness. There is no guarding or rebound.   Musculoskeletal:      Cervical back: Normal range of motion and neck supple.      Comments: Bilateral SI tenderness.  Ambulates with cane   Lymphadenopathy:      Cervical: No cervical adenopathy.   Skin:     General: Skin is warm and dry.      Capillary Refill: Capillary refill takes less than 2 seconds.      Coloration: Skin is not pale.      Findings: No rash.   Neurological:      Mental Status: She is alert and oriented to person, place, and time.      Motor: No abnormal muscle tone.      Coordination: Coordination normal.   Psychiatric:         Mood and Affect: Mood normal.         Behavior: Behavior normal.         Judgment: Judgment normal.         Procedures    Results Review:    I reviewed the patient's new clinical results.    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    Relevant Orders    Iron Profile    Iron deficiency    Relevant Orders    Iron Profile       Musculoskeletal and Injuries     Rheumatoid arthritis involving multiple sites    Relevant Medications    ENBREL 50 MG/ML injection    meloxicam (MOBIC) 15 MG tablet    zolpidem (AMBIEN) 10 MG tablet    HYDROcodone-acetaminophen (NORCO) 5-325 MG per tablet    Other Relevant Orders    CBC (No Diff)       Neuro    Restless legs syndrome (RLS) - Primary    Relevant Medications    gabapentin (NEURONTIN) 300 MG capsule     Other Visit Diagnoses       Abnormal thyroid function test        Relevant Medications    levothyroxine (SYNTHROID, LEVOTHROID) 75 MCG tablet    Anemia, unspecified type        Relevant Orders    CBC (No Diff)        She is contemplating well calling Dr. Wesley to consider SI injection.  She will place a call for her foot specialist Dr. Galicia.  We will attempt gabapentin 300 mg p.o. twice daily for restless legs as well as her SI pain.  We will repeat her hemoglobin hematocrit to see if she is maintaining a lower H&H secondary to her nocturnal hypoxemia from sleep apnea.  Her eventual goal would be to get off of CPAP.    Plan of care reviewed with patient at the conclusion of today's visit. Education was provided regarding diagnosis and management.  Patient verbalizes understanding of and agreement with management plan.    Return in about 4 months (around 3/30/2024) for rls, insomnia.    Fili Ray MD      Please note than portions of this note were completed [facility] a Voice Recognition Program

## 2023-12-01 LAB
IRON 24H UR-MRATE: 79 MCG/DL (ref 37–145)
IRON SATN MFR SERPL: 20 % (ref 20–50)
TIBC SERPL-MCNC: 396 MCG/DL (ref 298–536)
TRANSFERRIN SERPL-MCNC: 266 MG/DL (ref 200–360)

## 2024-01-25 DIAGNOSIS — M05.79 RHEUMATOID ARTHRITIS INVOLVING MULTIPLE SITES WITH POSITIVE RHEUMATOID FACTOR: ICD-10-CM

## 2024-01-25 RX ORDER — HYDROCODONE BITARTRATE AND ACETAMINOPHEN 5; 325 MG/1; MG/1
1 TABLET ORAL EVERY 6 HOURS PRN
Qty: 60 TABLET | Refills: 0 | Status: SHIPPED | OUTPATIENT
Start: 2024-01-25

## 2024-01-25 NOTE — TELEPHONE ENCOUNTER
Caller: Shanel Wall    Relationship: Self    Best call back number: [phone]    Requested Prescriptions:   Requested Prescriptions     Pending Prescriptions Disp Refills    HYDROcodone-acetaminophen (NORCO) 5-325 MG per tablet 60 tablet 0     Sig: Take 1 tablet by mouth Every 6 (Six) Hours As Needed for Severe Pain.        Pharmacy where request should be sent:    [facility] [phone]  Last office visit with prescribing clinician: 11/30/2023   Last telemedicine visit with prescribing clinician: Visit date not found   Next office visit with prescribing clinician: 4/1/2024     Additional details provided by patient: PATIENT HAS 2 PILLS LEFT     Does the patient have less than a 3 day supply:  [x] Yes  [] No    Would you like a call back once the refill request has been completed: [] Yes [x] No    If the office needs to give you a call back, can they leave a voicemail: [] Yes [x] No    Milad Christine Rep   01/25/24 09:46 EST

## 2024-01-29 ENCOUNTER — TELEPHONE (OUTPATIENT)
Dept: FAMILY MEDICINE CLINIC | Facility: CLINIC | Age: 74
End: 2024-01-29
Payer: MEDICARE

## 2024-01-29 DIAGNOSIS — Z51.81 MEDICATION MONITORING ENCOUNTER: ICD-10-CM

## 2024-01-29 DIAGNOSIS — M05.79 RHEUMATOID ARTHRITIS INVOLVING MULTIPLE SITES WITH POSITIVE RHEUMATOID FACTOR: ICD-10-CM

## 2024-01-29 RX ORDER — ZOLPIDEM TARTRATE 10 MG/1
10 TABLET ORAL NIGHTLY PRN
Qty: 90 TABLET | Refills: 0 | Status: CANCELLED | OUTPATIENT
Start: 2024-01-29

## 2024-01-29 NOTE — TELEPHONE ENCOUNTER
Caller: Shanel Wall    Relationship: Self    Best call back number: [phone]    Requested Prescriptions:   Requested Prescriptions     Pending Prescriptions Disp Refills    zolpidem (AMBIEN) 10 MG tablet 90 tablet 0     Sig: Take 1 tablet by mouth At Night As Needed for Sleep.        Pharmacy where request should be sent: [facility] [id number] [address] [facility] [phone] [facility] [phone]      Last office visit with prescribing clinician: 11/30/2023   Last telemedicine visit with prescribing clinician: Visit date not found   Next office visit with prescribing clinician: 4/1/2024     Additional details provided by patient: REQUESTING REFILL; 2 TABLETS LEFT OF MEDICATION    Does the patient have less than a 3 day supply:  [x] Yes  [] No    Would you like a call back once the refill request has been completed: [x] Yes [] No    If the office needs to give you a call back, can they leave a voicemail: [x] Yes [] No    Milad Chang Rep   01/29/24 09:28 EST

## 2024-01-30 NOTE — TELEPHONE ENCOUNTER
Rx Refill Note  Requested Prescriptions      No prescriptions requested or ordered in this encounter      Last office visit with prescribing clinician: 11/30/2023   Next office visit with prescribing clinician: 4/1/2024     CSA:6/9/23  UDS:6/9/23    Sarita Landers MA  01/30/24, 15:18 EST

## 2024-01-31 DIAGNOSIS — M05.79 RHEUMATOID ARTHRITIS INVOLVING MULTIPLE SITES WITH POSITIVE RHEUMATOID FACTOR: ICD-10-CM

## 2024-01-31 DIAGNOSIS — Z51.81 MEDICATION MONITORING ENCOUNTER: ICD-10-CM

## 2024-01-31 RX ORDER — ZOLPIDEM TARTRATE 10 MG/1
10 TABLET ORAL NIGHTLY PRN
Qty: 90 TABLET | Refills: 0 | Status: SHIPPED | OUTPATIENT
Start: 2024-01-31

## 2024-01-31 NOTE — TELEPHONE ENCOUNTER
Caller: Shanel Wall    Relationship: Self    Best call back number: [phone]    Requested Prescriptions:   Requested Prescriptions     Pending Prescriptions Disp Refills    zolpidem (AMBIEN) 10 MG tablet 90 tablet 0     Sig: Take 1 tablet by mouth At Night As Needed for Sleep.        Pharmacy where request should be sent:  [facility] [id number] [address] [facility] [phone] [facility] [phone] FX        Last office visit with prescribing clinician: 11/30/2023   Last telemedicine visit with prescribing clinician: Visit date not found   Next office visit with prescribing clinician: 4/1/2024       Does the patient have less than a 3 day supply:  [x] Yes  [] No    Would you like a call back once the refill request has been completed: [] Yes [x] No    If the office needs to give you a call back, can they leave a voicemail: [] Yes [x] No    Milad Arce Rep   01/31/24 08:20 EST

## 2024-03-12 DIAGNOSIS — M05.79 RHEUMATOID ARTHRITIS INVOLVING MULTIPLE SITES WITH POSITIVE RHEUMATOID FACTOR: ICD-10-CM

## 2024-03-12 RX ORDER — HYDROCODONE BITARTRATE AND ACETAMINOPHEN 5; 325 MG/1; MG/1
1 TABLET ORAL EVERY 6 HOURS PRN
Qty: 60 TABLET | Refills: 0 | Status: CANCELLED | OUTPATIENT
Start: 2024-03-12

## 2024-03-12 RX ORDER — HYDROCODONE BITARTRATE AND ACETAMINOPHEN 5; 325 MG/1; MG/1
1 TABLET ORAL EVERY 6 HOURS PRN
Qty: 60 TABLET | Refills: 0 | Status: SHIPPED | OUTPATIENT
Start: 2024-03-12

## 2024-03-12 NOTE — TELEPHONE ENCOUNTER
Caller: Shanel Wall    Relationship: Self    Best call back number: [phone]     Requested Prescriptions:   Requested Prescriptions     Pending Prescriptions Disp Refills    HYDROcodone-acetaminophen (NORCO) 5-325 MG per tablet 60 tablet 0     Sig: Take 1 tablet by mouth Every 6 (Six) Hours As Needed for Severe Pain.      Pharmacy where request should be sent: [facility] [id number] [address] [facility] [phone] [facility] [phone] FX     Last office visit with prescribing clinician: 11/30/2023   Last telemedicine visit with prescribing clinician: Visit date not found   Next office visit with prescribing clinician: 4/1/2024     Does the patient have less than a 3 day supply:  [x] Yes  [] No    Milad Turpin Rep   03/12/24 10:13 EDT

## 2024-03-25 ENCOUNTER — HOSPITAL ENCOUNTER (EMERGENCY)
Facility: HOSPITAL | Age: 74
Discharge: HOME | End: 2024-03-25
Attending: EMERGENCY MEDICINE
Payer: MEDICARE

## 2024-03-25 VITALS
HEIGHT: 59 IN | DIASTOLIC BLOOD PRESSURE: 95 MMHG | SYSTOLIC BLOOD PRESSURE: 210 MMHG | HEART RATE: 63 BPM | TEMPERATURE: 98.3 F | BODY MASS INDEX: 26.21 KG/M2 | RESPIRATION RATE: 18 BRPM | OXYGEN SATURATION: 99 % | WEIGHT: 130 LBS

## 2024-03-25 LAB
BACTERIA UR QL AUTO: ABNORMAL /HPF
BILIRUB UR QL STRIP: NEGATIVE
CLARITY UR: ABNORMAL
COLOR UR: YELLOW
GLUCOSE UR STRIP-MCNC: NEGATIVE MG/DL
HGB UR QL STRIP.AUTO: ABNORMAL
HYALINE CASTS UR QL AUTO: ABNORMAL /LPF
KETONES UR QL STRIP: ABNORMAL
LEUKOCYTE ESTERASE UR QL STRIP.AUTO: NEGATIVE
MUCOUS THREADS URNS QL MICRO: ABNORMAL /HPF
NITRITE UR QL STRIP: NEGATIVE
PH UR STRIP.AUTO: 6 [PH] (ref 5–8)
PROT UR QL STRIP: ABNORMAL
RBC # UR STRIP: ABNORMAL /HPF
REF LAB TEST METHOD: ABNORMAL
SP GR UR STRIP: 1.03 (ref 1–1.03)
SQUAMOUS #/AREA URNS HPF: ABNORMAL /HPF
UROBILINOGEN UR QL STRIP: ABNORMAL
WBC # UR STRIP: ABNORMAL /HPF

## 2024-03-25 PROCEDURE — 99283 EMERGENCY DEPT VISIT LOW MDM: CPT

## 2024-03-25 PROCEDURE — 81001 URINALYSIS AUTO W/SCOPE: CPT

## 2024-03-25 RX ORDER — DROPERIDOL 2.5 MG/ML
2.5 INJECTION, SOLUTION INTRAMUSCULAR; INTRAVENOUS ONCE
Status: DISCONTINUED | OUTPATIENT
Start: 2024-03-25 | End: 2024-03-25 | Stop reason: HOSPADM

## 2024-03-25 RX ORDER — MORPHINE SULFATE 4 MG/ML
4 INJECTION, SOLUTION INTRAMUSCULAR; INTRAVENOUS ONCE
Status: DISCONTINUED | OUTPATIENT
Start: 2024-03-25 | End: 2024-03-25 | Stop reason: HOSPADM

## 2024-03-25 NOTE — ED PROVIDER NOTES
"Subjective   History of Present Illness patient 73-year-old female presents via EMS from her home after acute onset of right flank pain approximate 7:30 AM this morning.  Patient indicates the right flank right posterior low back, with radiation forward, and tenderness in the right lower quadrant.  Patient also reports nausea, dry heaving.  Patient reports she was her usual state of health yesterday.  Patient denies any history of renal calculi.    Review of Systems   Constitutional: Negative.    HENT: Negative.     Respiratory: Negative.     Cardiovascular: Negative.    Gastrointestinal:  Positive for abdominal pain, constipation, nausea and vomiting.   Genitourinary:  Positive for flank pain. Negative for difficulty urinating.   Skin: Negative.    Neurological: Negative.        Past Medical History:   Diagnosis Date    Abnormal EKG     Acromioclavicular separation     Anesthesia     PT HAS RHEUMATOID ARTHRITIS, PT HAS SPECIFIC INSTRUCTIONS PER RA MD, pt to bring INSTRUCTIONS AND RECS ATTACHED TO CHART AND PT TO DISCUSS WITH ANESTHESIA ON DATE OF PROCEDURE     Arthritis     Arthritis of back     Arthritis of neck     Back disorder     degenerative disc disorder    Baker's cyst of knee     right    Benign paroxysmal positional vertigo     Cervical cancer     cone biopsy    Cervical disc disorder     Depression     Deviated septum     Deviated septum     Disease of thyroid gland     Diverticulosis     Diverticulosis     Fibromyalgia     Fibromyalgia     Fracture, clavicle     Fracture, foot     Gall stones     GERD (gastroesophageal reflux disease)     Hard to intubate     \"anterior trachea\"     History of gallstones     HL (hearing loss)     Hypoglycemia     Hypothyroidism     IBS (irritable bowel syndrome)     Joint pain     Knee swelling     Low back strain     Lumbosacral disc disease     Macular degeneration     Measles     Menopause     Multiple food allergies     Neck strain     Neuroma of foot     " Osteoarthritis     Osteoarthritis (arthritis due to wear and tear of joints)     Periarthritis of shoulder     Popliteal cyst     Retinal disease, bilateral     Rheumatoid aortitis     Rheumatoid arthritis     Rheumatoid arthritis     Rotator cuff syndrome     Ruptured tendon     Scoliosis     Scoliosis     Seasonal allergies     Shingles     Sleep apnea     Suppression of immune system subtherapeutic     Synovitis     Synovitis     Tinnitus     Tinnitus     TMJ (dislocation of temporomandibular joint)     Vertigo     Wears glasses     Well woman exam with routine gynecological exam 09/12/2022       Allergies   Allergen Reactions    Adhesive Tape Itching    Erythromycin Hives    Pseudoephedrine Hives       Past Surgical History:   Procedure Laterality Date    ADENOIDECTOMY      BILATERAL BREAST REDUCTION      BLEPHAROPLASTY      BLEPHAROPLASTY, QUAD Bilateral 09/18/2018    Dr. romano.    BREAST EXCISIONAL BIOPSY Left     NO VISIBLE SCAR    BREAST FIBROADENOMA SURGERY      CALCANEOUS OPEN REDUCTION INTERNAL FIXATION      CATARACT EXTRACTION Bilateral     CERVICAL CONE BIOPSY      COLONOSCOPY      COSMETIC SURGERY      SHORT SCAR FACE LIFT PER DR ROMANO     DILATATION AND CURETTAGE      FACIAL COSMETIC SURGERY      FACIAL COSMETIC SURGERY      FOOT SURGERY Left     calcaneous     HAND SURGERY      JOINT REPLACEMENT      KNEE ARTHROPLASTY Right     blue     KNEE ARTHROSCOPY Bilateral     REDUCTION MAMMAPLASTY Bilateral     REPLACEMENT TOTAL KNEE Right     REPLACEMENT TOTAL KNEE Left     SHOULDER SURGERY      TENDON REPAIR Right     HAND    TENDON REPAIR      R hand    TONSILLECTOMY AND ADENOIDECTOMY      TOTAL KNEE ARTHROPLASTY Left 04/03/2018    Procedure: TOTAL KNEE ARTHROPLASTY LEFT;  Surgeon: Mahesh Wesley MD;  Location: [facility];  Service: Orthopedics    TOTAL SHOULDER ARTHROPLASTY W/ DISTAL CLAVICLE EXCISION Left 05/22/2017    Procedure: LEFT REVERSE TOTAL SHOULDER ARTHROPLASTY FOR FRACTURE;  Surgeon: Damaso  MD Kimberly;  Location: [facility];  Service:     TOTAL SHOULDER REPLACEMENT Left     TOTAL SHOULDER REPLACEMENT Left     TUBAL ABDOMINAL LIGATION         Family History   Problem Relation Age of Onset    Parkinsonism Mother     Thyroid disease Mother     Heart failure Father     Kidney failure Father     Hyperlipidemia Father     Heart disease Father     Sleep apnea Sister     Thyroid disease Sister     Obesity Sister     No Known Problems Brother     Breast cancer Other     Ovarian cancer Neg Hx     Colon cancer Neg Hx     Colon polyps Neg Hx        Social History     Socioeconomic History    Marital status:     Number of children: 1   Tobacco Use    Smoking status: Never    Smokeless tobacco: Never   Vaping Use    Vaping status: Never Used   Substance and Sexual Activity    Alcohol use: Yes     Alcohol/week: 2.0 standard drinks of alcohol     Types: 1 Glasses of wine, 1 Shots of liquor per week     Comment: DAILY    Drug use: Never    Sexual activity: Defer     Partners: Male           Objective   Physical Exam  Constitutional:       Appearance: Normal appearance.   HENT:      Head: Normocephalic and atraumatic.   Eyes:      Extraocular Movements: Extraocular movements intact.      Pupils: Pupils are equal, round, and reactive to light.   Cardiovascular:      Rate and Rhythm: Normal rate.   Pulmonary:      Effort: Pulmonary effort is normal.   Abdominal:      General: Abdomen is flat.      Palpations: Abdomen is soft.      Tenderness: There is abdominal tenderness. There is right CVA tenderness and guarding.   Musculoskeletal:         General: Normal range of motion.      Cervical back: Normal range of motion.   Skin:     General: Skin is warm and dry.      Capillary Refill: Capillary refill takes less than 2 seconds.   Neurological:      General: No focal deficit present.      Mental Status: She is alert and oriented to person, place, and time.         Procedures           ED Course  ED Course as of  03/28/24 2141   Mon Mar 25, 2024   1445 Patient initially evaluated ED pit area after being transported via EMS to the ED. []   1650 Marked ready for CT imaging study by provider. []   1659 Patient has been marked as discharged leaving AMA from the ED waiting room.  Provider did not receive any communication regarding this apparent decision, patient's workup has not been completed. [JH]      ED Course User Index  [] Genaro Sterling APRN                                             Medical Decision Making  The patient's presentation, and acute onset of severe pain, differential diagnosis includes renal calculi, appendicitis cannot be excluded, cholecystitis, pancreatitis, diverticulitis, pyelonephritis, urinary tract infection, electrolyte derangement.  Patient will have serum screening labs, urinalysis, CT imaging abdomen pelvis, intravenous crystalloid infusion, antiemetic and analgesic medication delivered.  Patient be reevaluated for improvement in her pain, the results communicated of her workup, follow-up and/or recommendations for disposition provided.  Patient is agreeable plan as explained.    Amount and/or Complexity of Data Reviewed  Labs: ordered.  Radiology: ordered.    Risk  Prescription drug management.        Final diagnoses:   None       ED Disposition  ED Disposition       ED Disposition   AMA    Condition   --    Comment   --               No follow-up provider specified.       Medication List      No changes were made to your prescriptions during this visit.            Genaro Sterling APRN  03/28/24 2141

## 2024-04-01 ENCOUNTER — OFFICE VISIT (OUTPATIENT)
Dept: FAMILY MEDICINE CLINIC | Facility: CLINIC | Age: 74
End: 2024-04-01
Payer: MEDICARE

## 2024-04-01 VITALS
BODY MASS INDEX: 27.34 KG/M2 | HEIGHT: 59 IN | HEART RATE: 74 BPM | DIASTOLIC BLOOD PRESSURE: 76 MMHG | WEIGHT: 135.6 LBS | SYSTOLIC BLOOD PRESSURE: 122 MMHG | OXYGEN SATURATION: 97 %

## 2024-04-01 DIAGNOSIS — G25.81 RESTLESS LEGS SYNDROME (RLS): Primary | ICD-10-CM

## 2024-04-01 DIAGNOSIS — R31.9 HEMATURIA, UNSPECIFIED TYPE: ICD-10-CM

## 2024-04-01 DIAGNOSIS — R10.9 FLANK PAIN: ICD-10-CM

## 2024-04-01 DIAGNOSIS — F51.01 PRIMARY INSOMNIA: ICD-10-CM

## 2024-04-01 PROCEDURE — 99214 OFFICE O/P EST MOD 30 MIN: CPT | Performed by: INTERNAL MEDICINE

## 2024-04-01 NOTE — ASSESSMENT & PLAN NOTE
Improved with Ambien as well as restless legs treatment.  Continue Ambien, Requip with as needed Norco at night.

## 2024-04-01 NOTE — ASSESSMENT & PLAN NOTE
I suspect this represents nephrolithiasis.  No imaging was undertaken.  She is asymptomatic however agrees to an ultrasound as well as repeat urinalysis with micro and KUB.  If ureter dilatation is noted or hydronephrosis- urologic referral.

## 2024-04-01 NOTE — PROGRESS NOTES
"Shanel Wall  1950  [id number]  Patient Care Team:  Fili Ray MD as PCP - General (Internal Medicine)  Cristiane Araiza MD as Consulting Physician (Endocrinology)  Nain Alvarenga MD as Consulting Physician (Pain Medicine)    Shanel Wall is a 73 y.o. female here today for follow up.     This patient is accompanied by their self who contributes to the history of their care.    Chief Complaint:    Chief Complaint   Patient presents with    Insomnia     F/U        History of Present Illness:  I have reviewed and/or updated the patient's past medical, past surgical, family, social history, problem list and allergies as appropriate.     Xiao is here for follow-up and insomnia restless legs and chronic pain.  She was in the emergency room on March 25 with flank pain.  Urinalysis showed significant hematuria.  Unfortunately she did not stay in the emergency room. CT was ordered. Her pain improved while waiting- hence she left. She denies history of stones. She did not see blood in her urine, nor has she passed anything. There was nausea no vomiting. She has not has any further pain. Her labs indicated hematuria- he's indicates she DID NOT have labs or urine tests done.  Pain was right-sided and has not recurred.    She found that she could not take gabapentin as it caused significant instability.  Sleep is going well. She continues requip and occasionally hydrocodone if RLS bothers her. Still not wanting to do cpap    Review of Systems   Constitutional:  Positive for fatigue.   Genitourinary:  Positive for flank pain.   Musculoskeletal:  Positive for myalgias.       Vitals:    04/01/24 1352   BP: 122/76   Pulse: 74   SpO2: 97%   Weight: 61.5 kg (135 lb 9.6 oz)   Height: 148.6 cm (58.5\")     Body mass index is 27.85 kg/m².    Physical Exam  Vitals and nursing note reviewed.   Constitutional:       General: She is not in acute distress.     Appearance: She is well-developed. She is not " diaphoretic.   HENT:      Head: Normocephalic and atraumatic.      Right Ear: External ear normal.      Left Ear: External ear normal.      Mouth/Throat:      Pharynx: No oropharyngeal exudate.   Eyes:      General: No scleral icterus.        Right eye: No discharge.      Conjunctiva/sclera: Conjunctivae normal.   Neck:      Thyroid: No thyromegaly.      Vascular: No JVD.      Trachea: No tracheal deviation.   Cardiovascular:      Rate and Rhythm: Normal rate and regular rhythm.      Heart sounds: Normal heart sounds.      Comments: PMI nondisplaced  Pulmonary:      Effort: Pulmonary effort is normal.      Breath sounds: Normal breath sounds. No wheezing or rales.   Abdominal:      General: Bowel sounds are normal.      Palpations: Abdomen is soft.      Tenderness: There is no abdominal tenderness. There is no right CVA tenderness, left CVA tenderness, guarding or rebound.   Musculoskeletal:      Cervical back: Normal range of motion and neck supple.   Lymphadenopathy:      Cervical: No cervical adenopathy.   Skin:     General: Skin is warm and dry.      Capillary Refill: Capillary refill takes less than 2 seconds.      Coloration: Skin is not pale.      Findings: No rash.   Neurological:      Mental Status: She is alert and oriented to person, place, and time.      Motor: No abnormal muscle tone.      Coordination: Coordination normal.   Psychiatric:         Mood and Affect: Mood normal.         Behavior: Behavior normal.         Judgment: Judgment normal.         Procedures    Results Review:    I reviewed the patient's new clinical results.    Assessment/Plan:    Problem List Items Addressed This Visit       Insomnia    Current Assessment & Plan     Improved with Ambien as well as restless legs treatment.  Continue Ambien, Requip with as needed Norco at night.         Restless legs syndrome (RLS) - Primary    Flank pain    Current Assessment & Plan     I suspect this represents nephrolithiasis.  No imaging was  undertaken.  She is asymptomatic however agrees to an ultrasound as well as repeat urinalysis with micro and KUB.  If ureter dilatation is noted or hydronephrosis- urologic referral.         Relevant Orders    Urinalysis With Microscopic - Urine, Clean Catch    XR Abdomen KUB     Other Visit Diagnoses       Hematuria, unspecified type        Relevant Orders    Urinalysis With Microscopic - Urine, Clean Catch    XR Abdomen KUB    US Renal Bilateral            Plan of care reviewed with patient at the conclusion of today's visit. Education was provided regarding diagnosis and management.  Patient verbalizes understanding of and agreement with management plan.    Return in about 4 months (around 8/1/2024).    Fili Ray MD      Please note than portions of this note were completed [facility] a Voice Recognition Program

## 2024-04-04 ENCOUNTER — LAB (OUTPATIENT)
Dept: LAB | Facility: HOSPITAL | Age: 74
End: 2024-04-04
Payer: MEDICARE

## 2024-04-04 ENCOUNTER — HOSPITAL ENCOUNTER (OUTPATIENT)
Dept: GENERAL RADIOLOGY | Facility: HOSPITAL | Age: 74
Discharge: HOME | End: 2024-04-04
Payer: MEDICARE

## 2024-04-04 DIAGNOSIS — R31.9 HEMATURIA, UNSPECIFIED TYPE: ICD-10-CM

## 2024-04-04 DIAGNOSIS — R10.9 FLANK PAIN: ICD-10-CM

## 2024-04-04 PROCEDURE — 74018 RADEX ABDOMEN 1 VIEW: CPT

## 2024-04-04 PROCEDURE — 81001 URINALYSIS AUTO W/SCOPE: CPT

## 2024-04-05 LAB
BACTERIA UR QL AUTO: ABNORMAL /HPF
BILIRUB UR QL STRIP: NEGATIVE
CLARITY UR: ABNORMAL
COD CRY URNS QL: ABNORMAL /HPF
COLOR UR: YELLOW
GLUCOSE UR STRIP-MCNC: NEGATIVE MG/DL
HGB UR QL STRIP.AUTO: NEGATIVE
HYALINE CASTS UR QL AUTO: ABNORMAL /LPF
KETONES UR QL STRIP: NEGATIVE
LEUKOCYTE ESTERASE UR QL STRIP.AUTO: NEGATIVE
NITRITE UR QL STRIP: NEGATIVE
PH UR STRIP.AUTO: 8 [PH] (ref 5–8)
PROT UR QL STRIP: NEGATIVE
RBC # UR STRIP: ABNORMAL /HPF
REF LAB TEST METHOD: ABNORMAL
SP GR UR STRIP: 1.02 (ref 1–1.03)
SQUAMOUS #/AREA URNS HPF: ABNORMAL /HPF
UROBILINOGEN UR QL STRIP: ABNORMAL
WBC # UR STRIP: ABNORMAL /HPF

## 2024-04-09 ENCOUNTER — HOSPITAL ENCOUNTER (OUTPATIENT)
Dept: ULTRASOUND IMAGING | Facility: HOSPITAL | Age: 74
Discharge: HOME | End: 2024-04-09
Admitting: INTERNAL MEDICINE
Payer: MEDICARE

## 2024-04-09 DIAGNOSIS — R31.9 HEMATURIA, UNSPECIFIED TYPE: ICD-10-CM

## 2024-04-09 PROCEDURE — 76775 US EXAM ABDO BACK WALL LIM: CPT

## 2024-04-12 DIAGNOSIS — M05.79 RHEUMATOID ARTHRITIS INVOLVING MULTIPLE SITES WITH POSITIVE RHEUMATOID FACTOR: ICD-10-CM

## 2024-04-12 RX ORDER — HYDROCODONE BITARTRATE AND ACETAMINOPHEN 5; 325 MG/1; MG/1
1 TABLET ORAL EVERY 6 HOURS PRN
Qty: 60 TABLET | Refills: 0 | Status: SHIPPED | OUTPATIENT
Start: 2024-04-12

## 2024-04-12 NOTE — TELEPHONE ENCOUNTER
Caller: Shanel Wall    Relationship: Self    Best call back number: [phone]    Requested Prescriptions:   Requested Prescriptions     Pending Prescriptions Disp Refills    HYDROcodone-acetaminophen (NORCO) 5-325 MG per tablet 60 tablet 0     Sig: Take 1 tablet by mouth Every 6 (Six) Hours As Needed for Severe Pain.        Pharmacy where request should be sent: [facility] [id number] [address] [facility] [phone] [facility] [phone] FX     Last office visit with prescribing clinician: 4/1/2024   Last telemedicine visit with prescribing clinician: Visit date not found   Next office visit with prescribing clinician: 8/1/2024     Additional details provided by patient: PT HAS ABOUT 3 PILLS    Does the patient have less than a 3 day supply:  [x] Yes  [] No    Would you like a call back once the refill request has been completed: [x] Yes [] No    If the office needs to give you a call back, can they leave a voicemail: [x] Yes [] No    Milad Lee Rep   04/12/24 09:04 EDT

## 2024-04-24 ENCOUNTER — TELEPHONE (OUTPATIENT)
Dept: FAMILY MEDICINE CLINIC | Facility: CLINIC | Age: 74
End: 2024-04-24
Payer: MEDICARE

## 2024-04-24 NOTE — TELEPHONE ENCOUNTER
Name: Shanel Wall      Relationship: Self      Best Callback Number: [phone]      HUB PROVIDED THE RELAY MESSAGE FROM THE OFFICE      PATIENT: VOICED UNDERSTANDING AND HAS NO FURTHER QUESTIONS AT THIS TIME    ADDITIONAL INFORMATION: PT CANNOT USE MYCHART WELL. READ HER THE MESSAGE THAT THE DR IS OUT THIS WEEK. SHE WILL WAIT TILL NEXT WEEK TO TALK TO HIM ABOUT THIS

## 2024-04-24 NOTE — TELEPHONE ENCOUNTER
Caller: Shanel Wall    Relationship: Self    Best call back number: [phone]    What medication are you requesting: STRONGER ANXIETY MEDS    What are your current symptoms: THE ONES SHE HAS ARE NOT WORKING PER PT      If a prescription is needed, what is your preferred pharmacy and phone number: [facility] [id number] [place], [place] - [address] [place] [address] [address] & [address] [facility] [phone] [facility] [phone] FX     Additional notes:

## 2024-04-29 DIAGNOSIS — Z51.81 MEDICATION MONITORING ENCOUNTER: ICD-10-CM

## 2024-04-29 DIAGNOSIS — M05.79 RHEUMATOID ARTHRITIS INVOLVING MULTIPLE SITES WITH POSITIVE RHEUMATOID FACTOR: ICD-10-CM

## 2024-04-29 RX ORDER — ZOLPIDEM TARTRATE 10 MG/1
10 TABLET ORAL NIGHTLY PRN
Qty: 90 TABLET | Refills: 1 | Status: SHIPPED | OUTPATIENT
Start: 2024-04-29

## 2024-04-29 NOTE — TELEPHONE ENCOUNTER
Rx Refill Note  Requested Prescriptions     Pending Prescriptions Disp Refills    zolpidem (AMBIEN) 10 MG tablet [Pharmacy Med Name: ZOLPIDEM TARTRATE 10 MG TABLET] 90 tablet      Sig: TAKE ONE TABLET BY MOUTH ONCE NIGHTLY AS NEEDED FOR SLEEP      Last office visit with prescribing clinician: 4/1/2024   Last telemedicine visit with prescribing clinician: Visit date not found   Next office visit with prescribing clinician: 8/1/2024                         Would you like a call back once the refill request has been completed: [] Yes [] No    If the office needs to give you a call back, can they leave a voicemail: [] Yes [] No    Chantal Rios MA  04/29/24, 09:31 EDT

## 2024-05-06 ENCOUNTER — TELEPHONE (OUTPATIENT)
Dept: FAMILY MEDICINE CLINIC | Facility: CLINIC | Age: 74
End: 2024-05-06
Payer: MEDICARE

## 2024-05-06 NOTE — TELEPHONE ENCOUNTER
"The patient called, the Hub transferred her to the office because she said she is having a nervous breakdown. When I got on the phone she said she needed to make an appointment because she was having a nervous breakdown. I made her a 'Same Day' for tomorrow because it was so late today. She said \"I'm not going to kill myself\" I will be okay until tomorrow. I let her know that if she felt she could not wait until tomorrow that she could go to [facility], 3rd floor. She said I will be there tomorrow at 11:00am to see Jose Ray.  "

## 2024-05-07 ENCOUNTER — OFFICE VISIT (OUTPATIENT)
Dept: FAMILY MEDICINE CLINIC | Facility: CLINIC | Age: 74
End: 2024-05-07
Payer: MEDICARE

## 2024-05-07 VITALS
SYSTOLIC BLOOD PRESSURE: 122 MMHG | HEART RATE: 67 BPM | BODY MASS INDEX: 26.13 KG/M2 | HEIGHT: 59 IN | DIASTOLIC BLOOD PRESSURE: 74 MMHG | WEIGHT: 129.6 LBS | OXYGEN SATURATION: 98 %

## 2024-05-07 DIAGNOSIS — Z51.81 MEDICATION MONITORING ENCOUNTER: Primary | ICD-10-CM

## 2024-05-07 DIAGNOSIS — F41.9 ANXIETY: ICD-10-CM

## 2024-05-07 DIAGNOSIS — Z63.6 CAREGIVER STRESS: ICD-10-CM

## 2024-05-07 PROCEDURE — 1125F AMNT PAIN NOTED PAIN PRSNT: CPT | Performed by: INTERNAL MEDICINE

## 2024-05-07 PROCEDURE — 99214 OFFICE O/P EST MOD 30 MIN: CPT | Performed by: INTERNAL MEDICINE

## 2024-05-07 RX ORDER — LORAZEPAM 0.5 MG/1
0.5 TABLET ORAL EVERY 8 HOURS PRN
Qty: 90 TABLET | Refills: 1 | Status: SHIPPED | OUTPATIENT
Start: 2024-05-07

## 2024-05-07 SDOH — SOCIAL STABILITY - SOCIAL INSECURITY: DEPENDENT RELATIVE NEEDING CARE AT HOME: Z63.6

## 2024-05-13 LAB
6MAM SAL CFM-MCNC: NOT DETECTED NG/ML
6MAM SAL QL CFM: NEGATIVE
ALPRAZ SAL CFM-MCNC: NOT DETECTED NG/ML
AMPHET SAL CFM-MCNC: NOT DETECTED NG/ML
AMPHET SAL QL CFM: NEGATIVE
ANTICONVULSANTS SAL QL CFM: NEGATIVE
BENZODIAZ SAL QL CFM: NEGATIVE
BUPRENORPHINE SAL CFM-MCNC: NOT DETECTED NG/ML
BUPRENORPHINE SAL QL CFM: NEGATIVE
BZE SAL CFM-MCNC: NOT DETECTED NG/ML
CANNABINOIDS SAL QL SCN: NEGATIVE
CARBOXYTHC SAL CFM-MCNC: NOT DETECTED NG/ML
CARISOPRODOL SAL CFM-MCNC: NOT DETECTED NG/ML
CLONAZEPAM SAL CFM-MCNC: NOT DETECTED NG/ML
COC+MET SAL QL CFM: NEGATIVE
COCAINE SAL CFM-MCNC: NOT DETECTED NG/ML
CODEINE SAL CFM-MCNC: NOT DETECTED NG/ML
COTININE SAL CFM-MCNC: NOT DETECTED NG/ML
COTININE SAL QL CFM: NEGATIVE
CYCLOBENZAPRINE SAL CFM-MCNC: NOT DETECTED NG/ML
DHC SAL CFM-MCNC: 2.9 NG/ML
DIAZEPAM SAL CFM-MCNC: NOT DETECTED NG/ML
DULOXETINE SAL CFM-MCNC: 35.1 NG/ML
DULOXETINE SAL QL CFM: ABNORMAL
EDDP SAL QL CFM: NOT DETECTED NG/ML
FENTANYL SAL CFM-MCNC: NEGATIVE NG/ML
FENTANYL SAL QL SCN: NOT DETECTED NG/ML
FLUNITRAZEPAM SAL CFM-MCNC: NOT DETECTED NG/ML
FLURAZEPAM SAL CFM-MCNC: NOT DETECTED NG/ML
GABAPENTIN SAL CFM-MCNC: NOT DETECTED UG/ML
HYDROCODONE SAL CFM-MCNC: 17.9 NG/ML
HYDROMORPHONE SAL CFM-MCNC: NOT DETECTED NG/ML
HYPNOTICS SAL QL CFM: ABNORMAL
LORAZEPAM SAL-MCNC: NOT DETECTED NG/ML
MDMA SAL CFM-MCNC: NOT DETECTED NG/ML
MEPERIDINE SAL CFM-MCNC: NOT DETECTED NG/ML
MEPROBAMATE SAL CFM-MCNC: NOT DETECTED NG/ML
METHADONE SAL CFM-MCNC: NOT DETECTED NG/ML
METHADONE SAL QL CFM: NEGATIVE
METHAMPHET SAL CFM-MCNC: NOT DETECTED NG/ML
MIDAZOLAM SAL CFM-MCNC: NOT DETECTED NG/ML
MORPHINE SAL CFM-MCNC: NOT DETECTED NG/ML
MUSCLE RELAXANTS: NEGATIVE
NARCOTICS SAL QL CFM: NEGATIVE
NORBUPRENORPHINE SAL CFM-MCNC: NOT DETECTED NG/ML
NORDIAZEPAM SAL-MCNC: NOT DETECTED NG/ML
NORHYDROCODONE SAL CFM-MCNC: NOT DETECTED NG/ML
NOROXYCODONE SAL CFM-MCNC: NOT DETECTED NG/ML
OPIATES SAL QL CFM: ABNORMAL
OXAZEPAM SAL CFM-MCNC: NOT DETECTED NG/ML
OXYCODONE SAL CFM-MCNC: NOT DETECTED NG/ML
OXYCODONE SAL QL CFM: NEGATIVE
OXYMORPHONE SAL CFM-MCNC: NOT DETECTED NG/ML
PCP SAL CFM-MCNC: NOT DETECTED NG/ML
PCP SAL QL CFM: NEGATIVE
PREGABALIN SAL CFM-MCNC: NOT DETECTED UG/ML
PROPOXYPH SAL CFM-MCNC: NOT DETECTED NG/ML
TAPENTADOL SAL CFM-MCNC: NOT DETECTED NG/ML
TAPENTADOL SAL QL SCN: NEGATIVE
TEMAZEPAM SAL CFM-MCNC: NOT DETECTED NG/ML
TRAMADOL SAL CFM-MCNC: NOT DETECTED NG/ML
TRIAZOLAM SAL CFM-MCNC: NOT DETECTED NG/ML
ZOLPIDEM SAL CFM-MCNC: 3.2 NG/ML

## 2024-05-20 DIAGNOSIS — M05.79 RHEUMATOID ARTHRITIS INVOLVING MULTIPLE SITES WITH POSITIVE RHEUMATOID FACTOR: ICD-10-CM

## 2024-05-20 RX ORDER — HYDROCODONE BITARTRATE AND ACETAMINOPHEN 5; 325 MG/1; MG/1
1 TABLET ORAL EVERY 6 HOURS PRN
Qty: 60 TABLET | Refills: 0 | Status: SHIPPED | OUTPATIENT
Start: 2024-05-20

## 2024-05-20 NOTE — TELEPHONE ENCOUNTER
Caller: Shanel Wall    Relationship: Self    Best call back number: [phone]    Requested Prescriptions:   Requested Prescriptions     Pending Prescriptions Disp Refills    HYDROcodone-acetaminophen (NORCO) 5-325 MG per tablet 60 tablet 0     Sig: Take 1 tablet by mouth Every 6 (Six) Hours As Needed for Severe Pain.        Pharmacy where request should be sent: [facility] [id number] [address] & [address] [facility] [phone] [facility] [phone] FX     Last office visit with prescribing clinician: 5/7/2024   Last telemedicine visit with prescribing clinician: Visit date not found   Next office visit with prescribing clinician: 6/12/2024     Additional details provided by patient: HAS A FEW LEFT    Does the patient have less than a 3 day supply:  [x] Yes  [] No       Radha Nicolas MA   05/20/24 12:04 EDT

## 2024-05-20 NOTE — TELEPHONE ENCOUNTER
Rx Refill Note  Requested Prescriptions     Pending Prescriptions Disp Refills    HYDROcodone-acetaminophen (NORCO) 5-325 MG per tablet 60 tablet 0     Sig: Take 1 tablet by mouth Every 6 (Six) Hours As Needed for Severe Pain.      Last office visit with prescribing clinician: 5/7/2024   Last telemedicine visit with prescribing clinician: Visit date not found   Next office visit with prescribing clinician: 6/12/2024                         Would you like a call back once the refill request has been completed: [] Yes [] No    If the office needs to give you a call back, can they leave a voicemail: [] Yes [] No    Chantal Rios MA  05/20/24, 14:40 EDT

## 2024-06-12 ENCOUNTER — OFFICE VISIT (OUTPATIENT)
Dept: FAMILY MEDICINE CLINIC | Facility: CLINIC | Age: 74
End: 2024-06-12
Payer: MEDICARE

## 2024-06-12 VITALS
DIASTOLIC BLOOD PRESSURE: 74 MMHG | SYSTOLIC BLOOD PRESSURE: 122 MMHG | BODY MASS INDEX: 26.33 KG/M2 | WEIGHT: 130.6 LBS | OXYGEN SATURATION: 93 % | HEIGHT: 59 IN | HEART RATE: 80 BPM

## 2024-06-12 DIAGNOSIS — G25.81 RESTLESS LEGS SYNDROME (RLS): ICD-10-CM

## 2024-06-12 DIAGNOSIS — M05.79 RHEUMATOID ARTHRITIS INVOLVING MULTIPLE SITES WITH POSITIVE RHEUMATOID FACTOR: ICD-10-CM

## 2024-06-12 DIAGNOSIS — F41.9 ANXIETY: ICD-10-CM

## 2024-06-12 DIAGNOSIS — K59.09 CHRONIC CONSTIPATION: ICD-10-CM

## 2024-06-12 DIAGNOSIS — G25.81 RLS (RESTLESS LEGS SYNDROME): Primary | ICD-10-CM

## 2024-06-12 PROCEDURE — 1125F AMNT PAIN NOTED PAIN PRSNT: CPT | Performed by: INTERNAL MEDICINE

## 2024-06-12 PROCEDURE — G2211 COMPLEX E/M VISIT ADD ON: HCPCS | Performed by: INTERNAL MEDICINE

## 2024-06-12 PROCEDURE — 99214 OFFICE O/P EST MOD 30 MIN: CPT | Performed by: INTERNAL MEDICINE

## 2024-06-12 RX ORDER — ROPINIROLE 1 MG/1
1 TABLET, FILM COATED ORAL NIGHTLY
Qty: 90 TABLET | Refills: 1 | Status: SHIPPED | OUTPATIENT
Start: 2024-06-12

## 2024-06-12 RX ORDER — HYDROCODONE BITARTRATE AND ACETAMINOPHEN 5; 325 MG/1; MG/1
1 TABLET ORAL EVERY 6 HOURS PRN
Qty: 60 TABLET | Refills: 0 | Status: SHIPPED | OUTPATIENT
Start: 2024-06-12

## 2024-06-12 NOTE — PROGRESS NOTES
Shanel Wall  1950  [id number]  Patient Care Team:  Fili Ray MD as PCP - General (Internal Medicine)  Cristiane Araiza MD as Consulting Physician (Endocrinology)  Nain Alvarenga MD as Consulting Physician (Pain Medicine)    Shanel Wall is a 73 y.o. female here today for follow up.     This patient is accompanied by their self who contributes to the history of their care.    Chief Complaint:    Chief Complaint   Patient presents with    Anxiety     Little better        History of Present Illness:  I have reviewed and/or updated the patient's past medical, past surgical, family, social history, problem list and allergies as appropriate.     Last visi  She is feeling overwhelmed. More irritable . Dealing with her  with worsening dementia. Her buspar is not helpful. Currently on 90 mg daily. Contineus on Ambien at hS. ( Her  is exhibiting significant compulsive behaviors at home. She is wanting to do counseling however with her legal likeness status are dependent upon him for rides she is unable to make in person visits. Denies any HI or SI. She does feel her BuSpar never was helpful. He has been increasingly fatigued with this as well.. END COPIED TEXT.    Current visit    At last visit we prescribed Ativan 0.5 mg q 8hr and continued cymbalta 90 mg daily. She is awaiting her initial video counseling session.     Still struggles with constipation chronically. She does use miralax. Will have a non satisfactory BM about every 3-4 days. Occasional nausea/vomiting if it gets too bad. She is inquiring about linzess.     Her rls has become refractory to ropinirole- she is currently on 0.25 mg nightly. She will have issues with RLS in the am. The hydrocodone does help.    Review of Systems   Constitutional:  Positive for fatigue.   Gastrointestinal:  Positive for constipation, nausea and vomiting.   Endocrine: Negative.    Neurological:         Rls    "  Psychiatric/Behavioral:  Positive for sleep disturbance. The patient is nervous/anxious.        Vitals:    06/12/24 1358   BP: 122/74   Pulse: 80   SpO2: 93%   Weight: 59.2 kg (130 lb 9.6 oz)   Height: 148.6 cm (58.5\")     Body mass index is 26.83 kg/m².    Physical Exam  Vitals and nursing note reviewed.   Constitutional:       General: She is not in acute distress.     Appearance: She is well-developed. She is not diaphoretic.   HENT:      Head: Normocephalic and atraumatic.      Right Ear: External ear normal.      Left Ear: External ear normal.      Mouth/Throat:      Pharynx: No oropharyngeal exudate.   Eyes:      General: No scleral icterus.        Right eye: No discharge.      Conjunctiva/sclera: Conjunctivae normal.   Neck:      Thyroid: No thyromegaly.      Vascular: No JVD.      Trachea: No tracheal deviation.   Cardiovascular:      Rate and Rhythm: Normal rate and regular rhythm.      Heart sounds: Normal heart sounds.      Comments: PMI nondisplaced  Pulmonary:      Effort: Pulmonary effort is normal.      Breath sounds: Normal breath sounds. No wheezing or rales.   Abdominal:      General: Bowel sounds are normal.      Palpations: Abdomen is soft.      Tenderness: There is no abdominal tenderness. There is no guarding or rebound.   Musculoskeletal:      Cervical back: Normal range of motion and neck supple.   Lymphadenopathy:      Cervical: No cervical adenopathy.   Skin:     General: Skin is warm and dry.      Capillary Refill: Capillary refill takes less than 2 seconds.      Coloration: Skin is not pale.      Findings: No rash.   Neurological:      Mental Status: She is alert and oriented to person, place, and time.      Motor: No abnormal muscle tone.      Coordination: Coordination normal.      Gait: Gait abnormal.   Psychiatric:         Mood and Affect: Mood normal.         Behavior: Behavior normal.         Judgment: Judgment normal.         Procedures    Results " Review:        Assessment/Plan:    Problem List Items Addressed This Visit       Rheumatoid arthritis involving multiple sites    Relevant Medications    ENBREL 50 MG/ML injection    meloxicam (MOBIC) 15 MG tablet    zolpidem (AMBIEN) 10 MG tablet    HYDROcodone-acetaminophen (NORCO) 5-325 MG per tablet    Restless legs syndrome (RLS)    Current Assessment & Plan     Increase ropinirole to 1 mg at hs         Anxiety    Current Assessment & Plan     Awaits counseling. However symptomatic improvement on ativan and cymbalta 90 mg daily         Relevant Medications    LORazepam (ATIVAN) 0.5 MG tablet    Chronic constipation    Current Assessment & Plan     Contiue miralx; however lets try linzess 72 mcg daily. Ensure hydration/          Other Visit Diagnoses       RLS (restless legs syndrome)    -  Primary    Relevant Medications    rOPINIRole (REQUIP) 1 MG tablet            Plan of care reviewed with patient at the conclusion of today's visit. Education was provided regarding diagnosis and management.  Patient verbalizes understanding of and agreement with management plan.    Return for Next scheduled follow up.    Fili Ray MD      Please note than portions of this note were completed wth a Voice Recognition Program

## 2024-06-18 ENCOUNTER — TELEPHONE (OUTPATIENT)
Age: 74
End: 2024-06-18
Payer: MEDICARE

## 2024-06-18 RX ORDER — PREDNISONE 5 MG/1
5 TABLET ORAL DAILY
Qty: 30 TABLET | Refills: 1 | Status: SHIPPED | OUTPATIENT
Start: 2024-06-18

## 2024-06-18 NOTE — TELEPHONE ENCOUNTER
Incoming Refill Request      Medication requested (name and dose): Prednisone 5 mg Tablet    Pharmacy where request should be sent: [facility]    Additional details provided by patient: Patient is out of [facility]    Best call back number: [phone]    Does the patient have less than a 3 day supply:  [x] Yes  [] No    Milad Newby Rep  06/18/24, 12:24 EDT

## 2024-06-24 ENCOUNTER — TELEMEDICINE (OUTPATIENT)
Dept: PSYCHIATRY | Facility: CLINIC | Age: 74
End: 2024-06-24
Payer: MEDICARE

## 2024-06-24 DIAGNOSIS — F33.1 MDD (MAJOR DEPRESSIVE DISORDER), RECURRENT EPISODE, MODERATE: ICD-10-CM

## 2024-06-24 DIAGNOSIS — F41.1 GAD (GENERALIZED ANXIETY DISORDER): Primary | ICD-10-CM

## 2024-06-24 PROCEDURE — 90791 PSYCH DIAGNOSTIC EVALUATION: CPT | Performed by: COUNSELOR

## 2024-06-24 NOTE — PROGRESS NOTES
This provider is located at the [facility] (through [facility] [place] [facility]), [address], [place] [address] using a secure MyChart Video Visit through [employer]. Patient is being seen remotely via telehealth at home address in [place] and stated they are in a secure environment for this session. The patient's condition being diagnosed/treated is appropriate for telemedicine. The provider identified herself as well as her credentials. The patient, and/or patients guardian, consent to be seen remotely, and when consent is given they understand that the consent allows for patient identifiable information to be sent to a third party as needed. They may refuse to be seen remotely at any time. The electronic data is encrypted and password protected, and the patient and/or guardian has been advised of the potential risks to privacy not withstanding such measures.     You have chosen to receive care through a telehealth visit.  Do you consent to use a video/audio connection for your medical care today? Yes    Subjective   Shanel Wall is a 73 y.o. female who presents today for initial evaluation  .       Time In:  3:06  Time out: 3:59  Name of PCP: Fili Ray MD   Referral source: Fili Ray MD  [address] [place] [address]         Pt reports virtually today fully oriented, appropriately dressed and groomed, and open to communication. Pt denies any current SI/HI/SIB and denies any current crisis or need for immediate assistance. Rapport and trust were established through conversation and unconditional positive regard. Denies self injurious urges or behaviors. Denies current thoughts, urges, or intent to harm others. Limitations of Counselor's availability and office hours were discussed. Pt understands that due to safety reasons sessions cannot be conducted in a moving vehicle. Pt is encouraged to refrain from having children or others present during  "sessions, and should Pt need to make an exception, Pt will discuss this with Counselor to see if accommodations need to be made. Pt agrees that should Counselor determine that Pt's needs require more frequent or intensive therapy or care that is outside of Counselor's scope of practice, then Pt will be referred to more appropriate provider or modality. This will be discussed with Pt.   Does Pt agree: Yes    Pt's location during session:     Chief Complaint:   Chief Complaint   Patient presents with    Anxiety    Depression      Pt reports daily anxiety occurring throughout most days with symptoms including feeling on edge, thought rumination, excessive worry, inability to control worry, feeling panicky, and intrusive thoughts. Pt reports depression daily throughout the day with symptoms including feeling down and sad, loneliness, feeling empty, irritability, decreased motivation, fatigue, and malaise. Pt reports persistent lack of desire to participate in enjoyable activities and little or no feeling of reward when doing so. Reports depression going back to age 16.  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 .  is still mobile, active, and drives but Pt understands at some point his dementia will progress.  States she has very limited vision due to medical neglect, and  is her means of transportation currently. States she began feeling down and very anxious and called her PCP and told them she was having a \"nervous break down\" and needed to be seen. Pt medication was evaluated and referral made to this Counselor. Pt states going forward she needs to make sure she does not allow herself to \"get fragile,\" as she will be the one to manage  and her own care.  States she making plans to go to a [facility] facility and then eventually have to transfer  to memory care.  She is working on getting this initiated in [facility] where her sister lives. States she " "feels isolated and \"I get really down.\"  Pt states her brother passed away suddenly in 2024 and this is still very upsetting for her.    Anxiety:  -5/10  Medication helps some but I do get overwhelmed \"I just melt down.\"  Depression:  3-4/10  Ongoing depression \"for years\"   There are times it is highter.    States she sleeps or rests 12 hours a day and is always tired.  \"I don't feel well in the mornings mostly due to RA.\"  Pt states she also sleeps out of boredom and depression. \"I don't have anything else to do.: Pt is an extrovert and gets energy from being around people  She reports she goes for long periods of weeks where she is unable due to logistics to be in the company of friends.  States a girlfriend is picking up Pt on Thurs for lunch  Getting together with another group in July.  Reports this is not enough for Pt.  States her two best friends live in different states and they communicate virtually.  States,\"I juggle everything for two people.\"  Pt reports she does \"pretty good.\"  She talks to her sister a lot, but sister lives in [place].  30 years    Pt reports the lethargy goes away when someone is engaging with her or she is doing an activity to keep her busy.  State she is traumatized by the situation involving loss of her eyesight. States she has a lot of chronic pain and she struggles with it daily but it is her \"normal.\"      Patient adamantly and convincingly denies current suicidal or homicidal ideation or perceptual disturbance.    PHQ-9 and SIVAKUMAR-7 Assessment results were reviewed and discussed with Pt.    Pt educated using a person-centered approach about their diagnoses to encourage investment in their treatment protocol..    Hobbies/Enjoyable Activities: Read and Watch TV  Reads books about historical fiction.      Childhood Experiences:    One sister and one brother    Both parents are  and were  until they passed.    Has patient experienced a major accident " or tragic events as a child? yes   Had a child at age 18 and continued to going to school      Has patient experienced any other significant life events or trauma (such as verbal, physical, sexual abuse)? no      Significant Life Events:  Has patient been through or witnessed a divorce? yes  First marriage age [age]    Pt states she has no children    Has patient experienced a death / loss of relationship? yes  Brother passed suddenly in march 2024    Has patient experienced a major accident or tragic events?  Health issues      Has patient experienced any other significant life events or trauma (such as verbal, physical, sexual abuse)? no    Social History:   Social History     Socioeconomic History    Marital status:     Number of children: 1   Tobacco Use    Smoking status: Never    Smokeless tobacco: Never   Vaping Use    Vaping status: Never Used   Substance and Sexual Activity    Alcohol use: Yes     Alcohol/week: 2.0 standard drinks of alcohol     Types: 1 Glasses of wine, 1 Shots of liquor per week     Comment: DAILY    Drug use: Never    Sexual activity: Defer     Partners: Male     Marital Status:     Patient's current living situation: Patient lives with spouse      Support system:  Sister and some friends    Difficulty getting along with peers: no    Difficulty making new friendships: no    Difficulty maintaining friendships: no    Close with family members: yes sister    Religous: yes  Bahai     Work History:  Highest level of education obtained: college  Bachelor's in sociology    Ever been active duty in the ? 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    Past Medical History:  Past Medical History:   Diagnosis Date    Abnormal EKG     Acromioclavicular separation     Anesthesia     PT HAS RHEUMATOID ARTHRITIS,  "PT HAS SPECIFIC INSTRUCTIONS PER RA MD, pt to bring INSTRUCTIONS AND RECS ATTACHED TO CHART AND PT TO DISCUSS WITH ANESTHESIA ON DATE OF PROCEDURE     Arthritis     Arthritis of back     Arthritis of neck     Back disorder     degenerative disc disorder    Baker's cyst of knee     right    Benign paroxysmal positional vertigo     Cervical cancer     cone biopsy    Cervical disc disorder     Depression     Deviated septum     Deviated septum     Disease of thyroid gland     Diverticulosis     Diverticulosis     Fibromyalgia     Fibromyalgia     Fracture, clavicle     Fracture, foot     Gall stones     GERD (gastroesophageal reflux disease)     Hard to intubate     \"anterior trachea\"     History of gallstones     HL (hearing loss)     Hypoglycemia     Hypothyroidism     IBS (irritable bowel syndrome)     Joint pain     Knee swelling     Low back strain     Lumbosacral disc disease     Macular degeneration     Measles     Menopause     Multiple food allergies     Neck strain     Neuroma of foot     Osteoarthritis     Osteoarthritis (arthritis due to wear and tear of joints)     Periarthritis of shoulder     Popliteal cyst     Retinal disease, bilateral     Rheumatoid aortitis     Rheumatoid arthritis     Rheumatoid arthritis     Rotator cuff syndrome     Ruptured tendon     Scoliosis     Scoliosis     Seasonal allergies     Shingles     Sleep apnea     Suppression of immune system subtherapeutic     Synovitis     Synovitis     Tinnitus     Tinnitus     TMJ (dislocation of temporomandibular joint)     Vertigo     Wears glasses     Well woman exam with routine gynecological exam 09/12/2022       Past Surgical History:  Past Surgical History:   Procedure Laterality Date    ADENOIDECTOMY      BILATERAL BREAST REDUCTION      BLEPHAROPLASTY      BLEPHAROPLASTY, QUAD Bilateral 09/18/2018    Dr. romano.    BREAST EXCISIONAL BIOPSY Left     NO VISIBLE SCAR    BREAST FIBROADENOMA SURGERY      CALCANEOUS OPEN REDUCTION INTERNAL " FIXATION      CATARACT EXTRACTION Bilateral     CERVICAL CONE BIOPSY      COLONOSCOPY      COSMETIC SURGERY      SHORT SCAR FACE LIFT PER DR RODRIGUEZ     DILATATION AND CURETTAGE      FACIAL COSMETIC SURGERY      FACIAL COSMETIC SURGERY      FOOT SURGERY Left     calcaneous     HAND SURGERY      JOINT REPLACEMENT      KNEE ARTHROPLASTY Right     blue     KNEE ARTHROSCOPY Bilateral     REDUCTION MAMMAPLASTY Bilateral     REPLACEMENT TOTAL KNEE Right     REPLACEMENT TOTAL KNEE Left     SHOULDER SURGERY      TENDON REPAIR Right     HAND    TENDON REPAIR      R hand    TONSILLECTOMY AND ADENOIDECTOMY      TOTAL KNEE ARTHROPLASTY Left 04/03/2018    Procedure: TOTAL KNEE ARTHROPLASTY LEFT;  Surgeon: Mahesh Wesley MD;  Location:  [facility];  Service: Orthopedics    TOTAL SHOULDER ARTHROPLASTY W/ DISTAL CLAVICLE EXCISION Left 05/22/2017    Procedure: LEFT REVERSE TOTAL SHOULDER ARTHROPLASTY FOR FRACTURE;  Surgeon: Damaso Harris MD;  Location:  [facility];  Service:     TOTAL SHOULDER REPLACEMENT Left     TOTAL SHOULDER REPLACEMENT Left     TUBAL ABDOMINAL LIGATION         Physical Exam:   not currently breastfeeding. There is no height or weight on file to calculate BMI.     History of psychiatric treatment or hospitalization: Yes, describe: Counseling after her divorce and then back again a while later.  In total 10-20 sessions.      Allergy:   Allergies   Allergen Reactions    Adhesive Tape Itching    Erythromycin Hives    Pseudoephedrine Hives        Current Medications:   Current Outpatient Medications   Medication Sig Dispense Refill    aluminum hydroxide-mag carbonate (GAVISCON EXTRA RELIEF) 160-105 MG chewable tablet chewable tablet Chew 1 tablet Daily As Needed.      Calcium Carb-Cholecalciferol (CALCIUM-VITAMIN D) 600-400 MG-UNIT tablet Take 1 tablet by mouth 2 (Two) Times a Day.      Denosumab (PROLIA SC) Inject  under the skin into the appropriate area as directed Every 6 (Six) Months.      DULoxetine (CYMBALTA)  30 MG capsule Take 3 capsules by mouth Daily. 270 capsule 2    ENBREL 50 MG/ML injection Inject 1 mL under the skin into the appropriate area as directed 1 (One) Time Per Week.      HYDROcodone-acetaminophen (NORCO) 5-325 MG per tablet Take 1 tablet by mouth Every 6 (Six) Hours As Needed for Severe Pain. 60 tablet 0    lansoprazole (PREVACID) 15 MG capsule Take 1 capsule by mouth Daily.      levocetirizine (XYZAL) 5 MG tablet Take 1 tablet by mouth Daily. 90 tablet 2    levothyroxine (SYNTHROID, LEVOTHROID) 75 MCG tablet Take 1 tablet by mouth Daily. 90 tablet 3    linaclotide (Linzess) 72 MCG capsule capsule Take 1 capsule by mouth Every Morning Before Breakfast. 90 capsule 2    LORazepam (ATIVAN) 0.5 MG tablet Take 1 tablet by mouth Every 8 (Eight) Hours As Needed for Anxiety. 90 tablet 1    meloxicam (MOBIC) 15 MG tablet Take 1 tablet by mouth Daily.      methotrexate 2.5 MG tablet Take 9 tablets by mouth 1 (One) Time Per Week. MONDAYS  Resume when ok with Dr. Wesley (Patient taking differently: Take 6 tablets by mouth 1 (One) Time Per Week. MONDAYS  Resume when ok with Dr. Wesley)  0    Mucinex D  MG per 12 hr tablet TAKE ONE TABLET BY MOUTH EVERY 12 HOURS 72 tablet 6    Multiple Vitamins-Minerals (SENIOR MULTIVITAMIN PLUS) tablet Take 1 tablet by mouth Daily.      NON FORMULARY Take 2 tablets by mouth Daily. Hydro Eye gelcaps      predniSONE (DELTASONE) 5 MG tablet Take 1 tablet by mouth Daily. 30 tablet 1    prochlorperazine (COMPAZINE) 10 MG tablet Take 1 tablet by mouth Every 6 (Six) Hours As Needed for Nausea or Vomiting. 60 tablet 1    rOPINIRole (REQUIP) 1 MG tablet Take 1 tablet by mouth Every Night. Take 1 hour before bedtime. 90 tablet 1    Saline 0.2 % solution 1 application into the nostril(s) as directed by provider 2 (two) times a day.      sodium chloride (SHILPA 128) 2 % ophthalmic solution 1 drop.      vitamin A 8000 UNIT capsule Take 1 capsule by mouth Daily.      vitamin C (ASCORBIC ACID) 500  MG tablet Take 1 tablet by mouth Daily.      vitamin E 400 UNIT capsule Take 2 capsules by mouth Daily.      zolpidem (AMBIEN) 10 MG tablet TAKE ONE TABLET BY MOUTH ONCE NIGHTLY AS NEEDED FOR SLEEP 90 tablet 1     No current facility-administered medications for this visit.         Family History:  Family History   Problem Relation Age of Onset    Parkinsonism Mother     Thyroid disease Mother     Heart failure Father     Kidney failure Father     Hyperlipidemia Father     Heart disease Father     Sleep apnea Sister     Thyroid disease Sister     Obesity Sister     No Known Problems Brother     Breast cancer Other     Ovarian cancer Neg Hx     Colon cancer Neg Hx     Colon polyps Neg Hx        Problem List:  Patient Active Problem List   Diagnosis    Abnormal EKG    Dizziness    Lightheadedness    Gastroesophageal reflux disease without esophagitis    Rheumatoid arthritis involving multiple sites    Hypoglycemia    Insomnia    Osteoporosis    Arthropathy of shoulder region    Status post reverse total replacement of left shoulder    Hypothyroid    Acute blood loss anemia, mild, asymptomatic    Postoperative urinary retention    Bicuspid aortic valve    Rheumatoid arthritis involving left knee with positive rheumatoid factor    Benign paroxysmal positional vertigo    Neck pain    Ataxia    Nocturnal headaches    Status post total left knee replacement    Hypokalemia, replaced    Acute postoperative pain    Fatigue    Restless legs syndrome (RLS)    Irritable bowel syndrome with diarrhea    Diverticulosis of large intestine without hemorrhage    Palpitations    Elevated hemoglobin    Rheumatoid arthritis with positive rheumatoid factor    Abnormal thyroid blood test    Acute right ankle pain    Intercostal pain    Polycythemia secondary to hypoxia    Arthritis of right temporomandibular joint    Chronic night sweats    Arthritis of right temporomandibular joint    Apnea    DANICA (obstructive sleep apnea)    Excessive  daytime sleepiness    Depression    Well woman exam with routine gynecological exam    Closed displaced fracture of lateral end of right clavicle    Acute bilateral low back pain with bilateral sciatica    Nontraumatic complete tear of right rotator cuff    Rotator cuff tear arthropathy of right shoulder    Functional gait abnormality    Left hip pain    Pain in joint of right shoulder    Anxiety    Iron deficiency    Flank pain    Chronic constipation         History of Substance Use:   Patient answered No  to experiencing two or more of the following problems related to substance use: using more than intended or over longer period than intended; difficulty quitting or cutting back use; spending a great deal of time obtaining, using, or recovering from using; craving or strong desire or urge to use;  work and/or school problems; financial problems; family problems; using in dangerous situations; physical or mental health problems; relapse; feelings of guilt or remorse about use; times when used and/or drank alone; needing to use more in order to achieve the desired effect; illness or withdrawal when stopping or cutting back use; using to relieve or avoid getting ill or developing withdrawal symptoms; and black outs and/or memory issues when using.        Substance Age Frequency Amount Method Last use   Nicotine        Alcohol        Marijuana        Benzo        Pain Pills        Cocaine        Meth        Heroin        Suboxone        Synthetics/Other:            SUICIDE RISK ASSESSMENT/CSSRS  1. Does patient have thoughts of suicide? no  2. Does patient have intent for suicide? no  3. Does patient have a current plan for suicide? no  4. History of suicide attempts: no  5. Family history of suicide or attempts: no  6. History of violent behaviors towards others or property or thoughts of committing suicide: no  7. History of sexual aggression toward others: no  8. Access to firearms or weapons: no    PHQ-Score  Total:  PHQ-9 Total Score: 14    SIVAKUMAR-7 Score Total:  Over the last two weeks, how often have you been bothered by the following problems?  Feeling nervous, anxious or on edge: (P) More than half the days  Not being able to stop or control worrying: (P) Several days  Worrying too much about different things: (P) Several days  Trouble Relaxing: (P) Several days  Being so restless that it is hard to sit still: (P) Not at all  Becoming easily annoyed or irritable: (P) Several days  Feeling afraid as if something awful might happen: (P) Several days  SIVAKUMAR 7 Total Score: (P) 7  If you checked any problems, how difficult have these problems made it for you to do your work, take care of things at home, or get along with other people: (P) Somewhat difficult        Mental Status Exam:   Hygiene:   good  Cooperation:  Cooperative  Eye Contact:  Good  Psychomotor Behavior:  Appropriate  Affect:  Full range  Mood: depressed and anxious  Hopelessness: Denies  Speech:  Normal  Thought Process:  Goal directed  Thought Content:  Normal  Suicidal:  None  Homicidal:  None  Hallucinations:  None  Delusion:  None  Memory:  Intact  Orientation:  Grossly intact  Reliability:  good  Insight:  Good  Judgement:  Good  Impulse Control:  Good    Impression/Formulation:    Patient appears alert and oriented. Patient is open to communication. Patient is voluntarily requesting to begin outpatient therapy at [facility].  Patient is receptive to assistance with maintaining a stable lifestyle.  Patient presents today with history of anxiety and depression. Patient is agreeable to attend routine therapy sessions.  Patient expressed desire to maintain stability and participate in the therapeutic process.        Assessment and Plan: Pt convincingly denies SI/HI/SIB at this time. Pt will use learned coping skills to manage symptoms and reports any change in mood or behavior. Pt will review informational material  posted on Pt's  MyChart. Pt will keep follow up appointment or reschedule if unable to keep appointment. Pt would benefit from continued individual therapy to address Pt's presenting problems. Pt would benefit from gaining a better understanding of their issues and learning coping skills and therapeutic tools to assist Pt in alleviating symptoms and improve daily functioning.    Ambulatory Referral Made:  No      Visit Diagnoses:    ICD-10-CM ICD-9-CM   1. SIVAKUMAR (generalized anxiety disorder)  F41.1 300.02   2. MDD (major depressive disorder), recurrent episode, moderate  F33.1 296.32          Functional Status: Moderate impairment       Treatment Plan: Continue supportive psychotherapy efforts and medications as indicated. Obtain release of information for current treatment team for continuity of care as needed. Patient will adhere to medication regimen as prescribed and report any side effects. Patient will contact this office, call 911 or present to the nearest emergency room should suicidal or homicidal ideations occur.    Short Term Goals: Patient will be compliant with medication, and patient will have no significant medication related side effects.  Patient will be engaged in psychotherapy as indicated. Pt will complete homework as discussed and agreed upon with Counselor.  Pt will practice learned skills on their own and report success/issues at follow up appointment.  Patient will report subjective improvement of symptoms.    Long Term Goals: To stabilize mood and treat/improve subjective symptoms, the patient will stay out of the hospital, the patient will be at an optimal level of functioning, and the patient will take all medications as prescribed.The patient verbalized understanding and agreement with goals that were mutually set.    Crisis Plan:    If symptoms/behaviors persist, patient will present to the nearest hospital for an assessment. Advised patient of [facility] 24/7 assessment services.          This document has been electronically signed by CHELY Cunningham  June 25, 2024 15:06 EDT     Part of this note may be an electronic transcription/translation of spoken language to printed text using the Dragon Dictation System.

## 2024-06-25 NOTE — PATIENT INSTRUCTIONS
[place] warm Line: Phone number: [phone]      Monday through Friday 10 AM to 9 PM and Saturdays 5 PM to 9 PM.      A warmline is a NON-CRISIS number to call to get emotional support. You can call to have a conversation with someone who can provide support during hard times. Warmlines are staffed by trained peers who have been through their own mental health struggles and know what it's like to need help.      -Suicide hotline number: 988      -[facility] Resource Connection      The resource line is dedicated to providing behavioral health resources to residents of [place] and [facility], seven days a week, 7 a.m.-7 p.m.      [phone]      [email or web address]  [facility]Sc[facility] Visual [email or web address]      Should you ever need assistance or just want to reach out to someone when your behavioral health provider is not available due to the office being closed you can contact [email or web address].       -Just text HOME to 327706 and someone will reach out to you within a few minutes.  This is a 24/7 help line and they are open even on holidays.

## 2024-07-11 RX ORDER — GUAIFENESIN AND PSEUDOEPHEDRINE HYDROCHLORIDE 600; 60 MG/1; MG/1
TABLET, EXTENDED RELEASE ORAL
Qty: 36 TABLET | Refills: 0 | Status: SHIPPED | OUTPATIENT
Start: 2024-07-11

## 2024-07-11 NOTE — TELEPHONE ENCOUNTER
Rx Refill Note  Requested Prescriptions     Pending Prescriptions Disp Refills    Mucinex D  MG per 12 hr tablet [Pharmacy Med Name: MUCINEX D -60 MG TABLET] 36 tablet      Sig: TAKE ONE TABLET BY MOUTH EVERY 12 HOURS      Last office visit with prescribing clinician: 6/12/2024   Last telemedicine visit with prescribing clinician: Visit date not found   Next office visit with prescribing clinician: 8/1/2024                         Would you like a call back once the refill request has been completed: [] Yes [] No    If the office needs to give you a call back, can they leave a voicemail: [] Yes [] No    Ana Maria Cartagena MA  07/11/24, 09:29 EDT

## 2024-07-25 RX ORDER — FOLIC ACID 1 MG/1
TABLET ORAL
Qty: 30 TABLET | Refills: 2 | Status: SHIPPED | OUTPATIENT
Start: 2024-07-25

## 2024-07-29 ENCOUNTER — TELEMEDICINE (OUTPATIENT)
Dept: PSYCHIATRY | Facility: CLINIC | Age: 74
End: 2024-07-29
Payer: MEDICARE

## 2024-07-29 DIAGNOSIS — F41.1 GAD (GENERALIZED ANXIETY DISORDER): Primary | ICD-10-CM

## 2024-07-29 DIAGNOSIS — F33.1 MDD (MAJOR DEPRESSIVE DISORDER), RECURRENT EPISODE, MODERATE: ICD-10-CM

## 2024-07-29 PROCEDURE — 90791 PSYCH DIAGNOSTIC EVALUATION: CPT | Performed by: COUNSELOR

## 2024-07-29 PROCEDURE — 1159F MED LIST DOCD IN RCRD: CPT | Performed by: COUNSELOR

## 2024-07-29 PROCEDURE — 1160F RVW MEDS BY RX/DR IN RCRD: CPT | Performed by: COUNSELOR

## 2024-07-29 NOTE — PROGRESS NOTES
Date: July 30, 2024  Time In: 2:04  Time out: 3:05      This provider is located at the [facility]), [address], [place] [address] using a secure [employer] Video Visit through [employer]. Patient is being seen remotely via telehealth at home address in [place] and stated they are in a secure environment for this session. The patient's condition being diagnosed/treated is appropriate for telemedicine. The provider identified herself as well as her credentials. The patient, and/or patients guardian, consent to be seen remotely, and when consent is given they understand that the consent allows for patient identifiable information to be sent to a third party as needed. They may refuse to be seen remotely at any time. The electronic data is encrypted and password protected, and the patient and/or guardian has been advised of the potential risks to privacy not withstanding such measures.     You have chosen to receive care through a telehealth visit.  Do you consent to use a video/audio connection for your medical care today? Yes    Subjective   Shanel Wall is a 73 y.o. female who presents today fully oriented, appropriately dressed and groomed, and open to communication for follow up appointment.    Chief Complaint:   Chief Complaint   Patient presents with    Anxiety    Depression        History of Present Illness: Rapport was established through conversation and unconditional positive regard. Recent events were discussed and how these events impact Pt's emotional health.   Pt reports successful use of learned coping skills 6 out of 10 times since last report.  Pt reports continuation of symptoms and states the intensity and duration of symptoms has remained unchanged since last report. Pt rates anxiety at 7/10 and depression at 6/10.     Sleep: Pt reports sleep has remained unchanged since last report. Healthy sleep habits were discussed such  "as maintaining a schedule, routine, avoiding caffeine, and limiting screen time before bed.    Appetite:  Pt reports appetite has been good since last report.  Discussed the importance of hydration and eating a healthy diet for overall and mental health.    Medication compliance: Pt reports medications are being taken daily as prescribed. Discussed the importance of compliance with prescriber's directions and Pt was instructed to report questions/concerns, as well as missed doses or discontinuation of medication by Pt.     Safety Plan in Place: No Pt denies SI/HI/SIB recent or current    Daily Functioning:  Since last report, Pt states symptoms are causing Moderate difficulty in daily functioning.      Subjective Report:  Pt states  she does have friends who come and take her to lunch.  Reads and watches TV.  \"I really don't do much.\"  Pt states she often feels torn between being sensitive to 's dementia issues and the need to assume control and responsibility for tasks such as paying the bills.  Reports she will be moving to [place] with  to be near her sister and family. States she has some concern of how her  will handle this. States she wants to be in a place where they can transition from skilled nursing to assisted to memory care in the same living community. Pt encouraged to also consider her own needs, and for Pt to prioritize her desire to be in a more social and interactive community. Validation and affirmation was provided. Through dialogue, Pt was able to let go of some guilt and resentment.  Feelings were normalized.    .  Reviewed coping skills and encouraged Pt to continue to practicing coping skills daily when not under distress. Pt was praised for their attempts to decrease symptoms and mitigate activating events.    Clinical Maneuvering/Intervention:  CBT was utilized to encourage Pt to identify maladaptive thoughts and behaviors and replace with more affirming and positive.Pt " encouraged to set and maintain appropriate and healthy boundaries with others. Pt was instructed to practice daily using appropriate and specific words to communicate feelings to others.  Motivational interviewing used to encourage Pt to identify strengths which can be utilized in working toward treatment goals. Pt encouraged to practice daily learned skills such as controlled breathing, grounding, and mindfulness. Pt was encouraged to ask for help from support persons to assist them in maintaining stability and alleviate symptoms. Discussed the importance of being one's own mental health advocate and to refrain from seeing the need to ask for help as a weakness. Pt was encouraged to formulate a plan of action to be more proactive in managing stressors and refrain from using reactive and automatic heightened emotional responses.     Solution-focused therapy employed to identify how Pt would like their life to be if they were to make positive changes. Pt encouraged to identify effective coping skills and strengths they can use to continue utilizing those skills. Pt encouraged to discontinue utilizing non-effective coping mechanisms. Pt provided with feedback to highlight achievements and personal and other resources. Encouraged use of SMART goals    Assisted patient in processing above session content; acknowledged and normalized patient’s thoughts, feelings, and concerns.  Rationalized patient thought process regarding concerns presented at session.  Discussed triggers associated with patient's  anxiety  and depression  Also discussed coping skills for patient to implement such as mindfulness , self care , and setting and maintaining boundaries.    Allowed patient to freely discuss issues without interruption or judgment. Provided safe, confidential environment to facilitate the development of positive therapeutic relationship and encourage open, honest communication. Assisted patient in identifying risk factors  which would indicate the need for higher level of care including thoughts to harm self or others and/or self-harming behavior and encouraged patient to contact this office, call 911, or present to the nearest emergency room should any of these events occur. Discussed crisis intervention services and means to access. Patient adamantly and convincingly denies current suicidal or homicidal ideation or perceptual disturbance.    Assessment:     Patient appears to maintain relative stability as compared to their baseline.  However, patient persistently struggles with symptoms which continue to cause impairment in important areas of functioning.  As a result, they can be reasonably expected to continue to benefit from treatment and would likely be at increased risk for decompensation otherwise.      PHQ-Score Total:  PHQ-9 Total Score: Prior to or during assessment Pt began to verbally process feelings over recent events, and Counselor allowed Pt to continue processing without interruption. Pt denies recent or current presence of thoughts of wanting to harm self or end their life.     SIVAKUMAR-7 Score Total:         Mental Status Exam:   Hygiene:   good  Cooperation:  Cooperative  Eye Contact:  Good  Psychomotor Behavior:  Appropriate  Affect:  Full range  Mood: anxious  Speech:  Normal  Thought Process:  Goal directed  Thought Content:  Normal  Suicidal:  None  Homicidal: None  Hallucinations:  None  Delusion: None  Memory:  Intact  Orientation:  Grossly Intact  Reliability:  good  Insight:  Good  Judgement:  Good  Impulse Control:  Good  Physical/Medical Issues:  No        Functional Status: Moderate impairment     Progress toward goal: Not at goal    Prognosis: Good with continued therapeutic intervention        Plan:    Patient will continue in individual outpatient therapy with focus on improved functioning and coping skills, maintaining stability, and avoiding decompensation and the need for higher level of  care.    Patient will adhere to medication regimen as prescribed and report any side effects. Patient will contact this office, call 911 or present to the nearest emergency room should suicidal or homicidal ideations occur. Provide Cognitive Behavioral Therapy and Solution Focused Therapy to improve functioning, maintain stability, and avoid decompensation and the need for higher level of care.     Return in about 2 weeks (around 8/12/2024).      VISIT DIAGNOSIS:    Diagnosis Plan   1. SIVAKUMAR (generalized anxiety disorder)        2. MDD (major depressive disorder), recurrent episode, moderate         14:03 EDT       This document has been electronically signed by CHELY Cunningham  July 30, 2024      Part of this note may be an electronic transcription/translation of spoken language to printed text using the Dragon Dictation System.

## 2024-08-08 DIAGNOSIS — M05.79 RHEUMATOID ARTHRITIS INVOLVING MULTIPLE SITES WITH POSITIVE RHEUMATOID FACTOR: ICD-10-CM

## 2024-08-08 NOTE — TELEPHONE ENCOUNTER
Caller: Shanel Wall    Relationship: Self    Best call back number: [phone]     Requested Prescriptions:   Requested Prescriptions     Pending Prescriptions Disp Refills    HYDROcodone-acetaminophen (NORCO) 5-325 MG per tablet 60 tablet 0     Sig: Take 1 tablet by mouth Every 6 (Six) Hours As Needed for Severe Pain.        Pharmacy where request should be sent: [facility] [id number] [address] [facility] [phone] [facility] [phone] FX     Last office visit with prescribing clinician: 6/12/2024   Last telemedicine visit with prescribing clinician: Visit date not found   Next office visit with prescribing clinician: 8/29/2024         Does the patient have less than a 3 day supply:  [x] Yes  [] No    Would you like a call back once the refill request has been completed: [] Yes [x] No    If the office needs to give you a call back, can they leave a voicemail: [] Yes [x] No    Milad Forrester   08/08/24 13:52 EDT

## 2024-08-12 ENCOUNTER — TELEMEDICINE (OUTPATIENT)
Dept: PSYCHIATRY | Facility: CLINIC | Age: 74
End: 2024-08-12
Payer: MEDICARE

## 2024-08-12 DIAGNOSIS — F33.1 MDD (MAJOR DEPRESSIVE DISORDER), RECURRENT EPISODE, MODERATE: ICD-10-CM

## 2024-08-12 DIAGNOSIS — F41.1 GAD (GENERALIZED ANXIETY DISORDER): Primary | ICD-10-CM

## 2024-08-12 PROCEDURE — 90834 PSYTX W PT 45 MINUTES: CPT | Performed by: COUNSELOR

## 2024-08-12 RX ORDER — HYDROCODONE BITARTRATE AND ACETAMINOPHEN 5; 325 MG/1; MG/1
1 TABLET ORAL EVERY 6 HOURS PRN
Qty: 60 TABLET | Refills: 0 | Status: SHIPPED | OUTPATIENT
Start: 2024-08-12

## 2024-08-12 NOTE — TELEPHONE ENCOUNTER
Caller: Shanel Wall    Relationship: Self    Best call back number: [phone]     Requested Prescriptions:   Requested Prescriptions     Pending Prescriptions Disp Refills    HYDROcodone-acetaminophen (NORCO) 5-325 MG per tablet 60 tablet 0     Sig: Take 1 tablet by mouth Every 6 (Six) Hours As Needed for Severe Pain.        Pharmacy where request should be sent: [facility] [id number] [address] [facility] [phone] [facility] [phone] FX     Last office visit with prescribing clinician: 6/12/2024   Last telemedicine visit with prescribing clinician: Visit date not found   Next office visit with prescribing clinician: 8/29/2024     Additional details provided by patient: PATIENT IS OUT AND CALLED FOR STATUS OF REFILL.    Does the patient have less than a 3 day supply:  [x] Yes  [] No      Milad Shaikh Rep   08/12/24 09:01 EDT

## 2024-08-12 NOTE — PROGRESS NOTES
Date: August 13, 2024  Time In: 2:06  Time out: 2:57      This provider is located at the [facility] (through [facility]), [address], [place] [address] using a secure [employer] Video Visit through [employer]. Patient is being seen remotely via telehealth at home address in [place] and stated they are in a secure environment for this session. The patient's condition being diagnosed/treated is appropriate for telemedicine. The provider identified herself as well as her credentials. The patient, and/or patients guardian, consent to be seen remotely, and when consent is given they understand that the consent allows for patient identifiable information to be sent to a third party as needed. They may refuse to be seen remotely at any time. The electronic data is encrypted and password protected, and the patient and/or guardian has been advised of the potential risks to privacy not withstanding such measures.     You have chosen to receive care through a telehealth visit.  Do you consent to use a video/audio connection for your medical care today? Yes    Subjective   Shanel Wall is a 73 y.o. female who presents today fully oriented, appropriately dressed and groomed, and open to communication for follow up appointment.    Chief Complaint:   Chief Complaint   Patient presents with    Anxiety    Depression    Pain        History of Present Illness: Rapport was established through conversation and unconditional positive regard. Recent events were discussed and how these events impact Pt's emotional health.   Pt reports successful use of learned coping skills 7 out of 10 times since last report.  Pt reports continuation of symptoms and states the intensity and duration of symptoms has remained unchanged since last report. Pt rates anxiety at 6/10 and depression at 3/10.     Sleep: Pt reports sleep has remained unchanged since last report. Healthy sleep habits were  "discussed such as maintaining a schedule, routine, avoiding caffeine, and limiting screen time before bed.  \"My sleep has been pretty good.\"  Takes sleep medications and restless leg issue has been improved.    Appetite:  Pt reports appetite has been good since last report.  Discussed the importance of hydration and eating a healthy diet for overall and mental health.    Medication compliance: Pt reports medications are being taken daily as prescribed. Discussed the importance of compliance with prescriber's directions and Pt was instructed to report questions/concerns, as well as missed doses or discontinuation of medication by Pt.     Safety Plan in Place: No Pt denies SI/HI/SIB recent or current    Daily Functioning:  Since last report, Pt states symptoms are causing Mild difficulty in daily functioning.      Subjective Report and Content Discussion:  Pt states she has been spending some time each day purging and getting packed to move to [place] to be closer to family. States she and  are planning to live in an assisted living and then transition to memory care. States she gets frustrated with  over his inability to help with the purging and packing process. States 's dementia can cause frustration and resentment. Pt gave the example of  refusing to let Pt give away some old suits that Pt will never wear again. Discussed how task paralysis and being overwhelmed can manifest in those with impaired cognition. Pt states this was helpful in understanding her 's issues.  Pt states \"I was so aggravated\" this morning by having to call doctor offices to arrange and reschedule appointments. States she tries to be considerate but she finds it difficult. Pt's feelings were validated and normalized as a person in a caregiver role.  Pt states she is in a lot of pain and her joints are very inflamed, and Pt states she often gets more irritable when she has a flare up. Encouraged Pt to enlist " assistance from professionals and others in the moving and care of her . Pt encouraged to make necessary decisions such as purging items in preparation for the move and to refrain from feeling guilt when she makes decisions for her . Pt agrees this will be helpful.     .  Reviewed coping skills and encouraged Pt to continue to practicing coping skills daily when not under distress. Pt was praised for their attempts to decrease symptoms and mitigate activating events.    Clinical Maneuvering/Intervention:  CBT was utilized to encourage Pt to identify maladaptive thoughts and behaviors and replace with more affirming and positive.Pt encouraged to set and maintain appropriate and healthy boundaries with others. Pt was instructed to practice daily using appropriate and specific words to communicate feelings to others.  Motivational interviewing used to encourage Pt to identify strengths which can be utilized in working toward treatment goals. Pt encouraged to practice daily learned skills such as controlled breathing, grounding, and mindfulness. Pt was encouraged to ask for help from support persons to assist them in maintaining stability and alleviate symptoms. Discussed the importance of being one's own mental health advocate and to refrain from seeing the need to ask for help as a weakness. Pt was encouraged to formulate a plan of action to be more proactive in managing stressors and refrain from using reactive and automatic heightened emotional responses.     Solution-focused therapy employed to identify how Pt would like their life to be if they were to make positive changes. Pt encouraged to identify effective coping skills and strengths they can use to continue utilizing those skills. Pt encouraged to discontinue utilizing non-effective coping mechanisms. Pt provided with feedback to highlight achievements and personal and other resources. Encouraged use of SMART goals    Assisted patient in  processing above session content; acknowledged and normalized patient’s thoughts, feelings, and concerns.  Rationalized patient thought process regarding concerns presented at session.  Discussed triggers associated with patient's  anxiety  and depression  Also discussed coping skills for patient to implement such as  positive talk, self care, and enlisting help from support system.    Allowed patient to freely discuss issues without interruption or judgment. Provided safe, confidential environment to facilitate the development of positive therapeutic relationship and encourage open, honest communication. Assisted patient in identifying risk factors which would indicate the need for higher level of care including thoughts to harm self or others and/or self-harming behavior and encouraged patient to contact this office, call 911, or present to the nearest emergency room should any of these events occur. Discussed crisis intervention services and means to access. Patient adamantly and convincingly denies current suicidal or homicidal ideation or perceptual disturbance.    Assessment:     Patient appears to maintain relative stability as compared to their baseline.  However, patient persistently struggles with symptoms which continue to cause impairment in important areas of functioning.  As a result, they can be reasonably expected to continue to benefit from treatment and would likely be at increased risk for decompensation otherwise.      PHQ-Score Total:  PHQ-9 Total Score:  Not assessed    SIVAKUMAR-7 Score Total:         Mental Status Exam:   Hygiene:   good  Cooperation:  Cooperative  Eye Contact:  Good  Psychomotor Behavior:  Appropriate  Affect:  Full range  Mood: depressed, anxious, and irritable  Speech:  Normal  Thought Process:  Goal directed  Thought Content:  Normal  Suicidal:  None  Homicidal: None  Hallucinations:  None  Delusion: None  Memory:  Intact  Orientation:  Grossly Intact  Reliability:   good  Insight:  Good  Judgement:  Good  Impulse Control:  Good  Physical/Medical Issues:  chronic pain      Functional Status: Mild-moderate    Progress toward goal: Not at goal    Prognosis: Good with continued therapeutic intervention        Plan:    Patient will continue in individual outpatient therapy with focus on improved functioning and coping skills, maintaining stability, and avoiding decompensation and the need for higher level of care.    Patient will adhere to medication regimen as prescribed and report any side effects. Patient will contact this office, call 911 or present to the nearest emergency room should suicidal or homicidal ideations occur. Provide Cognitive Behavioral Therapy and Solution Focused Therapy to improve functioning, maintain stability, and avoid decompensation and the need for higher level of care.     Return in about 2 weeks (around 8/26/2024).      VISIT DIAGNOSIS:    Diagnosis Plan   1. SIVAKUMAR (generalized anxiety disorder)        2. MDD (major depressive disorder), recurrent episode, moderate         14:06 EDT       This document has been electronically signed by CHELY Cunningham  August 13, 2024      Part of this note may be an electronic transcription/translation of spoken language to printed text using the Dragon Dictation System.

## 2024-08-12 NOTE — TELEPHONE ENCOUNTER
Rx Refill Note  Requested Prescriptions     Pending Prescriptions Disp Refills    HYDROcodone-acetaminophen (NORCO) 5-325 MG per tablet 60 tablet 0     Sig: Take 1 tablet by mouth Every 6 (Six) Hours As Needed for Severe Pain.      Last office visit with prescribing clinician: 6/12/2024   Last telemedicine visit with prescribing clinician: Visit date not found   Next office visit with prescribing clinician: 8/29/2024                         Would you like a call back once the refill request has been completed: [] Yes [] No    If the office needs to give you a call back, can they leave a voicemail: [] Yes [] No    Ana Maria Cartagena MA  08/12/24, 10:11 EDT  UDS and CSA 05/07/2024

## 2024-08-16 RX ORDER — PREDNISONE 5 MG/1
5 TABLET ORAL DAILY
Qty: 30 TABLET | Refills: 1 | Status: SHIPPED | OUTPATIENT
Start: 2024-08-16

## 2024-08-21 PROBLEM — Z79.899 HIGH RISK MEDICATION USE: Status: ACTIVE | Noted: 2024-08-21

## 2024-08-21 PROBLEM — Z79.899 IMMUNOSUPPRESSION DUE TO DRUG THERAPY: Status: ACTIVE | Noted: 2024-08-21

## 2024-08-21 PROBLEM — D84.821 IMMUNOSUPPRESSION DUE TO DRUG THERAPY: Status: ACTIVE | Noted: 2024-08-21

## 2024-08-21 NOTE — ASSESSMENT & PLAN NOTE
Current Rx: Reclast (06/23/23), continue OTC calcium and vitamin D  She has previously been on Fosamax for 20+ years without a holiday.   She has been on Prolia since (11/2019) without real improvement in her bone density scores, so we changed to Reclast  3/22/22: Left femoral neck -2.5, spine -0.9, left forearm -3.7    - Continue calcium, vitamin D  - Start Reclast (08/01/23).  She did have bone pain the day after her Reclast infusion.  This was her first reclast dose.  Pain lasted two weeks, but improved some every day.  Does not wish to continue Reclast.  - Continue weight-bearing exercise  - Last dose of Prolia was 11/2022  - She would like to have her DEXA in July 2024 instead of March 2024.  I will get her DEXA.   She is unsure she wants to do Reclast again due to bone pain  I would suggest restarting Prolia and monitoring.  As long as she remains stable, I think it is a reasonable to continue Prolia.

## 2024-08-21 NOTE — ASSESSMENT & PLAN NOTE
Dx: 1999; Seropositive RA by Dr. Khanna.  +RF  Previous Rx: hydroxychloroquine (retinal toxicity)  Current Rx: 5 mg of prednisone daily,  methotrexate 15 mg weekly, and Enbrel, meloxicam 15mg daily.     Doing well.   Swollen joint count is: 0, Tender joint count is: 0, Physician global is: 1, VAS is: 9, Patient global is: 4.  CDAI is 5-low disease activity.   Continue methotrexate 15 mg weekly for now  - Continue prednisone 5 mg; cannot wean below 5 mg due to adrenal insufficiency.  Also helping her OA.  - Continue Enbrel weekly-- reports compliance with weekly dosing.  - FABB is on back order.  She would like to start on Rheumate.  I will send this to [facility].  F/u in 3 months.

## 2024-08-21 NOTE — PROGRESS NOTES
Office Follow Up      Date: 08/22/2024   Patient Name: Shanel Wall  MRN: [id number]  YOB: 1950    Referring Physician: Fili Ray MD     Chief Complaint: Rheumatoid arthritis      History of Present Illness: Shanel Wall is a 73 y.o. female who is here today for follow up on rheumatoid arthritis.    RA is doing well. She is having severe back pain and is considering seeing her neurosurgeon.   She rates her pain 9/10 with 4 hours of morning stiffness.  She is on methotrexate, Enbrel and prednisone 5 mg. No infections or problems with injections. No problems with MTX.     She confirms taking calcium and vitamin D. She is due for DEXA. She does not want Reclast again.      She is fatigued.  She has dry eyes and mouth.  She reports difficulty walking.  Her back and her neck hurt. Her joints are tender, but do not swell.    Now on Ropinirole and hydrocodone for restless leg syndrome.    She is on Ambien and Ativan to sleep.     Subjective   Review of Systems: Review of Systems   Constitutional:  Positive for fatigue. Negative for chills, fever and unexpected weight loss.   HENT:  Negative for dental problem, mouth sores, sinus pressure and swollen glands.    Eyes:  Negative for photophobia, pain and redness.   Respiratory:  Negative for apnea, cough, chest tightness and shortness of breath.    Cardiovascular:  Negative for chest pain and leg swelling.   Gastrointestinal:  Positive for nausea. Negative for abdominal pain, diarrhea and GERD.   Genitourinary:  Negative for dysuria and hematuria.   Skin:  Positive for rash. Negative for dry skin, skin lesions and bruise.   Neurological:  Positive for weakness. Negative for dizziness, seizures, syncope, headache and memory problem.   Hematological:  Negative for adenopathy. Bruises/bleeds easily.   Psychiatric/Behavioral:  Positive for sleep disturbance, depressed mood and stress. Negative for suicidal ideas.  The patient is nervous/anxious.         Medications:   Current Outpatient Medications:     aluminum hydroxide-mag carbonate (GAVISCON EXTRA RELIEF) 160-105 MG chewable tablet chewable tablet, Chew 1 tablet Daily As Needed., Disp: , Rfl:     Calcium Carb-Cholecalciferol (CALCIUM-VITAMIN D) 600-400 MG-UNIT tablet, Take 1 tablet by mouth 2 (Two) Times a Day., Disp: , Rfl:     DULoxetine (CYMBALTA) 30 MG capsule, Take 3 capsules by mouth Daily., Disp: 270 capsule, Rfl: 2    Enbrel 50 MG/ML injection, Inject 1 mL under the skin into the appropriate area as directed 1 (One) Time Per Week., Disp: 4 mL, Rfl: 3    folic acid (FOLVITE) 1 MG tablet, Take 1 tablet by mouth Daily., Disp: 30 tablet, Rfl: 4    HYDROcodone-acetaminophen (NORCO) 5-325 MG per tablet, Take 1 tablet by mouth Every 6 (Six) Hours As Needed for Severe Pain., Disp: 60 tablet, Rfl: 0    lansoprazole (PREVACID) 15 MG capsule, Take 1 capsule by mouth Daily., Disp: , Rfl:     levocetirizine (XYZAL) 5 MG tablet, Take 1 tablet by mouth Daily., Disp: 90 tablet, Rfl: 2    levothyroxine (SYNTHROID, LEVOTHROID) 75 MCG tablet, Take 1 tablet by mouth Daily., Disp: 90 tablet, Rfl: 3    LORazepam (ATIVAN) 0.5 MG tablet, Take 1 tablet by mouth Every 8 (Eight) Hours As Needed for Anxiety., Disp: 90 tablet, Rfl: 1    meloxicam (MOBIC) 15 MG tablet, Take 1 tablet by mouth Daily., Disp: , Rfl:     Mucinex D  MG per 12 hr tablet, TAKE ONE TABLET BY MOUTH EVERY 12 HOURS, Disp: 36 tablet, Rfl: 0    Multiple Vitamins-Minerals (SENIOR MULTIVITAMIN PLUS) tablet, Take 1 tablet by mouth Daily., Disp: , Rfl:     NON FORMULARY, Take 2 tablets by mouth Daily. Hydro Eye gelcaps, Disp: , Rfl:     predniSONE (DELTASONE) 5 MG tablet, Take 1 tablet by mouth Daily., Disp: 30 tablet, Rfl: 3    prochlorperazine (COMPAZINE) 10 MG tablet, Take 1 tablet by mouth Every 6 (Six) Hours As Needed for Nausea or Vomiting., Disp: 30 tablet, Rfl: 1    Propylene Glycol (Systane Balance) 0.6 %  solution, Apply 1 drop to eye(s) as directed by provider 2 (Two) Times a Day., Disp: , Rfl:     rOPINIRole (REQUIP) 1 MG tablet, Take 1 tablet by mouth Every Night. Take 1 hour before bedtime., Disp: 90 tablet, Rfl: 1    sodium chloride (SHILPA 128) 2 % ophthalmic solution, 1 drop., Disp: , Rfl:     vitamin A 8000 UNIT capsule, Take 1 capsule by mouth Daily., Disp: , Rfl:     vitamin C (ASCORBIC ACID) 500 MG tablet, Take 1 tablet by mouth Daily., Disp: , Rfl:     zolpidem (AMBIEN) 10 MG tablet, TAKE ONE TABLET BY MOUTH ONCE NIGHTLY AS NEEDED FOR SLEEP, Disp: 90 tablet, Rfl: 1    Cyanocobalamin (VITAMIN B-12 PO), Take  by mouth., Disp: , Rfl:     Denosumab (PROLIA SC), Inject  under the skin into the appropriate area as directed Every 6 (Six) Months. (Patient not taking: Reported on 8/22/2024), Disp: , Rfl:     linaclotide (Linzess) 72 MCG capsule capsule, Take 1 capsule by mouth Every Morning Before Breakfast. (Patient not taking: Reported on 8/22/2024), Disp: 90 capsule, Rfl: 2    methotrexate 2.5 MG tablet, Take 6 tablets by mouth 1 (One) Time Per Week. Take 6 tablets by oral route once every week, Disp: 72 tablet, Rfl: 0    Pyridoxine HCl (VITAMIN B6 PO), Take  by mouth., Disp: , Rfl:     zoledronic acid (RECLAST) 5 MG/100ML solution infusion, Infuse 100 mL into a venous catheter 1 (One) Time. ANNUALLY AS DIRECTED (Patient not taking: Reported on 8/22/2024), Disp: , Rfl:     Allergies:   Allergies   Allergen Reactions    Adhesive Tape Itching    Erythromycin Hives    Pseudoephedrine Hives       I have reviewed and updated the patient's chief complaint, history of present illness, review of systems, past medical history, surgical history, family history, social history, medications and allergy list as appropriate.     Objective    Vital Signs:   Vitals:    08/22/24 1427   BP: 124/82   BP Location: Right arm   Patient Position: Sitting   Cuff Size: Adult   Pulse: 77   Temp: 97.7 °F (36.5 °C)   Weight: 57.3 kg (126 lb  "4.8 oz)   Height: 148.6 cm (58.5\")   PainSc:   9     Body mass index is 25.95 kg/m².    Physical Exam:  General: The patient is well-developed and well nourished. Cooperative, alert and oriented x3. Affect is normal. Hydration appears normal.   HEENT: Normocephalic and atraumatic. No notable alopecia. Lids and conjunctiva are normal. Pupils are equal and sclera are clear. Oropharynx is clear   NECK: Supple without adenopathy, masses or thyromegaly.   CARDIOVASCULAR: Regular rate and rhythm. No murmurs, rubs or gallops   LUNGS: Effort is normal. Lungs are clear bilaterally.  ABDOMEN: Soft and non-tender without masses or hepatosplenomegaly..   EXTREMITIES: No edema.  No cyanosis or clubbing.  SKIN: Inspection and palpation are normal.  NEUROLOGIC: Gait is normal.    MUSCULOSKELETAL: Complete joint exam was performed. There was full range of motion of the shoulders, elbows, wrists and hands without soft tissue swelling synovitis or deformities except as noted. Degenerative changes are noted.  Mild ulnar deviation.  Limited range of motion of the left wrist.  Crepitus right shoulder.  Surgical changes at the right MCPs.  Hips have good flexion and internal and external rotation.  Knees have no palpable effusions.  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  There is full extension and flexion.  Ankles and feet have no soft tissue swelling or synovitis.  BACK:  Straight without scoliosis  Joint Exam 08/22/2024     The following joints were examined and normal:   Left Glenohumeral, Right Glenohumeral, Left Elbow, Right Elbow, Left Wrist, Right Wrist, Left MCP 1, Right MCP 1, Left MCP 2, Right MCP 2, Left MCP 3, Right MCP 3, Left MCP 4, Right MCP 4, Left MCP 5, Right MCP 5, Left IP (thumb), Right IP (thumb), Left PIP 2 (finger), Right PIP 2 (finger), Left PIP 3 (finger), Right PIP 3 (finger), Left PIP 4 (finger), Right PIP 4 (finger), Left PIP 5 (finger), Right PIP 5 (finger), Left Knee, Right Knee       Patient Global: 40 / " 100  Provider Global: 10 / 100    Assessment / Plan      Assessment & Plan  Rheumatoid arthritis involving multiple sites with positive rheumatoid factor  Dx: 1999; Seropositive RA by Dr. Khanna.  +RF  Previous Rx: hydroxychloroquine (retinal toxicity)  Current Rx: 5 mg of prednisone daily,  methotrexate 15 mg weekly, and Enbrel, meloxicam 15mg daily.     Doing well.   Swollen joint count is: 0, Tender joint count is: 0, Physician global is: 1, VAS is: 9, Patient global is: 4.  CDAI is 5-low disease activity.   Continue methotrexate 15 mg weekly for now  - Continue prednisone 5 mg; cannot wean below 5 mg due to adrenal insufficiency.  Also helping her OA.  - Continue Enbrel weekly-- reports compliance with weekly dosing.  - FABB is on back order.  She would like to start on Rheumate.  I will send this to [facility].  F/u in 3 months.   Immunosuppression due to drug therapy  Enbrel  No recent infections  High risk medication use  Methotrexate  She will need q. 6 to 8-week labs.  Age-related osteoporosis without current pathological fracture  Current Rx: Reclast (06/23/23), continue OTC calcium and vitamin D  She has previously been on Fosamax for 20+ years without a holiday.   She has been on Prolia since (11/2019) without real improvement in her bone density scores, so we changed to Reclast  3/22/22: Left femoral neck -2.5, spine -0.9, left forearm -3.7    - Continue calcium, vitamin D  - Start Reclast (08/01/23).  She did have bone pain the day after her Reclast infusion.  This was her first reclast dose.  Pain lasted two weeks, but improved some every day.  Does not wish to continue Reclast.  - Continue weight-bearing exercise  - Last dose of Prolia was 11/2022  - She would like to have her DEXA in July 2024 instead of March 2024.  I will get her DEXA.   She is unsure she wants to do Reclast again due to bone pain  I would suggest restarting Prolia and monitoring.  As long as she remains stable, I think it  is a reasonable to continue Prolia.  Chronic fatigue  We are prescribing 10 mg Ambien nightly.  This was initially started by her previous rheumatologist.    Previous Rx: amitriptyline (weight gain), Belsomra (didn't work).   Continue Ambien for now. We have discussed alternatives.  I warned her of risk of long-term Ambien use but she does not wish to try to wean or stop.    Chronic right shoulder pain  Still with pain. Had fracture in past.     We will continue to treat osteoporosis as above  Generalized osteoarthritis  She has pain in her spine and SI joints  - She continued to have leg pain.  - She has pain in her hips still and is thinking about following up with her neurosurgeon.  - She has failed steroid injections for SI joint arthritis  Post-menopause      Orders Placed This Encounter   Procedures    DEXA Bone Density Axial    CBC Auto Differential    Comprehensive Metabolic Panel    C-reactive Protein    Sedimentation Rate     New Medications Ordered This Visit   Medications    predniSONE (DELTASONE) 5 MG tablet     Sig: Take 1 tablet by mouth Daily.     Dispense:  30 tablet     Refill:  3    folic acid (FOLVITE) 1 MG tablet     Sig: Take 1 tablet by mouth Daily.     Dispense:  30 tablet     Refill:  4    Enbrel 50 MG/ML injection     Sig: Inject 1 mL under the skin into the appropriate area as directed 1 (One) Time Per Week.     Dispense:  4 mL     Refill:  3    methotrexate 2.5 MG tablet     Sig: Take 6 tablets by mouth 1 (One) Time Per Week. Take 6 tablets by oral route once every week     Dispense:  72 tablet     Refill:  0    prochlorperazine (COMPAZINE) 10 MG tablet     Sig: Take 1 tablet by mouth Every 6 (Six) Hours As Needed for Nausea or Vomiting.     Dispense:  30 tablet     Refill:  1          Follow Up:   Return in about 3 months (around 11/22/2024).        Iggy Antonio MD  [facility] [place]

## 2024-08-21 NOTE — ASSESSMENT & PLAN NOTE
We are prescribing 10 mg Ambien nightly.  This was initially started by her previous rheumatologist.    Previous Rx: amitriptyline (weight gain), Belsomra (didn't work).   Continue Ambien for now. We have discussed alternatives.  I warned her of risk of long-term Ambien use but she does not wish to try to wean or stop.

## 2024-08-22 ENCOUNTER — OFFICE VISIT (OUTPATIENT)
Age: 74
End: 2024-08-22
Payer: MEDICARE

## 2024-08-22 ENCOUNTER — LAB (OUTPATIENT)
Facility: HOSPITAL | Age: 74
End: 2024-08-22
Payer: MEDICARE

## 2024-08-22 VITALS
WEIGHT: 126.3 LBS | BODY MASS INDEX: 25.46 KG/M2 | TEMPERATURE: 97.7 F | SYSTOLIC BLOOD PRESSURE: 124 MMHG | HEART RATE: 77 BPM | HEIGHT: 59 IN | DIASTOLIC BLOOD PRESSURE: 82 MMHG

## 2024-08-22 DIAGNOSIS — Z78.0 POST-MENOPAUSE: ICD-10-CM

## 2024-08-22 DIAGNOSIS — D84.821 IMMUNOSUPPRESSION DUE TO DRUG THERAPY: ICD-10-CM

## 2024-08-22 DIAGNOSIS — M15.9 GENERALIZED OSTEOARTHRITIS: ICD-10-CM

## 2024-08-22 DIAGNOSIS — Z79.899 HIGH RISK MEDICATION USE: ICD-10-CM

## 2024-08-22 DIAGNOSIS — G89.29 CHRONIC RIGHT SHOULDER PAIN: ICD-10-CM

## 2024-08-22 DIAGNOSIS — Z79.899 IMMUNOSUPPRESSION DUE TO DRUG THERAPY: ICD-10-CM

## 2024-08-22 DIAGNOSIS — R53.82 CHRONIC FATIGUE: ICD-10-CM

## 2024-08-22 DIAGNOSIS — M25.511 CHRONIC RIGHT SHOULDER PAIN: ICD-10-CM

## 2024-08-22 DIAGNOSIS — M05.79 RHEUMATOID ARTHRITIS INVOLVING MULTIPLE SITES WITH POSITIVE RHEUMATOID FACTOR: ICD-10-CM

## 2024-08-22 DIAGNOSIS — M81.0 AGE-RELATED OSTEOPOROSIS WITHOUT CURRENT PATHOLOGICAL FRACTURE: ICD-10-CM

## 2024-08-22 DIAGNOSIS — M05.79 RHEUMATOID ARTHRITIS INVOLVING MULTIPLE SITES WITH POSITIVE RHEUMATOID FACTOR: Primary | ICD-10-CM

## 2024-08-22 LAB
ALBUMIN SERPL-MCNC: 4.3 G/DL (ref 3.5–5.2)
ALBUMIN/GLOB SERPL: 1.7 G/DL
ALP SERPL-CCNC: 96 U/L (ref 39–117)
ALT SERPL W P-5'-P-CCNC: 25 U/L (ref 1–33)
ANION GAP SERPL CALCULATED.3IONS-SCNC: 7.7 MMOL/L (ref 5–15)
AST SERPL-CCNC: 31 U/L (ref 1–32)
BASOPHILS # BLD AUTO: 0.05 10*3/MM3 (ref 0–0.2)
BASOPHILS NFR BLD AUTO: 0.9 % (ref 0–1.5)
BILIRUB SERPL-MCNC: 0.3 MG/DL (ref 0–1.2)
BUN SERPL-MCNC: 19 MG/DL (ref 8–23)
BUN/CREAT SERPL: 22.1 (ref 7–25)
CALCIUM SPEC-SCNC: 9.7 MG/DL (ref 8.6–10.5)
CHLORIDE SERPL-SCNC: 102 MMOL/L (ref 98–107)
CO2 SERPL-SCNC: 30.3 MMOL/L (ref 22–29)
CREAT SERPL-MCNC: 0.86 MG/DL (ref 0.57–1)
CRP SERPL-MCNC: <0.3 MG/DL (ref 0–0.5)
DEPRECATED RDW RBC AUTO: 46.1 FL (ref 37–54)
EGFRCR SERPLBLD CKD-EPI 2021: 72.3 ML/MIN/1.73
EOSINOPHIL # BLD AUTO: 0.07 10*3/MM3 (ref 0–0.4)
EOSINOPHIL NFR BLD AUTO: 1.3 % (ref 0.3–6.2)
ERYTHROCYTE [DISTWIDTH] IN BLOOD BY AUTOMATED COUNT: 13.2 % (ref 12.3–15.4)
ERYTHROCYTE [SEDIMENTATION RATE] IN BLOOD: 5 MM/HR (ref 0–30)
GLOBULIN UR ELPH-MCNC: 2.5 GM/DL
GLUCOSE SERPL-MCNC: 109 MG/DL (ref 65–99)
HCT VFR BLD AUTO: 43.9 % (ref 34–46.6)
HGB BLD-MCNC: 14.3 G/DL (ref 12–15.9)
IMM GRANULOCYTES # BLD AUTO: 0.01 10*3/MM3 (ref 0–0.05)
IMM GRANULOCYTES NFR BLD AUTO: 0.2 % (ref 0–0.5)
LYMPHOCYTES # BLD AUTO: 0.98 10*3/MM3 (ref 0.7–3.1)
LYMPHOCYTES NFR BLD AUTO: 17.5 % (ref 19.6–45.3)
MCH RBC QN AUTO: 31.4 PG (ref 26.6–33)
MCHC RBC AUTO-ENTMCNC: 32.6 G/DL (ref 31.5–35.7)
MCV RBC AUTO: 96.3 FL (ref 79–97)
MONOCYTES # BLD AUTO: 0.4 10*3/MM3 (ref 0.1–0.9)
MONOCYTES NFR BLD AUTO: 7.2 % (ref 5–12)
NEUTROPHILS NFR BLD AUTO: 4.08 10*3/MM3 (ref 1.7–7)
NEUTROPHILS NFR BLD AUTO: 72.9 % (ref 42.7–76)
NRBC BLD AUTO-RTO: 0 /100 WBC (ref 0–0.2)
PLATELET # BLD AUTO: 235 10*3/MM3 (ref 140–450)
PMV BLD AUTO: 9.5 FL (ref 6–12)
POTASSIUM SERPL-SCNC: 4.9 MMOL/L (ref 3.5–5.2)
PROT SERPL-MCNC: 6.8 G/DL (ref 6–8.5)
RBC # BLD AUTO: 4.56 10*6/MM3 (ref 3.77–5.28)
SODIUM SERPL-SCNC: 140 MMOL/L (ref 136–145)
WBC NRBC COR # BLD AUTO: 5.59 10*3/MM3 (ref 3.4–10.8)

## 2024-08-22 PROCEDURE — 86140 C-REACTIVE PROTEIN: CPT

## 2024-08-22 PROCEDURE — 36415 COLL VENOUS BLD VENIPUNCTURE: CPT

## 2024-08-22 PROCEDURE — 85025 COMPLETE CBC W/AUTO DIFF WBC: CPT

## 2024-08-22 PROCEDURE — 85652 RBC SED RATE AUTOMATED: CPT

## 2024-08-22 PROCEDURE — 80053 COMPREHEN METABOLIC PANEL: CPT

## 2024-08-22 RX ORDER — METHOTREXATE 2.5 MG/1
22.5 TABLET ORAL WEEKLY
Status: CANCELLED | OUTPATIENT
Start: 2024-08-22

## 2024-08-22 RX ORDER — PROCHLORPERAZINE MALEATE 10 MG
10 TABLET ORAL EVERY 6 HOURS PRN
Qty: 60 TABLET | Refills: 1 | Status: CANCELLED | OUTPATIENT
Start: 2024-08-22

## 2024-08-22 RX ORDER — FOLIC ACID 1 MG/1
1 TABLET ORAL DAILY
Qty: 30 TABLET | Refills: 4 | Status: SHIPPED | OUTPATIENT
Start: 2024-08-22

## 2024-08-22 RX ORDER — PROCHLORPERAZINE MALEATE 10 MG
10 TABLET ORAL EVERY 6 HOURS PRN
Qty: 30 TABLET | Refills: 1 | Status: SHIPPED | OUTPATIENT
Start: 2024-08-22

## 2024-08-22 RX ORDER — METHOTREXATE 2.5 MG/1
15 TABLET ORAL WEEKLY
Qty: 72 TABLET | Refills: 0 | Status: SHIPPED | OUTPATIENT
Start: 2024-08-22

## 2024-08-22 RX ORDER — ETANERCEPT 50 MG/ML
50 SOLUTION SUBCUTANEOUS WEEKLY
Qty: 4 ML | Refills: 3 | Status: SHIPPED | OUTPATIENT
Start: 2024-08-22

## 2024-08-22 RX ORDER — PREDNISONE 5 MG/1
5 TABLET ORAL DAILY
Qty: 30 TABLET | Refills: 3 | Status: SHIPPED | OUTPATIENT
Start: 2024-08-22

## 2024-08-27 RX ORDER — GUAIFENESIN AND PSEUDOEPHEDRINE HYDROCHLORIDE 600; 60 MG/1; MG/1
TABLET, EXTENDED RELEASE ORAL
Qty: 36 TABLET | Refills: 0 | Status: SHIPPED | OUTPATIENT
Start: 2024-08-27

## 2024-08-27 NOTE — TELEPHONE ENCOUNTER
Rx Refill Note  Requested Prescriptions     Pending Prescriptions Disp Refills    Mucinex D  MG per 12 hr tablet [Pharmacy Med Name: MUCINEX D -60 MG TABLET] 36 tablet 0     Sig: TAKE ONE TABLET BY MOUTH EVERY 12 HOURS      Last office visit with prescribing clinician: 6/12/2024   Last telemedicine visit with prescribing clinician: Visit date not found   Next office visit with prescribing clinician: 8/29/2024                         Would you like a call back once the refill request has been completed: [] Yes [] No    If the office needs to give you a call back, can they leave a voicemail: [] Yes [] No    Ana Maria Cartagena MA  08/27/24, 10:36 EDT

## 2024-08-29 ENCOUNTER — OFFICE VISIT (OUTPATIENT)
Dept: FAMILY MEDICINE CLINIC | Facility: CLINIC | Age: 74
End: 2024-08-29
Payer: MEDICARE

## 2024-08-29 VITALS
HEART RATE: 70 BPM | SYSTOLIC BLOOD PRESSURE: 122 MMHG | BODY MASS INDEX: 25.92 KG/M2 | OXYGEN SATURATION: 98 % | HEIGHT: 59 IN | DIASTOLIC BLOOD PRESSURE: 82 MMHG | WEIGHT: 128.6 LBS

## 2024-08-29 DIAGNOSIS — M05.79 RHEUMATOID ARTHRITIS INVOLVING MULTIPLE SITES WITH POSITIVE RHEUMATOID FACTOR: ICD-10-CM

## 2024-08-29 DIAGNOSIS — M05.9 RHEUMATOID ARTHRITIS WITH POSITIVE RHEUMATOID FACTOR, INVOLVING UNSPECIFIED SITE: ICD-10-CM

## 2024-08-29 DIAGNOSIS — Z63.6 CAREGIVER STRESS: ICD-10-CM

## 2024-08-29 DIAGNOSIS — G25.81 RLS (RESTLESS LEGS SYNDROME): ICD-10-CM

## 2024-08-29 DIAGNOSIS — R94.6 ABNORMAL THYROID FUNCTION TEST: ICD-10-CM

## 2024-08-29 DIAGNOSIS — F41.9 ANXIETY: ICD-10-CM

## 2024-08-29 DIAGNOSIS — Z51.81 MEDICATION MONITORING ENCOUNTER: ICD-10-CM

## 2024-08-29 DIAGNOSIS — M53.3 SACROILIAC JOINT DYSFUNCTION OF BOTH SIDES: Primary | ICD-10-CM

## 2024-08-29 PROCEDURE — 1125F AMNT PAIN NOTED PAIN PRSNT: CPT | Performed by: INTERNAL MEDICINE

## 2024-08-29 PROCEDURE — 99214 OFFICE O/P EST MOD 30 MIN: CPT | Performed by: INTERNAL MEDICINE

## 2024-08-29 RX ORDER — HYDROCODONE BITARTRATE AND ACETAMINOPHEN 5; 325 MG/1; MG/1
1 TABLET ORAL EVERY 6 HOURS PRN
Qty: 60 TABLET | Refills: 0 | Status: SHIPPED | OUTPATIENT
Start: 2024-08-29

## 2024-08-29 RX ORDER — LEVOCETIRIZINE DIHYDROCHLORIDE 5 MG/1
5 TABLET, FILM COATED ORAL DAILY
Qty: 90 TABLET | Refills: 2 | Status: SHIPPED | OUTPATIENT
Start: 2024-08-29

## 2024-08-29 RX ORDER — ZOLPIDEM TARTRATE 10 MG/1
10 TABLET ORAL NIGHTLY PRN
Qty: 90 TABLET | Refills: 1 | Status: SHIPPED | OUTPATIENT
Start: 2024-08-29

## 2024-08-29 RX ORDER — PROCHLORPERAZINE MALEATE 10 MG
10 TABLET ORAL EVERY 6 HOURS PRN
Qty: 30 TABLET | Refills: 1 | Status: SHIPPED | OUTPATIENT
Start: 2024-08-29

## 2024-08-29 RX ORDER — DULOXETIN HYDROCHLORIDE 30 MG/1
90 CAPSULE, DELAYED RELEASE ORAL DAILY
Qty: 270 CAPSULE | Refills: 2 | Status: SHIPPED | OUTPATIENT
Start: 2024-08-29

## 2024-08-29 RX ORDER — MECOBALAMIN 5000 MCG
15 TABLET,DISINTEGRATING ORAL DAILY
Qty: 90 CAPSULE | Refills: 2 | Status: SHIPPED | OUTPATIENT
Start: 2024-08-29

## 2024-08-29 RX ORDER — LORAZEPAM 0.5 MG/1
0.5 TABLET ORAL EVERY 8 HOURS PRN
Qty: 90 TABLET | Refills: 1 | Status: SHIPPED | OUTPATIENT
Start: 2024-08-29

## 2024-08-29 RX ORDER — LEVOTHYROXINE SODIUM 75 UG/1
75 TABLET ORAL DAILY
Qty: 90 TABLET | Refills: 3 | Status: SHIPPED | OUTPATIENT
Start: 2024-08-29

## 2024-08-29 RX ORDER — ROPINIROLE 1 MG/1
1 TABLET, FILM COATED ORAL NIGHTLY
Qty: 90 TABLET | Refills: 1 | Status: SHIPPED | OUTPATIENT
Start: 2024-08-29

## 2024-08-29 SDOH — SOCIAL STABILITY - SOCIAL INSECURITY: DEPENDENT RELATIVE NEEDING CARE AT HOME: Z63.6

## 2024-08-29 NOTE — PROGRESS NOTES
"Shanel Wall  1950  [id number]  Patient Care Team:  Fili Ray MD as PCP - General (Internal Medicine)  Cristiane Araiza MD as Consulting Physician (Endocrinology)  Nain Alvarenga MD as Consulting Physician (Pain Medicine)  Radha Prather [facility] as  (Behavioral Health)  Iggy Antonio MD as Consulting Physician (Rheumatology)    Shanel Wall is a 73 y.o. female here today for follow up.     This patient is accompanied by their self who contributes to the history of their care.    Chief Complaint:    Chief Complaint   Patient presents with    Anxiety    Eczema     Back of Lt hand    Temporomandibular Joint Pain        History of Present Illness:  I have reviewed and/or updated the patient's past medical, past surgical, family, social history, problem list and allergies as appropriate.     Her anxiety has stablized- using ativan only prn during the day but will take at night.    Increased SI pain, had difficult time standing- this has improved. She has had injections in the past. ( She is wondering about surgery - if it exists.     Having rough scaly patches on back of left wrist.  She has seen Dr. Weiss in the past for derm    Review of Systems   Constitutional: Negative.    Eyes: Negative.    Respiratory: Negative.     Genitourinary: Negative.    Musculoskeletal:  Positive for arthralgias and back pain.   Psychiatric/Behavioral:  Positive for sleep disturbance. The patient is nervous/anxious.        Vitals:    08/29/24 1347   BP: 122/82   Pulse: 70   SpO2: 98%   Weight: 58.3 kg (128 lb 9.6 oz)   Height: 148.6 cm (58.5\")     Body mass index is 26.42 kg/m².    Physical Exam  Vitals and nursing note reviewed.   Constitutional:       General: She is not in acute distress.     Appearance: She is well-developed. She is not diaphoretic.   HENT:      Head: Normocephalic and atraumatic.      Right Ear: External ear normal.      Left Ear: External ear normal.      " Mouth/Throat:      Pharynx: No oropharyngeal exudate.   Eyes:      General: No scleral icterus.        Right eye: No discharge.      Conjunctiva/sclera: Conjunctivae normal.   Neck:      Thyroid: No thyromegaly.      Vascular: No JVD.      Trachea: No tracheal deviation.   Cardiovascular:      Rate and Rhythm: Normal rate and regular rhythm.      Heart sounds: Normal heart sounds.      Comments: PMI nondisplaced  Pulmonary:      Effort: Pulmonary effort is normal.      Breath sounds: Normal breath sounds. No wheezing or rales.   Abdominal:      General: Bowel sounds are normal.      Palpations: Abdomen is soft.      Tenderness: There is no abdominal tenderness. There is no guarding or rebound.   Musculoskeletal:      Cervical back: Normal range of motion and neck supple.      Right lower leg: No edema.      Left lower leg: No edema.      Comments: .  Very tender over her SI joints.  Marked restricted range of motion with forward flexion at the waist rotation and extension   Lymphadenopathy:      Cervical: No cervical adenopathy.   Skin:     General: Skin is warm and dry.      Capillary Refill: Capillary refill takes less than 2 seconds.      Coloration: Skin is not pale.      Findings: No rash.   Neurological:      Mental Status: She is alert and oriented to person, place, and time.      Motor: No abnormal muscle tone.      Coordination: Coordination normal.      Gait: Gait abnormal.   Psychiatric:         Behavior: Behavior normal.         Judgment: Judgment normal.         Procedures    Results Review:    I reviewed the patient's new clinical results.  Reviewed UDS on file since he has a well    Assessment/Plan:    Problem List Items Addressed This Visit       Rheumatoid arthritis involving multiple sites    Relevant Medications    meloxicam (MOBIC) 15 MG tablet    predniSONE (DELTASONE) 5 MG tablet    Enbrel 50 MG/ML injection    zolpidem (AMBIEN) 10 MG tablet    HYDROcodone-acetaminophen (NORCO) 5-325 MG per  tablet    Rheumatoid arthritis with positive rheumatoid factor    Relevant Medications    meloxicam (MOBIC) 15 MG tablet    predniSONE (DELTASONE) 5 MG tablet    Enbrel 50 MG/ML injection    zolpidem (AMBIEN) 10 MG tablet    HYDROcodone-acetaminophen (NORCO) 5-325 MG per tablet    Other Relevant Orders    MRI Pelvis Without Contrast    Anxiety    Relevant Medications    LORazepam (ATIVAN) 0.5 MG tablet     Other Visit Diagnoses       Sacroiliac joint dysfunction of both sides    -  Primary    Relevant Medications    HYDROcodone-acetaminophen (NORCO) 5-325 MG per tablet    Other Relevant Orders    MRI Pelvis Without Contrast    Caregiver stress        Relevant Medications    LORazepam (ATIVAN) 0.5 MG tablet    Abnormal thyroid function test        Relevant Medications    levothyroxine (SYNTHROID, LEVOTHROID) 75 MCG tablet    Medication monitoring encounter        Relevant Medications    zolpidem (AMBIEN) 10 MG tablet    RLS (restless legs syndrome)        Relevant Medications    rOPINIRole (REQUIP) 1 MG tablet            Plan of care reviewed with patient at the conclusion of today's visit. Education was provided regarding diagnosis and management.  Patient verbalizes understanding of and agreement with management plan.    Return for Next scheduled follow up.    Fili Ray MD      Please note than portions of this note were completed wt a Voice Recognition Program

## 2024-09-04 ENCOUNTER — TELEMEDICINE (OUTPATIENT)
Dept: PSYCHIATRY | Facility: CLINIC | Age: 74
End: 2024-09-04
Payer: MEDICARE

## 2024-09-04 DIAGNOSIS — F41.1 GAD (GENERALIZED ANXIETY DISORDER): Primary | ICD-10-CM

## 2024-09-04 DIAGNOSIS — F33.1 MDD (MAJOR DEPRESSIVE DISORDER), RECURRENT EPISODE, MODERATE: ICD-10-CM

## 2024-09-04 PROCEDURE — 90834 PSYTX W PT 45 MINUTES: CPT | Performed by: COUNSELOR

## 2024-09-04 NOTE — PROGRESS NOTES
Date: September 4, 2024  Time In: 1:58  Time out: 2:48      This provider is located at the [facility]), [address], [place] [address] using a secure [employer] Video Visit through [employer]. Patient is being seen remotely via telehealth at home address in [place] and stated they are in a secure environment for this session. The patient's condition being diagnosed/treated is appropriate for telemedicine. The provider identified herself as well as her credentials. The patient, and/or patients guardian, consent to be seen remotely, and when consent is given they understand that the consent allows for patient identifiable information to be sent to a third party as needed. They may refuse to be seen remotely at any time. The electronic data is encrypted and password protected, and the patient and/or guardian has been advised of the potential risks to privacy not withstanding such measures.     You have chosen to receive care through a telehealth visit.  Do you consent to use a video/audio connection for your medical care today? Yes    Subjective   Shanel Wall is a 73 y.o. female who presents today fully oriented, appropriately dressed and groomed, and open to communication for follow up appointment.    Chief Complaint:   Chief Complaint   Patient presents with    Anxiety    Sleeping Problem    Pain        History of Present Illness: Rapport was established through conversation and unconditional positive regard. Recent events were discussed and how these events impact Pt's emotional health.   Pt reports successful use of learned coping skills 6 out of 10 times since last report.  Pt reports continuation of symptoms and states the intensity and duration of symptoms has remained unchanged since last report. Pt rates anxiety at 4/10 and depression at 3/10.     Sleep: Pt reports sleep has remained unchanged since last report. Healthy sleep habits  "were discussed such as maintaining a schedule, routine, avoiding caffeine, and limiting screen time before bed.    Appetite:  Pt reports appetite has been good since last report.  Discussed the importance of hydration and eating a healthy diet for overall and mental health.    Medication compliance: Pt reports medications are being taken daily as prescribed. Discussed the importance of compliance with prescriber's directions and Pt was instructed to report questions/concerns, as well as missed doses or discontinuation of medication by Pt.     Safety Plan in Place: No Pt denies SI/HI/SIB recent or current    Daily Functioning:  Since last report, Pt states symptoms are causing Moderate difficulty in daily functioning.      Content Discussion:  Pt states she continues to have some frustration with managing her own health issues and trying to help  with his dementia-related issues. States she gets short-tempered and \"snips\" when she has to repeat things numerous times. Pt's feelings were normalized and validated and Pt was assured this was a normal part of caregiver stress and guilt.  Pt states she continues to work on purging household items and packing for their eventually move to be in the same area with her sister. Pt states she does not have a timeframe and will just let things progress naturally. Pt was praised for her patience and ability to allow organic progress of events.      Counselor supported Pt in continued exploration of underlying belief systems which contribute to difficulty in connecting/vulnerability.  Through discussion, Pt identifies themes of preservation and overt emotional and physical reactivity when physical and/or emotional safety is in question, even in low or perceived threatening situations. Counselor supported Pt in identifying past experiences and underlying beliefs which contribute to these feelings and reactions.  Pt was supported and encouraged in processing emotions related " to frustrations with the expectations of self and others.  Pt was encouraged to identify cultural and nuclear familial contexts which contribute to these concerns.  Pt was receptive and engaged in conversation, and Pt reports this was beneficial and applicable to their situation.      Reviewed coping skills and encouraged Pt to continue to practicing coping skills daily when not under distress. Pt was praised for their attempts to decrease symptoms and mitigate activating events.    Clinical Maneuvering/Intervention:  CBT was utilized to encourage Pt to identify maladaptive thoughts and behaviors and replace with more affirming and positive.Pt encouraged to set and maintain appropriate and healthy boundaries with others. Pt was instructed to practice daily using appropriate and specific words to communicate feelings to others.  Motivational interviewing used to encourage Pt to identify strengths which can be utilized in working toward treatment goals. Pt encouraged to practice daily learned skills such as controlled breathing, grounding, and mindfulness. Pt was encouraged to ask for help from support persons to assist them in maintaining stability and alleviate symptoms. Discussed the importance of being one's own mental health advocate and to refrain from seeing the need to ask for help as a weakness. Pt was encouraged to formulate a plan of action to be more proactive in managing stressors and refrain from using reactive and automatic heightened emotional responses.     Solution-focused therapy employed to identify how Pt would like their life to be if they were to make positive changes. Pt encouraged to identify effective coping skills and strengths they can use to continue utilizing those skills. Pt encouraged to discontinue utilizing non-effective coping mechanisms. Pt provided with feedback to highlight achievements and personal and other resources. Encouraged use of SMART goals    Assisted patient in  processing above session content; acknowledged and normalized patient’s thoughts, feelings, and concerns.  Rationalized patient thought process regarding concerns presented at session.  Discussed triggers associated with patient's  anxiety  Also discussed coping skills for patient to implement such as mindfulness , self care , and positive self talk     Allowed patient to freely discuss issues without interruption or judgment. Provided safe, confidential environment to facilitate the development of positive therapeutic relationship and encourage open, honest communication. Assisted patient in identifying risk factors which would indicate the need for higher level of care including thoughts to harm self or others and/or self-harming behavior and encouraged patient to contact this office, call 911, or present to the nearest emergency room should any of these events occur. Discussed crisis intervention services and means to access. Patient adamantly and convincingly denies current suicidal or homicidal ideation or perceptual disturbance.    Assessment:     Patient appears to maintain relative stability as compared to their baseline.  However, patient persistently struggles with symptoms which continue to cause impairment in important areas of functioning.  As a result, they can be reasonably expected to continue to benefit from treatment and would likely be at increased risk for decompensation otherwise.      PHQ-Score Total:  PHQ-9 Total Score: Prior to or during assessment Pt began to verbally process feelings over recent events, and Counselor allowed Pt to continue processing without interruption. Pt denies recent or current presence of thoughts of wanting to harm self or end their life.     Note:  during completion of PHQ-9 Pt's  came in and Pt had to get off the call shortly after.    SIVAKUMAR-7 Score Total:         Mental Status Exam:   Hygiene:   good  Cooperation:  Cooperative  Eye Contact:  Good  Psychomotor  Behavior:  Appropriate  Affect:  Full range  Mood: normal  Speech:  Normal  Thought Process:  Goal directed  Thought Content:  Normal  Suicidal:  None  Homicidal: None  Hallucinations:  None  Delusion: None  Memory:  Intact  Orientation:  Grossly Intact  Reliability:  good  Insight:  Good  Judgement:  Good  Impulse Control:  Good  Physical/Medical Issues:  No        Functional Status: Moderate impairment     Progress toward goal: Not at goal    Prognosis: Good with continued therapeutic intervention        Plan:    Patient will continue in individual outpatient therapy with focus on improved functioning and coping skills, maintaining stability, and avoiding decompensation and the need for higher level of care.    Patient will adhere to medication regimen as prescribed and report any side effects. Patient will contact this office, call 911 or present to the nearest emergency room should suicidal or homicidal ideations occur. Provide Cognitive Behavioral Therapy and Solution Focused Therapy to improve functioning, maintain stability, and avoid decompensation and the need for higher level of care.     Return in about 3 weeks (around 9/25/2024).      VISIT DIAGNOSIS:    Diagnosis Plan   1. SIVAKUMAR (generalized anxiety disorder)        2. MDD (major depressive disorder), recurrent episode, moderate         13:58 EDT       This document has been electronically signed by CHELY Cunningham  September 4, 2024      Part of this note may be an electronic transcription/translation of spoken language to printed text using the Dragon Dictation System.

## 2024-09-05 ENCOUNTER — TELEPHONE (OUTPATIENT)
Age: 74
End: 2024-09-05
Payer: MEDICARE

## 2024-09-05 NOTE — TELEPHONE ENCOUNTER
This patient called inquiring about Prolia. I see in your note you mentioned wanting to pursue Prolia instead of Reclast, but we did not get a message regarding this. Would you like us to get the pt set up for Prolia? Thank you!

## 2024-09-06 RX ORDER — ROPINIROLE 0.25 MG/1
TABLET, FILM COATED ORAL
Qty: 90 TABLET | Refills: 2 | OUTPATIENT
Start: 2024-09-06

## 2024-09-16 DIAGNOSIS — M81.0 OSTEOPOROSIS WITHOUT CURRENT PATHOLOGICAL FRACTURE, UNSPECIFIED OSTEOPOROSIS TYPE: Primary | ICD-10-CM

## 2024-09-16 DIAGNOSIS — Z79.899 HIGH RISK MEDICATION USE: ICD-10-CM

## 2024-09-16 DIAGNOSIS — R53.83 FATIGUE, UNSPECIFIED TYPE: ICD-10-CM

## 2024-09-25 ENCOUNTER — HOSPITAL ENCOUNTER (OUTPATIENT)
Dept: MRI IMAGING | Facility: HOSPITAL | Age: 74
Discharge: HOME | End: 2024-09-25
Admitting: INTERNAL MEDICINE
Payer: MEDICARE

## 2024-09-25 DIAGNOSIS — M53.3 SACROILIAC JOINT DYSFUNCTION OF BOTH SIDES: ICD-10-CM

## 2024-09-25 DIAGNOSIS — M05.9 RHEUMATOID ARTHRITIS WITH POSITIVE RHEUMATOID FACTOR, INVOLVING UNSPECIFIED SITE: ICD-10-CM

## 2024-09-25 PROCEDURE — 72195 MRI PELVIS W/O DYE: CPT

## 2024-09-26 ENCOUNTER — TELEMEDICINE (OUTPATIENT)
Dept: PSYCHIATRY | Facility: CLINIC | Age: 74
End: 2024-09-26
Payer: MEDICARE

## 2024-09-26 DIAGNOSIS — F33.1 MDD (MAJOR DEPRESSIVE DISORDER), RECURRENT EPISODE, MODERATE: ICD-10-CM

## 2024-09-26 DIAGNOSIS — F41.1 GAD (GENERALIZED ANXIETY DISORDER): Primary | ICD-10-CM

## 2024-10-07 ENCOUNTER — TELEPHONE (OUTPATIENT)
Dept: FAMILY MEDICINE CLINIC | Facility: CLINIC | Age: 74
End: 2024-10-07
Payer: MEDICARE

## 2024-10-07 NOTE — TELEPHONE ENCOUNTER
Caller: Shanel Wall    Relationship: Self    Best call back number: [phone]     What orders are you requesting (i.e. lab or imaging): CHOLESTEROL AND FASTING SUGAR    In what timeframe would the patient need to come in: ASAP        Additional notes: PATIENT REQUEST THE ABOVE TO BE SUBMITTED ASAP

## 2024-10-08 DIAGNOSIS — R73.9 HYPERGLYCEMIA: Primary | ICD-10-CM

## 2024-10-08 DIAGNOSIS — E78.5 DYSLIPIDEMIA: ICD-10-CM

## 2024-10-08 NOTE — TELEPHONE ENCOUNTER
Left message for Shanel Wall  to return my call.    Hub may relay message and document    Fili Ray MD  You1 minute ago (12:47 PM)       Order in

## 2024-10-10 ENCOUNTER — LAB (OUTPATIENT)
Dept: LAB | Facility: HOSPITAL | Age: 74
End: 2024-10-10
Payer: MEDICARE

## 2024-10-10 DIAGNOSIS — M05.79 RHEUMATOID ARTHRITIS INVOLVING MULTIPLE SITES WITH POSITIVE RHEUMATOID FACTOR: ICD-10-CM

## 2024-10-10 DIAGNOSIS — M81.0 OSTEOPOROSIS WITHOUT CURRENT PATHOLOGICAL FRACTURE, UNSPECIFIED OSTEOPOROSIS TYPE: ICD-10-CM

## 2024-10-10 DIAGNOSIS — R53.83 FATIGUE, UNSPECIFIED TYPE: ICD-10-CM

## 2024-10-10 DIAGNOSIS — E78.5 DYSLIPIDEMIA: ICD-10-CM

## 2024-10-10 DIAGNOSIS — R73.9 HYPERGLYCEMIA: ICD-10-CM

## 2024-10-10 DIAGNOSIS — Z79.899 HIGH RISK MEDICATION USE: ICD-10-CM

## 2024-10-10 LAB
BASOPHILS # BLD AUTO: 0.04 10*3/MM3 (ref 0–0.2)
BASOPHILS NFR BLD AUTO: 0.7 % (ref 0–1.5)
DEPRECATED RDW RBC AUTO: 45.5 FL (ref 37–54)
EOSINOPHIL # BLD AUTO: 0.24 10*3/MM3 (ref 0–0.4)
EOSINOPHIL NFR BLD AUTO: 4.2 % (ref 0.3–6.2)
ERYTHROCYTE [DISTWIDTH] IN BLOOD BY AUTOMATED COUNT: 13 % (ref 12.3–15.4)
ERYTHROCYTE [SEDIMENTATION RATE] IN BLOOD: 15 MM/HR (ref 0–30)
HCT VFR BLD AUTO: 45.4 % (ref 34–46.6)
HGB BLD-MCNC: 15.2 G/DL (ref 12–15.9)
IMM GRANULOCYTES # BLD AUTO: 0.01 10*3/MM3 (ref 0–0.05)
IMM GRANULOCYTES NFR BLD AUTO: 0.2 % (ref 0–0.5)
LYMPHOCYTES # BLD AUTO: 2.38 10*3/MM3 (ref 0.7–3.1)
LYMPHOCYTES NFR BLD AUTO: 41.2 % (ref 19.6–45.3)
MCH RBC QN AUTO: 32.4 PG (ref 26.6–33)
MCHC RBC AUTO-ENTMCNC: 33.5 G/DL (ref 31.5–35.7)
MCV RBC AUTO: 96.8 FL (ref 79–97)
MONOCYTES # BLD AUTO: 0.48 10*3/MM3 (ref 0.1–0.9)
MONOCYTES NFR BLD AUTO: 8.3 % (ref 5–12)
NEUTROPHILS NFR BLD AUTO: 2.63 10*3/MM3 (ref 1.7–7)
NEUTROPHILS NFR BLD AUTO: 45.4 % (ref 42.7–76)
NRBC BLD AUTO-RTO: 0 /100 WBC (ref 0–0.2)
PLATELET # BLD AUTO: 235 10*3/MM3 (ref 140–450)
PMV BLD AUTO: 9.4 FL (ref 6–12)
RBC # BLD AUTO: 4.69 10*6/MM3 (ref 3.77–5.28)
WBC NRBC COR # BLD AUTO: 5.78 10*3/MM3 (ref 3.4–10.8)

## 2024-10-10 PROCEDURE — 82306 VITAMIN D 25 HYDROXY: CPT

## 2024-10-10 PROCEDURE — 85025 COMPLETE CBC W/AUTO DIFF WBC: CPT

## 2024-10-10 PROCEDURE — 86140 C-REACTIVE PROTEIN: CPT

## 2024-10-10 PROCEDURE — 36415 COLL VENOUS BLD VENIPUNCTURE: CPT

## 2024-10-10 PROCEDURE — 80061 LIPID PANEL: CPT

## 2024-10-10 PROCEDURE — 80053 COMPREHEN METABOLIC PANEL: CPT

## 2024-10-10 PROCEDURE — 85652 RBC SED RATE AUTOMATED: CPT

## 2024-10-11 ENCOUNTER — TELEPHONE (OUTPATIENT)
Age: 74
End: 2024-10-11
Payer: MEDICARE

## 2024-10-11 LAB
25(OH)D3 SERPL-MCNC: 41.8 NG/ML (ref 30–100)
ALBUMIN SERPL-MCNC: 4.2 G/DL (ref 3.5–5.2)
ALBUMIN/GLOB SERPL: 1.5 G/DL
ALP SERPL-CCNC: 97 U/L (ref 39–117)
ALT SERPL W P-5'-P-CCNC: 23 U/L (ref 1–33)
ANION GAP SERPL CALCULATED.3IONS-SCNC: 10 MMOL/L (ref 5–15)
AST SERPL-CCNC: 30 U/L (ref 1–32)
BILIRUB SERPL-MCNC: 0.5 MG/DL (ref 0–1.2)
BUN SERPL-MCNC: 16 MG/DL (ref 8–23)
BUN/CREAT SERPL: 20.5 (ref 7–25)
CALCIUM SPEC-SCNC: 9.8 MG/DL (ref 8.6–10.5)
CHLORIDE SERPL-SCNC: 103 MMOL/L (ref 98–107)
CHOLEST SERPL-MCNC: 226 MG/DL (ref 0–200)
CO2 SERPL-SCNC: 31 MMOL/L (ref 22–29)
CREAT SERPL-MCNC: 0.78 MG/DL (ref 0.57–1)
CRP SERPL-MCNC: <0.3 MG/DL (ref 0–0.5)
EGFRCR SERPLBLD CKD-EPI 2021: 81.3 ML/MIN/1.73
GLOBULIN UR ELPH-MCNC: 2.8 GM/DL
GLUCOSE SERPL-MCNC: 79 MG/DL (ref 65–99)
HDLC SERPL-MCNC: 72 MG/DL (ref 40–60)
LDLC SERPL CALC-MCNC: 130 MG/DL (ref 0–100)
LDLC/HDLC SERPL: 1.75 {RATIO}
POTASSIUM SERPL-SCNC: 3.9 MMOL/L (ref 3.5–5.2)
PROT SERPL-MCNC: 7 G/DL (ref 6–8.5)
SODIUM SERPL-SCNC: 144 MMOL/L (ref 136–145)
TRIGL SERPL-MCNC: 140 MG/DL (ref 0–150)
VLDLC SERPL-MCNC: 24 MG/DL (ref 5–40)

## 2024-10-17 ENCOUNTER — TELEMEDICINE (OUTPATIENT)
Dept: PSYCHIATRY | Facility: CLINIC | Age: 74
End: 2024-10-17
Payer: MEDICARE

## 2024-10-17 DIAGNOSIS — F33.1 MDD (MAJOR DEPRESSIVE DISORDER), RECURRENT EPISODE, MODERATE: ICD-10-CM

## 2024-10-17 DIAGNOSIS — F41.1 GAD (GENERALIZED ANXIETY DISORDER): Primary | ICD-10-CM

## 2024-10-17 NOTE — PROGRESS NOTES
Date: October 19, 2024  Time In: 3:30  Time out: 4:51      This provider is located at the [facility]), [address], [place] [address] using a secure [employer] Video Visit through [employer]. Patient is being seen remotely via telehealth at home address in [place] and stated they are in a secure environment for this session. The patient's condition being diagnosed/treated is appropriate for telemedicine. The provider identified herself as well as her credentials. The patient, and/or patients guardian, consent to be seen remotely, and when consent is given they understand that the consent allows for patient identifiable information to be sent to a third party as needed. They may refuse to be seen remotely at any time. The electronic data is encrypted and password protected, and the patient and/or guardian has been advised of the potential risks to privacy not withstanding such measures.     You have chosen to receive care through a telehealth visit.  Do you consent to use a video/audio connection for your medical care today? Yes    Subjective   Shanel Wall is a 73 y.o. female who presents today fully oriented, appropriately dressed and groomed, and open to communication for follow up appointment.    Chief Complaint:   Chief Complaint   Patient presents with    Anxiety    Depression    Sleeping Problem        History of Present Illness: Rapport was established through conversation and unconditional positive regard. Recent events were discussed and how these events impact Pt's emotional health.   Pt reports successful use of learned coping skills 6 out of 10 times since last report.  Pt reports continuation of symptoms and states the intensity and duration of symptoms has remained unchanged since last report. Pt rates anxiety at 5/10 and depression at 2/10.     Sleep: Pt reports sleep has remained unchanged since last report. Healthy sleep  habits were discussed such as maintaining a schedule, routine, avoiding caffeine, and limiting screen time before bed.    Appetite:  Pt reports appetite has been good since last report.  Discussed the importance of hydration and eating a healthy diet for overall and mental health.    Medication compliance: Pt reports medications are being taken daily as prescribed. Discussed the importance of compliance with prescriber's directions and Pt was instructed to report questions/concerns, as well as missed doses or discontinuation of medication by Pt.     Safety Plan in Place: No Pt denies SI/HI/SIB recent or current    Daily Functioning:  Since last report, Pt states symptoms are causing Mild difficulty in daily functioning.      Content Discussion:   Pt reports life updates since previous session. Pt identified current stressors. Pt acknowledged stressors within and outside of one's control. Pt identified successes and issues with utilizing coping mechanisms.  Pt states she has been having recurring dreams, and she feels the dreams related to her having a lot of unresolved stressors.  Pt states she continues to work on purging items and going through paperwork and files in preparation for her and 's move to be near her sister in [place].  States she gets frustrated with 's dementia issues and since he is no longer driving Pt is reliant on others to get around.  Pt reports she does have a housekeeping/friend who comes three days a week and helps Pt.  Pt was allowed to freely and openly process feelings associated with her situation and feelings were validated and normalized.       Counselor supported Pt in continued exploration of underlying belief systems which contribute to difficulty in connecting/vulnerability.  Through discussion, Pt identifies themes of preservation and overt emotional and physical reactivity when physical and/or emotional statis is in question, even in low or perceived threatening  situations. Counselor supported Pt in identifying past experiences and underlying beliefs which contribute to these feelings and reactions.  Pt was supported and encouraged in processing emotions related to frustrations with the expectations of self and others.  Pt was encouraged to identify cultural and nuclear familial contexts which contribute to these concerns.  Pt was receptive and engaged in conversation, and Pt reports this was beneficial and applicable to their situation.      Reviewed coping skills and encouraged Pt to continue to practicing coping skills daily when not under distress. Pt was praised for their attempts to decrease symptoms and mitigate activating events.    Clinical Maneuvering/Intervention:  CBT was utilized to encourage Pt to identify maladaptive thoughts and behaviors and replace with more affirming and positive.Pt encouraged to set and maintain appropriate and healthy boundaries with others. Pt was instructed to practice daily using appropriate and specific words to communicate feelings to others.  Motivational interviewing used to encourage Pt to identify strengths which can be utilized in working toward treatment goals. Pt encouraged to practice daily learned skills such as controlled breathing, grounding, and mindfulness. Pt was encouraged to ask for help from support persons to assist them in maintaining stability and alleviate symptoms. Discussed the importance of being one's own mental health advocate and to refrain from seeing the need to ask for help as a weakness. Pt was encouraged to formulate a plan of action to be more proactive in managing stressors and refrain from using reactive and automatic heightened emotional responses.     Solution-focused therapy employed to identify how Pt would like their life to be if they were to make positive changes. Pt encouraged to identify effective coping skills and strengths they can use to continue utilizing those skills. Pt encouraged  to discontinue utilizing non-effective coping mechanisms. Pt provided with feedback to highlight achievements and personal and other resources. Encouraged use of SMART goals    Assisted patient in processing above session content; acknowledged and normalized patient’s thoughts, feelings, and concerns.  Rationalized patient thought process regarding concerns presented at session.  Discussed triggers associated with patient's  anxiety  Also discussed coping skills for patient to implement such as controlled breathing , grounding , and mindfulness     Allowed patient to freely discuss issues without interruption or judgment. Provided safe, confidential environment to facilitate the development of positive therapeutic relationship and encourage open, honest communication. Assisted patient in identifying risk factors which would indicate the need for higher level of care including thoughts to harm self or others and/or self-harming behavior and encouraged patient to contact this office, call 911, or present to the nearest emergency room should any of these events occur. Discussed crisis intervention services and means to access. Patient adamantly and convincingly denies current suicidal or homicidal ideation or perceptual disturbance.    Assessment:     Patient appears to maintain relative stability as compared to their baseline.  However, patient persistently struggles with symptoms which continue to cause impairment in important areas of functioning.  As a result, they can be reasonably expected to continue to benefit from treatment and would likely be at increased risk for decompensation otherwise.      PHQ-Score Total:  PHQ-9 Total Score: 0   Error when completing      SIVAKUMAR-7 Score Total:         Mental Status Exam:   Hygiene:   good  Cooperation:  Cooperative  Eye Contact:  Good  Psychomotor Behavior:  Appropriate  Affect:  Full range   Pt was able to laugh and smile at appropriate times  Mood: normal  Speech:   Normal  Thought Process:  Goal directed  Thought Content:  Normal  Suicidal:  None  Homicidal: None  Hallucinations:  None  Delusion: None  Memory:  Intact  Orientation:  Grossly Intact  Reliability:  good  Insight:  Good  Judgement:  Good  Impulse Control:  Good  Physical/Medical Issues:   Pt's poor vision        Functional Status: Mild impairment     Progress toward goal: Not at goal    Prognosis: Good with continued therapeutic intervention        Plan:    Patient will continue in individual outpatient therapy with focus on improved functioning and coping skills, maintaining stability, and avoiding decompensation and the need for higher level of care.    Patient will adhere to medication regimen as prescribed and report any side effects. Patient will contact this office, call 911 or present to the nearest emergency room should suicidal or homicidal ideations occur. Provide Cognitive Behavioral Therapy and Solution Focused Therapy to improve functioning, maintain stability, and avoid decompensation and the need for higher level of care.     Return in about 2 weeks (around 10/31/2024).      VISIT DIAGNOSIS:    Diagnosis Plan   1. SIVAKUMAR (generalized anxiety disorder)        2. MDD (major depressive disorder), recurrent episode, moderate         15:30 EDT       This document has been electronically signed by CHELY Cunningham  October 19, 2024      Part of this note may be an electronic transcription/translation of spoken language to printed text using the Dragon Dictation System.

## 2024-10-18 ENCOUNTER — TELEPHONE (OUTPATIENT)
Age: 74
End: 2024-10-18
Payer: MEDICARE

## 2024-10-18 ENCOUNTER — SPECIALTY PHARMACY (OUTPATIENT)
Age: 74
End: 2024-10-18
Payer: MEDICARE

## 2024-10-18 NOTE — TELEPHONE ENCOUNTER
Patient is scheduled for next Thursday, 10/24/24 for Prolia injection at our office. So we would need to order this for her.

## 2024-10-21 ENCOUNTER — SPECIALTY PHARMACY (OUTPATIENT)
Dept: GENERAL RADIOLOGY | Facility: HOSPITAL | Age: 74
End: 2024-10-21
Payer: MEDICARE

## 2024-10-21 RX ORDER — DENOSUMAB 60 MG/ML
60 INJECTION SUBCUTANEOUS
Qty: 1 ML | Refills: 0 | Status: SHIPPED | OUTPATIENT
Start: 2024-10-21 | End: 2024-10-22

## 2024-10-21 NOTE — PROGRESS NOTES
Specialty Pharmacy Patient Management Program  Rheumatology Initial Assessment     Shanel Wall was referred by an Rheumatology provider to the Rheumatology Patient Management program offered by [facility] for Osteoporosis on 10/21/24.  An initial outreach was conducted, including assessment of therapy appropriateness and specialty medication education for Prolia. The patient was introduced to services offered by [facility], including: regular assessments, refill coordination, curbside pick-up or mail order delivery options, prior authorization maintenance, and financial assistance programs as applicable. The patient was also provided with contact information for the pharmacy team.     Insurance Coverage & Financial Support  [employer]     Relevant Past Medical History and Comorbidities  Relevant medical history and concomitant health conditions were discussed with the patient. The patient's chart has been reviewed for relevant past medical history and comorbid conditions and updated as necessary.  Past Medical History:   Diagnosis Date    Abnormal EKG     Acromioclavicular separation     Anesthesia     PT HAS RHEUMATOID ARTHRITIS, PT HAS SPECIFIC INSTRUCTIONS PER RA MD, pt to bring INSTRUCTIONS AND RECS ATTACHED TO CHART AND PT TO DISCUSS WITH ANESTHESIA ON DATE OF PROCEDURE     Arthritis     Arthritis of back     Arthritis of neck     Back disorder     degenerative disc disorder    Baker's cyst of knee     right    Benign paroxysmal positional vertigo     Cervical cancer     cone biopsy    Cervical disc disorder     Depression     Deviated septum     Deviated septum     Difficulty in walking     Disease of thyroid gland     Diverticulosis     Encounter for long-term (current) use of high-risk medication     Extensor tendonitis of foot     Fatigue     Fibromyalgia     Finger pain, right     Fracture, clavicle     Fracture, foot     Fractured calcaneus   "   Gall stones     GERD (gastroesophageal reflux disease)     Hard to intubate     \"anterior trachea\"     History of gallstones     HL (hearing loss)     Hypoglycemia     Hypothyroidism     IBS (irritable bowel syndrome)     Joint pain     Knee swelling     Long term (current) use of non-steroidal anti-inflammatories (nsaid)     Long term (current) use of systemic steroids     Low back strain     Lumbosacral disc disease     Macular degeneration     Measles     Menopause     Multiple food allergies     Neck strain     Neuroma of foot     Osteoarthritis     Osteoarthritis (arthritis due to wear and tear of joints)     Osteoporosis     Periarthritis of shoulder     Popliteal cyst     Retinal disease, bilateral     Rheumatoid aortitis     Rheumatoid arthritis     Rheumatoid arthritis     Rotator cuff syndrome     Ruptured tendon     Scoliosis     Scoliosis     Seasonal allergies     Shingles     Shoulder fracture, left     Sleep apnea     Stress at home     Subcutaneous nodule     Suppression of immune system subtherapeutic     Synovitis     Synovitis     Tinnitus     Tinnitus     TMJ (dislocation of temporomandibular joint)     Vertigo     Wears glasses     Well woman exam with routine gynecological exam 09/12/2022     Social History     Socioeconomic History    Marital status:     Number of children: 1   Tobacco Use    Smoking status: Never    Smokeless tobacco: Never   Vaping Use    Vaping status: Never Used   Substance and Sexual Activity    Alcohol use: Yes     Alcohol/week: 2.0 standard drinks of alcohol     Types: 1 Glasses of wine, 1 Shots of liquor per week     Comment: DAILY    Drug use: Never    Sexual activity: Defer     Partners: Male       Problem list reviewed by Juani Hope, PharmD on 10/21/2024 at 11:18 AM    Allergies  Known allergies and reactions were discussed with the patient. The patient's chart has been reviewed for  allergy information and updated as necessary.   Allergies   Allergen " Reactions    Adhesive Tape Itching    Erythromycin Hives    Pseudoephedrine Hives       Allergies reviewed by Juani Hope, PharmD on 10/21/2024 at 11:18 AM    Relevant Laboratory Values  Relevant laboratory values were discussed with the patient. The following specialty medication dose adjustment(s) are recommended:     Lab Results   Component Value Date    HGBA1C 5.40 03/20/2018     Lab Results   Component Value Date    GLUCOSE 79 10/10/2024    CALCIUM 9.8 10/10/2024     10/10/2024    K 3.9 10/10/2024    CO2 31.0 (H) 10/10/2024     10/10/2024    BUN 16 10/10/2024    CREATININE 0.78 10/10/2024    EGFRIFNONA 60 (L) 09/02/2021    BCR 20.5 10/10/2024    ANIONGAP 10.0 10/10/2024     Lab Results   Component Value Date    CHOL 226 (H) 10/10/2024    TRIG 140 10/10/2024    HDL 72 (H) 10/10/2024     (H) 10/10/2024         Current Medication List  This medication list has been reviewed with the patient and evaluated for any interactions or necessary modifications/recommendations, and updated to include all prescription medications, OTC medications, and supplements the patient is currently taking.  This list reflects what is contained in the patient's profile, which has also been marked as reviewed to communicate to other providers it is the most up to date version of the patient's current medication therapy.     Current Outpatient Medications:     denosumab (Prolia) 60 MG/ML solution prefilled syringe syringe, Inject 1 mL under the skin into the appropriate area as directed Every 6 (Six) Months., Disp: 1 mL, Rfl: 0    aluminum hydroxide-mag carbonate (GAVISCON EXTRA RELIEF) 160-105 MG chewable tablet chewable tablet, Chew 1 tablet Daily As Needed., Disp: , Rfl:     Calcium Carb-Cholecalciferol (CALCIUM-VITAMIN D) 600-400 MG-UNIT tablet, Take 1 tablet by mouth 2 (Two) Times a Day., Disp: , Rfl:     Cyanocobalamin (VITAMIN B-12 PO), Take  by mouth., Disp: , Rfl:     DULoxetine (CYMBALTA) 30 MG capsule,  Take 3 capsules by mouth Daily., Disp: 270 capsule, Rfl: 2    Enbrel 50 MG/ML injection, Inject 1 mL under the skin into the appropriate area as directed 1 (One) Time Per Week., Disp: 4 mL, Rfl: 3    folic acid (FOLVITE) 1 MG tablet, Take 1 tablet by mouth Daily., Disp: 30 tablet, Rfl: 4    HYDROcodone-acetaminophen (NORCO) 5-325 MG per tablet, Take 1 tablet by mouth Every 6 (Six) Hours As Needed for Severe Pain., Disp: 60 tablet, Rfl: 0    lansoprazole (PREVACID) 15 MG capsule, Take 1 capsule by mouth Daily., Disp: 90 capsule, Rfl: 2    levocetirizine (XYZAL) 5 MG tablet, Take 1 tablet by mouth Daily., Disp: 90 tablet, Rfl: 2    levothyroxine (SYNTHROID, LEVOTHROID) 75 MCG tablet, Take 1 tablet by mouth Daily., Disp: 90 tablet, Rfl: 3    LORazepam (ATIVAN) 0.5 MG tablet, Take 1 tablet by mouth Every 8 (Eight) Hours As Needed for Anxiety., Disp: 90 tablet, Rfl: 1    meloxicam (MOBIC) 15 MG tablet, Take 1 tablet by mouth Daily., Disp: , Rfl:     methotrexate 2.5 MG tablet, Take 6 tablets by mouth 1 (One) Time Per Week. Take 6 tablets by oral route once every week, Disp: 72 tablet, Rfl: 0    Mucinex D  MG per 12 hr tablet, TAKE ONE TABLET BY MOUTH EVERY 12 HOURS, Disp: 36 tablet, Rfl: 0    Multiple Vitamins-Minerals (SENIOR MULTIVITAMIN PLUS) tablet, Take 1 tablet by mouth Daily., Disp: , Rfl:     NON FORMULARY, Take 2 tablets by mouth Daily. Hydro Eye gelcaps, Disp: , Rfl:     predniSONE (DELTASONE) 5 MG tablet, Take 1 tablet by mouth Daily., Disp: 30 tablet, Rfl: 3    prochlorperazine (COMPAZINE) 10 MG tablet, Take 1 tablet by mouth Every 6 (Six) Hours As Needed for Nausea or Vomiting., Disp: 30 tablet, Rfl: 1    Propylene Glycol (Systane Balance) 0.6 % solution, Apply 1 drop to eye(s) as directed by provider 2 (Two) Times a Day., Disp: , Rfl:     Pyridoxine HCl (VITAMIN B6 PO), Take  by mouth., Disp: , Rfl:     rOPINIRole (REQUIP) 1 MG tablet, Take 1 tablet by mouth Every Night. Take 1 hour before bedtime.,  Disp: 90 tablet, Rfl: 1    sodium chloride (SHILPA 128) 2 % ophthalmic solution, 1 drop., Disp: , Rfl:     vitamin A 8000 UNIT capsule, Take 1 capsule by mouth Daily., Disp: , Rfl:     vitamin C (ASCORBIC ACID) 500 MG tablet, Take 1 tablet by mouth Daily., Disp: , Rfl:     zolpidem (AMBIEN) 10 MG tablet, Take 1 tablet by mouth At Night As Needed for Sleep., Disp: 90 tablet, Rfl: 1    Medicines reviewed by Juani Hope, Bj on 10/21/2024 at 11:18 AM    Drug Interactions  Currently no medication interactions.    Initial Education Provided for Specialty Medication  The patient has been provided with the following education and any applicable administration techniques (i.e. self-injection) have been demonstrated for the therapies indicated. All questions and concerns have been addressed prior to the patient receiving the medication, and the patient has verbalized comprehension of the education and any materials provided. Additional patient education shall be provided and documented upon request by the patient, provider, or payer.    PROLIA® (denosumab)  Medication Expectations   Why am I taking this medication? You are taking this medication to help prevent bone fractures because you have osteoporosis.    What should I expect while on this medication? You should expect to see your bone mineral density increase over a period of 18 to 24 months. During this time, you should expect to get regular lab work as directed by your doctor to monitor your progress.    How does the medication work? Osteoporosis can reduce your bone density, increasing your risk for fractures. Prolia is a monoclonal antibody with affinity for nuclear factor-kappa ligand (RANKL).  Osteoblasts secrete RANKL and RANKL activates osteoclast precursors and subsequent osteolysis which promotes release of bone-derived growth factors and increase calcium levels.  Prolia bind to RANKL and blocks interaction of RANKL and RANK receptors located on  osteoclasts that lead to osteoclast formation.     How long will I be on this medication for? The amount of time you will be on this medication will be determined by your doctor based on your lab results.     How do I take this medication? Take as directed on your prescription label. Prolia is generally administered by a health care professional.    What are some possible side effects? The most common side effects include increased calcium in the body (hypercalcemia).  You may also experience back, muscle, or joint pain, headache, signs of a common cold, or pain in arms or legs.  Your doctor will monitor your calcium levels through regular lab work. Talk with your doctor if you notice these or other side effects that do not go away or get better with time.      What happens if I miss a dose? Will be administered by a health care professional.      Medication Safety   What are things I should warn my doctor immediately about? Tell your doctor if you experience confusion, mouth sores, swelling in the arms or legs, feeling very tried or weak, any new strange groin, hip, or thigh pain, very bad bone, joint, or muscle pain, shortness of breath,  jaw swelling or pain, infection like very bad sore throat, pancreatitis, skin infection, high or low blood pressure, chest pain, abnormal heartbeat, kasandra bumps or patches on skin, bladder pain, or passing more urine than usual. Talk to your doctor if you are pregnant, planning to become pregnant, or breastfeeding. Also tell your doctor if you notice any signs/symptoms of an allergic reaction (rash, hives, difficulty breathing, etc.) or blisters on your skin.     What are things that I should be aware of? Be cautious of any side effects from this medication. Talk to your doctor if any new ones develop or aren't getting better.     What are some medications that can interact with this one? There are no currently known medication interactions that would be serious.  However, this  does not mean that medication interactions cannot happen.  Always tell your doctor or pharmacist immediately if you start taking any new medications, including over-the-counter medications, vitamins, and herbal supplements.       Medication Storage/Handling   How should I handle this medication? Keep this medication out of reach of pets/children in the original container. Do not remove medication from the injector pen.  Use caution when administering medication as needles are required with use.     How does this medication need to be stored? Store in the refrigerator. It is important to avoid freezing the medication.  Remove from the refrigerator only when you are ready to inject your dose.    How should I dispose of this medication? Discard syringe into sharps container once used.       Resources/Support   How can I remind myself to take this medication? Since this medication must be administered in a healthcare setting, refills will be prompted by provider and clinic.    Is financial support available?  [employer] can provide co-pay cards if you have commercial insurance or patient assistance if you have Medicare or no insurance.    Which vaccines are recommended for me? Talk to your doctor about these vaccines: Flu, Coronavirus (COVID-19), Pneumococcal (pneumonia), Tdap, Hepatitis B, Zoster (shingles)        Adherence and Self-Administration  Adherence related to the patient's specialty therapy was discussed with the patient. The Adherence segment of this outreach has been reviewed and updated.     Is there a concern with patient's ability to self administer the medication correctly and without issue?: No  Were any potential barriers to adherence identified during the initial assessment or patient education?: No  Are there any concerns regarding the patient's understanding of the importance of medication adherence?: No  Methods for Supporting Patient Adherence and/or Self-Administration: n/a medication to be  administered in the office    Open Medication Therapy Problems  No medication therapy recommendations to display    Goals of Therapy  Goals related to the patient's specialty therapy were discussed with the patient. The Patient Goals segment of this outreach has been reviewed and updated.   Goals Addressed Today    None       Reassessment Plan & Follow-Up  1. Medication Therapy Changes: Prolia 60mg injection every months  2. Related Plans, Therapy Recommendations, or Therapy Problems to Be Addressed: patient has not questions at this time. I verified that the patient is taking a Calcium and Vitamin D supplement.  3. Pharmacist to perform regular assessments no more than (6) months from the previous assessment.  4. Care Coordinator to set up future refill outreaches, coordinate prescription delivery, and escalate clinical questions to pharmacist.  5. Welcome information and patient satisfaction survey to be sent by specialty pharmacy team with patient's initial fill.    Attestation  Therapeutic appropriateness: Appropriate   I attest the patient was actively involved in and has agreed to the above plan of care. If the prescribed therapy is at any point deemed not appropriate based on the current or future assessments, a consultation will be initiated with the patient's specialty care provider to determine the best course of action. The revised plan of therapy will be documented along with any required assessments and/or additional patient education provided.     Juani Hope, Bj, BCPS  Clinical Specialty Pharmacist, Rheumatology   10/21/2024  11:21 EDT

## 2024-10-22 RX ORDER — DENOSUMAB 60 MG/ML
60 INJECTION SUBCUTANEOUS
Qty: 1 ML | Refills: 0 | Status: SHIPPED | OUTPATIENT
Start: 2024-10-22

## 2024-10-23 ENCOUNTER — TRANSCRIBE ORDERS (OUTPATIENT)
Dept: ADMINISTRATIVE | Facility: HOSPITAL | Age: 74
End: 2024-10-23
Payer: MEDICARE

## 2024-10-23 DIAGNOSIS — Z12.31 SCREENING MAMMOGRAM FOR BREAST CANCER: Primary | ICD-10-CM

## 2024-10-24 ENCOUNTER — CLINICAL SUPPORT (OUTPATIENT)
Age: 74
End: 2024-10-24
Payer: MEDICARE

## 2024-10-24 VITALS
TEMPERATURE: 98 F | BODY MASS INDEX: 25.3 KG/M2 | WEIGHT: 125.5 LBS | HEART RATE: 85 BPM | DIASTOLIC BLOOD PRESSURE: 82 MMHG | HEIGHT: 59 IN | SYSTOLIC BLOOD PRESSURE: 144 MMHG

## 2024-10-24 DIAGNOSIS — M81.0 OSTEOPOROSIS WITHOUT CURRENT PATHOLOGICAL FRACTURE, UNSPECIFIED OSTEOPOROSIS TYPE: Primary | ICD-10-CM

## 2024-10-24 NOTE — PROGRESS NOTES
Patient came in today to receive Prolia 60mg/1mL injection. This was administered in the right upper arm. This was supplied via [facility] specialty pharmacy. LOE=[id number], EXP=2/27, JON=[id number]. Pt tolerated the injection well and vital signs were stable. Patient has not had Prolia since 2022 and is restarting due to adverse effects from Reclast. Pt's labs from 10/10/24 were stable. I provided pt with the Prolia teaching sheet. Pt denied any recent or upcoming dental work. Pt would like to wait to make her 6 month injection appt and she has a follow up scheduled with the provider. Pt left [facility] alert and oriented free of all s/s of a reaction and complaints.

## 2024-11-04 ENCOUNTER — TELEMEDICINE (OUTPATIENT)
Dept: PSYCHIATRY | Facility: CLINIC | Age: 74
End: 2024-11-04
Payer: MEDICARE

## 2024-11-04 DIAGNOSIS — F41.1 GAD (GENERALIZED ANXIETY DISORDER): Primary | ICD-10-CM

## 2024-11-04 NOTE — PROGRESS NOTES
Date: November 5, 2024  Time In: 2:20  Time out:  3:01      This provider is located at the [facility] (through [facility]), [address], [place] [address] using a secure [employer] Video Visit through [facility]. Patient is being seen remotely via telehealth at home address in [place] and stated they are in a secure environment for this session. The patient's condition being diagnosed/treated is appropriate for telemedicine. The provider identified herself as well as her credentials. The patient, and/or patients guardian, consent to be seen remotely, and when consent is given they understand that the consent allows for patient identifiable information to be sent to a third party as needed. They may refuse to be seen remotely at any time. The electronic data is encrypted and password protected, and the patient and/or guardian has been advised of the potential risks to privacy not withstanding such measures.     You have chosen to receive care through a telehealth visit.  Do you consent to use a video/audio connection for your medical care today? Yes    Subjective   Shanel Wall is a 73 y.o. female who presents today fully oriented, appropriately dressed and groomed, and open to communication for follow up appointment.    Chief Complaint:   Chief Complaint   Patient presents with    Anxiety     Due to issues with Pt logging in, this session conducted via phone only.    History of Present Illness: Rapport was established through conversation and unconditional positive regard. Recent events were discussed and how these events impact Pt's emotional health.   Pt reports successful use of learned coping skills 5 out of 10 times since last report.  Pt reports continuation of symptoms and states the intensity and duration of symptoms has remained unchanged since last report. Pt rates anxiety at 3510 and depression at 2/10.     Sleep: Pt reports sleep has remained  unchanged since last report. Healthy sleep habits were discussed such as maintaining a schedule, routine, avoiding caffeine, and limiting screen time before bed.    Appetite:  Pt reports appetite has been good since last report.  Discussed the importance of hydration and eating a healthy diet for overall and mental health.    Medication compliance: Pt reports medications are being taken daily as prescribed. Discussed the importance of compliance with prescriber's directions and Pt was instructed to report questions/concerns, as well as missed doses or discontinuation of medication by Pt.     Safety Plan in Place: No Pt denies SI/HI/SIB recent or current    Daily Functioning:  Since last report, Pt states symptoms are causing Mild difficulty in daily functioning.      Content Discussion:   Pt reports life updates since previous session. Pt identified current stressors. Pt acknowledged stressors within and outside of one's control. Pt identified successes and issues with utilizing coping mechanisms.  Pt is making appointments to looking at 3 different community and assisted living/memory care facility.  States she and  and a family friend are going to [place] to see assisted living options in two weeks. Pt reports she is not looking forward at all to the moving process and this is causing some anxiety, however, she will be glad to be in the same area as her sister. Pt also is looking forward to living in a place where she can socialize with friends while maintaining her privacy.      Counselor supported Pt in continued exploration of underlying belief systems which contribute to difficulty in connecting/vulnerability.  Through discussion, Pt identifies themes of preservation and overt emotional and physical reactivity when physical and/or emotional statis is in question, even in low or perceived threatening situations. Counselor supported Pt in identifying past experiences and underlying beliefs which  contribute to these feelings and reactions.  Pt was supported and encouraged in processing emotions related to frustrations with the expectations of self and others.  Pt was encouraged to identify cultural and nuclear familial contexts which contribute to these concerns.  Pt was receptive and engaged in conversation, and Pt reports this was beneficial and applicable to their situation.      Reviewed coping skills and encouraged Pt to continue to practicing coping skills daily when not under distress. Pt was praised for their attempts to decrease symptoms and mitigate activating events.    Clinical Maneuvering/Intervention:  CBT was utilized to encourage Pt to identify maladaptive thoughts and behaviors and replace with more affirming and positive.Pt encouraged to set and maintain appropriate and healthy boundaries with others. Pt was instructed to practice daily using appropriate and specific words to communicate feelings to others.  Motivational interviewing used to encourage Pt to identify strengths which can be utilized in working toward treatment goals. Pt encouraged to practice daily learned skills such as controlled breathing, grounding, and mindfulness. Pt was encouraged to ask for help from support persons to assist them in maintaining stability and alleviate symptoms. Discussed the importance of being one's own mental health advocate and to refrain from seeing the need to ask for help as a weakness. Pt was encouraged to formulate a plan of action to be more proactive in managing stressors and refrain from using reactive and automatic heightened emotional responses.     Solution-focused therapy employed to identify how Pt would like their life to be if they were to make positive changes. Pt encouraged to identify effective coping skills and strengths they can use to continue utilizing those skills. Pt encouraged to discontinue utilizing non-effective coping mechanisms. Pt provided with feedback to highlight  achievements and personal and other resources. Encouraged use of SMART goals    Assisted patient in processing above session content; acknowledged and normalized patient’s thoughts, feelings, and concerns.  Rationalized patient thought process regarding concerns presented at session.  Discussed triggers associated with patient's  anxiety  and depression  Also discussed coping skills for patient to implement such as controlled breathing , mindfulness , and positive self talk     Allowed patient to freely discuss issues without interruption or judgment. Provided safe, confidential environment to facilitate the development of positive therapeutic relationship and encourage open, honest communication. Assisted patient in identifying risk factors which would indicate the need for higher level of care including thoughts to harm self or others and/or self-harming behavior and encouraged patient to contact this office, call 911, or present to the nearest emergency room should any of these events occur. Discussed crisis intervention services and means to access. Patient adamantly and convincingly denies current suicidal or homicidal ideation or perceptual disturbance.    Assessment:     Patient appears to maintain relative stability as compared to their baseline.  However, patient persistently struggles with symptoms which continue to cause impairment in important areas of functioning.  As a result, they can be reasonably expected to continue to benefit from treatment and would likely be at increased risk for decompensation otherwise.                  Mental Status Exam:   Hygiene:   good  Cooperation:  Cooperative  Eye Contact:  Good  Psychomotor Behavior:  Appropriate  Affect:  Full range  Mood: anxious  Speech:  Normal  Thought Process:  Goal directed  Thought Content:  Normal  Suicidal:  None  Homicidal: None  Hallucinations:  None  Delusion: None  Memory:  Intact  Orientation:  Grossly Intact  Reliability:   good  Insight:  Good  Judgement:  Good  Impulse Control:  Good  Physical/Medical Issues:  No        Functional Status: Mild impairment     Progress toward goal: Not at goal    Prognosis: Good with continued therapeutic intervention        Plan:    Patient will continue in individual outpatient therapy with focus on improved functioning and coping skills, maintaining stability, and avoiding decompensation and the need for higher level of care.    Patient will adhere to medication regimen as prescribed and report any side effects. Patient will contact this office, call 911 or present to the nearest emergency room should suicidal or homicidal ideations occur. Provide Cognitive Behavioral Therapy and Solution Focused Therapy to improve functioning, maintain stability, and avoid decompensation and the need for higher level of care.     Return in about 2 weeks (around 11/18/2024).      VISIT DIAGNOSIS:    Diagnosis Plan   1. SIVAKUMAR (generalized anxiety disorder)         14:14 EST       This document has been electronically signed by CHELY Cunningham  November 5, 2024      Part of this note may be an electronic transcription/translation of spoken language to printed text using the Dragon Dictation System.

## 2024-11-06 LAB
NCCN CRITERIA FLAG: NORMAL
TYRER CUZICK SCORE: 3.8

## 2024-11-07 DIAGNOSIS — F41.9 ANXIETY: ICD-10-CM

## 2024-11-07 DIAGNOSIS — Z63.6 CAREGIVER STRESS: ICD-10-CM

## 2024-11-07 DIAGNOSIS — M53.3 SACROILIAC JOINT DYSFUNCTION OF BOTH SIDES: ICD-10-CM

## 2024-11-07 DIAGNOSIS — M05.79 RHEUMATOID ARTHRITIS INVOLVING MULTIPLE SITES WITH POSITIVE RHEUMATOID FACTOR: ICD-10-CM

## 2024-11-07 DIAGNOSIS — M05.9 RHEUMATOID ARTHRITIS WITH POSITIVE RHEUMATOID FACTOR, INVOLVING UNSPECIFIED SITE: ICD-10-CM

## 2024-11-07 RX ORDER — HYDROCODONE BITARTRATE AND ACETAMINOPHEN 5; 325 MG/1; MG/1
1 TABLET ORAL EVERY 6 HOURS PRN
Qty: 60 TABLET | Refills: 0 | Status: SHIPPED | OUTPATIENT
Start: 2024-11-07

## 2024-11-07 RX ORDER — LORAZEPAM 0.5 MG/1
0.5 TABLET ORAL EVERY 8 HOURS PRN
Qty: 90 TABLET | Refills: 1 | Status: SHIPPED | OUTPATIENT
Start: 2024-11-07

## 2024-11-07 SDOH — SOCIAL STABILITY - SOCIAL INSECURITY: DEPENDENT RELATIVE NEEDING CARE AT HOME: Z63.6

## 2024-11-07 NOTE — TELEPHONE ENCOUNTER
Caller: Shanel Wall    Relationship: Self    Best call back number: [phone]    Requested Prescriptions:   Requested Prescriptions     Pending Prescriptions Disp Refills    HYDROcodone-acetaminophen (NORCO) 5-325 MG per tablet 60 tablet 0     Sig: Take 1 tablet by mouth Every 6 (Six) Hours As Needed for Severe Pain.    LORazepam (ATIVAN) 0.5 MG tablet     Pharmacy where request should be sent: [facility] [id number] [address] [facility] [phone] [facility] [phone] FX     Last office visit with prescribing clinician: 8/29/2024   Last telemedicine visit with prescribing clinician: Visit date not found   Next office visit with prescribing clinician: Visit date not found     Additional details provided by patient: PATIENT HAS LESS THAN 3 DAYS    Does the patient have less than a 3 day supply:  [x] Yes  [] No      Milad Lopez Rep   11/07/24 12:09 EST

## 2024-11-08 DIAGNOSIS — G25.81 RLS (RESTLESS LEGS SYNDROME): ICD-10-CM

## 2024-11-08 RX ORDER — MELOXICAM 15 MG/1
TABLET ORAL EVERY 24 HOURS
Qty: 90 TABLET | Refills: 1 | Status: SHIPPED | OUTPATIENT
Start: 2024-11-08

## 2024-11-08 RX ORDER — ROPINIROLE 1 MG/1
1 TABLET, FILM COATED ORAL NIGHTLY
Qty: 90 TABLET | Refills: 1 | Status: SHIPPED | OUTPATIENT
Start: 2024-11-08

## 2024-11-08 NOTE — TELEPHONE ENCOUNTER
Rx Refill Note  Requested Prescriptions     Pending Prescriptions Disp Refills    rOPINIRole (REQUIP) 1 MG tablet 90 tablet 1     Sig: Take 1 tablet by mouth Every Night. Take 1 hour before bedtime.      Last office visit with prescribing clinician: 8/29/2024   Last telemedicine visit with prescribing clinician: Visit date not found   Next office visit with prescribing clinician: Visit date not found                         Would you like a call back once the refill request has been completed: [] Yes [] No    If the office needs to give you a call back, can they leave a voicemail: [] Yes [] No    Kassandra Ayala MA  11/08/24, 10:47 EST

## 2024-11-08 NOTE — TELEPHONE ENCOUNTER
Caller: Shanel Wall    Relationship: Self    Best call back number: [phone]     Requested Prescriptions:   Requested Prescriptions     Pending Prescriptions Disp Refills    rOPINIRole (REQUIP) 1 MG tablet 90 tablet 1     Sig: Take 1 tablet by mouth Every Night. Take 1 hour before bedtime.    PATIENT TAKES 2 AT NIGHT TIME       Pharmacy where request should be sent: [facility] [id number] [address] [facility] [phone] [facility] [phone]      Last office visit with prescribing clinician: 8/29/2024   Last telemedicine visit with prescribing clinician: Visit date not found   Next office visit with prescribing clinician: Visit date not found     Additional details provided by patient: OUT OF MEDICATION    Does the patient have less than a 3 day supply:  [] Yes  [] No    Would you like a call back once the refill request has been completed: [] Yes [x] No    If the office needs to give you a call back, can they leave a voicemail: [] Yes [x] No    Milad Youssef Rep   11/08/24 09:39 EST

## 2024-11-19 ENCOUNTER — OFFICE VISIT (OUTPATIENT)
Dept: NEUROSURGERY | Facility: CLINIC | Age: 74
End: 2024-11-19
Payer: MEDICARE

## 2024-11-19 VITALS — TEMPERATURE: 98.5 F | HEIGHT: 58 IN | WEIGHT: 123.6 LBS | BODY MASS INDEX: 25.94 KG/M2

## 2024-11-19 DIAGNOSIS — M54.2 NECK PAIN: ICD-10-CM

## 2024-11-19 DIAGNOSIS — R20.2 NUMBNESS AND TINGLING OF FOOT: Primary | ICD-10-CM

## 2024-11-19 DIAGNOSIS — M54.9 MECHANICAL BACK PAIN: ICD-10-CM

## 2024-11-19 DIAGNOSIS — R20.0 NUMBNESS AND TINGLING OF FOOT: Primary | ICD-10-CM

## 2024-11-19 PROCEDURE — 1159F MED LIST DOCD IN RCRD: CPT | Performed by: PHYSICIAN ASSISTANT

## 2024-11-19 PROCEDURE — 1160F RVW MEDS BY RX/DR IN RCRD: CPT | Performed by: PHYSICIAN ASSISTANT

## 2024-11-19 PROCEDURE — 99213 OFFICE O/P EST LOW 20 MIN: CPT | Performed by: PHYSICIAN ASSISTANT

## 2024-11-19 NOTE — PROGRESS NOTES
"Patient: Shanel Wall  : 1950  Chart #: [id number]    Date of Service: 2024    CHIEF COMPLAINT: Sensory alteration in the feet    History of Present Illness Ms. Wall is a 73-year-old unemployed woman who has been evaluated in our clinic for neck and low back difficulties.  She has chronic back pain dating back 15 to 20 years.  No prior spinal surgeries.  Today she presents with complaints of numbness in the bilateral feet that affects her on a intermittent basis.  EXTR feel off balance when walking.  She denies any involvement in the legs.  She has chronic back pain.  She was evaluated by Dr. Alvarenga but did not want to be sedated for injections.  No bowel or bladder difficulties.      Past Medical History:   Diagnosis Date    Abnormal EKG     Acromioclavicular separation     Anesthesia     PT HAS RHEUMATOID ARTHRITIS, PT HAS SPECIFIC INSTRUCTIONS PER RA MD, pt to bring INSTRUCTIONS AND RECS ATTACHED TO CHART AND PT TO DISCUSS WITH ANESTHESIA ON DATE OF PROCEDURE     Arthritis     Arthritis of back     Arthritis of neck     Back disorder     degenerative disc disorder    Baker's cyst of knee     right    Benign paroxysmal positional vertigo     Cervical cancer     cone biopsy    Cervical disc disorder     Depression     Deviated septum     Deviated septum     Difficulty in walking     Disease of thyroid gland     Diverticulosis     Encounter for long-term (current) use of high-risk medication     Extensor tendonitis of foot     Fatigue     Fibromyalgia     Finger pain, right     Fracture, clavicle     Fracture, foot     Fractured calcaneus     Gall stones     GERD (gastroesophageal reflux disease)     Hard to intubate     \"anterior trachea\"     History of gallstones     HL (hearing loss)     Hypoglycemia     Hypothyroidism     IBS (irritable bowel syndrome)     Joint pain     Knee swelling     Long term (current) use of non-steroidal anti-inflammatories (nsaid)     Long term (current) " use of systemic steroids     Low back pain     Low back strain     Lumbosacral disc disease     Macular degeneration     Measles     Menopause     Multiple food allergies     Neck strain     Neuroma of foot     Osteoarthritis     Osteoarthritis (arthritis due to wear and tear of joints)     Osteoporosis     Periarthritis of shoulder     Peripheral neuropathy     Popliteal cyst     Retinal disease, bilateral     Rheumatoid aortitis     Rheumatoid arthritis     Rheumatoid arthritis     Rotator cuff syndrome     Ruptured tendon     Scoliosis     Scoliosis     Seasonal allergies     Shingles     Shoulder fracture, left     Sleep apnea     Stress at home     Subcutaneous nodule     Suppression of immune system subtherapeutic     Synovitis     Synovitis     Thoracic disc disorder     Tinnitus     Tinnitus     TMJ (dislocation of temporomandibular joint)     Vertigo     Wears glasses     Well woman exam with routine gynecological exam 09/12/2022         Current Outpatient Medications:     aluminum hydroxide-mag carbonate (GAVISCON EXTRA RELIEF) 160-105 MG chewable tablet chewable tablet, Chew 1 tablet Daily As Needed., Disp: , Rfl:     Calcium Carb-Cholecalciferol (CALCIUM-VITAMIN D) 600-400 MG-UNIT tablet, Take 1 tablet by mouth 2 (Two) Times a Day., Disp: , Rfl:     Cyanocobalamin (VITAMIN B-12 PO), Take  by mouth., Disp: , Rfl:     denosumab (Prolia) 60 MG/ML solution prefilled syringe syringe, Inject 1 mL under the skin into the appropriate area as directed Every 6 (Six) Months., Disp: 1 mL, Rfl: 0    DULoxetine (CYMBALTA) 30 MG capsule, Take 3 capsules by mouth Daily., Disp: 270 capsule, Rfl: 2    Enbrel 50 MG/ML injection, Inject 1 mL under the skin into the appropriate area as directed 1 (One) Time Per Week., Disp: 4 mL, Rfl: 3    folic acid (FOLVITE) 1 MG tablet, Take 1 tablet by mouth Daily., Disp: 30 tablet, Rfl: 4    HYDROcodone-acetaminophen (NORCO) 5-325 MG per tablet, Take 1 tablet by mouth Every 6 (Six)  Hours As Needed for Severe Pain., Disp: 60 tablet, Rfl: 0    lansoprazole (PREVACID) 15 MG capsule, Take 1 capsule by mouth Daily., Disp: 90 capsule, Rfl: 2    levocetirizine (XYZAL) 5 MG tablet, Take 1 tablet by mouth Daily., Disp: 90 tablet, Rfl: 2    levothyroxine (SYNTHROID, LEVOTHROID) 75 MCG tablet, Take 1 tablet by mouth Daily., Disp: 90 tablet, Rfl: 3    LORazepam (ATIVAN) 0.5 MG tablet, Take 1 tablet by mouth Every 8 (Eight) Hours As Needed for Anxiety., Disp: 90 tablet, Rfl: 1    meloxicam (MOBIC) 15 MG tablet, TAKE 1 TABLET BY MOUTH DAILY, Disp: 90 tablet, Rfl: 1    methotrexate 2.5 MG tablet, Take 6 tablets by mouth 1 (One) Time Per Week. Take 6 tablets by oral route once every week, Disp: 72 tablet, Rfl: 0    Mucinex D  MG per 12 hr tablet, TAKE ONE TABLET BY MOUTH EVERY 12 HOURS, Disp: 36 tablet, Rfl: 0    Multiple Vitamins-Minerals (SENIOR MULTIVITAMIN PLUS) tablet, Take 1 tablet by mouth Daily., Disp: , Rfl:     NON FORMULARY, Take 2 tablets by mouth Daily. Hydro Eye gelcaps, Disp: , Rfl:     predniSONE (DELTASONE) 5 MG tablet, Take 1 tablet by mouth Daily., Disp: 30 tablet, Rfl: 3    prochlorperazine (COMPAZINE) 10 MG tablet, Take 1 tablet by mouth Every 6 (Six) Hours As Needed for Nausea or Vomiting., Disp: 30 tablet, Rfl: 1    Propylene Glycol (Systane Balance) 0.6 % solution, Apply 1 drop to eye(s) as directed by provider 2 (Two) Times a Day., Disp: , Rfl:     Pyridoxine HCl (VITAMIN B6 PO), Take  by mouth., Disp: , Rfl:     rOPINIRole (REQUIP) 1 MG tablet, Take 1 tablet by mouth Every Night. Take 1 hour before bedtime., Disp: 90 tablet, Rfl: 1    sodium chloride (SHILPA 128) 2 % ophthalmic solution, 1 drop., Disp: , Rfl:     vitamin A 8000 UNIT capsule, Take 1 capsule by mouth Daily., Disp: , Rfl:     vitamin C (ASCORBIC ACID) 500 MG tablet, Take 1 tablet by mouth Daily., Disp: , Rfl:     zolpidem (AMBIEN) 10 MG tablet, Take 1 tablet by mouth At Night As Needed for Sleep., Disp: 90 tablet,  Rfl: 1    Past Surgical History:   Procedure Laterality Date    ADENOIDECTOMY      BILATERAL BREAST REDUCTION      BLEPHAROPLASTY      BLEPHAROPLASTY, QUAD Bilateral 09/18/2018    Dr. romano.    BREAST EXCISIONAL BIOPSY Left     NO VISIBLE SCAR    BREAST FIBROADENOMA SURGERY      CALCANEOUS OPEN REDUCTION INTERNAL FIXATION      CATARACT EXTRACTION Bilateral     CERVICAL CONE BIOPSY      COLONOSCOPY      COSMETIC SURGERY      SHORT SCAR FACE LIFT PER DR ROMANO     DILATATION AND CURETTAGE      FACIAL COSMETIC SURGERY      FACIAL COSMETIC SURGERY      FOOT SURGERY Left     calcaneous     HAND SURGERY      JOINT REPLACEMENT      KNEE ARTHROPLASTY Right     blue     KNEE ARTHROSCOPY Bilateral     REDUCTION MAMMAPLASTY Bilateral     REPLACEMENT TOTAL KNEE Right     REPLACEMENT TOTAL KNEE Left     SHOULDER SURGERY      TENDON REPAIR Right     HAND    TENDON REPAIR      R hand    TONSILLECTOMY AND ADENOIDECTOMY      TOTAL KNEE ARTHROPLASTY Left 04/03/2018    Procedure: TOTAL KNEE ARTHROPLASTY LEFT;  Surgeon: Mahesh Wesley MD;  Location:  [facility] OR;  Service: Orthopedics    TOTAL SHOULDER ARTHROPLASTY W/ DISTAL CLAVICLE EXCISION Left 05/22/2017    Procedure: LEFT REVERSE TOTAL SHOULDER ARTHROPLASTY FOR FRACTURE;  Surgeon: Damaso Harris MD;  Location:  [facility] OR;  Service:     TOTAL SHOULDER REPLACEMENT Left     TOTAL SHOULDER REPLACEMENT Left     TUBAL ABDOMINAL LIGATION         Social History     Socioeconomic History    Marital status:     Number of children: 1   Tobacco Use    Smoking status: Never    Smokeless tobacco: Never   Vaping Use    Vaping status: Never Used   Substance and Sexual Activity    Alcohol use: Not Currently     Alcohol/week: 2.0 standard drinks of alcohol     Comment: occasionally    Drug use: Never    Sexual activity: Not Currently     Partners: Male     Birth control/protection: None, Partner of same sex         Review of Systems    Objective   Vital Signs: Temperature 98.5 °F (36.9 °C),  "temperature source Temporal, height 147.3 cm (58\"), weight 56.1 kg (123 lb 9.6 oz), not currently breastfeeding.  Physical Exam  Vitals and nursing note reviewed.   Constitutional:       General: She is not in acute distress.     Appearance: She is well-developed.   HENT:      Head: Normocephalic and atraumatic.   Psychiatric:         Behavior: Behavior normal.         Thought Content: Thought content normal.     Musculoskeletal:     Strength is intact in upper and lower extremities to direct testing.     Station and gait are normal.     Straight leg raising is negative.   Neurologic:     Muscle tone is normal throughout.     Coordination is intact.     Deep tendon reflexes: Diminished in the lower extremities        sensation is intact to light touch throughout.     Patient is oriented to person, place, and time.     Marisabel sign       Independent review of radiographic imaging: MRI of the cervical and lumbar spine report reviewed.  These were from 2022.  There is diffuse degenerative changes.  No high-grade stenosis.    Assessment & Plan   Diagnosis:  1.  Intermittent sensory alteration in the bilateral feet  2.  Chronic mechanical low back pain  3.  Lumbar stenosis without neurogenic claudication    Medical Decision Making: I have referred patient for electrodiagnostic studies for further evaluation.  She will follow-up with me to review and further recommendations will be made.      Diagnoses and all orders for this visit:    1. Numbness and tingling of foot (Primary)  -     EMG & Nerve Conduction Test; Future    2. Neck pain    3. Mechanical back pain                                  Geneva Graham PA-C  Patient Care Team:  Fili Ray MD as PCP - General (Internal Medicine)  Cristiane Araiza MD as Consulting Physician (Endocrinology)  Nain Alvarenga MD as Consulting Physician (Pain Medicine)  [facility]  ([facility])  Iggy Antonio MD as Consulting Physician " (Rheumatology)

## 2024-11-20 ENCOUNTER — TELEMEDICINE (OUTPATIENT)
Dept: PSYCHIATRY | Facility: CLINIC | Age: 74
End: 2024-11-20
Payer: MEDICARE

## 2024-11-20 DIAGNOSIS — F41.1 GAD (GENERALIZED ANXIETY DISORDER): Primary | ICD-10-CM

## 2024-11-20 NOTE — PROGRESS NOTES
Date: November 20, 2024  Time In: 2:08  Time out: 3:07      This provider is located at the [facility]through [facility]), [address], [place] [address] using a secure [employer] Video Visit through [employer]. Patient is being seen remotely via telehealth at home address in [place] and stated they are in a secure environment for this session. The patient's condition being diagnosed/treated is appropriate for telemedicine. The provider identified herself as well as her credentials. The patient, and/or patients guardian, consent to be seen remotely, and when consent is given they understand that the consent allows for patient identifiable information to be sent to a third party as needed. They may refuse to be seen remotely at any time. The electronic data is encrypted and password protected, and the patient and/or guardian has been advised of the potential risks to privacy not withstanding such measures.     You have chosen to receive care through a telehealth visit.  Do you consent to use a video/audio connection for your medical care today? Yes    Subjective   Shanel Wall is a 73 y.o. female who presents today fully oriented, appropriately dressed and groomed, and open to communication for follow up appointment.    Chief Complaint:   Chief Complaint   Patient presents with    Anxiety        History of Present Illness: Rapport was established through conversation and unconditional positive regard. Recent events were discussed and how these events impact Pt's emotional health.   Pt reports successful use of learned coping skills 6 out of 10 times since last report.  Pt reports continuation of symptoms and states the intensity and duration of symptoms has remained unchanged since last report.     Sleep: Pt reports sleep has remained unchanged since last report. Healthy sleep habits were discussed such as maintaining a schedule, routine, avoiding caffeine, and  limiting screen time before bed.    Appetite:  Pt reports appetite has been good since last report.  Discussed the importance of hydration and eating a healthy diet for overall and mental health.    Medication compliance: Pt reports medications are being taken daily as prescribed. Discussed the importance of compliance with prescriber's directions and Pt was instructed to report questions/concerns, as well as missed doses or discontinuation of medication by Pt.     Safety Plan in Place: No Pt denies SI/HI/SIB recent or current    Daily Functioning:  Since last report, Pt states symptoms are causing Moderate difficulty in daily functioning.      Content Discussion:   Pt reports life updates since previous session. Pt identified current stressors. Pt acknowledged stressors within and outside of one's control. Pt identified successes and issues with utilizing coping mechanisms.  Pt states they went to [place] and looked at 3 places and they really like on of their options.  Pt expresses some frustration related to health issues such as numbness in her feet.  Pt reports she has lived with chronic pain for many years.   Pt was allowed to process some feelings associated with having to get repeated medical tests.  Pt's feelings were normalized and validated.  Pt and Counselor discussed the importance of prioritizing her health and emotional needs, as well as being 's caretaker.  Caretaker fatigue and ways to manage symptoms was discussed.       Counselor supported Pt in continued exploration of underlying belief systems which contribute to difficulty in connecting/vulnerability.  Through discussion, Pt identifies themes of preservation and overt emotional and physical reactivity when physical and/or emotional statis is in question, even in low or perceived threatening situations. Counselor supported Pt in identifying past experiences and underlying beliefs which contribute to these feelings and reactions.  Pt was  supported and encouraged in processing emotions related to frustrations with the expectations of self and others.  Pt was encouraged to identify cultural and nuclear familial contexts which contribute to these concerns.  Pt was receptive and engaged in conversation, and Pt reports this was beneficial and applicable to their situation.      Reviewed coping skills and encouraged Pt to continue to practicing coping skills daily when not under distress. Pt was praised for their attempts to decrease symptoms and mitigate activating events.    Clinical Maneuvering/Intervention:  CBT was utilized to encourage Pt to identify maladaptive thoughts and behaviors and replace with more affirming and positive.Pt encouraged to set and maintain appropriate and healthy boundaries with others. Pt was instructed to practice daily using appropriate and specific words to communicate feelings to others.  Motivational interviewing used to encourage Pt to identify strengths which can be utilized in working toward treatment goals. Pt encouraged to practice daily learned skills such as controlled breathing, grounding, and mindfulness. Pt was encouraged to ask for help from support persons to assist them in maintaining stability and alleviate symptoms. Discussed the importance of being one's own mental health advocate and to refrain from seeing the need to ask for help as a weakness. Pt was encouraged to formulate a plan of action to be more proactive in managing stressors and refrain from using reactive and automatic heightened emotional responses.     Solution-focused therapy employed to identify how Pt would like their life to be if they were to make positive changes. Pt encouraged to identify effective coping skills and strengths they can use to continue utilizing those skills. Pt encouraged to discontinue utilizing non-effective coping mechanisms. Pt provided with feedback to highlight achievements and personal and other resources.  Encouraged use of SMART goals    Assisted patient in processing above session content; acknowledged and normalized patient’s thoughts, feelings, and concerns.  Rationalized patient thought process regarding concerns presented at session.  Discussed triggers associated with patient's  anxiety  Also discussed coping skills for patient to implement such as mindfulness , increasing activity , and self care     Allowed patient to freely discuss issues without interruption or judgment. Provided safe, confidential environment to facilitate the development of positive therapeutic relationship and encourage open, honest communication. Assisted patient in identifying risk factors which would indicate the need for higher level of care including thoughts to harm self or others and/or self-harming behavior and encouraged patient to contact this office, call 911, or present to the nearest emergency room should any of these events occur. Discussed crisis intervention services and means to access. Patient adamantly and convincingly denies current suicidal or homicidal ideation or perceptual disturbance.    Assessment:     Patient appears to maintain relative stability as compared to their baseline.  However, patient persistently struggles with symptoms which continue to cause impairment in important areas of functioning.  As a result, they can be reasonably expected to continue to benefit from treatment and would likely be at increased risk for decompensation otherwise.                Mental Status Exam:   Hygiene:   good  Cooperation:  Cooperative  Eye Contact:  Good  Psychomotor Behavior:  Appropriate  Affect:  Full range  Mood: normal  Speech:  Normal  Thought Process:  Goal directed  Thought Content:  Normal  Suicidal:  None  Homicidal: None  Hallucinations:  None  Delusion: None  Memory:  Intact  Orientation:  Grossly Intact  Reliability:  good  Insight:  Good  Judgement:  Good  Impulse Control:  Good  Physical/Medical Issues:   No        Functional Status: Mild impairment     Progress toward goal: Not at goal    Prognosis: Good with continued therapeutic intervention        Plan:    Patient will continue in individual outpatient therapy with focus on improved functioning and coping skills, maintaining stability, and avoiding decompensation and the need for higher level of care.    Patient will adhere to medication regimen as prescribed and report any side effects. Patient will contact this office, call 911 or present to the nearest emergency room should suicidal or homicidal ideations occur. Provide Cognitive Behavioral Therapy and Solution Focused Therapy to improve functioning, maintain stability, and avoid decompensation and the need for higher level of care.     Return in about 2 weeks (around 12/4/2024).      VISIT DIAGNOSIS:    Diagnosis Plan   1. SIVAKUMAR (generalized anxiety disorder)         14:08 EST       This document has been electronically signed by CHELY Cunningham  November 20, 2024      Part of this note may be an electronic transcription/translation of spoken language to printed text using the Dragon Dictation System.

## 2024-11-26 PROBLEM — M25.511 CHRONIC RIGHT SHOULDER PAIN: Status: ACTIVE | Noted: 2024-11-26

## 2024-11-26 PROBLEM — G89.29 CHRONIC RIGHT SHOULDER PAIN: Status: ACTIVE | Noted: 2024-11-26

## 2024-11-26 PROBLEM — M15.9 GENERALIZED OSTEOARTHRITIS: Status: ACTIVE | Noted: 2024-11-26

## 2024-11-27 ENCOUNTER — LAB (OUTPATIENT)
Facility: HOSPITAL | Age: 74
End: 2024-11-27
Payer: MEDICARE

## 2024-11-27 ENCOUNTER — OFFICE VISIT (OUTPATIENT)
Age: 74
End: 2024-11-27
Payer: MEDICARE

## 2024-11-27 VITALS
HEIGHT: 58 IN | DIASTOLIC BLOOD PRESSURE: 72 MMHG | SYSTOLIC BLOOD PRESSURE: 120 MMHG | BODY MASS INDEX: 26.09 KG/M2 | WEIGHT: 124.3 LBS | HEART RATE: 75 BPM | TEMPERATURE: 98.3 F

## 2024-11-27 DIAGNOSIS — D84.821 IMMUNOSUPPRESSION DUE TO DRUG THERAPY: ICD-10-CM

## 2024-11-27 DIAGNOSIS — R53.82 CHRONIC FATIGUE: ICD-10-CM

## 2024-11-27 DIAGNOSIS — M81.0 OSTEOPOROSIS WITHOUT CURRENT PATHOLOGICAL FRACTURE, UNSPECIFIED OSTEOPOROSIS TYPE: ICD-10-CM

## 2024-11-27 DIAGNOSIS — M25.511 CHRONIC RIGHT SHOULDER PAIN: ICD-10-CM

## 2024-11-27 DIAGNOSIS — Z79.899 IMMUNOSUPPRESSION DUE TO DRUG THERAPY: ICD-10-CM

## 2024-11-27 DIAGNOSIS — M81.0 AGE-RELATED OSTEOPOROSIS WITHOUT CURRENT PATHOLOGICAL FRACTURE: ICD-10-CM

## 2024-11-27 DIAGNOSIS — M05.79 RHEUMATOID ARTHRITIS INVOLVING MULTIPLE SITES WITH POSITIVE RHEUMATOID FACTOR: ICD-10-CM

## 2024-11-27 DIAGNOSIS — R53.83 FATIGUE, UNSPECIFIED TYPE: ICD-10-CM

## 2024-11-27 DIAGNOSIS — M05.79 RHEUMATOID ARTHRITIS INVOLVING MULTIPLE SITES WITH POSITIVE RHEUMATOID FACTOR: Primary | ICD-10-CM

## 2024-11-27 DIAGNOSIS — Z79.899 HIGH RISK MEDICATION USE: ICD-10-CM

## 2024-11-27 DIAGNOSIS — M15.9 GENERALIZED OSTEOARTHRITIS: ICD-10-CM

## 2024-11-27 DIAGNOSIS — G89.29 CHRONIC RIGHT SHOULDER PAIN: ICD-10-CM

## 2024-11-27 LAB
ALBUMIN SERPL-MCNC: 4 G/DL (ref 3.5–5.2)
ALBUMIN/GLOB SERPL: 1.4 G/DL
ALP SERPL-CCNC: 82 U/L (ref 39–117)
ALT SERPL W P-5'-P-CCNC: 21 U/L (ref 1–33)
ANION GAP SERPL CALCULATED.3IONS-SCNC: 10.8 MMOL/L (ref 5–15)
AST SERPL-CCNC: 28 U/L (ref 1–32)
BASOPHILS # BLD AUTO: 0.05 10*3/MM3 (ref 0–0.2)
BASOPHILS NFR BLD AUTO: 0.8 % (ref 0–1.5)
BILIRUB SERPL-MCNC: 0.3 MG/DL (ref 0–1.2)
BUN SERPL-MCNC: 25 MG/DL (ref 8–23)
BUN/CREAT SERPL: 34.2 (ref 7–25)
CALCIUM SPEC-SCNC: 8.9 MG/DL (ref 8.6–10.5)
CHLORIDE SERPL-SCNC: 101 MMOL/L (ref 98–107)
CO2 SERPL-SCNC: 28.2 MMOL/L (ref 22–29)
CREAT SERPL-MCNC: 0.73 MG/DL (ref 0.57–1)
CRP SERPL-MCNC: <0.3 MG/DL (ref 0–0.5)
DEPRECATED RDW RBC AUTO: 44.6 FL (ref 37–54)
EGFRCR SERPLBLD CKD-EPI 2021: 88 ML/MIN/1.73
EOSINOPHIL # BLD AUTO: 0.04 10*3/MM3 (ref 0–0.4)
EOSINOPHIL NFR BLD AUTO: 0.6 % (ref 0.3–6.2)
ERYTHROCYTE [DISTWIDTH] IN BLOOD BY AUTOMATED COUNT: 12.8 % (ref 12.3–15.4)
ERYTHROCYTE [SEDIMENTATION RATE] IN BLOOD: 18 MM/HR (ref 0–30)
GLOBULIN UR ELPH-MCNC: 2.9 GM/DL
GLUCOSE SERPL-MCNC: 101 MG/DL (ref 65–99)
HCT VFR BLD AUTO: 43.9 % (ref 34–46.6)
HGB BLD-MCNC: 14.7 G/DL (ref 12–15.9)
IMM GRANULOCYTES # BLD AUTO: 0.02 10*3/MM3 (ref 0–0.05)
IMM GRANULOCYTES NFR BLD AUTO: 0.3 % (ref 0–0.5)
LYMPHOCYTES # BLD AUTO: 0.86 10*3/MM3 (ref 0.7–3.1)
LYMPHOCYTES NFR BLD AUTO: 13.4 % (ref 19.6–45.3)
MCH RBC QN AUTO: 32.5 PG (ref 26.6–33)
MCHC RBC AUTO-ENTMCNC: 33.5 G/DL (ref 31.5–35.7)
MCV RBC AUTO: 96.9 FL (ref 79–97)
MONOCYTES # BLD AUTO: 0.44 10*3/MM3 (ref 0.1–0.9)
MONOCYTES NFR BLD AUTO: 6.9 % (ref 5–12)
NEUTROPHILS NFR BLD AUTO: 5.01 10*3/MM3 (ref 1.7–7)
NEUTROPHILS NFR BLD AUTO: 78 % (ref 42.7–76)
NRBC BLD AUTO-RTO: 0 /100 WBC (ref 0–0.2)
PLATELET # BLD AUTO: 230 10*3/MM3 (ref 140–450)
PMV BLD AUTO: 9.2 FL (ref 6–12)
POTASSIUM SERPL-SCNC: 5.1 MMOL/L (ref 3.5–5.2)
PROT SERPL-MCNC: 6.9 G/DL (ref 6–8.5)
RBC # BLD AUTO: 4.53 10*6/MM3 (ref 3.77–5.28)
SODIUM SERPL-SCNC: 140 MMOL/L (ref 136–145)
WBC NRBC COR # BLD AUTO: 6.42 10*3/MM3 (ref 3.4–10.8)

## 2024-11-27 PROCEDURE — 85025 COMPLETE CBC W/AUTO DIFF WBC: CPT

## 2024-11-27 PROCEDURE — 99214 OFFICE O/P EST MOD 30 MIN: CPT | Performed by: INTERNAL MEDICINE

## 2024-11-27 PROCEDURE — 36415 COLL VENOUS BLD VENIPUNCTURE: CPT

## 2024-11-27 PROCEDURE — 1159F MED LIST DOCD IN RCRD: CPT | Performed by: INTERNAL MEDICINE

## 2024-11-27 PROCEDURE — G2211 COMPLEX E/M VISIT ADD ON: HCPCS | Performed by: INTERNAL MEDICINE

## 2024-11-27 PROCEDURE — 86140 C-REACTIVE PROTEIN: CPT

## 2024-11-27 PROCEDURE — 80053 COMPREHEN METABOLIC PANEL: CPT

## 2024-11-27 PROCEDURE — 1160F RVW MEDS BY RX/DR IN RCRD: CPT | Performed by: INTERNAL MEDICINE

## 2024-11-27 PROCEDURE — 85652 RBC SED RATE AUTOMATED: CPT

## 2024-11-27 RX ORDER — PROCHLORPERAZINE MALEATE 10 MG
10 TABLET ORAL EVERY 8 HOURS PRN
Qty: 30 TABLET | Refills: 1 | Status: SHIPPED | OUTPATIENT
Start: 2024-11-27

## 2024-11-27 RX ORDER — ETANERCEPT 50 MG/ML
50 SOLUTION SUBCUTANEOUS WEEKLY
Qty: 4 ML | Refills: 3 | Status: SHIPPED | OUTPATIENT
Start: 2024-11-27

## 2024-11-27 RX ORDER — FOLIC ACID 1 MG/1
1 TABLET ORAL DAILY
Qty: 30 TABLET | Refills: 4 | Status: SHIPPED | OUTPATIENT
Start: 2024-11-27

## 2024-11-27 RX ORDER — PREDNISONE 5 MG/1
5 TABLET ORAL DAILY
Qty: 30 TABLET | Refills: 3 | Status: SHIPPED | OUTPATIENT
Start: 2024-11-27

## 2024-11-27 RX ORDER — METHOTREXATE 2.5 MG/1
15 TABLET ORAL WEEKLY
Qty: 72 TABLET | Refills: 0 | Status: SHIPPED | OUTPATIENT
Start: 2024-11-27

## 2024-11-27 NOTE — PROGRESS NOTES
Office Follow Up      Date: 11/27/2024   Patient Name: Shanel Wall  MRN: [id number]  YOB: 1950    Referring Physician: No ref. provider found     Chief Complaint: Rheumatoid arthritis      History of Present Illness: Shanel Wall is a 73 y.o. female who is here today for follow up on rheumatoid arthritis.    RA is stable without swelling. She is having severe back pain and saw her neurosurgeon. NS suggested PT or chiropractor.   Her feet are numb and her NS referred her to neurology.   She rates her pain 0/10 with 4 hours of morning stiffness.  She is on methotrexate, Enbrel and prednisone 5 mg. No infections or problems with injections. No problems with MTX.     She confirms taking calcium and vitamin D. She is due for DEXA. She does not want Reclast again.      She is fatigued.  She has dry eyes and mouth.  She reports difficulty walking.  Her back and her neck hurt. Her joints are tender, but do not swell.    She is having increased nausea.     Now on Ropinirole and hydrocodone for restless leg syndrome.    She is on Ambien and Ativan to sleep.     Subjective   Review of Systems: Review of Systems   Constitutional:  Positive for fatigue. Negative for chills, fever and unexpected weight loss.   HENT:  Negative for dental problem, mouth sores, sinus pressure and swollen glands.    Eyes:  Negative for photophobia, pain and redness.   Respiratory:  Negative for apnea, cough, chest tightness and shortness of breath.    Cardiovascular:  Negative for chest pain and leg swelling.   Gastrointestinal:  Positive for nausea. Negative for abdominal pain, diarrhea and GERD.   Genitourinary:  Negative for dysuria and hematuria.   Skin:  Positive for rash. Negative for dry skin, skin lesions and bruise.   Neurological:  Positive for weakness. Negative for dizziness, seizures, syncope, headache and memory problem.   Hematological:  Negative for adenopathy. Bruises/bleeds  easily.   Psychiatric/Behavioral:  Positive for sleep disturbance, depressed mood and stress. Negative for suicidal ideas. The patient is nervous/anxious.    All other systems reviewed and are negative.       Medications:   Current Outpatient Medications:     aluminum hydroxide-mag carbonate (GAVISCON EXTRA RELIEF) 160-105 MG chewable tablet chewable tablet, Chew 1 tablet Daily As Needed., Disp: , Rfl:     Calcium Carb-Cholecalciferol (CALCIUM-VITAMIN D) 600-400 MG-UNIT tablet, Take 1 tablet by mouth 2 (Two) Times a Day., Disp: , Rfl:     Cyanocobalamin (VITAMIN B-12 PO), Take  by mouth., Disp: , Rfl:     denosumab (Prolia) 60 MG/ML solution prefilled syringe syringe, Inject 1 mL under the skin into the appropriate area as directed Every 6 (Six) Months., Disp: 1 mL, Rfl: 0    DULoxetine (CYMBALTA) 30 MG capsule, Take 3 capsules by mouth Daily., Disp: 270 capsule, Rfl: 2    Enbrel 50 MG/ML injection, Inject 1 mL under the skin into the appropriate area as directed 1 (One) Time Per Week., Disp: 4 mL, Rfl: 3    folic acid (FOLVITE) 1 MG tablet, Take 1 tablet by mouth Daily., Disp: 30 tablet, Rfl: 4    HYDROcodone-acetaminophen (NORCO) 5-325 MG per tablet, Take 1 tablet by mouth Every 6 (Six) Hours As Needed for Severe Pain., Disp: 60 tablet, Rfl: 0    lansoprazole (PREVACID) 15 MG capsule, Take 1 capsule by mouth Daily., Disp: 90 capsule, Rfl: 2    levocetirizine (XYZAL) 5 MG tablet, Take 1 tablet by mouth Daily., Disp: 90 tablet, Rfl: 2    levothyroxine (SYNTHROID, LEVOTHROID) 75 MCG tablet, Take 1 tablet by mouth Daily., Disp: 90 tablet, Rfl: 3    LORazepam (ATIVAN) 0.5 MG tablet, Take 1 tablet by mouth Every 8 (Eight) Hours As Needed for Anxiety., Disp: 90 tablet, Rfl: 1    meloxicam (MOBIC) 15 MG tablet, TAKE 1 TABLET BY MOUTH DAILY, Disp: 90 tablet, Rfl: 1    methotrexate 2.5 MG tablet, Take 6 tablets by mouth 1 (One) Time Per Week. Take 6 tablets by oral route once every week, Disp: 72 tablet, Rfl: 0    Mucinex D  " MG per 12 hr tablet, TAKE ONE TABLET BY MOUTH EVERY 12 HOURS, Disp: 36 tablet, Rfl: 0    Multiple Vitamins-Minerals (SENIOR MULTIVITAMIN PLUS) tablet, Take 1 tablet by mouth Daily., Disp: , Rfl:     NON FORMULARY, Take 2 tablets by mouth Daily. Hydro Eye gelcaps, Disp: , Rfl:     predniSONE (DELTASONE) 5 MG tablet, Take 1 tablet by mouth Daily., Disp: 30 tablet, Rfl: 3    prochlorperazine (COMPAZINE) 10 MG tablet, Take 1 tablet by mouth Every 8 (Eight) Hours As Needed for Nausea or Vomiting., Disp: 30 tablet, Rfl: 1    Propylene Glycol (Systane Balance) 0.6 % solution, Apply 1 drop to eye(s) as directed by provider 2 (Two) Times a Day., Disp: , Rfl:     Pyridoxine HCl (VITAMIN B6 PO), Take  by mouth., Disp: , Rfl:     rOPINIRole (REQUIP) 1 MG tablet, Take 1 tablet by mouth Every Night. Take 1 hour before bedtime., Disp: 90 tablet, Rfl: 1    sodium chloride (SHILPA 128) 2 % ophthalmic solution, 1 drop., Disp: , Rfl:     vitamin A 8000 UNIT capsule, Take 1 capsule by mouth Daily., Disp: , Rfl:     vitamin C (ASCORBIC ACID) 500 MG tablet, Take 1 tablet by mouth Daily., Disp: , Rfl:     zolpidem (AMBIEN) 10 MG tablet, Take 1 tablet by mouth At Night As Needed for Sleep., Disp: 90 tablet, Rfl: 1    Allergies:   Allergies   Allergen Reactions    Adhesive Tape Itching    Erythromycin Hives       I have reviewed and updated the patient's chief complaint, history of present illness, review of systems, past medical history, surgical history, family history, social history, medications and allergy list as appropriate.     Objective    Vital Signs:   Vitals:    11/27/24 1410   BP: 120/72   Pulse: 75   Temp: 98.3 °F (36.8 °C)   Weight: 56.4 kg (124 lb 4.8 oz)   Height: 147.3 cm (57.99\")   PainSc: 0-No pain       Body mass index is 25.99 kg/m².    Physical Exam:  General: The patient is well-developed and well nourished. Cooperative, alert and oriented x3. Affect is normal. Hydration appears normal.   HEENT: Normocephalic " and atraumatic. No notable alopecia. Lids and conjunctiva are normal. Pupils are equal and sclera are clear. Oropharynx is clear   NECK: Supple without adenopathy, masses or thyromegaly.   CARDIOVASCULAR: Regular rate and rhythm. No murmurs, rubs or gallops   LUNGS: Effort is normal. Lungs are clear bilaterally.  ABDOMEN: Soft and non-tender without masses or hepatosplenomegaly..   EXTREMITIES: No edema.  No cyanosis or clubbing.  SKIN: Inspection and palpation are normal.  NEUROLOGIC: Gait is normal.    MUSCULOSKELETAL: Complete joint exam was performed. There was full range of motion of the shoulders, elbows, wrists and hands without soft tissue swelling synovitis or deformities except as noted. Degenerative changes are noted.  Mild ulnar deviation.  Limited range of motion of the left wrist.  Crepitus right shoulder.  Surgical changes at the right MCPs.  Hips have good flexion and internal and external rotation.  Knees have no palpable effusions.  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  There is full extension and flexion.  Ankles and feet have no soft tissue swelling or synovitis.  BACK:  Straight without scoliosis  Joint Exam 11/27/2024     The following joints were examined and normal:   Left Glenohumeral, Right Glenohumeral, Left Elbow, Right Elbow, Left Wrist, Right Wrist, Left MCP 1, Right MCP 1, Left MCP 2, Right MCP 2, Left MCP 3, Right MCP 3, Left MCP 4, Right MCP 4, Left MCP 5, Right MCP 5, Left IP (thumb), Right IP (thumb), Left PIP 2 (finger), Right PIP 2 (finger), Left PIP 3 (finger), Right PIP 3 (finger), Left PIP 4 (finger), Right PIP 4 (finger), Left PIP 5 (finger), Right PIP 5 (finger), Left Knee, Right Knee       Patient Global: 50 / 100  Provider Global: 10 / 100      Assessment / Plan      Assessment & Plan  Rheumatoid arthritis involving multiple sites with positive rheumatoid factor  Dx: 1999; Seropositive RA by Dr. Khanna.  +RF  Previous Rx: hydroxychloroquine (retinal toxicity)  Current  Rx: 5 mg of prednisone daily,  methotrexate 15 mg weekly, and Enbrel, meloxicam 15mg daily.     RA is doing well.   Swollen joint count is: 0, Tender joint count is: 0, Physician global is: 1, VAS is: 0, Patient global is: 5.  CDAI is 6-low disease activity.   Continue methotrexate 15 mg weekly for now  Continue prednisone 5 mg; cannot wean below 5 mg due to adrenal insufficiency.  -   Continue Enbrel weekly-- reports compliance with weekly dosing.  F/u in 3 months.   Immunosuppression due to drug therapy  Enbrel  No recent infections  Age-related osteoporosis without current pathological fracture  Current Rx: Reclast (06/23/23), continue OTC calcium and vitamin D  She has previously been on Fosamax for 20+ years without a holiday.   She has been on Prolia since (11/2019) without real improvement in her bone density scores, so we changed to Reclast  3/22/22: Left femoral neck -2.5, spine -0.9, left forearm -3.7    - Continue calcium, vitamin D  - Started Reclast (08/01/23).  She did have bone pain the day after her Reclast infusion.  This was her first reclast dose.  Pain lasted two weeks, but improved some every day.  Does not wish to continue Reclast.  - Continue weight-bearing exercise  - Last dose of Prolia was 11/2022  - She would like to have her DEXA in July 2024 instead of March 2024.  I will get her DEXA.   She is unsure she wants to do Reclast again due to bone pain  I would suggest restarting Prolia and monitoring.  As long as she remains stable, I think it is a reasonable to continue Prolia.  Chronic right shoulder pain  Still with pain. Had fracture in past.      Chronic fatigue  We are prescribing 10 mg Ambien nightly.  This was initially started by her previous rheumatologist.    Previous Rx: amitriptyline (weight gain), Belsomra (didn't work).   Continue Ambien for now. We have discussed alternatives.  I warned her of risk of long-term Ambien use but she does not wish to try to wean or stop.  I told  her my discomfort with Ambien and frequent use of Compazine.  She will see her primary care regarding the nausea so hopefully another solution can be found for the nausea.  If methotrexate makes her nauseated, she could take a Compazine with the dose.  However, she is having daily nausea at this point and I do think it needs worked up.    Generalized osteoarthritis  She has pain in her spine and SI joints  - She continued to have leg pain.  - She has pain in her hips still and followed up with her neurosurgeon.  - She has failed steroid injections for SI joint arthritis  Osteoporosis without current pathological fracture, unspecified osteoporosis type  Current Rx: Reclast (06/23/23), continue OTC calcium and vitamin D  She has previously been on Fosamax for 20+ years without a holiday.   She has been on Prolia since (11/2019) without real improvement in her bone density scores, so we changed to Reclast  3/22/22: Left femoral neck -2.5, spine -0.9, left forearm -3.7    - Continue calcium, vitamin D  - Start Reclast (08/01/23).  She did have bone pain the day after her Reclast infusion.  This was her first reclast dose.  Pain lasted two weeks, but improved some every day.  Does not wish to continue Reclast.  - Continue weight-bearing exercise  - Last dose of Prolia was 11/2022  - She would like to have her DEXA in July 2024 instead of March 2024.  I will get her DEXA.   She is unsure she wants to do Reclast again due to bone pain  I would suggest restarting Prolia and monitoring.  As long as she remains stable, I think it is a reasonable to continue Prolia.  Fatigue, unspecified type  We are prescribing 10 mg Ambien nightly.  This was initially started by her previous rheumatologist.    Previous Rx: amitriptyline (weight gain), Belsomra (didn't work).   Continue Ambien for now. We have discussed alternatives.  I warned her of risk of long-term Ambien use but she does not wish to try to wean or stop.    High risk  medication use  Methotrexate  She will need q. 6 to 8-week labs.    Orders Placed This Encounter   Procedures    CBC Auto Differential    Comprehensive Metabolic Panel    C-reactive Protein    Sedimentation Rate       New Medications Ordered This Visit   Medications    Enbrel 50 MG/ML injection     Sig: Inject 1 mL under the skin into the appropriate area as directed 1 (One) Time Per Week.     Dispense:  4 mL     Refill:  3    folic acid (FOLVITE) 1 MG tablet     Sig: Take 1 tablet by mouth Daily.     Dispense:  30 tablet     Refill:  4    methotrexate 2.5 MG tablet     Sig: Take 6 tablets by mouth 1 (One) Time Per Week. Take 6 tablets by oral route once every week     Dispense:  72 tablet     Refill:  0    predniSONE (DELTASONE) 5 MG tablet     Sig: Take 1 tablet by mouth Daily.     Dispense:  30 tablet     Refill:  3    prochlorperazine (COMPAZINE) 10 MG tablet     Sig: Take 1 tablet by mouth Every 8 (Eight) Hours As Needed for Nausea or Vomiting.     Dispense:  30 tablet     Refill:  1            Follow Up:   Return in about 3 months (around 2/27/2025).        Iggy Antonio MD  [facility] [place]

## 2024-11-27 NOTE — ASSESSMENT & PLAN NOTE
Dx: 1999; Seropositive RA by Dr. Khanna.  +RF  Previous Rx: hydroxychloroquine (retinal toxicity)  Current Rx: 5 mg of prednisone daily,  methotrexate 15 mg weekly, and Enbrel, meloxicam 15mg daily.     RA is doing well.   Swollen joint count is: 0, Tender joint count is: 0, Physician global is: 1, VAS is: 0, Patient global is: 5.  CDAI is 6-low disease activity.   Continue methotrexate 15 mg weekly for now  Continue prednisone 5 mg; cannot wean below 5 mg due to adrenal insufficiency.  -   Continue Enbrel weekly-- reports compliance with weekly dosing.  F/u in 3 months.

## 2024-11-27 NOTE — ASSESSMENT & PLAN NOTE
She has pain in her spine and SI joints  - She continued to have leg pain.  - She has pain in her hips still and followed up with her neurosurgeon.  - She has failed steroid injections for SI joint arthritis

## 2024-11-27 NOTE — ASSESSMENT & PLAN NOTE
Current Rx: Reclast (06/23/23), continue OTC calcium and vitamin D  She has previously been on Fosamax for 20+ years without a holiday.   She has been on Prolia since (11/2019) without real improvement in her bone density scores, so we changed to Reclast  3/22/22: Left femoral neck -2.5, spine -0.9, left forearm -3.7    - Continue calcium, vitamin D  - Started Reclast (08/01/23).  She did have bone pain the day after her Reclast infusion.  This was her first reclast dose.  Pain lasted two weeks, but improved some every day.  Does not wish to continue Reclast.  - Continue weight-bearing exercise  - Last dose of Prolia was 11/2022  - She would like to have her DEXA in July 2024 instead of March 2024.  I will get her DEXA.   She is unsure she wants to do Reclast again due to bone pain  I would suggest restarting Prolia and monitoring.  As long as she remains stable, I think it is a reasonable to continue Prolia.

## 2024-11-27 NOTE — ASSESSMENT & PLAN NOTE
We are prescribing 10 mg Ambien nightly.  This was initially started by her previous rheumatologist.    Previous Rx: amitriptyline (weight gain), Belsomra (didn't work).   Continue Ambien for now. We have discussed alternatives.  I warned her of risk of long-term Ambien use but she does not wish to try to wean or stop.  I told her my discomfort with Ambien and frequent use of Compazine.  She will see her primary care regarding the nausea so hopefully another solution can be found for the nausea.  If methotrexate makes her nauseated, she could take a Compazine with the dose.  However, she is having daily nausea at this point and I do think it needs worked up.

## 2024-11-30 NOTE — PROGRESS NOTES
Labs reviewed.. bun/creatinine ratio indicates Shanel needs to increase her fluid intake. Target 64-80oz water daily

## 2024-12-16 ENCOUNTER — TELEMEDICINE (OUTPATIENT)
Dept: PSYCHIATRY | Facility: CLINIC | Age: 74
End: 2024-12-16
Payer: MEDICARE

## 2024-12-16 DIAGNOSIS — F41.1 GAD (GENERALIZED ANXIETY DISORDER): Primary | ICD-10-CM

## 2024-12-16 DIAGNOSIS — F33.1 MDD (MAJOR DEPRESSIVE DISORDER), RECURRENT EPISODE, MODERATE: ICD-10-CM

## 2024-12-16 NOTE — PROGRESS NOTES
Date: December 17, 2024  Time In: 2:05  Time out: 3:02      This provider is located at [facility]), [address], [place] [address] using a secure [employer] Video Visit through [employer]. Patient is being seen remotely via telehealth at home address in [place] and stated they are in a secure environment for this session. The patient's condition being diagnosed/treated is appropriate for telemedicine. The provider identified herself as well as her credentials. The patient, and/or patients guardian, consent to be seen remotely, and when consent is given they understand that the consent allows for patient identifiable information to be sent to a third party as needed. They may refuse to be seen remotely at any time. The electronic data is encrypted and password protected, and the patient and/or guardian has been advised of the potential risks to privacy not withstanding such measures.     You have chosen to receive care through a telehealth visit.  Do you consent to use a video/audio connection for your medical care today? Yes      Pt is located at Home    Subjective   Shanel Wall is a 73 y.o. female who presents today fully oriented, appropriately dressed and groomed, and open to communication for follow up appointment.    Chief Complaint:   Chief Complaint   Patient presents with    Anxiety    Depression        History of Present Illness: Rapport was established through conversation and unconditional positive regard. Recent events were discussed and how these events impact Pt's emotional health.   Pt reports successful use of learned coping skills since last report.  Pt reports continuation of symptoms and states the intensity and duration of symptoms has worsened since last report. Pt rates anxiety at 10/10 and depression at 6/10.     Sleep: Pt reports sleep has remained unchanged since last report. Healthy sleep habits were discussed  such as maintaining a schedule, routine, avoiding caffeine, and limiting screen time before bed.    Appetite:  Pt reports appetite has been good since last report.  Discussed the importance of hydration and eating a healthy diet for overall and mental health.    Medication compliance: Pt reports medications are being taken daily as prescribed. Discussed the importance of compliance with prescriber's directions and Pt was instructed to report questions/concerns, as well as missed doses or discontinuation of medication by Pt.     Safety Plan in Place: No Pt denies SI/HI/SIB recent or current    Daily Functioning:  Since last report, Pt states symptoms are causing Moderate difficulty in daily functioning.      Content Discussion:   Pt reports life updates since previous session. Pt identified current stressors. Pt acknowledged stressors within and outside of one's control. Pt identified successes and issues with utilizing coping mechanisms.  Pt indicates her anxiety has been high over the past couple of weeks due to worsening of 's dementia issues.  Discussed the need to ensure Pt's own safety, emotional, and physical wellbeing. Pt allowed to process feelings associated with caregiver stress. Pt will enlist 's PCP's assistance in communicating to  about moving to [place] to assisted living.     Counselor supported Pt in continued exploration of underlying belief systems which contribute to difficulty in connecting/vulnerability.  Through discussion, Pt identifies themes of preservation and overt emotional and physical reactivity when physical and/or emotional statis is in question, even in low or perceived threatening situations. Counselor supported Pt in identifying past experiences and underlying beliefs which contribute to these feelings and reactions.  Pt was supported and encouraged in processing emotions related to frustrations with the expectations of self and others.  Pt was encouraged to  identify cultural and nuclear familial contexts which contribute to these concerns.  Pt was receptive and engaged in conversation, and Pt reports this was beneficial and applicable to their situation.      Reviewed coping skills and encouraged Pt to continue to practicing coping skills daily when not under distress. Pt was praised for their attempts to decrease symptoms and mitigate activating events.    Clinical Maneuvering/Intervention:  CBT was utilized to encourage Pt to identify maladaptive thoughts and behaviors and replace with more affirming and positive.Pt encouraged to set and maintain appropriate and healthy boundaries with others. Pt was instructed to practice daily using appropriate and specific words to communicate feelings to others.  Motivational interviewing used to encourage Pt to identify strengths which can be utilized in working toward treatment goals. Pt encouraged to practice daily learned skills such as controlled breathing, grounding, and mindfulness. Pt was encouraged to ask for help from support persons to assist them in maintaining stability and alleviate symptoms. Discussed the importance of being one's own mental health advocate and to refrain from seeing the need to ask for help as a weakness. Pt was encouraged to formulate a plan of action to be more proactive in managing stressors and refrain from using reactive and automatic heightened emotional responses.     Solution-focused therapy employed to identify how Pt would like their life to be if they were to make positive changes. Pt encouraged to identify effective coping skills and strengths they can use to continue utilizing those skills. Pt encouraged to discontinue utilizing non-effective coping mechanisms. Pt provided with feedback to highlight achievements and personal and other resources. Encouraged use of SMART goals    Assisted patient in processing above session content; acknowledged and normalized patient’s thoughts,  feelings, and concerns.  Rationalized patient thought process regarding concerns presented at session.  Discussed triggers associated with patient's  anxiety  and depression  Also discussed coping skills for patient to implement such as grounding , self care , and positive self talk     Allowed patient to freely discuss issues without interruption or judgment. Provided safe, confidential environment to facilitate the development of positive therapeutic relationship and encourage open, honest communication. Assisted patient in identifying risk factors which would indicate the need for higher level of care including thoughts to harm self or others and/or self-harming behavior and encouraged patient to contact this office, call 911, or present to the nearest emergency room should any of these events occur. Discussed crisis intervention services and means to access. Patient adamantly and convincingly denies current suicidal or homicidal ideation or perceptual disturbance.    Assessment:     Patient appears to maintain relative stability as compared to their baseline.  However, patient persistently struggles with symptoms which continue to cause impairment in important areas of functioning.  As a result, they can be reasonably expected to continue to benefit from treatment and would likely be at increased risk for decompensation otherwise.                  Mental Status Exam:   Hygiene:   good  Cooperation:  Cooperative  Eye Contact:  Good  Psychomotor Behavior:  Appropriate  Affect:  Full range  Mood: depressed and anxious  Speech:  Normal  Thought Process:  Goal directed  Thought Content:  Normal  Suicidal:  None  Homicidal: None  Hallucinations:  None  Delusion: None  Memory:  Intact  Orientation:  Grossly Intact  Reliability:  good  Insight:  Good  Judgement:  Good  Impulse Control:  Good  Physical/Medical Issues:  No        Functional Status: Moderate impairment     Progress toward goal: Not at goal    Prognosis:  Good with continued therapeutic intervention        Plan:    Patient will continue in individual outpatient therapy with focus on improved functioning and coping skills, maintaining stability, and avoiding decompensation and the need for higher level of care.    Patient will adhere to medication regimen as prescribed and report any side effects. Patient will contact this office, call 911 or present to the nearest emergency room should suicidal or homicidal ideations occur. Provide Cognitive Behavioral Therapy and Solution Focused Therapy to improve functioning, maintain stability, and avoid decompensation and the need for higher level of care.     Return in about 2 weeks (around 12/30/2024).      VISIT DIAGNOSIS:    Diagnosis Plan   1. SIVAKUMAR (generalized anxiety disorder)        2. MDD (major depressive disorder), recurrent episode, moderate         14:06 EST       This document has been electronically signed by CHELY Cunningham  December 17, 2024      Part of this note may be an electronic transcription/translation of spoken language to printed text using the Dragon Dictation System.

## 2024-12-17 ENCOUNTER — HOSPITAL ENCOUNTER (OUTPATIENT)
Dept: MAMMOGRAPHY | Facility: HOSPITAL | Age: 74
Discharge: HOME | End: 2024-12-17
Admitting: INTERNAL MEDICINE
Payer: MEDICARE

## 2024-12-17 DIAGNOSIS — Z12.31 SCREENING MAMMOGRAM FOR BREAST CANCER: ICD-10-CM

## 2024-12-17 PROCEDURE — 77067 SCR MAMMO BI INCL CAD: CPT

## 2024-12-17 PROCEDURE — 77063 BREAST TOMOSYNTHESIS BI: CPT

## 2024-12-23 DIAGNOSIS — M53.3 SACROILIAC JOINT DYSFUNCTION OF BOTH SIDES: ICD-10-CM

## 2024-12-23 DIAGNOSIS — M05.9 RHEUMATOID ARTHRITIS WITH POSITIVE RHEUMATOID FACTOR, INVOLVING UNSPECIFIED SITE: ICD-10-CM

## 2024-12-23 DIAGNOSIS — M05.79 RHEUMATOID ARTHRITIS INVOLVING MULTIPLE SITES WITH POSITIVE RHEUMATOID FACTOR: ICD-10-CM

## 2024-12-23 RX ORDER — HYDROCODONE BITARTRATE AND ACETAMINOPHEN 5; 325 MG/1; MG/1
1 TABLET ORAL EVERY 6 HOURS PRN
Qty: 60 TABLET | Refills: 0 | Status: SHIPPED | OUTPATIENT
Start: 2024-12-23

## 2024-12-23 NOTE — TELEPHONE ENCOUNTER
Caller: Shanel Wall    Relationship: Self    Best call back number: [phone]    Requested Prescriptions:   Requested Prescriptions     Pending Prescriptions Disp Refills    HYDROcodone-acetaminophen (NORCO) 5-325 MG per tablet 60 tablet 0     Sig: Take 1 tablet by mouth Every 6 (Six) Hours As Needed for Severe Pain.        Pharmacy where request should be sent: [facility] [id number] [address] [facility] [phone] [facility] [phone] FX     Last office visit with prescribing clinician: 8/29/2024   Last telemedicine visit with prescribing clinician: Visit date not found   Next office visit with prescribing clinician: Visit date not found     Additional details provided by patient: PT HAS 2 DAYS LEFT    Does the patient have less than a 3 day supply:  [x] Yes  [] No    Would you like a call back once the refill request has been completed: [x] Yes [] No    If the office needs to give you a call back, can they leave a voicemail: [x] Yes [] No    Milad Lee Rep   12/23/24 11:17 EST

## 2024-12-23 NOTE — TELEPHONE ENCOUNTER
Rx Refill Note  Requested Prescriptions     Pending Prescriptions Disp Refills    HYDROcodone-acetaminophen (NORCO) 5-325 MG per tablet 60 tablet 0     Sig: Take 1 tablet by mouth Every 6 (Six) Hours As Needed for Severe Pain.      Last office visit with prescribing clinician: 8/29/2024   Last telemedicine visit with prescribing clinician: Visit date not found   Next office visit with prescribing clinician: 1/24/2025                         Would you like a call back once the refill request has been completed: [] Yes [] No    If the office needs to give you a call back, can they leave a voicemail: [] Yes [] No    Ana Maria Cartagena MA  12/23/24, 11:55 EST    UDS and CSA 11/19/2024

## 2025-01-13 ENCOUNTER — TELEMEDICINE (OUTPATIENT)
Dept: PSYCHIATRY | Facility: CLINIC | Age: 75
End: 2025-01-13
Payer: MEDICARE

## 2025-01-13 DIAGNOSIS — F41.1 GAD (GENERALIZED ANXIETY DISORDER): Primary | ICD-10-CM

## 2025-01-13 DIAGNOSIS — F33.1 MDD (MAJOR DEPRESSIVE DISORDER), RECURRENT EPISODE, MODERATE: ICD-10-CM

## 2025-01-13 NOTE — PROGRESS NOTES
Date: January 14, 2025  Time In: 2:02  Time out: 2:57      This provider is located at the [facility]), [address], [place] [address] using a secure [employer] Video Visit through [employer]. Patient is being seen remotely via telehealth, and they are in a secure environment for this session. The patient's condition being diagnosed/treated is appropriate for telemedicine. The provider identified herself as well as her credentials. The patient, and/or patients guardian, consent to be seen remotely, and when consent is given they understand that the consent allows for patient identifiable information to be sent to a third party as needed. They may refuse to be seen remotely at any time. The electronic data is encrypted and password protected, and the patient and/or guardian has been advised of the potential risks to privacy not withstanding such measures.     You have chosen to receive care through a telehealth visit.  Do you consent to use a video/audio connection for your medical care today? Yes      Pt is located at home    Subjective   Shanel Wall is a 74 y.o. female who presents today fully oriented, appropriately dressed and groomed, and open to communication for follow up appointment.    Chief Complaint:   Chief Complaint   Patient presents with    Anxiety    Depression    Sleeping Problem        History of Present Illness: Rapport was established through conversation and unconditional positive regard. Recent events were discussed and how these events impact Pt's emotional health.   Pt reports successful use of learned coping skills since last report.  Pt reports continuation of symptoms and states the intensity and duration of symptoms has worsened since last report. Pt rates anxiety at 10/10 and depression at 8/10.  Pt states her increase in anx and dep are situational and related to 's worsening dementia and their upcoming move  "to be near Pt's sister.     Sleep: Pt reports sleep has remained unchanged since last report. Healthy sleep habits were discussed such as maintaining a schedule, routine, avoiding caffeine, and limiting screen time before bed.  My sleep is ok but I have to get up several times in the night for bathroom issues.  This causes Pt to sleep in in the mornings. Pt has begun locking the outside doors and removing the key from the deadbolt so  cannot leave the house.      Appetite:  Pt reports appetite has been good since last report.  Discussed the importance of hydration and eating a healthy diet for overall and mental health.    Medication compliance: Pt reports medications are being taken daily as prescribed. Discussed the importance of compliance with prescriber's directions and Pt was instructed to report questions/concerns, as well as missed doses or discontinuation of medication by Pt.     Safety Plan in Place: No Pt denies SI/HI/SIB recent or current    Daily Functioning:  Since last report, Pt states symptoms are causing Severe difficulty in daily functioning.      Content Discussion:   Pt reports life updates since previous session. Pt identified current stressors. Pt acknowledged stressors within and outside of one's control. Pt identified successes and issues with utilizing coping mechanisms.  's dementia is worsening.  He keeps wanting to go \"back to [place]\" and wants to go somewhere there is no snow.   \"Somehow he's displaced\" in his mind.  States he has bags packed in the kitchen and is waiting for someone to pick him up.  He keeps asking Pt who is coming.  Pt has scheduled a consult with their PCP and an appointment for her  immediately after. Pt is enlisting the help of their , , and medical provider to make the move more comfortable for  as well as Pt.  Pt strongly encouraged to practice self care and enlist help wherever possible.  Pt agrees this will " be a good idea. Pt states she has some concern when  does not recognize her. States  refused to give her mail because he did not believe it was her.  Discussed the importance of ensuring Pt's safety and security at all times.    Counselor supported Pt in continued exploration of underlying belief systems which contribute to difficulty in connecting/vulnerability.  Through discussion, Pt identifies themes of preservation and overt emotional and physical reactivity when physical and/or emotional statis is in question, even in low or perceived threatening situations. Counselor supported Pt in identifying past experiences and underlying beliefs which contribute to these feelings and reactions.  Pt was supported and encouraged in processing emotions related to frustrations with the expectations of self and others.  Pt was encouraged to identify cultural and nuclear familial contexts which contribute to these concerns.  Pt was receptive and engaged in conversation, and Pt reports this was beneficial and applicable to their situation.      Reviewed coping skills and encouraged Pt to continue to practicing coping skills daily when not under distress. Pt was praised for their attempts to decrease symptoms and mitigate activating events.    Clinical Maneuvering/Intervention:  CBT was utilized to encourage Pt to identify maladaptive thoughts and behaviors and replace with more affirming and positive.Pt encouraged to set and maintain appropriate and healthy boundaries with others. Pt was instructed to practice daily using appropriate and specific words to communicate feelings to others.  Motivational interviewing used to encourage Pt to identify strengths which can be utilized in working toward treatment goals. Pt encouraged to practice daily learned skills such as controlled breathing, grounding, and mindfulness. Pt was encouraged to ask for help from support persons to assist them in maintaining stability and  alleviate symptoms. Discussed the importance of being one's own mental health advocate and to refrain from seeing the need to ask for help as a weakness. Pt was encouraged to formulate a plan of action to be more proactive in managing stressors and refrain from using reactive and automatic heightened emotional responses.     Solution-focused therapy employed to identify how Pt would like their life to be if they were to make positive changes. Pt encouraged to identify effective coping skills and strengths they can use to continue utilizing those skills. Pt encouraged to discontinue utilizing non-effective coping mechanisms. Pt provided with feedback to highlight achievements and personal and other resources. Encouraged use of SMART goals    Assisted patient in processing above session content; acknowledged and normalized patient’s thoughts, feelings, and concerns.  Rationalized patient thought process regarding concerns presented at session.  Discussed triggers associated with patient's  anxiety  and depression  Also discussed coping skills for patient to implement such as grounding , self care , and positive self talk     Allowed patient to freely discuss issues without interruption or judgment. Provided safe, confidential environment to facilitate the development of positive therapeutic relationship and encourage open, honest communication. Assisted patient in identifying risk factors which would indicate the need for higher level of care including thoughts to harm self or others and/or self-harming behavior and encouraged patient to contact this office, call 911, or present to the nearest emergency room should any of these events occur. Discussed crisis intervention services and means to access. Patient adamantly and convincingly denies current suicidal or homicidal ideation or perceptual disturbance.    Assessment:     Patient appears to maintain relative stability as compared to their baseline.  However,  "patient persistently struggles with symptoms which continue to cause impairment in important areas of functioning.  As a result, they can be reasonably expected to continue to benefit from treatment and would likely be at increased risk for decompensation otherwise.      PHQ-Score Total:  PHQ-9 Total Score: PHQ-9 Depression Screening  Little interest or pleasure in doing things?  3   Feeling down, depressed, or hopeless?  3   PHQ-2 Total Score     Trouble falling or staying asleep, or sleeping too much?  3   Feeling tired or having little energy?  3   Poor appetite or overeating?  0   Feeling bad about yourself - or that you are a failure or have let yourself or your family down?  3   Trouble concentrating on things, such as reading the newspaper or watching television?  1   Moving or speaking so slowly that other people could have noticed? Or the opposite - being so fidgety or restless that you have been moving around a lot more than usual?  3  \"I'm very slow\"   Thoughts that you would be better off dead, or of hurting yourself in some way?  0   PHQ-9 Total Score 19   If you checked off any problems, how difficult have these problems made it for you to do your work, take care of things at home, or get along with other people?  Extreme      Over the last two weeks, how often have you been bothered by the following problems?  Feeling nervous, anxious or on edge: Nearly every day  Not being able to stop or control worrying: Nearly every day  Worrying too much about different things: Nearly every day  Trouble Relaxing: More than half the days  Being so restless that it is hard to sit still: More than half the days  Becoming easily annoyed or irritable: More than half the days  Feeling afraid as if something awful might happen: Nearly every day  SIVAKUMAR 7 Total Score: 18  If you checked any problems, how difficult have these problems made it for you to do your work, take care of things at home, or get along with other " people: Extremely difficult      Mental Status Exam:   Hygiene:   good  Cooperation:  Cooperative  Eye Contact:  Good  Psychomotor Behavior:  Appropriate  Affect:  Worried and flat  Mood: anxious and depressed  Speech:  normal  Thought Process:  Goal directed and Linear  Thought Content:  Normal and Mood congruent  Suicidal:  None  Homicidal: None  Hallucinations:  None  Delusion: None  Memory:  Intact  Orientation:  Grossly Intact  Reliability:  good  Insight:  Good  Judgement:  Good  Impulse Control:  Good  Physical/Medical Issues:  No        Functional Status: Severe impairment    Progress toward goal: Not at goal    Prognosis: Good with continued therapeutic intervention        Plan:    Patient will continue in individual outpatient therapy with focus on improved functioning and coping skills, maintaining stability, and avoiding decompensation and the need for higher level of care.    Patient will adhere to medication regimen as prescribed and report any side effects. Patient will contact this office, call 911 or present to the nearest emergency room should suicidal or homicidal ideations occur. Provide Cognitive Behavioral Therapy and Solution Focused Therapy to improve functioning, maintain stability, and avoid decompensation and the need for higher level of care.     Return in about 2 weeks (around 1/27/2025).      VISIT DIAGNOSIS:    Diagnosis Plan   1. SIVAKUMAR (generalized anxiety disorder)        2. MDD (major depressive disorder), recurrent episode, moderate         14:02 EST       This document has been electronically signed by CHELY Cunningham  January 14, 2025      Part of this note may be an electronic transcription/translation of spoken language to printed text using the Dragon Dictation System.

## 2025-01-14 NOTE — PLAN OF CARE
Anxiety: 10/10  Depression 8/10    PHQ-9: 10 Extreme    SIVAKUMAR-7 18      Pt's levels have worsened since last care plan update, and Pt attributes this to 's dementia worsening.     Pt would benefit from continuation of bi-weekly individual therapy.    Add goal:    Pt will enlist help from others when possible.    Pt will prioritize her safety and mental and physical health.

## 2025-01-14 NOTE — PATIENT INSTRUCTIONS
[place] warm Line: Phone number: [phone]      Monday through Friday 10 AM to 9 PM and Saturdays 5 PM to 9 PM.      A warmline is a NON-CRISIS number to call to get emotional support. You can call to have a conversation with someone who can provide support during hard times. Warmlines are staffed by trained peers who have been through their own mental health struggles and know what it's like to need help.      -Suicide hotline number: 988      -[facility] Resource Connection      The resource line is dedicated to providing behavioral health resources to residents of [place] and [facility], seven days a week, 7 a.m.-7 p.m.      [phone]      [email or web address]  [facility]/BehavioralHealth      Should you ever need assistance or just want to reach out to someone when your behavioral health provider is not available due to the office being closed you can contact [email or web address].       -Just text HOME to [phone] and someone will reach out to you within a few minutes.  This is a 24/7 help line and they are open even on holidays.

## 2025-01-24 ENCOUNTER — OFFICE VISIT (OUTPATIENT)
Dept: FAMILY MEDICINE CLINIC | Facility: CLINIC | Age: 75
End: 2025-01-24
Payer: MEDICARE

## 2025-01-24 VITALS
HEART RATE: 72 BPM | WEIGHT: 122.3 LBS | SYSTOLIC BLOOD PRESSURE: 124 MMHG | BODY MASS INDEX: 25.67 KG/M2 | OXYGEN SATURATION: 98 % | HEIGHT: 58 IN | DIASTOLIC BLOOD PRESSURE: 83 MMHG

## 2025-01-24 DIAGNOSIS — Z51.81 MEDICATION MONITORING ENCOUNTER: ICD-10-CM

## 2025-01-24 DIAGNOSIS — M05.9 RHEUMATOID ARTHRITIS WITH POSITIVE RHEUMATOID FACTOR, INVOLVING UNSPECIFIED SITE: ICD-10-CM

## 2025-01-24 DIAGNOSIS — Z63.6 CAREGIVER STRESS: ICD-10-CM

## 2025-01-24 DIAGNOSIS — M53.3 SACROILIAC JOINT DYSFUNCTION OF BOTH SIDES: ICD-10-CM

## 2025-01-24 DIAGNOSIS — F41.9 ANXIETY: ICD-10-CM

## 2025-01-24 DIAGNOSIS — R94.6 ABNORMAL THYROID FUNCTION TEST: ICD-10-CM

## 2025-01-24 DIAGNOSIS — J30.89 ALLERGIC RHINITIS DUE TO OTHER ALLERGIC TRIGGER, UNSPECIFIED SEASONALITY: Primary | ICD-10-CM

## 2025-01-24 DIAGNOSIS — M05.79 RHEUMATOID ARTHRITIS INVOLVING MULTIPLE SITES WITH POSITIVE RHEUMATOID FACTOR: ICD-10-CM

## 2025-01-24 PROCEDURE — 1126F AMNT PAIN NOTED NONE PRSNT: CPT | Performed by: INTERNAL MEDICINE

## 2025-01-24 PROCEDURE — 99214 OFFICE O/P EST MOD 30 MIN: CPT | Performed by: INTERNAL MEDICINE

## 2025-01-24 RX ORDER — METHOTREXATE 2.5 MG/1
15 TABLET ORAL WEEKLY
Qty: 72 TABLET | Refills: 0 | Status: SHIPPED | OUTPATIENT
Start: 2025-01-24

## 2025-01-24 RX ORDER — LORAZEPAM 0.5 MG/1
0.5 TABLET ORAL EVERY 8 HOURS PRN
Qty: 90 TABLET | Refills: 1 | Status: SHIPPED | OUTPATIENT
Start: 2025-01-24 | End: 2025-01-24

## 2025-01-24 RX ORDER — LORAZEPAM 0.5 MG/1
0.5 TABLET ORAL EVERY 8 HOURS PRN
Qty: 270 TABLET | Refills: 0 | Status: SHIPPED | OUTPATIENT
Start: 2025-01-24

## 2025-01-24 RX ORDER — PROCHLORPERAZINE MALEATE 10 MG
10 TABLET ORAL EVERY 8 HOURS PRN
Qty: 30 TABLET | Refills: 1 | Status: SHIPPED | OUTPATIENT
Start: 2025-01-24

## 2025-01-24 RX ORDER — MECOBALAMIN 5000 MCG
15 TABLET,DISINTEGRATING ORAL DAILY
Qty: 90 CAPSULE | Refills: 2 | Status: SHIPPED | OUTPATIENT
Start: 2025-01-24

## 2025-01-24 RX ORDER — ZOLPIDEM TARTRATE 10 MG/1
10 TABLET ORAL NIGHTLY PRN
Qty: 90 TABLET | Refills: 1 | Status: SHIPPED | OUTPATIENT
Start: 2025-01-24

## 2025-01-24 RX ORDER — LEVOTHYROXINE SODIUM 75 UG/1
75 TABLET ORAL DAILY
Qty: 90 TABLET | Refills: 3 | Status: SHIPPED | OUTPATIENT
Start: 2025-01-24

## 2025-01-24 RX ORDER — MELOXICAM 15 MG/1
15 TABLET ORAL EVERY 24 HOURS
Qty: 90 TABLET | Refills: 1 | Status: SHIPPED | OUTPATIENT
Start: 2025-01-24

## 2025-01-24 RX ORDER — LEVOCETIRIZINE DIHYDROCHLORIDE 5 MG/1
5 TABLET, FILM COATED ORAL DAILY
Qty: 90 TABLET | Refills: 2 | Status: SHIPPED | OUTPATIENT
Start: 2025-01-24

## 2025-01-24 RX ORDER — DULOXETIN HYDROCHLORIDE 30 MG/1
90 CAPSULE, DELAYED RELEASE ORAL DAILY
Qty: 270 CAPSULE | Refills: 2 | Status: SHIPPED | OUTPATIENT
Start: 2025-01-24

## 2025-01-24 RX ORDER — HYDROCODONE BITARTRATE AND ACETAMINOPHEN 5; 325 MG/1; MG/1
1 TABLET ORAL EVERY 6 HOURS PRN
Qty: 180 TABLET | Refills: 0 | Status: SHIPPED | OUTPATIENT
Start: 2025-01-24

## 2025-01-24 SDOH — SOCIAL STABILITY - SOCIAL INSECURITY: DEPENDENT RELATIVE NEEDING CARE AT HOME: Z63.6

## 2025-01-24 NOTE — PROGRESS NOTES
"Shanel Wall  1950  [id number]  Patient Care Team:  Fili Ray MD as PCP - General (Internal Medicine)  Cristiane Araiza MD as Consulting Physician (Endocrinology)  Nain Alvarenga MD as Consulting Physician (Pain Medicine)  Radha Prather [facility] as  (Behavioral Health)  Iggy Antonio MD as Consulting Physician (Rheumatology)    Shanel Wall is a 74 y.o. female here today for follow up.     This patient is accompanied by their self who contributes to the history of their care.    Chief Complaint:    Chief Complaint   Patient presents with    Anxiety        History of Present Illness:  I have reviewed and/or updated the patient's past medical, past surgical, family, social history, problem list and allergies as appropriate.     She is her to f/o anxiety [facility] care caregiver stress. Her  has worsened with his decline in cognition. He is resisting the move. This will allow them to be in proximity of family. They are moving to assisted living and will be followed closely. Transportation is available. She has actually paid a deposit for a memory care unit.  She continues to have Ambien for sleep.  She has been using lorazepam low-dose up to 3 times per day dealing with the stress of her 's cognitive decline in the face of her diminished vision.  She still has diffuse arthralgias predominantly in the sacroiliac region.  She continues on hydrocodone tablet every 6 hours as needed.    Review of Systems   Constitutional: Negative.    HENT: Negative.     Respiratory: Negative.     Musculoskeletal:  Positive for arthralgias and back pain.   Psychiatric/Behavioral:  Positive for sleep disturbance and stress. The patient is nervous/anxious.        Vitals:    01/24/25 1035   BP: 124/83   Pulse: 72   SpO2: 98%   Weight: 55.5 kg (122 lb 4.8 oz)   Height: 147.3 cm (57.99\")     Body mass index is 25.57 kg/m².    Physical Exam  Vitals and nursing note reviewed. "   Constitutional:       General: She is not in acute distress.     Appearance: She is well-developed. She is not diaphoretic.   HENT:      Head: Normocephalic and atraumatic.      Right Ear: External ear normal.      Left Ear: External ear normal.      Mouth/Throat:      Pharynx: No oropharyngeal exudate.   Eyes:      General: No scleral icterus.        Right eye: No discharge.      Conjunctiva/sclera: Conjunctivae normal.   Neck:      Thyroid: No thyromegaly.      Vascular: No JVD.      Trachea: No tracheal deviation.   Cardiovascular:      Rate and Rhythm: Normal rate and regular rhythm.      Heart sounds: Normal heart sounds.      Comments: PMI nondisplaced  Pulmonary:      Effort: Pulmonary effort is normal.      Breath sounds: Normal breath sounds. No wheezing or rales.   Abdominal:      General: Bowel sounds are normal.      Palpations: Abdomen is soft.      Tenderness: There is no abdominal tenderness. There is no guarding or rebound.   Musculoskeletal:      Cervical back: Normal range of motion and neck supple.   Lymphadenopathy:      Cervical: No cervical adenopathy.   Skin:     General: Skin is warm and dry.      Capillary Refill: Capillary refill takes less than 2 seconds.      Coloration: Skin is not pale.      Findings: No rash.   Neurological:      Mental Status: She is alert and oriented to person, place, and time.      Motor: No abnormal muscle tone.      Coordination: Coordination normal.   Psychiatric:         Attention and Perception: Attention and perception normal.         Mood and Affect: Mood is depressed. Affect is tearful.         Speech: Speech normal.         Behavior: Behavior normal. Behavior is cooperative.         Thought Content: Thought content normal.         Judgment: Judgment normal.         Procedures    Results Review:    None    Assessment/Plan:    Problem List Items Addressed This Visit       Rheumatoid arthritis involving multiple sites    Relevant Medications    Enbrel 50  MG/ML injection    predniSONE (DELTASONE) 5 MG tablet    zolpidem (AMBIEN) 10 MG tablet    HYDROcodone-acetaminophen (NORCO) 5-325 MG per tablet    meloxicam (MOBIC) 15 MG tablet    Rheumatoid arthritis with positive rheumatoid factor    Relevant Medications    Enbrel 50 MG/ML injection    predniSONE (DELTASONE) 5 MG tablet    zolpidem (AMBIEN) 10 MG tablet    HYDROcodone-acetaminophen (NORCO) 5-325 MG per tablet    meloxicam (MOBIC) 15 MG tablet    methotrexate 2.5 MG tablet    Anxiety    Relevant Medications    DULoxetine (CYMBALTA) 30 MG capsule    LORazepam (ATIVAN) 0.5 MG tablet    Sacroiliac joint dysfunction of both sides    Relevant Medications    HYDROcodone-acetaminophen (NORCO) 5-325 MG per tablet    Abnormal thyroid function test    Relevant Medications    levothyroxine (SYNTHROID, LEVOTHROID) 75 MCG tablet     Other Visit Diagnoses       Allergic rhinitis due to other allergic trigger, unspecified seasonality    -  Primary    Relevant Medications    levocetirizine (XYZAL) 5 MG tablet    meloxicam (MOBIC) 15 MG tablet    Medication monitoring encounter        Relevant Medications    zolpidem (AMBIEN) 10 MG tablet    Caregiver stress        Relevant Medications    DULoxetine (CYMBALTA) 30 MG capsule    LORazepam (ATIVAN) 0.5 MG tablet        I have refilled her medications for at least a 3-month supply as she transitions to her new home in [place].  PDMP was reviewed.  UDS CSA on file    Plan of care reviewed with patient at the conclusion of today's visit. Education was provided regarding diagnosis and management.  Patient verbalizes understanding of and agreement with management plan.    No follow-ups on file.    Fili Ray MD      Please note than portions of this note were completed [facility] a Voice Recognition Program

## 2025-02-04 ENCOUNTER — TELEMEDICINE (OUTPATIENT)
Dept: PSYCHIATRY | Facility: CLINIC | Age: 75
End: 2025-02-04
Payer: MEDICARE

## 2025-02-04 DIAGNOSIS — F33.1 MDD (MAJOR DEPRESSIVE DISORDER), RECURRENT EPISODE, MODERATE: ICD-10-CM

## 2025-02-04 DIAGNOSIS — F41.1 GAD (GENERALIZED ANXIETY DISORDER): Primary | ICD-10-CM

## 2025-02-04 NOTE — PROGRESS NOTES
Date: February 4, 2025  Time In: 3:02  Time out: 3:52      This provider is located at the [facility] ([facility] [address], [place] [address] using a secure [employer] Video Visit through [employer]. Patient is being seen remotely via telehealth, and they are in a secure environment for this session. The patient's condition being diagnosed/treated is appropriate for telemedicine. The provider identified herself as well as her credentials. The patient, and/or patients guardian, consent to be seen remotely, and when consent is given they understand that the consent allows for patient identifiable information to be sent to a third party as needed. They may refuse to be seen remotely at any time. The electronic data is encrypted and password protected, and the patient and/or guardian has been advised of the potential risks to privacy not withstanding such measures.     Mode of Visit: Video  Location of patient: Home  Location of provider: Provider home  You have chosen to receive care through a telehealth visit.  The patient has signed the video visit consent form.  The visit included audio and video interaction. No technical issues occurred during this visit    Subjective   Shanel Wall is a 74 y.o. female who presents today fully oriented, appropriately dressed and groomed, and open to communication for follow up appointment.    Chief Complaint:   Chief Complaint   Patient presents with    Anxiety    Depression        History of Present Illness: Rapport was established through conversation and unconditional positive regard. Recent events were discussed and how these events impact Pt's emotional health.   Pt reports successful use of learned coping skills since last report.  Pt reports continuation of symptoms and states the intensity and duration of symptoms has remained unchanged since last report.     Sleep: Pt reports sleep has remained unchanged  since last report. Healthy sleep habits were discussed such as maintaining a schedule, routine, avoiding caffeine, and limiting screen time before bed.    Appetite:  Pt reports appetite has been good since last report.  Discussed the importance of hydration and eating a healthy diet for overall and mental health.    Medication compliance: Pt reports medications are being taken daily as prescribed. Discussed the importance of compliance with prescriber's directions and Pt was instructed to report questions/concerns, as well as missed doses or discontinuation of medication by Pt.     Safety Plan in Place: No Pt denies SI/HI/SIB recent or current    Daily Functioning:  Since last report, Pt states symptoms are causing Moderate difficulty in daily functioning.      Content Discussion:   Pt reports life updates since previous session. Pt identified current stressors. Pt acknowledged stressors within and outside of one's control. Pt identified successes and issues with utilizing coping mechanisms.  Per discussion with Counselor at previous session, Pt made the decision to have professional movers pack and transport and unpack at new home to alleviate stress for Pt.  Pt states this decision has been a relief for her anxiety.  Pt states she has been accepting help from others without feeling guilt, and Pt was praised for this.  Pt states they will be checking into their new assisted living facility in [place] at the end of January.   Counselor reviewed learned coping skills with Pt.  Pt encouraged to continue her self care and prioritizing her emotional and physical needs.  Pt was wished the best for herself and .    Counselor supported Pt in continued exploration of underlying belief systems which contribute to difficulty in connecting/vulnerability.  Through discussion, Pt identifies themes of preservation and overt emotional and physical reactivity when physical and/or emotional statis is in question, even in low  or perceived threatening situations. Counselor supported Pt in identifying past experiences and underlying beliefs which contribute to these feelings and reactions.  Pt was supported and encouraged in processing emotions related to frustrations with the expectations of self and others.  Pt was encouraged to identify cultural and nuclear familial contexts which contribute to these concerns.  Pt was receptive and engaged in conversation, and Pt reports this was beneficial and applicable to their situation.      Reviewed coping skills and encouraged Pt to continue to practicing coping skills daily when not under distress. Pt was praised for their attempts to decrease symptoms and mitigate activating events.    Clinical Maneuvering/Intervention:  CBT was utilized to encourage Pt to identify maladaptive thoughts and behaviors and replace with more affirming and positive.Pt encouraged to set and maintain appropriate and healthy boundaries with others. Pt was instructed to practice daily using appropriate and specific words to communicate feelings to others.  Motivational interviewing used to encourage Pt to identify strengths which can be utilized in working toward treatment goals. Pt encouraged to practice daily learned skills such as controlled breathing, grounding, and mindfulness. Pt was encouraged to ask for help from support persons to assist them in maintaining stability and alleviate symptoms. Discussed the importance of being one's own mental health advocate and to refrain from seeing the need to ask for help as a weakness. Pt was encouraged to formulate a plan of action to be more proactive in managing stressors and refrain from using reactive and automatic heightened emotional responses.     Solution-focused therapy employed to identify how Pt would like their life to be if they were to make positive changes. Pt encouraged to identify effective coping skills and strengths they can use to continue utilizing  those skills. Pt encouraged to discontinue utilizing non-effective coping mechanisms. Pt provided with feedback to highlight achievements and personal and other resources. Encouraged use of SMART goals    Assisted patient in processing above session content; acknowledged and normalized patient’s thoughts, feelings, and concerns.  Rationalized patient thought process regarding concerns presented at session.  Discussed triggers associated with patient's  anxiety  and depression  Also discussed coping skills for patient to implement such as mindfulness , self care , and positive self talk     Allowed patient to freely discuss issues without interruption or judgment. Provided safe, confidential environment to facilitate the development of positive therapeutic relationship and encourage open, honest communication. Assisted patient in identifying risk factors which would indicate the need for higher level of care including thoughts to harm self or others and/or self-harming behavior and encouraged patient to contact this office, call 911, or present to the nearest emergency room should any of these events occur. Discussed crisis intervention services and means to access. Patient adamantly and convincingly denies current suicidal or homicidal ideation or perceptual disturbance.    Assessment:     Patient appears to maintain relative stability as compared to their baseline.  However, patient persistently struggles with symptoms which continue to cause impairment in important areas of functioning.  As a result, they can be reasonably expected to continue to benefit from treatment and would likely be at increased risk for decompensation otherwise.          Mental Status Exam:   Hygiene:   good  Cooperation:  Cooperative  Eye Contact:  Good  Psychomotor Behavior:  Appropriate  Affect:  Full range  Mood: anxious  Speech:  Normal  Thought Process:  Goal directed  Thought Content:  Normal  Suicidal:  None  Homicidal:  None  Hallucinations:  None  Delusion: None  Memory:  Intact  Orientation:  Grossly Intact  Reliability:  good  Insight:  Good  Judgement:  Good  Impulse Control:  Good  Physical/Medical Issues:  Vision issues      Functional Status: Moderate impairment     Progress toward goal: Not at goal    Prognosis: Good with continued therapeutic intervention        Plan:    Patient will continue in individual outpatient therapy with focus on improved functioning and coping skills, maintaining stability, and avoiding decompensation and the need for higher level of care.    Patient will adhere to medication regimen as prescribed and report any side effects. Patient will contact this office, call 911 or present to the nearest emergency room should suicidal or homicidal ideations occur. Provide Cognitive Behavioral Therapy and Solution Focused Therapy to improve functioning, maintain stability, and avoid decompensation and the need for higher level of care.     Pt will be moving out of states and no follow ups were scheduled.       VISIT DIAGNOSIS:    Diagnosis Plan   1. SIVAKUMAR (generalized anxiety disorder)        2. MDD (major depressive disorder), recurrent episode, moderate         15:02 EST       This document has been electronically signed by CHELY Cunningham  February 5, 2025      Part of this note may be an electronic transcription/translation of spoken language to printed text using the Dragon Dictation System.

## 2025-02-14 RX ORDER — FOLIC ACID 1 MG/1
1 TABLET ORAL DAILY
Qty: 90 TABLET | Refills: 1 | Status: SHIPPED | OUTPATIENT
Start: 2025-02-14

## 2025-02-19 ENCOUNTER — TELEPHONE (OUTPATIENT)
Age: 75
End: 2025-02-19
Payer: MEDICARE

## 2025-02-19 NOTE — TELEPHONE ENCOUNTER
Caller: Shanel Wall    Relationship to patient: Self    Best call back number: [phone]    Patient is needing: DR WEAVER IS AWARE THAT PT IS MOVING AND PROMISED THAT HE WILL CONTINUE TO REFILL MEDS UNTIL SHE GETS ESTABLISHED SOMEWHERE ELSE. PT WANTS TO KNOW IF HE CAN SEND A 3 MONTH SUPPLY OF ALL MEDS TO THE [facility] ON FILE. PLEASE CALL PT TO ADVISE

## 2025-02-26 NOTE — TELEPHONE ENCOUNTER
Hub staff attempted to follow warm transfer process and was unsuccessful     Caller: Shanel Wall    Relationship to patient: Self    Best call back number: [phone]; [facility]    Patient is needing: PATIENT HAS NOT RECEIVED HER REFILL AND RECEIVED A LETTER DATED 02/20/2025 STATING THAT SHE NEEDS AN APPT. PATIENT STATED DR. BELLA IS AWARE THAT SHE IS MOVING, BUT DOES NOT KNOW WHY HER 3 MONTH REFILL HAS NOT BEEN SENT YET. HUB UNABLE TO RELAY PRIOR MESSAGE. PLEASE CALL PATIENT WITH UPDATE.

## (undated) DEVICE — KT PUMP PAIN ONQ CBLOC SELCTAFLO 400ML

## (undated) DEVICE — 3M™ TEGADERM™ CHG DRESSING 25/CARTON 4 CARTONS/CASE 1658: Brand: TEGADERM™

## (undated) DEVICE — SYS SKIN CLS DERMABOND PRINEO W/22CM MESH TP

## (undated) DEVICE — ANTIBACTERIAL UNDYED BRAIDED (POLYGLACTIN 910), SYNTHETIC ABSORBABLE SUTURE: Brand: COATED VICRYL

## (undated) DEVICE — BNDG ELAS ELITE V/CLOSE 6IN 5YD LF STRL

## (undated) DEVICE — ENCORE® LATEX MICRO SIZE 8.5, STERILE LATEX POWDER-FREE SURGICAL GLOVE: Brand: ENCORE

## (undated) DEVICE — CVR HNDL LT SURG ACCSSRY BLU STRL

## (undated) DEVICE — DRSNG PAD ABD 8X10IN STRL

## (undated) DEVICE — SHOULDER STABILIZATION KIT,                                    DISPOSABLE 12 PER BOX

## (undated) DEVICE — FLTR HME STR UNIV W/SMPL PORT

## (undated) DEVICE — GLV SURG DERMASSURE GRN LF PF 7.0

## (undated) DEVICE — NERVE BLOCK SUPPORT KIT/BLUE: Brand: MEDLINE INDUSTRIES, INC.

## (undated) DEVICE — GLV SURG TRIUMPH ORTHO W/ALOE PF LTX 7.0

## (undated) DEVICE — SPNG GZ WOVN 4X4IN 12PLY 10/BX STRL

## (undated) DEVICE — SENSR O2 OXIMAX FNGR A/ 18IN NONSTR

## (undated) DEVICE — 2000CC GUARDIAN II: Brand: GUARDIAN

## (undated) DEVICE — MEDI-VAC YANKAUER SUCTION HANDLE W/BULBOUS TIP: Brand: CARDINAL HEALTH

## (undated) DEVICE — T-MAX DISPOSABLE FACE MASK 8 PER BOX

## (undated) DEVICE — GLV SURG SIGNATURE TOUCH PF LTX 8 STRL BX/50

## (undated) DEVICE — STRYKER PERFORMANCE SERIES SAGITTAL BLADE: Brand: STRYKER PERFORMANCE SERIES

## (undated) DEVICE — GLV SURG SENSICARE W/ALOE PF LF 9 STRL

## (undated) DEVICE — PK KN TOTL 10

## (undated) DEVICE — ADAPT ST INFUS ADMIN SYR 70IN

## (undated) DEVICE — DRSNG WND GZ PAD BORDERED 4X8IN STRL

## (undated) DEVICE — ELECTRD BLD EXT EDGE 1P COAT 6.5IN STRL

## (undated) DEVICE — MEDI-VAC NON-CONDUCTIVE SUCTION TUBING: Brand: CARDINAL HEALTH

## (undated) DEVICE — UNDERCAST PADDING: Brand: DEROYAL

## (undated) DEVICE — COVER,MAYO STAND,STERILE: Brand: MEDLINE

## (undated) DEVICE — ST NERV BLCK CONT CONTIPLEX ECHO CLSD 18G 4IN

## (undated) DEVICE — GLV SURG TRIUMPH LT PF LTX 8 STRL

## (undated) DEVICE — SIGMA LCS HIGH PERFORMANCE STERILE THREADED HEADED PINS: Brand: SIGMA LCS HIGH PERFORMANCE

## (undated) DEVICE — BNDG ELAS CO-FLEX SLF ADHR 4IN5YD LF STRL

## (undated) DEVICE — PK MAJ SHLDR SPLT 10

## (undated) DEVICE — CANNULA,OXY,ADULT,SUPERSOFT,W/7'TUB,UC: Brand: MEDLINE

## (undated) DEVICE — INTENDED USE FOR SURGICAL MARKING ON INTACT SKIN, ALSO PROVIDES A PERMANENT METHOD OF IDENTIFYING OBJECTS IN THE OPERATING ROOM: Brand: WRITESITE® REGULAR TIP SKIN MARKER

## (undated) DEVICE — BNDG ELAS W/CLIP 6IN 10YD LF STRL

## (undated) DEVICE — NDL HYPO ECLPS SFTY 18G 1 1/2IN

## (undated) DEVICE — BIT DRL GLENOID UNIV 2.5MM

## (undated) DEVICE — AIRWY 90MM NO9

## (undated) DEVICE — ST PIN FIX TEMP UNIVERS REVERS W/OSTEO/GUIDE/PIN 2.4MM STRL

## (undated) DEVICE — SIGMA LCS HIGH PERFORMANCE INSTRUMENTS STERILE THREADED PINS: Brand: SIGMA LCS HIGH PERFORMANCE

## (undated) DEVICE — CANNULA,ADULT,SOFT-TOUCH,7TUBE,SC: Brand: MEDLINE

## (undated) DEVICE — LARYNG GLIDESCOPE COBALT/RANGER GVL3ST

## (undated) DEVICE — Device

## (undated) DEVICE — RECIPROCATING BLADE, DOUBLE SIDED, OFFSET  (70.0 X 0.8 X 12.5MM)

## (undated) DEVICE — SOL LR 1000ML

## (undated) DEVICE — T4 PULLOVER TOGA, LARGE